# Patient Record
Sex: FEMALE | Race: WHITE | Employment: UNEMPLOYED | URBAN - METROPOLITAN AREA
[De-identification: names, ages, dates, MRNs, and addresses within clinical notes are randomized per-mention and may not be internally consistent; named-entity substitution may affect disease eponyms.]

---

## 2017-01-04 RX ORDER — IMIPRAMINE HCL 50 MG
200 TABLET ORAL NIGHTLY
Qty: 360 TABLET | Refills: 3 | Status: SHIPPED | OUTPATIENT
Start: 2017-01-04 | End: 2017-12-04

## 2017-01-04 RX ORDER — PRAVASTATIN SODIUM 40 MG
40 TABLET ORAL NIGHTLY
Qty: 90 TABLET | Refills: 3 | OUTPATIENT
Start: 2017-01-04

## 2017-01-04 RX ORDER — FAMOTIDINE 40 MG/1
40 TABLET, FILM COATED ORAL 2 TIMES DAILY PRN
Qty: 180 TABLET | Refills: 3 | Status: SHIPPED | OUTPATIENT
Start: 2017-01-04 | End: 2017-12-23

## 2017-01-04 NOTE — TELEPHONE ENCOUNTER
Pt. Called to check status of refills she is out of meds as of yesterday. Teresa's ph. # [phone]   Nichole ph.  # [phone]   Routed to Rx

## 2017-03-02 RX ORDER — LOSARTAN POTASSIUM 25 MG/1
TABLET ORAL
Qty: 45 TABLET | Refills: 3 | OUTPATIENT
Start: 2017-03-02

## 2017-03-02 RX ORDER — FUROSEMIDE 20 MG/1
TABLET ORAL
Qty: 180 TABLET | Refills: 3 | Status: SHIPPED | OUTPATIENT
Start: 2017-03-02 | End: 2018-03-26

## 2017-03-02 RX ORDER — LOSARTAN POTASSIUM 25 MG/1
TABLET ORAL
Qty: 45 TABLET | Refills: 3 | Status: SHIPPED | OUTPATIENT
Start: 2017-03-02 | End: 2018-01-25

## 2017-03-08 ENCOUNTER — TELEPHONE (OUTPATIENT)
Dept: INTERNAL MEDICINE CLINIC | Facility: CLINIC | Age: 62
End: 2017-03-08

## 2017-03-08 ENCOUNTER — OFFICE VISIT (OUTPATIENT)
Dept: INTERNAL MEDICINE CLINIC | Facility: CLINIC | Age: 62
End: 2017-03-08

## 2017-03-08 VITALS
DIASTOLIC BLOOD PRESSURE: 74 MMHG | RESPIRATION RATE: 18 BRPM | HEIGHT: 63.5 IN | SYSTOLIC BLOOD PRESSURE: 112 MMHG | OXYGEN SATURATION: 94 % | WEIGHT: 217.63 LBS | HEART RATE: 118 BPM | BODY MASS INDEX: 38.08 KG/M2 | TEMPERATURE: 99 F

## 2017-03-08 DIAGNOSIS — E53.8 VITAMIN B12 DEFICIENCY: ICD-10-CM

## 2017-03-08 DIAGNOSIS — Z86.010 HISTORY OF COLONIC POLYPS: ICD-10-CM

## 2017-03-08 DIAGNOSIS — Z79.4 TYPE 2 DIABETES MELLITUS WITHOUT COMPLICATION, WITH LONG-TERM CURRENT USE OF INSULIN (HCC): Primary | ICD-10-CM

## 2017-03-08 DIAGNOSIS — I87.2 VENOUS INSUFFICIENCY: ICD-10-CM

## 2017-03-08 DIAGNOSIS — Z86.73 HX OF TRANSIENT ISCHEMIC ATTACK (TIA): ICD-10-CM

## 2017-03-08 DIAGNOSIS — K76.0 NAFLD (NONALCOHOLIC FATTY LIVER DISEASE): ICD-10-CM

## 2017-03-08 DIAGNOSIS — E66.01 MORBID OBESITY, UNSPECIFIED OBESITY TYPE (HCC): ICD-10-CM

## 2017-03-08 DIAGNOSIS — Z00.00 ROUTINE HEALTH MAINTENANCE: ICD-10-CM

## 2017-03-08 DIAGNOSIS — E78.00 HYPERCHOLESTEROLEMIA: ICD-10-CM

## 2017-03-08 DIAGNOSIS — D69.6 THROMBOCYTOPENIA (HCC): ICD-10-CM

## 2017-03-08 DIAGNOSIS — E11.9 TYPE 2 DIABETES MELLITUS WITHOUT COMPLICATION, WITH LONG-TERM CURRENT USE OF INSULIN (HCC): Primary | ICD-10-CM

## 2017-03-08 DIAGNOSIS — E03.8 OTHER SPECIFIED HYPOTHYROIDISM: ICD-10-CM

## 2017-03-08 PROCEDURE — 99214 OFFICE O/P EST MOD 30 MIN: CPT | Performed by: INTERNAL MEDICINE

## 2017-03-08 PROCEDURE — 99212 OFFICE O/P EST SF 10 MIN: CPT | Performed by: INTERNAL MEDICINE

## 2017-03-08 NOTE — PROGRESS NOTES
Shannon Cerrato is a 63year old female. Patient presents with:  Diabetes: 6 month check up  Hyperlipidemia  Thyroid Problem    HPI:     Here for check up. Feels well. Admits to gorging on chocolate and sweets while in [facility] last month.    Daughter Be total) by mouth daily. Disp: 1 tablet Rfl: 0   Glucose Blood In Vitro Strip 1 each by Other route 6 (six) times daily.    Disp:  Rfl:    ondansetron (ZOFRAN ODT) 8 MG Oral Tablet Dispersible Take 1 tablet (8 mg total) by mouth every 6 (six) hours as needed 94%  GENERAL: well developed, well nourished, in no apparent distress  HEENT: normal oropharynx, normal TM's  NECK: supple, no adenopathy, no bruits  LUNGS: clear to auscultation  CARDIO: RRR, normal S1S2, without murmur or gallop  GI: soft, NT, ND, NABS, hepatology. 11. Thrombocytopenia  Plts remain slightly low (143) but stable. If starts to trend downward, will refer to heme for eval    12.  Left knee OA  Had arthroscopic surgery with Dr. Royal Nice in the past.  Had cortisone injection by Dr. Darcy Gibbs in the p

## 2017-03-09 NOTE — TELEPHONE ENCOUNTER
Called patient and relayed DR. METZ message and she verbalized understanding .  Transferred to [facility] to schedule F/U laura with  in 2-3 weeks

## 2017-03-22 ENCOUNTER — OFFICE VISIT (OUTPATIENT)
Dept: INTERNAL MEDICINE CLINIC | Facility: CLINIC | Age: 62
End: 2017-03-22

## 2017-03-22 VITALS
OXYGEN SATURATION: 98 % | DIASTOLIC BLOOD PRESSURE: 78 MMHG | HEART RATE: 100 BPM | BODY MASS INDEX: 38.15 KG/M2 | HEIGHT: 63.5 IN | SYSTOLIC BLOOD PRESSURE: 140 MMHG | TEMPERATURE: 98 F | WEIGHT: 218 LBS

## 2017-03-22 DIAGNOSIS — L98.8 LESION OF SKIN OF BREAST: Primary | ICD-10-CM

## 2017-03-22 DIAGNOSIS — Z12.31 ENCOUNTER FOR SCREENING MAMMOGRAM FOR BREAST CANCER: ICD-10-CM

## 2017-03-22 PROCEDURE — 99212 OFFICE O/P EST SF 10 MIN: CPT | Performed by: INTERNAL MEDICINE

## 2017-03-22 PROCEDURE — 99213 OFFICE O/P EST LOW 20 MIN: CPT | Performed by: INTERNAL MEDICINE

## 2017-03-22 NOTE — PROGRESS NOTES
Vashti Trejo is a [address] old female. Patient presents with: Follow - Up: Right breast lesion. Patient reports no change since last visit. HPI:     Here for f/u of breast lesion. Pt states unchanged; has been scratching at scab.   No more oozing; no Blood In Vitro Strip 1 each by Other route 6 (six) times daily. Disp:  Rfl:    ondansetron (ZOFRAN ODT) 8 MG Oral Tablet Dispersible Take 1 tablet (8 mg total) by mouth every 6 (six) hours as needed for Nausea.  Place on top of the tongue to dissolve, the fluctuance    ASSESSMENT AND PLAN:     1. Lesion of skin of R breast  Lesion started out as a \"blood blister. \"  Pt squeezed it and expressed blood -- now scabbed over with some induration.  Still with small scab and thickened, indurated skin -- likely ju

## 2017-04-21 RX ORDER — ERGOCALCIFEROL 1.25 MG/1
CAPSULE ORAL
Qty: 12 CAPSULE | Refills: 3 | Status: SHIPPED | OUTPATIENT
Start: 2017-04-21 | End: 2018-05-25

## 2017-04-27 RX ORDER — POTASSIUM CHLORIDE 750 MG/1
TABLET, FILM COATED, EXTENDED RELEASE ORAL
Qty: 180 TABLET | Refills: 11 | Status: SHIPPED | OUTPATIENT
Start: 2017-04-27 | End: 2017-09-26

## 2017-05-02 ENCOUNTER — HOSPITAL ENCOUNTER (OUTPATIENT)
Dept: MAMMOGRAPHY | Facility: HOSPITAL | Age: 62
Discharge: HOME | End: 2017-05-02
Attending: INTERNAL MEDICINE
Payer: COMMERCIAL

## 2017-05-02 ENCOUNTER — HOSPITAL ENCOUNTER (OUTPATIENT)
Dept: ULTRASOUND IMAGING | Facility: HOSPITAL | Age: 62
Discharge: HOME | End: 2017-05-02
Attending: INTERNAL MEDICINE
Payer: COMMERCIAL

## 2017-05-02 DIAGNOSIS — Z12.31 ENCOUNTER FOR SCREENING MAMMOGRAM FOR BREAST CANCER: ICD-10-CM

## 2017-05-02 DIAGNOSIS — L98.8 LESION OF SKIN OF BREAST: ICD-10-CM

## 2017-05-02 PROCEDURE — 77066 DX MAMMO INCL CAD BI: CPT

## 2017-05-02 PROCEDURE — 76642 ULTRASOUND BREAST LIMITED: CPT

## 2017-05-08 ENCOUNTER — OFFICE VISIT (OUTPATIENT)
Dept: SURGERY | Facility: CLINIC | Age: 62
End: 2017-05-08

## 2017-05-08 VITALS
DIASTOLIC BLOOD PRESSURE: 73 MMHG | WEIGHT: 217.38 LBS | TEMPERATURE: 97 F | HEIGHT: 63.5 IN | HEART RATE: 109 BPM | SYSTOLIC BLOOD PRESSURE: 122 MMHG | BODY MASS INDEX: 38.04 KG/M2

## 2017-05-08 DIAGNOSIS — R79.89 ABNORMAL LIVER FUNCTION TESTS: Primary | ICD-10-CM

## 2017-05-08 NOTE — PROGRESS NOTES
[facility]  [address], [address]  [address]  [phone] ([phone]) CR, TAKE 3 TABLETS(30 MEQ) BY MOUTH TWICE DAILY, Disp: 180 tablet, Rfl: 11  •  ERGOCALCIFEROL 57130 units Oral Cap, TAKE 1 CAPSULE BY MOUTH EVERY WEEK, Disp: 12 capsule, Rfl: 3  •  Insulin Pen Needle (CARETOUCH PEN NEEDLES) 32G X 4 MM Does not apply Misc, 5  •  Meclizine HCl (ANTIVERT) 25 MG Oral Tab, Take 1 tablet (25 mg total) by mouth 3 (three) times daily as needed. , Disp: 30 tablet, Rfl: 5  •  Butalbital-APAP-Caffeine (FIORICET) -40 MG Oral Tab, Take 1-2 tablets by mouth every 4 (four) hours as n (fax)  [phone] (mobile)  [email or web address]

## 2017-05-10 ENCOUNTER — LAB ENCOUNTER (OUTPATIENT)
Dept: LAB | Facility: HOSPITAL | Age: 62
End: 2017-05-10
Attending: INTERNAL MEDICINE
Payer: COMMERCIAL

## 2017-05-10 ENCOUNTER — PRIOR ORIGINAL RECORDS (OUTPATIENT)
Dept: OTHER | Age: 62
End: 2017-05-10

## 2017-05-10 ENCOUNTER — TELEPHONE (OUTPATIENT)
Dept: SURGERY | Facility: CLINIC | Age: 62
End: 2017-05-10

## 2017-05-10 DIAGNOSIS — E03.9 MYXEDEMA HEART DISEASE: ICD-10-CM

## 2017-05-10 DIAGNOSIS — R79.89 HYPOURICEMIA: ICD-10-CM

## 2017-05-10 DIAGNOSIS — E78.2 MIXED HYPERLIPIDEMIA: ICD-10-CM

## 2017-05-10 DIAGNOSIS — E10.9 DIABETES MELLITUS TYPE I (HCC): Primary | ICD-10-CM

## 2017-05-10 DIAGNOSIS — I51.9 MYXEDEMA HEART DISEASE: ICD-10-CM

## 2017-05-10 PROCEDURE — 85049 AUTOMATED PLATELET COUNT: CPT

## 2017-05-10 PROCEDURE — 84460 ALANINE AMINO (ALT) (SGPT): CPT

## 2017-05-10 PROCEDURE — 84450 TRANSFERASE (AST) (SGOT): CPT

## 2017-05-10 PROCEDURE — 84443 ASSAY THYROID STIM HORMONE: CPT

## 2017-05-10 PROCEDURE — 82570 ASSAY OF URINE CREATININE: CPT

## 2017-05-10 PROCEDURE — 83883 ASSAY NEPHELOMETRY NOT SPEC: CPT

## 2017-05-10 PROCEDURE — 82977 ASSAY OF GGT: CPT

## 2017-05-10 PROCEDURE — 83036 HEMOGLOBIN GLYCOSYLATED A1C: CPT

## 2017-05-10 PROCEDURE — 82043 UR ALBUMIN QUANTITATIVE: CPT

## 2017-05-10 PROCEDURE — 84520 ASSAY OF UREA NITROGEN: CPT

## 2017-05-10 PROCEDURE — 80061 LIPID PANEL: CPT

## 2017-05-10 PROCEDURE — 84439 ASSAY OF FREE THYROXINE: CPT

## 2017-05-10 PROCEDURE — 36415 COLL VENOUS BLD VENIPUNCTURE: CPT

## 2017-05-10 PROCEDURE — 80053 COMPREHEN METABOLIC PANEL: CPT

## 2017-05-10 NOTE — TELEPHONE ENCOUNTER
Spoke with MD at [facility] and [facility] approved    [id number]; expiration 6/24/17    He noted ordering MD is incorrect on what they have and not sure where error was.

## 2017-05-12 ENCOUNTER — HOSPITAL ENCOUNTER (OUTPATIENT)
Dept: CT IMAGING | Facility: HOSPITAL | Age: 62
Discharge: HOME | End: 2017-05-12
Attending: INTERNAL MEDICINE
Payer: COMMERCIAL

## 2017-05-12 DIAGNOSIS — R79.89 ABNORMAL LIVER FUNCTION TESTS: ICD-10-CM

## 2017-05-12 PROCEDURE — 82565 ASSAY OF CREATININE: CPT

## 2017-05-12 PROCEDURE — 74160 CT ABDOMEN W/CONTRAST: CPT | Performed by: INTERNAL MEDICINE

## 2017-05-31 ENCOUNTER — TELEPHONE (OUTPATIENT)
Dept: INTERNAL MEDICINE CLINIC | Facility: CLINIC | Age: 62
End: 2017-05-31

## 2017-05-31 NOTE — TELEPHONE ENCOUNTER
Pt states breast lesion has healed and doing fine Had US and xray of breast and a cyst was noted to recheck in 1 yr  FYI for DR METZ to note--    Saw DR Mercedes Turner - cat scan of liver and spleen done   Enlarged liver and spleen noted  rec bx of liver   But needs t

## 2017-06-01 NOTE — TELEPHONE ENCOUNTER
Noted.  I do not see any recent CXR. Pt did have a recent CT chest and aorta was normal; there was mild calcification of aortic valve.

## 2017-06-13 ENCOUNTER — OFFICE VISIT (OUTPATIENT)
Dept: INTERNAL MEDICINE CLINIC | Facility: CLINIC | Age: 62
End: 2017-06-13

## 2017-06-13 VITALS
SYSTOLIC BLOOD PRESSURE: 120 MMHG | WEIGHT: 217 LBS | HEART RATE: 109 BPM | OXYGEN SATURATION: 97 % | TEMPERATURE: 99 F | DIASTOLIC BLOOD PRESSURE: 82 MMHG | HEIGHT: 63.5 IN | BODY MASS INDEX: 37.97 KG/M2

## 2017-06-13 DIAGNOSIS — Z86.73 HX OF TRANSIENT ISCHEMIC ATTACK (TIA): ICD-10-CM

## 2017-06-13 DIAGNOSIS — K76.0 NAFLD (NONALCOHOLIC FATTY LIVER DISEASE): Primary | ICD-10-CM

## 2017-06-13 PROCEDURE — 99213 OFFICE O/P EST LOW 20 MIN: CPT | Performed by: INTERNAL MEDICINE

## 2017-06-13 PROCEDURE — 99212 OFFICE O/P EST SF 10 MIN: CPT | Performed by: INTERNAL MEDICINE

## 2017-06-13 NOTE — PROGRESS NOTES
Joseluis Daley is a 63year old female. Patient presents with:   Follow - Up    HPI:     Seeing Dr. Robbie Dubois for her NAFLD -- recommended liver biopsy but given that pt is on Plavix, he recommended a CT abdomen and blood test -- which were inconclusive, so he U-500 KWIKPEN 500 UNIT/ML Subcutaneous Solution Pen-injector Inject 80 Units into the skin 3 (three) times daily. Disp:  Rfl: 11   Vitamin B-12 500 MCG Oral Tab Take 1 tablet (500 mcg total) by mouth daily.  Disp: 1 tablet Rfl: 0   Glucose Blood In Vitro 217 lb (98.431 kg)  BMI 37.83 kg/m2  SpO2 97%  GENERAL: well developed, well nourished, in no apparent distress  NECK: supple, no adenopathy  LUNGS: clear to auscultation  CARDIO: RRR, normal S1S2, without murmur or gallop      ASSESSMENT AND PLAN:     1.

## 2017-06-29 LAB
ALBUMIN: 3.4 G/DL
ALKALINE PHOSPHATATE(ALK PHOS): 85 IU/L
ALT (SGPT): 76 U/L
AST (SGOT): 75 U/L
BILIRUBIN TOTAL: 0.6 MG/DL
BUN: 11 MG/DL
CALCIUM: 9.7 MG/DL
CHLORIDE: 99 MEQ/L
CHOLESTEROL, TOTAL: 164 MG/DL
CREATININE, SERUM: 0.87 MG/DL
GLUCOSE: 157 MG/DL
GLUCOSE: 157 MG/DL
HDL CHOLESTEROL: 51 MG/DL
HEMOGLOBIN A1C: 11.6 %
LDL CHOLESTEROL: 90 MG/DL
NON-HDL CHOLESTEROL: 113 MG/DL
POTASSIUM, SERUM: 4.1 MEQ/L
PROTEIN, TOTAL: 7.3 G/DL
SGOT (AST): 75 IU/L
SGPT (ALT): 76 IU/L
SODIUM: 139 MEQ/L
THYROID STIMULATING HORMONE: 2.27 MLU/L
TRIGLYCERIDES: 114 MG/DL

## 2017-06-30 ENCOUNTER — PRIOR ORIGINAL RECORDS (OUTPATIENT)
Dept: OTHER | Age: 62
End: 2017-06-30

## 2017-07-04 RX ORDER — CLOPIDOGREL BISULFATE 75 MG/1
TABLET ORAL
Qty: 90 TABLET | Refills: 3 | Status: SHIPPED | OUTPATIENT
Start: 2017-07-04 | End: 2018-06-26

## 2017-07-10 ENCOUNTER — PRIOR ORIGINAL RECORDS (OUTPATIENT)
Dept: OTHER | Age: 62
End: 2017-07-10

## 2017-07-14 ENCOUNTER — PRIOR ORIGINAL RECORDS (OUTPATIENT)
Dept: OTHER | Age: 62
End: 2017-07-14

## 2017-09-05 ENCOUNTER — LAB ENCOUNTER (OUTPATIENT)
Dept: LAB | Facility: HOSPITAL | Age: 62
End: 2017-09-05
Attending: INTERNAL MEDICINE
Payer: COMMERCIAL

## 2017-09-05 ENCOUNTER — PRIOR ORIGINAL RECORDS (OUTPATIENT)
Dept: OTHER | Age: 62
End: 2017-09-05

## 2017-09-05 DIAGNOSIS — R79.89 ABNORMAL LFTS: Primary | ICD-10-CM

## 2017-09-05 LAB
ALBUMIN SERPL BCP-MCNC: 3.4 G/DL (ref 3.5–4.8)
ALP SERPL-CCNC: 82 U/L (ref 32–100)
ALT SERPL-CCNC: 55 U/L (ref 14–54)
AST SERPL-CCNC: 46 U/L (ref 15–41)
BILIRUB DIRECT SERPL-MCNC: 0.2 MG/DL (ref 0–0.2)
BILIRUB SERPL-MCNC: 0.6 MG/DL (ref 0.3–1.2)
PROT SERPL-MCNC: 7 G/DL (ref 5.9–8.4)

## 2017-09-05 PROCEDURE — 36415 COLL VENOUS BLD VENIPUNCTURE: CPT

## 2017-09-05 PROCEDURE — 80076 HEPATIC FUNCTION PANEL: CPT

## 2017-09-06 LAB
ALBUMIN: 3.4 G/DL
ALKALINE PHOSPHATATE(ALK PHOS): 82 IU/L
BILIRUBIN TOTAL: 0.6 MG/DL
PROTEIN, TOTAL: 7 G/DL
SGOT (AST): 46 IU/L
SGPT (ALT): 55 IU/L

## 2017-09-12 ENCOUNTER — MYAURORA ACCOUNT LINK (OUTPATIENT)
Dept: OTHER | Age: 62
End: 2017-09-12

## 2017-09-12 ENCOUNTER — PRIOR ORIGINAL RECORDS (OUTPATIENT)
Dept: OTHER | Age: 62
End: 2017-09-12

## 2017-09-13 ENCOUNTER — LAB ENCOUNTER (OUTPATIENT)
Dept: LAB | Facility: HOSPITAL | Age: 62
End: 2017-09-13
Attending: INTERNAL MEDICINE
Payer: COMMERCIAL

## 2017-09-13 ENCOUNTER — PRIOR ORIGINAL RECORDS (OUTPATIENT)
Dept: OTHER | Age: 62
End: 2017-09-13

## 2017-09-13 DIAGNOSIS — E11.9 DM2 (DIABETES MELLITUS, TYPE 2) (HCC): Primary | ICD-10-CM

## 2017-09-13 DIAGNOSIS — R74.8 ELEVATED LIVER ENZYMES: ICD-10-CM

## 2017-09-13 DIAGNOSIS — D69.6 THROMBOCYTOPENIA (HCC): ICD-10-CM

## 2017-09-13 DIAGNOSIS — E78.00 HYPERCHOLESTEREMIA: ICD-10-CM

## 2017-09-13 LAB
ALBUMIN SERPL BCP-MCNC: 3.5 G/DL (ref 3.5–4.8)
ALBUMIN/GLOB SERPL: 1 {RATIO} (ref 1–2)
ALP SERPL-CCNC: 79 U/L (ref 32–100)
ALT SERPL-CCNC: 63 U/L (ref 14–54)
ANION GAP SERPL CALC-SCNC: 10 MMOL/L (ref 0–18)
AST SERPL-CCNC: 50 U/L (ref 15–41)
BASOPHILS # BLD: 0 K/UL (ref 0–0.2)
BASOPHILS NFR BLD: 1 %
BILIRUB SERPL-MCNC: 0.7 MG/DL (ref 0.3–1.2)
BUN SERPL-MCNC: 8 MG/DL (ref 8–20)
BUN/CREAT SERPL: 9.4 (ref 10–20)
CALCIUM SERPL-MCNC: 9.1 MG/DL (ref 8.5–10.5)
CHLORIDE SERPL-SCNC: 99 MMOL/L (ref 95–110)
CHOLEST SERPL-MCNC: 208 MG/DL (ref 110–200)
CO2 SERPL-SCNC: 30 MMOL/L (ref 22–32)
CREAT SERPL-MCNC: 0.85 MG/DL (ref 0.5–1.5)
CREAT UR-MCNC: 186.1 MG/DL
EOSINOPHIL # BLD: 0.2 K/UL (ref 0–0.7)
EOSINOPHIL NFR BLD: 4 %
ERYTHROCYTE [DISTWIDTH] IN BLOOD BY AUTOMATED COUNT: 13.6 % (ref 11–15)
GLOBULIN PLAS-MCNC: 3.5 G/DL (ref 2.5–3.7)
GLUCOSE SERPL-MCNC: 133 MG/DL (ref 70–99)
HBA1C MFR BLD: 11.3 % (ref 4–6)
HCT VFR BLD AUTO: 44.1 % (ref 35–48)
HDLC SERPL-MCNC: 49 MG/DL
HGB BLD-MCNC: 14.8 G/DL (ref 12–16)
LDLC SERPL CALC-MCNC: 127 MG/DL (ref 0–99)
LYMPHOCYTES # BLD: 2.9 K/UL (ref 1–4)
LYMPHOCYTES NFR BLD: 54 %
MCH RBC QN AUTO: 30.4 PG (ref 27–32)
MCHC RBC AUTO-ENTMCNC: 33.6 G/DL (ref 32–37)
MCV RBC AUTO: 90.7 FL (ref 80–100)
MICROALBUMIN UR-MCNC: 58.3 MG/DL (ref 0–1.8)
MICROALBUMIN/CREAT UR: 313.3 MG/G{CREAT} (ref 0–20)
MONOCYTES # BLD: 0.6 K/UL (ref 0–1)
MONOCYTES NFR BLD: 11 %
NEUTROPHILS # BLD AUTO: 1.7 K/UL (ref 1.8–7.7)
NEUTROPHILS NFR BLD: 32 %
NONHDLC SERPL-MCNC: 159 MG/DL
OSMOLALITY UR CALC.SUM OF ELEC: 288 MOSM/KG (ref 275–295)
PLATELET # BLD AUTO: 134 K/UL (ref 140–400)
PMV BLD AUTO: 7.7 FL (ref 7.4–10.3)
POTASSIUM SERPL-SCNC: 3.3 MMOL/L (ref 3.3–5.1)
PROT SERPL-MCNC: 7 G/DL (ref 5.9–8.4)
RBC # BLD AUTO: 4.86 M/UL (ref 3.7–5.4)
SODIUM SERPL-SCNC: 139 MMOL/L (ref 136–144)
TRIGL SERPL-MCNC: 162 MG/DL (ref 1–149)
WBC # BLD AUTO: 5.5 K/UL (ref 4–11)

## 2017-09-13 PROCEDURE — 82043 UR ALBUMIN QUANTITATIVE: CPT

## 2017-09-13 PROCEDURE — 80061 LIPID PANEL: CPT

## 2017-09-13 PROCEDURE — 82570 ASSAY OF URINE CREATININE: CPT

## 2017-09-13 PROCEDURE — 80053 COMPREHEN METABOLIC PANEL: CPT

## 2017-09-13 PROCEDURE — 36415 COLL VENOUS BLD VENIPUNCTURE: CPT

## 2017-09-13 PROCEDURE — 83036 HEMOGLOBIN GLYCOSYLATED A1C: CPT

## 2017-09-13 PROCEDURE — 85025 COMPLETE CBC W/AUTO DIFF WBC: CPT

## 2017-09-14 LAB
ALBUMIN: 3.5 G/DL
ALKALINE PHOSPHATATE(ALK PHOS): 79 IU/L
ALT (SGPT): 63 U/L
AST (SGOT): 50 U/L
BILIRUBIN TOTAL: 0.7 MG/DL
BUN: 8 MG/DL
CALCIUM: 9.1 MG/DL
CHLORIDE: 99 MEQ/L
CHOLESTEROL, TOTAL: 208 MG/DL
CREATININE, SERUM: 0.85 MG/DL
GLOBULIN: 3.5 G/DL
GLUCOSE: 133 MG/DL
GLUCOSE: 133 MG/DL
HDL CHOLESTEROL: 49 MG/DL
LDL CHOLESTEROL: 127 MG/DL
POTASSIUM, SERUM: 3.3 MEQ/L
PROTEIN, TOTAL: 7 G/DL
SGOT (AST): 50 IU/L
SGPT (ALT): 63 IU/L
SODIUM: 139 MEQ/L
TRIGLYCERIDES: 162 MG/DL

## 2017-09-19 ENCOUNTER — OFFICE VISIT (OUTPATIENT)
Dept: INTERNAL MEDICINE CLINIC | Facility: CLINIC | Age: 62
End: 2017-09-19

## 2017-09-19 VITALS
DIASTOLIC BLOOD PRESSURE: 78 MMHG | HEIGHT: 63 IN | HEART RATE: 107 BPM | WEIGHT: 217 LBS | OXYGEN SATURATION: 98 % | TEMPERATURE: 100 F | BODY MASS INDEX: 38.45 KG/M2 | RESPIRATION RATE: 16 BRPM | SYSTOLIC BLOOD PRESSURE: 132 MMHG

## 2017-09-19 DIAGNOSIS — E53.8 VITAMIN B12 DEFICIENCY: ICD-10-CM

## 2017-09-19 DIAGNOSIS — Z00.00 ROUTINE HEALTH MAINTENANCE: ICD-10-CM

## 2017-09-19 DIAGNOSIS — K76.0 NAFLD (NONALCOHOLIC FATTY LIVER DISEASE): ICD-10-CM

## 2017-09-19 DIAGNOSIS — E03.8 OTHER SPECIFIED HYPOTHYROIDISM: ICD-10-CM

## 2017-09-19 DIAGNOSIS — D69.6 THROMBOCYTOPENIA (HCC): ICD-10-CM

## 2017-09-19 DIAGNOSIS — E11.9 TYPE 2 DIABETES MELLITUS WITHOUT COMPLICATION, WITH LONG-TERM CURRENT USE OF INSULIN (HCC): Primary | ICD-10-CM

## 2017-09-19 DIAGNOSIS — Z86.73 HX OF TRANSIENT ISCHEMIC ATTACK (TIA): ICD-10-CM

## 2017-09-19 DIAGNOSIS — E78.00 HYPERCHOLESTEROLEMIA: ICD-10-CM

## 2017-09-19 DIAGNOSIS — Z79.4 TYPE 2 DIABETES MELLITUS WITHOUT COMPLICATION, WITH LONG-TERM CURRENT USE OF INSULIN (HCC): Primary | ICD-10-CM

## 2017-09-19 PROCEDURE — 99215 OFFICE O/P EST HI 40 MIN: CPT | Performed by: INTERNAL MEDICINE

## 2017-09-19 PROCEDURE — 99212 OFFICE O/P EST SF 10 MIN: CPT | Performed by: INTERNAL MEDICINE

## 2017-09-19 NOTE — PROGRESS NOTES
Ellie Stephens is a 63year old female. Patient presents with: Follow - Up    HPI:     Here for 6 month check up. Saw Dr. Scott Saldana in 5/2017 for eval of her elevated liver enzymes. He did a scan and then recommmended a liver biopsy.   She saw Dr. Wicho Hall 10   HUMULIN R U-500 KWIKPEN 500 UNIT/ML Subcutaneous Solution Pen-injector Inject 80 Units into the skin 3 (three) times daily. Disp:  Rfl: 11   Vitamin B-12 500 MCG Oral Tab Take 1 tablet (500 mcg total) by mouth daily.  Disp: 1 tablet Rfl: 0   Glucose index is 38.44 kg/m².       EXAM:   /78 (BP Location: Left arm, Patient Position: Sitting, Cuff Size: adult)   Pulse 107   Temp 99.5 °F (37.5 °C) (Oral)   Resp 16   Ht 5' 3\" (1.6 m)   Wt 217 lb (98.4 kg)   SpO2 98%   BMI 38.44 kg/m²   GENERAL: well d A/B/C panel, ceruloplasmin, DONTAE, AMA normal.  Again discussed weight loss. Seeing Dr. Anjana Posey who recommended liver biopsy.   Saw Dr. Don Poon for pre-op eval -- had Echo which was overall normal. He stopped her pravachol in 7/2017, but liver transaminases onl

## 2017-09-19 NOTE — PROGRESS NOTES
Pt reported that she normally has a foot exam done at the \"diabetic expo\" every year. She was not able to go this year, but plans to go next year. This RN encouraged and offered a foot exam today.  Pt refused, stating that she will go to the expo next yea

## 2017-09-25 ENCOUNTER — OFFICE VISIT (OUTPATIENT)
Dept: SURGERY | Facility: CLINIC | Age: 62
End: 2017-09-25

## 2017-09-25 VITALS
RESPIRATION RATE: 18 BRPM | TEMPERATURE: 99 F | BODY MASS INDEX: 37.16 KG/M2 | SYSTOLIC BLOOD PRESSURE: 122 MMHG | WEIGHT: 217.63 LBS | OXYGEN SATURATION: 98 % | HEART RATE: 105 BPM | DIASTOLIC BLOOD PRESSURE: 78 MMHG | HEIGHT: 64 IN

## 2017-09-25 DIAGNOSIS — K75.81 LIVER CIRRHOSIS SECONDARY TO NASH (HCC): Primary | ICD-10-CM

## 2017-09-25 DIAGNOSIS — K74.60 LIVER CIRRHOSIS SECONDARY TO NASH (HCC): Primary | ICD-10-CM

## 2017-09-25 RX ORDER — PRAVASTATIN SODIUM 40 MG
TABLET ORAL
Qty: 90 TABLET | Refills: 0 | OUTPATIENT
Start: 2017-09-25

## 2017-09-25 NOTE — PROGRESS NOTES
[facility]  [address], [address]  [address]  [phone] ([phone]) Use: Yes                Comment: occassionaly            Medications:  •  KLOR-CON 10 10 MEQ Oral Tab CR, TAKE 3 TABLETS(30 MEQ) BY MOUTH TWICE DAILY, Disp: 180 tablet, Rfl: 11  •  ERGOCALCIFEROL 58470 units Oral Cap, TAKE 1 CAPSULE BY MOUTH EVERY WEEK, Martha Prince hours as needed for Nausea. Place on top of the tongue to dissolve, then swallow, Disp: 30 tablet, Rfl: 5  •  Meclizine HCl (ANTIVERT) 25 MG Oral Tab, Take 1 tablet (25 mg total) by mouth 3 (three) times daily as needed. , Disp: 30 tablet, Rfl: 5  •  Butalb

## 2017-09-25 NOTE — PROGRESS NOTES
[facility]  [address], [address]  [address]  [phone] ([phone]) Rfl: 3  •  LOSARTAN POTASSIUM 25 MG Oral Tab, TAKE 1/2 TABLET(12.5 MG) BY MOUTH DAILY, Disp: 45 tablet, Rfl: 3  •  famotidine 40 MG Oral Tab, Take 1 tablet (40 mg total) by mouth 2 (two) times daily as needed for Heartburn., Disp: 180 tablet, Rfl: 3  •  Im non-distended, non-tender, no hepatosplenomegaly  Derm: no rash  Neuro: A&Ox3, no asterixis  Psych: normal affect/mood     Assessment and Plan:  63year old with NAFLD, likely BLANCAS cirrhosis given her thrombocytopenia, mild splenomegaly and slightly low al

## 2017-09-25 NOTE — TELEPHONE ENCOUNTER
Pravastatin refill request denied, per last office visit note (9/19/17) pravachol stopped by Rosanne Ontiveros.

## 2017-09-26 ENCOUNTER — PATIENT MESSAGE (OUTPATIENT)
Dept: INTERNAL MEDICINE CLINIC | Facility: CLINIC | Age: 62
End: 2017-09-26

## 2017-09-26 ENCOUNTER — PRIOR ORIGINAL RECORDS (OUTPATIENT)
Dept: OTHER | Age: 62
End: 2017-09-26

## 2017-09-26 DIAGNOSIS — E87.6 HYPOKALEMIA: Primary | ICD-10-CM

## 2017-09-26 RX ORDER — FLUCONAZOLE 150 MG/1
150 TABLET ORAL ONCE
Qty: 1 TABLET | Refills: 1 | Status: SHIPPED | OUTPATIENT
Start: 2017-09-26 | End: 2017-09-26

## 2017-09-26 RX ORDER — POTASSIUM CHLORIDE 750 MG/1
40 TABLET, FILM COATED, EXTENDED RELEASE ORAL 2 TIMES DAILY
Qty: 240 TABLET | Refills: 11 | Status: SHIPPED | OUTPATIENT
Start: 2017-09-26 | End: 2018-09-11

## 2017-09-26 RX ORDER — PRAVASTATIN SODIUM 40 MG
40 TABLET ORAL NIGHTLY
Qty: 90 TABLET | Refills: 3 | Status: SHIPPED | OUTPATIENT
Start: 2017-09-26 | End: 2018-09-11

## 2017-09-26 NOTE — TELEPHONE ENCOUNTER
From: Pj Ruelas  To: Monster Murrieta MD  Sent: 9/26/2017 4:27 PM CDT  Subject: Other    Dr Dianne Kelly    . Dr Peyman Swartz did see me yesterday and confirms that i do have Cirrhosis.  He will be doing Ultrasounds/Cat Scans every 6 mos to see if there is any

## 2017-10-12 ENCOUNTER — TELEPHONE (OUTPATIENT)
Dept: INTERNAL MEDICINE CLINIC | Facility: CLINIC | Age: 62
End: 2017-10-12

## 2017-10-12 ENCOUNTER — APPOINTMENT (OUTPATIENT)
Dept: LAB | Facility: HOSPITAL | Age: 62
End: 2017-10-12
Attending: INTERNAL MEDICINE
Payer: COMMERCIAL

## 2017-10-12 DIAGNOSIS — E87.6 HYPOKALEMIA: ICD-10-CM

## 2017-10-12 DIAGNOSIS — E87.6 HYPOKALEMIA: Primary | ICD-10-CM

## 2017-10-12 PROCEDURE — 80048 BASIC METABOLIC PNL TOTAL CA: CPT

## 2017-10-12 PROCEDURE — 36415 COLL VENOUS BLD VENIPUNCTURE: CPT

## 2017-10-12 NOTE — TELEPHONE ENCOUNTER
Pt went for lab today at [facility]  Pt hoping to hear back today about her potassium if possible (not an emergency) as she is concerned it might be high. Pt can be reached at [phone].    Tasked to nursing

## 2017-10-12 NOTE — TELEPHONE ENCOUNTER
To Dr Gera Holliday advise on lab results from today. Patient hoping to hear back today.  She is concerned about her potassium level (3.8)

## 2017-10-13 NOTE — TELEPHONE ENCOUNTER
Spoke with patient and relayed Dr. Rebecca Acosta' message. Patient verbalized an understanding. Results released to [address]   Order for BMP placed per Dr. Rebecca Acosta' verbal request.

## 2017-10-18 ENCOUNTER — LAB ENCOUNTER (OUTPATIENT)
Dept: LAB | Facility: HOSPITAL | Age: 62
End: 2017-10-18
Attending: INTERNAL MEDICINE
Payer: COMMERCIAL

## 2017-10-18 DIAGNOSIS — E11.9 DIABETES MELLITUS (HCC): Primary | ICD-10-CM

## 2017-10-18 PROCEDURE — 36415 COLL VENOUS BLD VENIPUNCTURE: CPT

## 2017-10-18 PROCEDURE — 83036 HEMOGLOBIN GLYCOSYLATED A1C: CPT

## 2017-10-18 PROCEDURE — 80053 COMPREHEN METABOLIC PANEL: CPT

## 2017-10-21 ENCOUNTER — HOSPITAL ENCOUNTER (OUTPATIENT)
Dept: ULTRASOUND IMAGING | Facility: HOSPITAL | Age: 62
Discharge: HOME | End: 2017-10-21
Attending: INTERNAL MEDICINE
Payer: COMMERCIAL

## 2017-10-21 DIAGNOSIS — K75.81 LIVER CIRRHOSIS SECONDARY TO NASH (HCC): ICD-10-CM

## 2017-10-21 DIAGNOSIS — K74.60 LIVER CIRRHOSIS SECONDARY TO NASH (HCC): ICD-10-CM

## 2017-10-21 PROCEDURE — 76705 ECHO EXAM OF ABDOMEN: CPT | Performed by: INTERNAL MEDICINE

## 2017-10-25 ENCOUNTER — PATIENT MESSAGE (OUTPATIENT)
Dept: INTERNAL MEDICINE CLINIC | Facility: CLINIC | Age: 62
End: 2017-10-25

## 2017-10-25 DIAGNOSIS — E87.6 HYPOKALEMIA: Primary | ICD-10-CM

## 2017-10-26 NOTE — TELEPHONE ENCOUNTER
From: Shannon Cerrato  To:  Leticia Astorga MD  Sent: 10/25/2017 11:41 PM CDT  Subject: Test Results Question    Hello  Just a quick question  Potassium  9/13 3.3 taking 6 pills  10/12 3.8 after taking 8 pills    i had blood work done for Dr Haydee Brennan and th

## 2017-10-28 ENCOUNTER — APPOINTMENT (OUTPATIENT)
Dept: ENDOCRINOLOGY | Facility: HOSPITAL | Age: 62
End: 2017-10-28
Attending: INTERNAL MEDICINE
Payer: COMMERCIAL

## 2017-11-08 RX ORDER — IMIPRAMINE HCL 50 MG
TABLET ORAL
Qty: 360 TABLET | Refills: 3 | OUTPATIENT
Start: 2017-11-08

## 2017-11-08 NOTE — TELEPHONE ENCOUNTER
Refill request received for imipramine. Rx last authorized for refill 1/4/17 90 day supply with 3 refills:  Disp Refills Start End     Imipramine HCl 50 MG Oral Tab 360 tablet 3 1/4/2017     Sig - Route: Take 4 tablets (200 mg total) by mouth nightly.  - Or

## 2017-11-16 ENCOUNTER — APPOINTMENT (OUTPATIENT)
Dept: LAB | Facility: HOSPITAL | Age: 62
End: 2017-11-16
Attending: INTERNAL MEDICINE
Payer: COMMERCIAL

## 2017-11-16 DIAGNOSIS — E87.6 HYPOKALEMIA: ICD-10-CM

## 2017-11-16 PROCEDURE — 36415 COLL VENOUS BLD VENIPUNCTURE: CPT

## 2017-11-16 PROCEDURE — 83735 ASSAY OF MAGNESIUM: CPT

## 2017-11-16 PROCEDURE — 80048 BASIC METABOLIC PNL TOTAL CA: CPT

## 2017-11-17 ENCOUNTER — APPOINTMENT (OUTPATIENT)
Dept: ENDOCRINOLOGY | Facility: HOSPITAL | Age: 62
End: 2017-11-17
Attending: INTERNAL MEDICINE
Payer: COMMERCIAL

## 2017-12-04 RX ORDER — IMIPRAMINE HCL 50 MG
TABLET ORAL
Qty: 360 TABLET | Refills: 3 | OUTPATIENT
Start: 2017-12-04

## 2017-12-04 RX ORDER — ERGOCALCIFEROL 1.25 MG/1
CAPSULE ORAL
Qty: 12 CAPSULE | Refills: 0 | OUTPATIENT
Start: 2017-12-04

## 2017-12-04 RX ORDER — ERGOCALCIFEROL 1.25 MG/1
CAPSULE ORAL
Qty: 12 CAPSULE | Refills: 3
Start: 2017-12-04

## 2017-12-05 RX ORDER — IMIPRAMINE HCL 50 MG
200 TABLET ORAL NIGHTLY
Qty: 360 TABLET | Refills: 3 | Status: SHIPPED
Start: 2017-12-05 | End: 2018-09-11

## 2017-12-05 RX ORDER — ERGOCALCIFEROL 1.25 MG/1
CAPSULE ORAL
Qty: 12 CAPSULE | Refills: 0 | OUTPATIENT
Start: 2017-12-05

## 2017-12-05 NOTE — TELEPHONE ENCOUNTER
From: Lu Michael  Sent: 12/4/2017 10:16 PM CST  Subject: Medication Renewal Request    Lu Michael would like a refill of the following medications:     Imipramine HCl 50 MG Oral Tab Jenaro Llanes MD]   [facility] Comment[facility]

## 2017-12-08 RX ORDER — ERGOCALCIFEROL 1.25 MG/1
CAPSULE ORAL
Qty: 12 CAPSULE | Refills: 0 | OUTPATIENT
Start: 2017-12-08

## 2017-12-09 ENCOUNTER — TELEPHONE (OUTPATIENT)
Dept: INTERNAL MEDICINE CLINIC | Facility: CLINIC | Age: 62
End: 2017-12-09

## 2017-12-09 DIAGNOSIS — E83.42 HYPOMAGNESEMIA: ICD-10-CM

## 2017-12-09 DIAGNOSIS — E87.6 HYPOKALEMIA: Primary | ICD-10-CM

## 2017-12-09 RX ORDER — MELATONIN
400 2 TIMES DAILY
Qty: 30 TABLET | Refills: 0 | COMMUNITY
Start: 2017-12-09 | End: 2018-09-11

## 2017-12-11 RX ORDER — MAGNESIUM OXIDE 400 MG (241.3 MG MAGNESIUM) TABLET
400 TABLET 2 TIMES DAILY
Qty: 1 TABLET | Refills: 0 | COMMUNITY
Start: 2017-12-11 | End: 2018-09-11

## 2017-12-11 NOTE — TELEPHONE ENCOUNTER
Patient called back - relayed DR. METZ message and she verbalized understanding.  Med module updated

## 2017-12-26 RX ORDER — FAMOTIDINE 40 MG/1
TABLET, FILM COATED ORAL
Qty: 180 TABLET | Refills: 3 | Status: SHIPPED | OUTPATIENT
Start: 2017-12-26 | End: 2018-09-11

## 2017-12-29 RX ORDER — IMIPRAMINE HCL 50 MG
TABLET ORAL
Qty: 360 TABLET | Refills: 0 | OUTPATIENT
Start: 2017-12-29

## 2018-01-03 ENCOUNTER — APPOINTMENT (OUTPATIENT)
Dept: LAB | Facility: HOSPITAL | Age: 63
End: 2018-01-03
Attending: INTERNAL MEDICINE
Payer: COMMERCIAL

## 2018-01-03 DIAGNOSIS — E83.42 HYPOMAGNESEMIA: ICD-10-CM

## 2018-01-03 DIAGNOSIS — E87.6 HYPOKALEMIA: ICD-10-CM

## 2018-01-03 LAB
ANION GAP SERPL CALC-SCNC: 10 MMOL/L (ref 0–18)
BUN SERPL-MCNC: 10 MG/DL (ref 8–20)
BUN/CREAT SERPL: 12 (ref 10–20)
CALCIUM SERPL-MCNC: 9.4 MG/DL (ref 8.5–10.5)
CHLORIDE SERPL-SCNC: 99 MMOL/L (ref 95–110)
CO2 SERPL-SCNC: 31 MMOL/L (ref 22–32)
CREAT SERPL-MCNC: 0.83 MG/DL (ref 0.5–1.5)
GLUCOSE SERPL-MCNC: 120 MG/DL (ref 70–99)
MAGNESIUM SERPL-MCNC: 1.9 MG/DL (ref 1.8–2.5)
OSMOLALITY UR CALC.SUM OF ELEC: 290 MOSM/KG (ref 275–295)
POTASSIUM SERPL-SCNC: 3.7 MMOL/L (ref 3.3–5.1)
SODIUM SERPL-SCNC: 140 MMOL/L (ref 136–144)

## 2018-01-03 PROCEDURE — 80048 BASIC METABOLIC PNL TOTAL CA: CPT

## 2018-01-03 PROCEDURE — 83735 ASSAY OF MAGNESIUM: CPT

## 2018-01-03 PROCEDURE — 36415 COLL VENOUS BLD VENIPUNCTURE: CPT

## 2018-01-25 RX ORDER — LOSARTAN POTASSIUM 25 MG/1
TABLET ORAL
Qty: 45 TABLET | Refills: 3 | Status: SHIPPED | OUTPATIENT
Start: 2018-01-25 | End: 2018-09-11

## 2018-02-19 ENCOUNTER — LAB ENCOUNTER (OUTPATIENT)
Dept: LAB | Facility: HOSPITAL | Age: 63
End: 2018-02-19
Attending: INTERNAL MEDICINE
Payer: COMMERCIAL

## 2018-02-19 DIAGNOSIS — E78.2 MIXED HYPERLIPIDEMIA: Primary | ICD-10-CM

## 2018-02-19 DIAGNOSIS — E11.9 DIABETES MELLITUS, TYPE II (HCC): ICD-10-CM

## 2018-02-19 LAB
ALBUMIN SERPL BCP-MCNC: 3.5 G/DL (ref 3.5–4.8)
ALBUMIN/GLOB SERPL: 1 {RATIO} (ref 1–2)
ALP SERPL-CCNC: 78 U/L (ref 32–100)
ALT SERPL-CCNC: 61 U/L (ref 14–54)
ANION GAP SERPL CALC-SCNC: 8 MMOL/L (ref 0–18)
AST SERPL-CCNC: 52 U/L (ref 15–41)
BILIRUB SERPL-MCNC: 0.7 MG/DL (ref 0.3–1.2)
BUN SERPL-MCNC: 10 MG/DL (ref 8–20)
BUN/CREAT SERPL: 11.4 (ref 10–20)
CALCIUM SERPL-MCNC: 9.2 MG/DL (ref 8.5–10.5)
CHLORIDE SERPL-SCNC: 101 MMOL/L (ref 95–110)
CHOLEST SERPL-MCNC: 149 MG/DL (ref 110–200)
CO2 SERPL-SCNC: 31 MMOL/L (ref 22–32)
CREAT SERPL-MCNC: 0.88 MG/DL (ref 0.5–1.5)
CREAT UR-MCNC: 244 MG/DL
GLOBULIN PLAS-MCNC: 3.5 G/DL (ref 2.5–3.7)
GLUCOSE SERPL-MCNC: 108 MG/DL (ref 70–99)
HBA1C MFR BLD: 11 % (ref 4–6)
HDLC SERPL-MCNC: 46 MG/DL
LDLC SERPL CALC-MCNC: 82 MG/DL (ref 0–99)
MICROALBUMIN UR-MCNC: 1.8 MG/DL (ref 0–1.8)
MICROALBUMIN/CREAT UR: 7.4 MG/G{CREAT} (ref 0–20)
NONHDLC SERPL-MCNC: 103 MG/DL
OSMOLALITY UR CALC.SUM OF ELEC: 290 MOSM/KG (ref 275–295)
PATIENT FASTING: YES
POTASSIUM SERPL-SCNC: 3.6 MMOL/L (ref 3.3–5.1)
PROT SERPL-MCNC: 7 G/DL (ref 5.9–8.4)
SODIUM SERPL-SCNC: 140 MMOL/L (ref 136–144)
TRIGL SERPL-MCNC: 107 MG/DL (ref 1–149)

## 2018-02-19 PROCEDURE — 82043 UR ALBUMIN QUANTITATIVE: CPT

## 2018-02-19 PROCEDURE — 36415 COLL VENOUS BLD VENIPUNCTURE: CPT

## 2018-02-19 PROCEDURE — 80053 COMPREHEN METABOLIC PANEL: CPT

## 2018-02-19 PROCEDURE — 82570 ASSAY OF URINE CREATININE: CPT

## 2018-02-19 PROCEDURE — 83036 HEMOGLOBIN GLYCOSYLATED A1C: CPT

## 2018-02-19 PROCEDURE — 80061 LIPID PANEL: CPT

## 2018-03-08 DIAGNOSIS — K75.81 LIVER CIRRHOSIS SECONDARY TO NASH (HCC): ICD-10-CM

## 2018-03-08 DIAGNOSIS — K74.60 LIVER CIRRHOSIS SECONDARY TO NASH (HCC): ICD-10-CM

## 2018-03-08 DIAGNOSIS — K75.81 NASH (NONALCOHOLIC STEATOHEPATITIS): Primary | ICD-10-CM

## 2018-03-19 ENCOUNTER — LAB ENCOUNTER (OUTPATIENT)
Dept: LAB | Facility: HOSPITAL | Age: 63
End: 2018-03-19
Attending: INTERNAL MEDICINE
Payer: COMMERCIAL

## 2018-03-19 DIAGNOSIS — E78.00 HYPERCHOLESTEROLEMIA: ICD-10-CM

## 2018-03-19 DIAGNOSIS — E53.8 VITAMIN B12 DEFICIENCY: ICD-10-CM

## 2018-03-19 DIAGNOSIS — Z79.4 TYPE 2 DIABETES MELLITUS WITHOUT COMPLICATION, WITH LONG-TERM CURRENT USE OF INSULIN (HCC): ICD-10-CM

## 2018-03-19 DIAGNOSIS — E03.8 OTHER SPECIFIED HYPOTHYROIDISM: ICD-10-CM

## 2018-03-19 DIAGNOSIS — E11.9 TYPE 2 DIABETES MELLITUS WITHOUT COMPLICATION, WITH LONG-TERM CURRENT USE OF INSULIN (HCC): ICD-10-CM

## 2018-03-19 DIAGNOSIS — Z00.00 ROUTINE HEALTH MAINTENANCE: ICD-10-CM

## 2018-03-19 LAB
25(OH)D3 SERPL-MCNC: 42.6 NG/ML
ALBUMIN SERPL BCP-MCNC: 3.4 G/DL (ref 3.5–4.8)
ALBUMIN/GLOB SERPL: 0.9 {RATIO} (ref 1–2)
ALP SERPL-CCNC: 87 U/L (ref 32–100)
ALT SERPL-CCNC: 44 U/L (ref 14–54)
ANION GAP SERPL CALC-SCNC: 7 MMOL/L (ref 0–18)
AST SERPL-CCNC: 34 U/L (ref 15–41)
BASOPHILS # BLD: 0 K/UL (ref 0–0.2)
BASOPHILS NFR BLD: 1 %
BILIRUB SERPL-MCNC: 0.6 MG/DL (ref 0.3–1.2)
BUN SERPL-MCNC: 10 MG/DL (ref 8–20)
BUN/CREAT SERPL: 13 (ref 10–20)
CALCIUM SERPL-MCNC: 9.4 MG/DL (ref 8.5–10.5)
CHLORIDE SERPL-SCNC: 100 MMOL/L (ref 95–110)
CHOLEST SERPL-MCNC: 156 MG/DL (ref 110–200)
CO2 SERPL-SCNC: 31 MMOL/L (ref 22–32)
CREAT SERPL-MCNC: 0.77 MG/DL (ref 0.5–1.5)
CREAT UR-MCNC: 186.9 MG/DL
EOSINOPHIL # BLD: 0.2 K/UL (ref 0–0.7)
EOSINOPHIL NFR BLD: 4 %
ERYTHROCYTE [DISTWIDTH] IN BLOOD BY AUTOMATED COUNT: 13.2 % (ref 11–15)
GLOBULIN PLAS-MCNC: 3.9 G/DL (ref 2.5–3.7)
GLUCOSE SERPL-MCNC: 141 MG/DL (ref 70–99)
HBA1C MFR BLD: 10.9 % (ref 4–6)
HCT VFR BLD AUTO: 44.3 % (ref 35–48)
HDLC SERPL-MCNC: 51 MG/DL
HGB BLD-MCNC: 15 G/DL (ref 12–16)
LDLC SERPL CALC-MCNC: 85 MG/DL (ref 0–99)
LYMPHOCYTES # BLD: 2.6 K/UL (ref 1–4)
LYMPHOCYTES NFR BLD: 47 %
MCH RBC QN AUTO: 30.4 PG (ref 27–32)
MCHC RBC AUTO-ENTMCNC: 33.7 G/DL (ref 32–37)
MCV RBC AUTO: 90.2 FL (ref 80–100)
MICROALBUMIN UR-MCNC: 2.3 MG/DL (ref 0–1.8)
MICROALBUMIN/CREAT UR: 12.3 MG/G{CREAT} (ref 0–20)
MONOCYTES # BLD: 0.6 K/UL (ref 0–1)
MONOCYTES NFR BLD: 11 %
NEUTROPHILS # BLD AUTO: 2 K/UL (ref 1.8–7.7)
NEUTROPHILS NFR BLD: 37 %
NONHDLC SERPL-MCNC: 105 MG/DL
OSMOLALITY UR CALC.SUM OF ELEC: 287 MOSM/KG (ref 275–295)
PATIENT FASTING: YES
PLATELET # BLD AUTO: 168 K/UL (ref 140–400)
PMV BLD AUTO: 7.9 FL (ref 7.4–10.3)
POTASSIUM SERPL-SCNC: 3.7 MMOL/L (ref 3.3–5.1)
PROT SERPL-MCNC: 7.3 G/DL (ref 5.9–8.4)
RBC # BLD AUTO: 4.91 M/UL (ref 3.7–5.4)
SODIUM SERPL-SCNC: 138 MMOL/L (ref 136–144)
TRIGL SERPL-MCNC: 98 MG/DL (ref 1–149)
TSH SERPL-ACNC: 3.03 UIU/ML (ref 0.45–5.33)
VIT B12 SERPL-MCNC: 768 PG/ML (ref 181–914)
WBC # BLD AUTO: 5.5 K/UL (ref 4–11)

## 2018-03-19 PROCEDURE — 82570 ASSAY OF URINE CREATININE: CPT

## 2018-03-19 PROCEDURE — 36415 COLL VENOUS BLD VENIPUNCTURE: CPT

## 2018-03-19 PROCEDURE — 85025 COMPLETE CBC W/AUTO DIFF WBC: CPT

## 2018-03-19 PROCEDURE — 80053 COMPREHEN METABOLIC PANEL: CPT

## 2018-03-19 PROCEDURE — 80050 GENERAL HEALTH PANEL: CPT

## 2018-03-19 PROCEDURE — 82306 VITAMIN D 25 HYDROXY: CPT

## 2018-03-19 PROCEDURE — 82043 UR ALBUMIN QUANTITATIVE: CPT

## 2018-03-19 PROCEDURE — 83036 HEMOGLOBIN GLYCOSYLATED A1C: CPT

## 2018-03-19 PROCEDURE — 80061 LIPID PANEL: CPT

## 2018-03-19 PROCEDURE — 82607 VITAMIN B-12: CPT

## 2018-03-20 ENCOUNTER — OFFICE VISIT (OUTPATIENT)
Dept: INTERNAL MEDICINE CLINIC | Facility: CLINIC | Age: 63
End: 2018-03-20

## 2018-03-20 VITALS
TEMPERATURE: 98 F | BODY MASS INDEX: 36.88 KG/M2 | WEIGHT: 216 LBS | HEIGHT: 64 IN | HEART RATE: 104 BPM | DIASTOLIC BLOOD PRESSURE: 82 MMHG | SYSTOLIC BLOOD PRESSURE: 120 MMHG

## 2018-03-20 DIAGNOSIS — K76.0 NAFLD (NONALCOHOLIC FATTY LIVER DISEASE): ICD-10-CM

## 2018-03-20 DIAGNOSIS — E53.8 VITAMIN B12 DEFICIENCY: ICD-10-CM

## 2018-03-20 DIAGNOSIS — G47.33 OBSTRUCTIVE SLEEP APNEA: ICD-10-CM

## 2018-03-20 DIAGNOSIS — K21.9 GASTROESOPHAGEAL REFLUX DISEASE, ESOPHAGITIS PRESENCE NOT SPECIFIED: ICD-10-CM

## 2018-03-20 DIAGNOSIS — E03.8 OTHER SPECIFIED HYPOTHYROIDISM: ICD-10-CM

## 2018-03-20 DIAGNOSIS — Z79.4 TYPE 2 DIABETES MELLITUS WITHOUT COMPLICATION, WITH LONG-TERM CURRENT USE OF INSULIN (HCC): Primary | ICD-10-CM

## 2018-03-20 DIAGNOSIS — E83.42 HYPOMAGNESEMIA: ICD-10-CM

## 2018-03-20 DIAGNOSIS — E11.9 TYPE 2 DIABETES MELLITUS WITHOUT COMPLICATION, WITH LONG-TERM CURRENT USE OF INSULIN (HCC): Primary | ICD-10-CM

## 2018-03-20 DIAGNOSIS — Z86.010 HISTORY OF COLONIC POLYPS: ICD-10-CM

## 2018-03-20 DIAGNOSIS — E78.00 HYPERCHOLESTEROLEMIA: ICD-10-CM

## 2018-03-20 DIAGNOSIS — Z12.31 ENCOUNTER FOR SCREENING MAMMOGRAM FOR BREAST CANCER: ICD-10-CM

## 2018-03-20 DIAGNOSIS — E66.01 MORBID OBESITY (HCC): ICD-10-CM

## 2018-03-20 DIAGNOSIS — K75.81 NASH (NONALCOHOLIC STEATOHEPATITIS): ICD-10-CM

## 2018-03-20 PROCEDURE — 99213 OFFICE O/P EST LOW 20 MIN: CPT | Performed by: INTERNAL MEDICINE

## 2018-03-20 PROCEDURE — 99396 PREV VISIT EST AGE 40-64: CPT | Performed by: INTERNAL MEDICINE

## 2018-03-20 NOTE — PROGRESS NOTES
Anabel Hummel is a 63year old female. Patient presents with:  Physical: Patient is here today for a PE. She states that she has been feeling good lately. Patient notes that she is following Dr. Bakari Dean for liver cirrhosis.  She is scheduled to have a liver PEN NEEDLES) 32G X 4 MM Does not apply Misc Use to inject QID Disp: 400 each Rfl: 3   FUROSEMIDE 20 MG Oral Tab TAKE 1 TABLET BY MOUTH TWICE DAILY Disp: 180 tablet Rfl: 3   Levothyroxine Sodium 88 MCG Oral Tab Take 1 tablet by mouth every morning before br Past Surgical History:  No date: APPENDECTOMY  No date: CHOLECYSTECTOMY  No date: COLONOSCOPY  No date: COLONOSCOPY & POLYPECTOMY  No date: CORTISONE INJECTION Left      Comment: Left knee, Dr. Yves Chaney  3-7-2013: ELECTROCARDIOGRAM, COMPLETE      Comment: Lantus and Victoza and Humalog R U-500 (500 units/mL). Unable to afford or tolerate Farxiga.  HgbA1c still elevated 10.9 (was 11.2 a month ago). On Humulin R U-500 80 units TID.   Intolerant to lisinopril (nausea).  Continue losartan 12.5mg/day.  Sees opht  continue to follow.      12. Left knee OA  Had arthroscopic surgery with Dr. Alanna Blair in the past.  Had cortisone injection by Dr. lAba Peoples in the past.  Was told she will need eventual TKR.      13.  Overweight  Again discussed pt's weight and related health prob

## 2018-03-22 ENCOUNTER — LAB ENCOUNTER (OUTPATIENT)
Dept: LAB | Facility: HOSPITAL | Age: 63
End: 2018-03-22
Attending: INTERNAL MEDICINE
Payer: COMMERCIAL

## 2018-03-22 DIAGNOSIS — E83.42 HYPOMAGNESEMIA: Primary | ICD-10-CM

## 2018-03-22 LAB — MAGNESIUM SERPL-MCNC: 1.8 MG/DL (ref 1.8–2.5)

## 2018-03-22 PROCEDURE — 83735 ASSAY OF MAGNESIUM: CPT

## 2018-03-22 PROCEDURE — 36415 COLL VENOUS BLD VENIPUNCTURE: CPT

## 2018-03-27 RX ORDER — FUROSEMIDE 20 MG/1
TABLET ORAL
Qty: 180 TABLET | Refills: 3 | Status: SHIPPED | OUTPATIENT
Start: 2018-03-27 | End: 2018-09-11

## 2018-03-31 ENCOUNTER — HOSPITAL ENCOUNTER (OUTPATIENT)
Dept: ULTRASOUND IMAGING | Facility: HOSPITAL | Age: 63
Discharge: HOME | End: 2018-03-31
Attending: INTERNAL MEDICINE
Payer: COMMERCIAL

## 2018-03-31 DIAGNOSIS — K75.81 LIVER CIRRHOSIS SECONDARY TO NASH (HCC): ICD-10-CM

## 2018-03-31 DIAGNOSIS — K74.60 LIVER CIRRHOSIS SECONDARY TO NASH (HCC): ICD-10-CM

## 2018-03-31 PROCEDURE — 76705 ECHO EXAM OF ABDOMEN: CPT | Performed by: INTERNAL MEDICINE

## 2018-04-05 ENCOUNTER — LAB ENCOUNTER (OUTPATIENT)
Dept: LAB | Facility: HOSPITAL | Age: 63
End: 2018-04-05
Attending: INTERNAL MEDICINE
Payer: COMMERCIAL

## 2018-04-05 DIAGNOSIS — K74.60 LIVER CIRRHOSIS SECONDARY TO NASH (HCC): ICD-10-CM

## 2018-04-05 DIAGNOSIS — K75.81 LIVER CIRRHOSIS SECONDARY TO NASH (HCC): ICD-10-CM

## 2018-04-05 PROCEDURE — 36415 COLL VENOUS BLD VENIPUNCTURE: CPT

## 2018-04-05 PROCEDURE — 86706 HEP B SURFACE ANTIBODY: CPT

## 2018-04-05 PROCEDURE — 86708 HEPATITIS A ANTIBODY: CPT

## 2018-04-05 PROCEDURE — 86709 HEPATITIS A IGM ANTIBODY: CPT

## 2018-04-09 ENCOUNTER — OFFICE VISIT (OUTPATIENT)
Dept: SURGERY | Facility: CLINIC | Age: 63
End: 2018-04-09

## 2018-04-09 VITALS
HEIGHT: 64 IN | HEART RATE: 96 BPM | TEMPERATURE: 97 F | BODY MASS INDEX: 37.59 KG/M2 | DIASTOLIC BLOOD PRESSURE: 84 MMHG | RESPIRATION RATE: 18 BRPM | OXYGEN SATURATION: 98 % | WEIGHT: 220.19 LBS | SYSTOLIC BLOOD PRESSURE: 138 MMHG

## 2018-04-09 DIAGNOSIS — K74.60 CIRRHOSIS OF LIVER WITHOUT ASCITES, UNSPECIFIED HEPATIC CIRRHOSIS TYPE (HCC): Primary | ICD-10-CM

## 2018-04-09 NOTE — PROGRESS NOTES
[facility]  [address], [address]  [address]  [phone] ([phone]) polydipsia(-)  Neuro: loss of strength/weakness (-)    Medications:  FUROSEMIDE 20 MG Oral Tab TAKE 1 TABLET BY MOUTH TWICE DAILY Disp: 180 tablet Rfl: 3   LOSARTAN POTASSIUM 25 MG Oral Tab TAKE 1/2 TABLET(12.5 MG) BY MOUTH DAILY Disp: 45 tablet Rfl: 3   F ODT) 8 MG Oral Tablet Dispersible Take 1 tablet (8 mg total) by mouth every 6 (six) hours as needed for Nausea.  Place on top of the tongue to dissolve, then swallow Disp: 30 tablet Rfl: 5   Meclizine HCl (ANTIVERT) 25 MG Oral Tab Take 1 tablet (25 mg total follow up. 1. Cirrhosis likely to BLANCAS  Her weight loss (0257-5237) certainly helps with lessening any liver damage. Patient encouraged to continue to diet and exercise and gradually lose 5-10% percent of weight. Low MELD.  Will repeat labs in 6 months-o

## 2018-04-12 RX ORDER — PEN NEEDLE, DIABETIC 32GX 5/32"
NEEDLE, DISPOSABLE MISCELLANEOUS
Qty: 400 EACH | Refills: 3 | Status: SHIPPED | OUTPATIENT
Start: 2018-04-12 | End: 2018-09-11

## 2018-04-25 ENCOUNTER — TELEPHONE (OUTPATIENT)
Dept: INTERNAL MEDICINE CLINIC | Facility: CLINIC | Age: 63
End: 2018-04-25

## 2018-04-25 NOTE — TELEPHONE ENCOUNTER
Called patient and relayed  S message - verbalized understanding. Patient states that DR. Roxana Herrmann has her off Plavix for 5 days. DR Naheed Fleming told patient she should stay on insulin , just not the morning of procedure

## 2018-04-25 NOTE — TELEPHONE ENCOUNTER
Patient would like Dr. Raleigh Ontiveros to know that Dr. Natalya Bernard is removing 6 teeth and putting in implants,  Patient needs to be sedated. Diabetes physician Dr. Yoshi Valladares is okay with her having this done.  A1C 10.9 Dr. Yoshi Valladares said go ahead have teeth pulled and

## 2018-04-25 NOTE — TELEPHONE ENCOUNTER
The only question I can answer is about the liver enzymes -- they are normal.  Pt's cardiologist will have to answer about the Plavix.   Pt is on high dose insulin -- Dr. Haydee Brennan can answer the question about perioperative insulin

## 2018-04-25 NOTE — TELEPHONE ENCOUNTER
Dr. Arben Rios (oral & maxillofacial surgeon) called to clarify and confirm what patient reported below. He wants to ensure the following before proceeding with procedure (which has not been scheduled yet). Procedure will be done under IV anesthesia.     1. Yobani Ray

## 2018-04-30 NOTE — TELEPHONE ENCOUNTER
Patient called back again. She could not remember what the nurse told her last Wednesday. She states that Dr Laurence Driscoll has not received a phone call back from Dr Raleigh Ontiveros.   I again relayed Dr Katie Rios message to her and told her that Dr Laurence Driscoll would have

## 2018-05-24 ENCOUNTER — PATIENT MESSAGE (OUTPATIENT)
Dept: INTERNAL MEDICINE CLINIC | Facility: CLINIC | Age: 63
End: 2018-05-24

## 2018-05-24 DIAGNOSIS — R53.83 FATIGUE, UNSPECIFIED TYPE: Primary | ICD-10-CM

## 2018-05-24 DIAGNOSIS — R53.83 OTHER FATIGUE: Primary | ICD-10-CM

## 2018-05-24 NOTE — TELEPHONE ENCOUNTER
From: Jeimy Marie  To: Abdirizak Massey MD  Sent: 5/24/2018 5:23 PM CDT  Subject: Other    Dr Ren Wong    I have been extra tired and exhausted. Could my potassium or anything else be out of range.  i was wondering if i should have blood test to find

## 2018-05-25 RX ORDER — ERGOCALCIFEROL 1.25 MG/1
CAPSULE ORAL
Qty: 12 CAPSULE | Refills: 3 | Status: SHIPPED | OUTPATIENT
Start: 2018-05-25 | End: 2018-09-11

## 2018-05-25 NOTE — TELEPHONE ENCOUNTER
From: Piter Reason  To: Frederick Pendleton MD  Sent: 5/24/2018 10:49 PM CDT  Subject: Other    Maybe i should have a blood test to check if everything ok , AST ALT be in the blood test .. ..also can u do magnesium  LMK when u have sent [place] for the b

## 2018-05-25 NOTE — TELEPHONE ENCOUNTER
Per other MyChart encounter, Dr. Amanda Bolaños states, [facility],     Low potassium does not generally cause fatigue, but we can check a CMP and a CBC.  Your recent thyroid test 2 mos ago was normal, so I wouldn't repeat that.      Es.\"    Patient also requesting ma

## 2018-05-29 ENCOUNTER — LAB ENCOUNTER (OUTPATIENT)
Dept: LAB | Facility: HOSPITAL | Age: 63
End: 2018-05-29
Attending: INTERNAL MEDICINE
Payer: COMMERCIAL

## 2018-05-29 DIAGNOSIS — R53.83 OTHER FATIGUE: ICD-10-CM

## 2018-05-29 DIAGNOSIS — R53.83 FATIGUE, UNSPECIFIED TYPE: ICD-10-CM

## 2018-05-29 PROCEDURE — 85025 COMPLETE CBC W/AUTO DIFF WBC: CPT

## 2018-05-29 PROCEDURE — 36415 COLL VENOUS BLD VENIPUNCTURE: CPT

## 2018-05-29 PROCEDURE — 80053 COMPREHEN METABOLIC PANEL: CPT

## 2018-05-29 PROCEDURE — 83735 ASSAY OF MAGNESIUM: CPT

## 2018-05-30 ENCOUNTER — TELEPHONE (OUTPATIENT)
Dept: INTERNAL MEDICINE CLINIC | Facility: CLINIC | Age: 63
End: 2018-05-30

## 2018-05-30 NOTE — TELEPHONE ENCOUNTER
Had labs done yesterday - would like results released on My Chart  Can be reached at [phone] if Dr Mark Stone needs to speak with her

## 2018-06-02 ENCOUNTER — HOSPITAL ENCOUNTER (OUTPATIENT)
Dept: MAMMOGRAPHY | Age: 63
Discharge: HOME | End: 2018-06-02
Attending: INTERNAL MEDICINE
Payer: COMMERCIAL

## 2018-06-02 DIAGNOSIS — Z12.31 ENCOUNTER FOR SCREENING MAMMOGRAM FOR BREAST CANCER: ICD-10-CM

## 2018-06-02 PROCEDURE — 77067 SCR MAMMO BI INCL CAD: CPT | Performed by: INTERNAL MEDICINE

## 2018-06-26 RX ORDER — CLOPIDOGREL BISULFATE 75 MG/1
TABLET ORAL
Qty: 90 TABLET | Refills: 3 | Status: SHIPPED | OUTPATIENT
Start: 2018-06-26 | End: 2018-09-11

## 2018-07-21 ENCOUNTER — LAB ENCOUNTER (OUTPATIENT)
Dept: LAB | Age: 63
End: 2018-07-21
Attending: INTERNAL MEDICINE
Payer: COMMERCIAL

## 2018-07-21 DIAGNOSIS — E11.9 DIABETES MELLITUS (HCC): Primary | ICD-10-CM

## 2018-07-21 DIAGNOSIS — E78.2 MIXED HYPERLIPIDEMIA: ICD-10-CM

## 2018-07-21 LAB
ALBUMIN SERPL BCP-MCNC: 3.4 G/DL (ref 3.5–4.8)
ALBUMIN/GLOB SERPL: 1 {RATIO} (ref 1–2)
ALP SERPL-CCNC: 82 U/L (ref 32–100)
ALT SERPL-CCNC: 54 U/L (ref 14–54)
ANION GAP SERPL CALC-SCNC: 6 MMOL/L (ref 0–18)
AST SERPL-CCNC: 44 U/L (ref 15–41)
BILIRUB SERPL-MCNC: 0.5 MG/DL (ref 0.3–1.2)
BUN SERPL-MCNC: 8 MG/DL (ref 8–20)
BUN/CREAT SERPL: 9.3 (ref 10–20)
CALCIUM SERPL-MCNC: 9.2 MG/DL (ref 8.5–10.5)
CHLORIDE SERPL-SCNC: 104 MMOL/L (ref 95–110)
CO2 SERPL-SCNC: 31 MMOL/L (ref 22–32)
CREAT SERPL-MCNC: 0.86 MG/DL (ref 0.5–1.5)
CREAT UR-MCNC: 188.7 MG/DL
GLOBULIN PLAS-MCNC: 3.5 G/DL (ref 2.5–3.7)
GLUCOSE SERPL-MCNC: 92 MG/DL (ref 70–99)
MICROALBUMIN UR-MCNC: 1.1 MG/DL (ref 0–1.8)
MICROALBUMIN/CREAT UR: 5.8 MG/G{CREAT} (ref 0–20)
OSMOLALITY UR CALC.SUM OF ELEC: 290 MOSM/KG (ref 275–295)
PATIENT FASTING: YES
POTASSIUM SERPL-SCNC: 3.6 MMOL/L (ref 3.3–5.1)
PROT SERPL-MCNC: 6.9 G/DL (ref 5.9–8.4)
SODIUM SERPL-SCNC: 141 MMOL/L (ref 136–144)

## 2018-07-21 PROCEDURE — 82570 ASSAY OF URINE CREATININE: CPT

## 2018-07-21 PROCEDURE — 83036 HEMOGLOBIN GLYCOSYLATED A1C: CPT

## 2018-07-21 PROCEDURE — 82043 UR ALBUMIN QUANTITATIVE: CPT

## 2018-07-21 PROCEDURE — 36415 COLL VENOUS BLD VENIPUNCTURE: CPT

## 2018-07-21 PROCEDURE — 80053 COMPREHEN METABOLIC PANEL: CPT

## 2018-07-22 LAB — HBA1C MFR BLD: 10.9 % (ref 4–6)

## 2018-09-07 ENCOUNTER — LAB ENCOUNTER (OUTPATIENT)
Dept: LAB | Facility: HOSPITAL | Age: 63
End: 2018-09-07
Attending: INTERNAL MEDICINE
Payer: COMMERCIAL

## 2018-09-07 DIAGNOSIS — E87.6 HYPOKALEMIA: ICD-10-CM

## 2018-09-07 DIAGNOSIS — E53.8 VITAMIN B12 DEFICIENCY: ICD-10-CM

## 2018-09-07 DIAGNOSIS — E11.9 TYPE 2 DIABETES MELLITUS WITHOUT COMPLICATION, WITH LONG-TERM CURRENT USE OF INSULIN (HCC): ICD-10-CM

## 2018-09-07 DIAGNOSIS — E83.42 HYPOMAGNESEMIA: ICD-10-CM

## 2018-09-07 DIAGNOSIS — Z79.4 TYPE 2 DIABETES MELLITUS WITHOUT COMPLICATION, WITH LONG-TERM CURRENT USE OF INSULIN (HCC): ICD-10-CM

## 2018-09-07 DIAGNOSIS — E78.00 HYPERCHOLESTEROLEMIA: ICD-10-CM

## 2018-09-07 LAB
ALBUMIN SERPL BCP-MCNC: 3.4 G/DL (ref 3.5–4.8)
ALBUMIN/GLOB SERPL: 0.9 {RATIO} (ref 1–2)
ALP SERPL-CCNC: 91 U/L (ref 32–100)
ALT SERPL-CCNC: 56 U/L (ref 14–54)
ANION GAP SERPL CALC-SCNC: 8 MMOL/L (ref 0–18)
AST SERPL-CCNC: 47 U/L (ref 15–41)
BASOPHILS # BLD: 0 K/UL (ref 0–0.2)
BASOPHILS NFR BLD: 1 %
BILIRUB SERPL-MCNC: 0.4 MG/DL (ref 0.3–1.2)
BUN SERPL-MCNC: 9 MG/DL (ref 8–20)
BUN/CREAT SERPL: 10.6 (ref 10–20)
CALCIUM SERPL-MCNC: 9.2 MG/DL (ref 8.5–10.5)
CHLORIDE SERPL-SCNC: 100 MMOL/L (ref 95–110)
CHOLEST SERPL-MCNC: 168 MG/DL (ref 110–200)
CO2 SERPL-SCNC: 31 MMOL/L (ref 22–32)
CREAT SERPL-MCNC: 0.85 MG/DL (ref 0.5–1.5)
CREAT UR-MCNC: 215.5 MG/DL
EOSINOPHIL # BLD: 0.3 K/UL (ref 0–0.7)
EOSINOPHIL NFR BLD: 5 %
ERYTHROCYTE [DISTWIDTH] IN BLOOD BY AUTOMATED COUNT: 13.4 % (ref 11–15)
GLOBULIN PLAS-MCNC: 3.7 G/DL (ref 2.5–3.7)
GLUCOSE SERPL-MCNC: 239 MG/DL (ref 70–99)
HBA1C MFR BLD: 10.7 % (ref 4–6)
HCT VFR BLD AUTO: 44.3 % (ref 35–48)
HDLC SERPL-MCNC: 52 MG/DL
HGB BLD-MCNC: 14.6 G/DL (ref 12–16)
LDLC SERPL CALC-MCNC: 83 MG/DL (ref 0–99)
LYMPHOCYTES # BLD: 2.6 K/UL (ref 1–4)
LYMPHOCYTES NFR BLD: 44 %
MAGNESIUM SERPL-MCNC: 1.9 MG/DL (ref 1.8–2.5)
MCH RBC QN AUTO: 30.2 PG (ref 27–32)
MCHC RBC AUTO-ENTMCNC: 32.9 G/DL (ref 32–37)
MCV RBC AUTO: 91.9 FL (ref 80–100)
MICROALBUMIN UR-MCNC: 1.9 MG/DL (ref 0–1.8)
MICROALBUMIN/CREAT UR: 8.8 MG/G{CREAT} (ref 0–20)
MONOCYTES # BLD: 0.6 K/UL (ref 0–1)
MONOCYTES NFR BLD: 10 %
NEUTROPHILS # BLD AUTO: 2.4 K/UL (ref 1.8–7.7)
NEUTROPHILS NFR BLD: 40 %
NONHDLC SERPL-MCNC: 116 MG/DL
OSMOLALITY UR CALC.SUM OF ELEC: 294 MOSM/KG (ref 275–295)
PATIENT FASTING: YES
PLATELET # BLD AUTO: 148 K/UL (ref 140–400)
PMV BLD AUTO: 8.1 FL (ref 7.4–10.3)
POTASSIUM SERPL-SCNC: 3.7 MMOL/L (ref 3.3–5.1)
PROT SERPL-MCNC: 7.1 G/DL (ref 5.9–8.4)
RBC # BLD AUTO: 4.82 M/UL (ref 3.7–5.4)
SODIUM SERPL-SCNC: 139 MMOL/L (ref 136–144)
TRIGL SERPL-MCNC: 166 MG/DL (ref 1–149)
WBC # BLD AUTO: 5.9 K/UL (ref 4–11)

## 2018-09-07 PROCEDURE — 83036 HEMOGLOBIN GLYCOSYLATED A1C: CPT

## 2018-09-07 PROCEDURE — 80053 COMPREHEN METABOLIC PANEL: CPT

## 2018-09-07 PROCEDURE — 83735 ASSAY OF MAGNESIUM: CPT

## 2018-09-07 PROCEDURE — 85025 COMPLETE CBC W/AUTO DIFF WBC: CPT

## 2018-09-07 PROCEDURE — 80061 LIPID PANEL: CPT

## 2018-09-07 PROCEDURE — 82043 UR ALBUMIN QUANTITATIVE: CPT

## 2018-09-07 PROCEDURE — 82570 ASSAY OF URINE CREATININE: CPT

## 2018-09-07 PROCEDURE — 36415 COLL VENOUS BLD VENIPUNCTURE: CPT

## 2018-09-11 ENCOUNTER — OFFICE VISIT (OUTPATIENT)
Dept: INTERNAL MEDICINE CLINIC | Facility: CLINIC | Age: 63
End: 2018-09-11
Payer: COMMERCIAL

## 2018-09-11 VITALS
HEART RATE: 100 BPM | WEIGHT: 220.63 LBS | HEIGHT: 64 IN | SYSTOLIC BLOOD PRESSURE: 136 MMHG | DIASTOLIC BLOOD PRESSURE: 86 MMHG | TEMPERATURE: 98 F | OXYGEN SATURATION: 96 % | BODY MASS INDEX: 37.67 KG/M2

## 2018-09-11 DIAGNOSIS — E11.40 TYPE 2 DIABETES MELLITUS WITH DIABETIC NEUROPATHY, WITH LONG-TERM CURRENT USE OF INSULIN (HCC): Primary | ICD-10-CM

## 2018-09-11 DIAGNOSIS — E78.00 HYPERCHOLESTEROLEMIA: ICD-10-CM

## 2018-09-11 DIAGNOSIS — R22.2 CHEST WALL MASS: ICD-10-CM

## 2018-09-11 DIAGNOSIS — E03.8 OTHER SPECIFIED HYPOTHYROIDISM: ICD-10-CM

## 2018-09-11 DIAGNOSIS — K76.0 NAFLD (NONALCOHOLIC FATTY LIVER DISEASE): ICD-10-CM

## 2018-09-11 DIAGNOSIS — K75.81 NASH (NONALCOHOLIC STEATOHEPATITIS): ICD-10-CM

## 2018-09-11 DIAGNOSIS — Z79.4 TYPE 2 DIABETES MELLITUS WITH DIABETIC NEUROPATHY, WITH LONG-TERM CURRENT USE OF INSULIN (HCC): Primary | ICD-10-CM

## 2018-09-11 DIAGNOSIS — E66.01 MORBID OBESITY (HCC): ICD-10-CM

## 2018-09-11 DIAGNOSIS — D69.6 THROMBOCYTOPENIA (HCC): ICD-10-CM

## 2018-09-11 DIAGNOSIS — E83.42 HYPOMAGNESEMIA: ICD-10-CM

## 2018-09-11 DIAGNOSIS — E87.6 HYPOKALEMIA: ICD-10-CM

## 2018-09-11 DIAGNOSIS — Z00.00 ROUTINE HEALTH MAINTENANCE: ICD-10-CM

## 2018-09-11 DIAGNOSIS — Z86.010 HISTORY OF COLONIC POLYPS: ICD-10-CM

## 2018-09-11 DIAGNOSIS — Z86.73 HX OF TRANSIENT ISCHEMIC ATTACK (TIA): ICD-10-CM

## 2018-09-11 DIAGNOSIS — E53.8 VITAMIN B12 DEFICIENCY: ICD-10-CM

## 2018-09-11 DIAGNOSIS — G47.33 OBSTRUCTIVE SLEEP APNEA: ICD-10-CM

## 2018-09-11 PROCEDURE — 99212 OFFICE O/P EST SF 10 MIN: CPT | Performed by: INTERNAL MEDICINE

## 2018-09-11 PROCEDURE — 99214 OFFICE O/P EST MOD 30 MIN: CPT | Performed by: INTERNAL MEDICINE

## 2018-09-11 RX ORDER — LEVOTHYROXINE SODIUM 88 UG/1
88 TABLET ORAL
Qty: 30 TABLET | Refills: 11 | COMMUNITY
Start: 2018-09-11

## 2018-09-11 RX ORDER — IMIPRAMINE HCL 50 MG
200 TABLET ORAL NIGHTLY
Qty: 90 TABLET | Refills: 11 | COMMUNITY
Start: 2018-09-11 | End: 2018-12-24

## 2018-09-11 RX ORDER — POTASSIUM CHLORIDE 750 MG/1
TABLET, FILM COATED, EXTENDED RELEASE ORAL
Qty: 240 TABLET | Refills: 3 | Status: ON HOLD | COMMUNITY
Start: 2018-09-11 | End: 2019-01-21

## 2018-09-11 RX ORDER — INSULIN HUMAN 500 [IU]/ML
240 INJECTION, SOLUTION SUBCUTANEOUS 2 TIMES DAILY
Qty: 1 PEN | Refills: 11 | COMMUNITY
Start: 2018-09-11

## 2018-09-11 RX ORDER — METOLAZONE 2.5 MG/1
2.5 TABLET ORAL AS NEEDED
Qty: 30 TABLET | Refills: 0 | COMMUNITY
Start: 2018-09-11

## 2018-09-11 RX ORDER — PRAVASTATIN SODIUM 40 MG
40 TABLET ORAL NIGHTLY
Qty: 90 TABLET | Refills: 3 | COMMUNITY
Start: 2018-09-11 | End: 2018-10-31

## 2018-09-11 RX ORDER — MELATONIN
400 2 TIMES DAILY
Qty: 30 TABLET | Refills: 0 | COMMUNITY
Start: 2018-09-11

## 2018-09-11 RX ORDER — CLOPIDOGREL BISULFATE 75 MG/1
75 TABLET ORAL DAILY
COMMUNITY
End: 2019-06-21

## 2018-09-11 RX ORDER — TRAMADOL HYDROCHLORIDE 50 MG/1
50 TABLET ORAL EVERY 6 HOURS PRN
Qty: 30 TABLET | Refills: 0 | COMMUNITY
Start: 2018-09-11

## 2018-09-11 RX ORDER — FUROSEMIDE 20 MG/1
20 TABLET ORAL 2 TIMES DAILY
Qty: 180 TABLET | Refills: 3 | COMMUNITY
Start: 2018-09-11 | End: 2019-10-06

## 2018-09-11 RX ORDER — ERGOCALCIFEROL 1.25 MG/1
CAPSULE ORAL
Qty: 12 CAPSULE | Refills: 3 | COMMUNITY
Start: 2018-09-11 | End: 2019-07-11

## 2018-09-11 RX ORDER — BLOOD-GLUCOSE METER
EACH MISCELLANEOUS
COMMUNITY

## 2018-09-11 RX ORDER — BUTALBITAL, ACETAMINOPHEN AND CAFFEINE 50; 325; 40 MG/1; MG/1; MG/1
1 TABLET ORAL EVERY 4 HOURS PRN
COMMUNITY
End: 2019-03-12

## 2018-09-11 RX ORDER — FAMOTIDINE 40 MG/1
40 TABLET, FILM COATED ORAL 2 TIMES DAILY PRN
Qty: 180 TABLET | Refills: 3 | Status: SHIPPED | OUTPATIENT
Start: 2018-09-11 | End: 2019-09-18

## 2018-09-11 RX ORDER — MECLIZINE HYDROCHLORIDE 25 MG/1
25 TABLET ORAL 3 TIMES DAILY PRN
Qty: 30 TABLET | Refills: 5 | COMMUNITY
Start: 2018-09-11 | End: 2020-03-16

## 2018-09-11 RX ORDER — LOSARTAN POTASSIUM 25 MG/1
TABLET ORAL
Qty: 45 TABLET | Refills: 3 | COMMUNITY
Start: 2018-09-11 | End: 2019-02-08

## 2018-09-11 RX ORDER — ALBUTEROL SULFATE 90 UG/1
2 AEROSOL, METERED RESPIRATORY (INHALATION) EVERY 6 HOURS PRN
COMMUNITY
End: 2018-10-30

## 2018-09-11 RX ORDER — ONDANSETRON 8 MG/1
8 TABLET, ORALLY DISINTEGRATING ORAL EVERY 6 HOURS PRN
Qty: 30 TABLET | Refills: 0 | COMMUNITY
Start: 2018-09-11 | End: 2019-03-12

## 2018-09-11 NOTE — PROGRESS NOTES
Pj Ruelas is a 66year old female. Patient presents with:  Checkup: 6 MONTH - lab results, LLE stinging x 6 weeks, 9/2 - pt felt a snap near the left ankle while putting her shoe on, tender to touch.       HPI:   Pj Ruelas is a 66year old fema (two) times daily. Disp: 1 tablet Rfl: 0   Imipramine HCl 50 MG Oral Tab Take 4 tablets (200 mg total) by mouth nightly.  Disp: 360 tablet Rfl: 3   Potassium Chloride ER (KLOR-CON 10) 10 MEQ Oral Tab CR Take 4 tablets (40 mEq total) by mouth 2 (two) times d unspecified type diabetes mellitus without mention of complication, not stated as uncontrolled    • Unspecified sleep apnea    • Vertigo       Past Surgical History:  No date: APPENDECTOMY  No date: CHOLECYSTECTOMY  No date: COLONOSCOPY  No date: Sam Metcalf murmurs  GI: soft, NT, ND, NABS, no HSM  EXTREMITIES: no cyanosis, clubbing or edema, +2 radial pulses, +mild swelling and tenderness over mid-Achilles tendon. ASSESSMENT AND PLAN:     1.  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 Liver transaminases again slightly elevated (AST 47, ALT 56). Sees Dr. Venu Del Angel next month 10/2018. Has u/s every 6 months.      11.  Thrombocytopenia  Plts again normal (1

## 2018-09-15 RX ORDER — POTASSIUM CHLORIDE 750 MG/1
TABLET, FILM COATED, EXTENDED RELEASE ORAL
Qty: 720 TABLET | Refills: 1 | Status: SHIPPED | OUTPATIENT
Start: 2018-09-15 | End: 2019-04-10

## 2018-10-09 RX ORDER — PRAVASTATIN SODIUM 40 MG
TABLET ORAL
Qty: 90 TABLET | Refills: 3 | Status: SHIPPED | OUTPATIENT
Start: 2018-10-09 | End: 2019-12-29

## 2018-10-13 ENCOUNTER — LAB ENCOUNTER (OUTPATIENT)
Dept: LAB | Age: 63
End: 2018-10-13
Attending: NURSE PRACTITIONER
Payer: COMMERCIAL

## 2018-10-13 ENCOUNTER — HOSPITAL ENCOUNTER (OUTPATIENT)
Dept: ULTRASOUND IMAGING | Age: 63
Discharge: HOME | End: 2018-10-13
Attending: NURSE PRACTITIONER
Payer: COMMERCIAL

## 2018-10-13 DIAGNOSIS — K74.60 CIRRHOSIS OF LIVER WITHOUT ASCITES, UNSPECIFIED HEPATIC CIRRHOSIS TYPE (HCC): ICD-10-CM

## 2018-10-13 PROCEDURE — 85025 COMPLETE CBC W/AUTO DIFF WBC: CPT

## 2018-10-13 PROCEDURE — 36415 COLL VENOUS BLD VENIPUNCTURE: CPT

## 2018-10-13 PROCEDURE — 82105 ALPHA-FETOPROTEIN SERUM: CPT

## 2018-10-13 PROCEDURE — 80053 COMPREHEN METABOLIC PANEL: CPT

## 2018-10-13 PROCEDURE — 76705 ECHO EXAM OF ABDOMEN: CPT | Performed by: NURSE PRACTITIONER

## 2018-10-13 PROCEDURE — 85610 PROTHROMBIN TIME: CPT

## 2018-10-22 ENCOUNTER — OFFICE VISIT (OUTPATIENT)
Dept: SURGERY | Facility: CLINIC | Age: 63
End: 2018-10-22
Payer: COMMERCIAL

## 2018-10-22 VITALS
DIASTOLIC BLOOD PRESSURE: 81 MMHG | OXYGEN SATURATION: 98 % | RESPIRATION RATE: 16 BRPM | SYSTOLIC BLOOD PRESSURE: 145 MMHG | WEIGHT: 223 LBS | BODY MASS INDEX: 38 KG/M2 | HEART RATE: 100 BPM

## 2018-10-22 DIAGNOSIS — K75.81 LIVER CIRRHOSIS SECONDARY TO NASH (HCC): Primary | ICD-10-CM

## 2018-10-22 DIAGNOSIS — K74.60 LIVER CIRRHOSIS SECONDARY TO NASH (HCC): Primary | ICD-10-CM

## 2018-10-22 NOTE — PROGRESS NOTES
[facility]  [address], [address]  [address]  [phone] ([phone]) Jeffrey   • ELECTROCARDIOGRAM, COMPLETE  3-7-2013    scanned to media   • EXTRACTION ERUPTED TOOTH/EXR  06/01/2018   • HERNIA SURGERY     • HYSTERECTOMY     • TOOTH IMPLANTATION  07/93/7519   • UMBILICAL HERNIA REPAIR        Family History   Problem Relatio Blood In Vitro Strip, Use 1 strip by percutaneous route 4-6 times every day, Disp: 200 each, Rfl: 0  •  Imipramine HCl 50 MG Oral Tab, Take 3 tablets (150 mg total) by mouth nightly., Disp: 90 tablet, Rfl: 11  •  Insulin Pen Needle (BD PEN NEEDLE SHORT U/F affect/mood    Assessment and Plan:   66year old female with history of cirrhosis likely to BLANCAS who presents to the hepatology clinic for follow up. - Low platelets, splenomegaly; likely with cirrhosis. On plavix with stroke history.  Don't feel biopsy

## 2018-10-30 ENCOUNTER — PATIENT MESSAGE (OUTPATIENT)
Dept: INTERNAL MEDICINE CLINIC | Facility: CLINIC | Age: 63
End: 2018-10-30

## 2018-10-30 ENCOUNTER — TELEPHONE (OUTPATIENT)
Dept: INTERNAL MEDICINE CLINIC | Facility: CLINIC | Age: 63
End: 2018-10-30

## 2018-10-30 RX ORDER — ALBUTEROL SULFATE 90 UG/1
2 AEROSOL, METERED RESPIRATORY (INHALATION) EVERY 6 HOURS PRN
Qty: 1 INHALER | Refills: 0 | Status: SHIPPED | OUTPATIENT
Start: 2018-10-30 | End: 2019-11-16

## 2018-10-30 NOTE — TELEPHONE ENCOUNTER
To nursing, refill for albuterol inhaler sent to [facility] in [place]. Please let patient know. Thanks.

## 2018-10-30 NOTE — TELEPHONE ENCOUNTER
See Blued messages sent today for med clarification. Discussed with patient at length. Med module updated (dose of Imipramine was incorrect). The main thing patient was concerned about was the qty listed with certain medications on [employer].  Explained to

## 2018-10-30 NOTE — TELEPHONE ENCOUNTER
From: Shannon Cerrato  To:  Leticia Astorga MD  Sent: 10/30/2018 11:03 AM CDT  Subject: Prescription Question    Dr Bert Smiley    I have been looking at my medicine list and it looks like someone has changed that i longer use some of the drugs and changed

## 2018-10-30 NOTE — TELEPHONE ENCOUNTER
Called patient to ensure she is safe. She states she had butter on a hot pan and forgot about it when we spoke on the phone earlier. Reports there is soot everywhere, but she is okay. Air is now clear.      Patient also reports Dr. Rosalba Shi usually orders her Vent

## 2018-12-24 RX ORDER — IMIPRAMINE HCL 50 MG
TABLET ORAL
Qty: 360 TABLET | Refills: 3 | Status: SHIPPED | OUTPATIENT
Start: 2018-12-24 | End: 2019-12-27

## 2018-12-26 RX ORDER — SODIUM CHLORIDE 9 MG/ML
INJECTION, SOLUTION INTRAVENOUS CONTINUOUS
Status: CANCELLED | OUTPATIENT
Start: 2018-12-26

## 2018-12-27 ENCOUNTER — LAB ENCOUNTER (OUTPATIENT)
Dept: LAB | Age: 63
End: 2018-12-27
Attending: INTERNAL MEDICINE
Payer: COMMERCIAL

## 2018-12-27 DIAGNOSIS — E11.9 DIABETES MELLITUS (HCC): Primary | ICD-10-CM

## 2018-12-27 DIAGNOSIS — E78.5 HYPERLIPIDEMIA, UNSPECIFIED HYPERLIPIDEMIA TYPE: ICD-10-CM

## 2018-12-27 PROCEDURE — 80061 LIPID PANEL: CPT

## 2018-12-27 PROCEDURE — 36415 COLL VENOUS BLD VENIPUNCTURE: CPT

## 2018-12-27 PROCEDURE — 83036 HEMOGLOBIN GLYCOSYLATED A1C: CPT

## 2018-12-27 PROCEDURE — 80053 COMPREHEN METABOLIC PANEL: CPT

## 2019-01-21 ENCOUNTER — ANESTHESIA (OUTPATIENT)
Dept: ENDOSCOPY | Facility: HOSPITAL | Age: 64
End: 2019-01-21
Payer: COMMERCIAL

## 2019-01-21 ENCOUNTER — HOSPITAL ENCOUNTER (OUTPATIENT)
Facility: HOSPITAL | Age: 64
Setting detail: HOSPITAL OUTPATIENT SURGERY
Discharge: HOME | End: 2019-01-21
Attending: INTERNAL MEDICINE | Admitting: INTERNAL MEDICINE
Payer: COMMERCIAL

## 2019-01-21 ENCOUNTER — ANESTHESIA EVENT (OUTPATIENT)
Dept: ENDOSCOPY | Facility: HOSPITAL | Age: 64
End: 2019-01-21
Payer: COMMERCIAL

## 2019-01-21 VITALS
SYSTOLIC BLOOD PRESSURE: 118 MMHG | RESPIRATION RATE: 17 BRPM | OXYGEN SATURATION: 95 % | WEIGHT: 220 LBS | HEIGHT: 63 IN | HEART RATE: 89 BPM | DIASTOLIC BLOOD PRESSURE: 75 MMHG | BODY MASS INDEX: 38.98 KG/M2

## 2019-01-21 DIAGNOSIS — K75.81 LIVER CIRRHOSIS SECONDARY TO NASH (HCC): ICD-10-CM

## 2019-01-21 DIAGNOSIS — K74.60 LIVER CIRRHOSIS SECONDARY TO NASH (HCC): ICD-10-CM

## 2019-01-21 DIAGNOSIS — Z86.010 HISTORY OF ADENOMATOUS POLYP OF COLON: ICD-10-CM

## 2019-01-21 DIAGNOSIS — D69.6 THROMBOCYTOPENIA (HCC): ICD-10-CM

## 2019-01-21 LAB
COLONOSCOPY STUDY: NORMAL
GLUCOSE BLDC GLUCOMTR-MCNC: 243 MG/DL (ref 70–99)

## 2019-01-21 PROCEDURE — 88305 TISSUE EXAM BY PATHOLOGIST: CPT | Performed by: INTERNAL MEDICINE

## 2019-01-21 PROCEDURE — 88312 SPECIAL STAINS GROUP 1: CPT | Performed by: INTERNAL MEDICINE

## 2019-01-21 PROCEDURE — 0DB68ZX EXCISION OF STOMACH, VIA NATURAL OR ARTIFICIAL OPENING ENDOSCOPIC, DIAGNOSTIC: ICD-10-PCS | Performed by: INTERNAL MEDICINE

## 2019-01-21 PROCEDURE — 82962 GLUCOSE BLOOD TEST: CPT

## 2019-01-21 PROCEDURE — 0DBL8ZX EXCISION OF TRANSVERSE COLON, VIA NATURAL OR ARTIFICIAL OPENING ENDOSCOPIC, DIAGNOSTIC: ICD-10-PCS | Performed by: INTERNAL MEDICINE

## 2019-01-21 PROCEDURE — 0DBM8ZX EXCISION OF DESCENDING COLON, VIA NATURAL OR ARTIFICIAL OPENING ENDOSCOPIC, DIAGNOSTIC: ICD-10-PCS | Performed by: INTERNAL MEDICINE

## 2019-01-21 RX ORDER — SODIUM CHLORIDE, SODIUM LACTATE, POTASSIUM CHLORIDE, CALCIUM CHLORIDE 600; 310; 30; 20 MG/100ML; MG/100ML; MG/100ML; MG/100ML
INJECTION, SOLUTION INTRAVENOUS CONTINUOUS
Status: DISCONTINUED | OUTPATIENT
Start: 2019-01-21 | End: 2019-01-21

## 2019-01-21 RX ORDER — NALOXONE HYDROCHLORIDE 0.4 MG/ML
80 INJECTION, SOLUTION INTRAMUSCULAR; INTRAVENOUS; SUBCUTANEOUS AS NEEDED
Status: DISCONTINUED | OUTPATIENT
Start: 2019-01-21 | End: 2019-01-21

## 2019-01-21 RX ORDER — MIDAZOLAM HYDROCHLORIDE 1 MG/ML
1 INJECTION INTRAMUSCULAR; INTRAVENOUS EVERY 5 MIN PRN
Status: DISCONTINUED | OUTPATIENT
Start: 2019-01-21 | End: 2019-01-21

## 2019-01-21 RX ORDER — SODIUM CHLORIDE 0.9 % (FLUSH) 0.9 %
10 SYRINGE (ML) INJECTION AS NEEDED
Status: DISCONTINUED | OUTPATIENT
Start: 2019-01-21 | End: 2019-01-21

## 2019-01-21 RX ORDER — SODIUM CHLORIDE, SODIUM LACTATE, POTASSIUM CHLORIDE, CALCIUM CHLORIDE 600; 310; 30; 20 MG/100ML; MG/100ML; MG/100ML; MG/100ML
INJECTION, SOLUTION INTRAVENOUS CONTINUOUS PRN
Status: DISCONTINUED | OUTPATIENT
Start: 2019-01-21 | End: 2019-01-21 | Stop reason: SURG

## 2019-01-21 RX ORDER — DEXTROSE MONOHYDRATE 25 G/50ML
50 INJECTION, SOLUTION INTRAVENOUS
Status: DISCONTINUED | OUTPATIENT
Start: 2019-01-21 | End: 2019-01-21

## 2019-01-21 RX ORDER — ONDANSETRON 2 MG/ML
INJECTION INTRAMUSCULAR; INTRAVENOUS AS NEEDED
Status: DISCONTINUED | OUTPATIENT
Start: 2019-01-21 | End: 2019-01-21 | Stop reason: SURG

## 2019-01-21 RX ADMIN — SODIUM CHLORIDE, SODIUM LACTATE, POTASSIUM CHLORIDE, CALCIUM CHLORIDE: 600; 310; 30; 20 INJECTION, SOLUTION INTRAVENOUS at 07:32:00

## 2019-01-21 RX ADMIN — SODIUM CHLORIDE, SODIUM LACTATE, POTASSIUM CHLORIDE, CALCIUM CHLORIDE: 600; 310; 30; 20 INJECTION, SOLUTION INTRAVENOUS at 08:00:00

## 2019-01-21 RX ADMIN — ONDANSETRON 4 MG: 2 INJECTION INTRAMUSCULAR; INTRAVENOUS at 07:45:00

## 2019-01-21 NOTE — ANESTHESIA POSTPROCEDURE EVALUATION
Patient: Fredy Guillen    Procedure Summary     Date:  01/21/19 Room / Location:  [facility] ENDOSCOPY 01 / [facility] ENDOSCOPY    Anesthesia Start:  0732 Anesthesia Stop:      Procedures:       COLONOSCOPY (N/A )      ESOPHAGOGASTRODUODENOSCOPY (EGD) (N/A ) Diagnos

## 2019-01-21 NOTE — PROGRESS NOTES
1/21/2019  [address] [address]    Dear Franklyn Sinha,     Here are the results from your recent testing :    Here are the biopsy/pathology findings from your recent EGD (upper endoscopy) and colonoscopy:     The biopsy/patholog

## 2019-01-21 NOTE — H&P
PRE-PROCEDURE UPDATE    HPI: Jen Guevara is a 66year old female. 8/8/1955. Patient presents for a colonoscopy and gastroscopy.      ALLERGIES:   Adhesive Tape           HIVES  Acetaminophen           UNKNOWN  Amiloride                   Comment:Oth 31/59/7555   • UMBILICAL HERNIA REPAIR         PHYSICAL EXAM:  BP (!) 149/94 (BP Location: Left arm)   Pulse 106   Resp 20   Ht 5' 3\" (1.6 m)   Wt 220 lb (99.8 kg)   SpO2 98%   BMI 38.97 kg/m²   LUNGS:   Clear to auscultation.   CARDIAC:   RRR, normal S1,

## 2019-01-21 NOTE — PROGRESS NOTES
1/21/2019    Re:  Felipe Collins   8/8/1955   [id number]       Dear Kenneth Welsh,    I had the pleasure of seeing Felipe Collins for colon cancer screening due to a history of polyps and cirrhosis: screening for varices.      She underwent colonoscopy and upp

## 2019-01-21 NOTE — OPERATIVE REPORT
COLONOSCOPY AND ESOPHAGOGASTRODUODENOSCOPY REPORT    Patient Name:  Mario De Jesus Record #: [id number]  YOB: 1955  Date of Procedure: 1/21/2019    Referring physician: Natalie Peoples MD    Surgeon:  Leonardo Adhikari MD    Pre-o or hiatal hernia. There were small (grade 1) esophageal varices seen only the distal one third of the esophagus. There were no stigmata of risk of bleeding seen. The stomach demonstrated normal distensibility.   There were no retained gastric contents a

## 2019-01-21 NOTE — ANESTHESIA PREPROCEDURE EVALUATION
Anesthesia PreOp Note    HPI:     Jeimy Marie is a 66year old female who presents for preoperative consultation requested by: Ward Kumar MD    Date of Surgery: 1/21/2019    Procedure(s):  COLONOSCOPY  ESOPHAGOGASTRODUODENOSCOPY (EGD)  Indicati disorder    • Stroke [facility])    • Type II or unspecified type diabetes mellitus without mention of complication, not stated as uncontrolled    • Unspecified sleep apnea    • Vertigo        Past Surgical History:   Procedure Laterality Date   • APPENDECTOMY breakfast. Disp: 30 tablet Rfl: 11 Taking   Losartan Potassium 25 MG Oral Tab TAKE 1/2 TABLET(12.5 MG) BY MOUTH DAILY Disp: 45 tablet Rfl: 3 Taking   Magnesium Oxide 400 (240 Mg) MG Oral Tab Take 1 tablet (400 mg total) by mouth 2 (two) times daily.  Disp: infusion  Intravenous Continuous PRN Rafael LEROY CRNA     propofol (DIPRIVAN) injection  Intravenous PRN Beverley León CRNA 20 mg at 01/21/19 0736    propofol (DIPRIVAN) infusion  Intravenous Continuous PRN Beverley León CRNA Last Rate: 59.9 Never Used    Substance and Sexual Activity      Alcohol use: Yes        Comment: occassionaly      Drug use: No      Sexual activity: Not on file    Other Topics      Concerns:         Service: Not Asked        Blood Transfusions: Not Asked benefits, risks including possible dental damage if relevant, major complications, and any alternative forms of anesthetic management. All of the patient's questions were answered to the best of my ability.  The patient desires the anesthetic management a

## 2019-01-26 RX ORDER — LOSARTAN POTASSIUM 25 MG/1
TABLET ORAL
Qty: 45 TABLET | Refills: 0 | OUTPATIENT
Start: 2019-01-26

## 2019-02-08 RX ORDER — LOSARTAN POTASSIUM 25 MG/1
TABLET ORAL
Qty: 45 TABLET | Refills: 3 | Status: SHIPPED | OUTPATIENT
Start: 2019-02-08 | End: 2020-01-15

## 2019-02-08 RX ORDER — LOSARTAN POTASSIUM 25 MG/1
TABLET ORAL
Qty: 45 TABLET | Refills: 0 | OUTPATIENT
Start: 2019-02-08

## 2019-02-08 NOTE — TELEPHONE ENCOUNTER
Current refill request refused due to refill is either a duplicate request or has active refills at the pharmacy. Check previous templates.     Requested Prescriptions     Refused Prescriptions Disp Refills   • LOSARTAN POTASSIUM 25 MG Oral Tab [Pharmacy M

## 2019-02-28 VITALS
BODY MASS INDEX: 38.45 KG/M2 | WEIGHT: 217 LBS | HEART RATE: 112 BPM | SYSTOLIC BLOOD PRESSURE: 120 MMHG | OXYGEN SATURATION: 97 % | DIASTOLIC BLOOD PRESSURE: 62 MMHG | HEIGHT: 63 IN

## 2019-02-28 VITALS
BODY MASS INDEX: 38.27 KG/M2 | SYSTOLIC BLOOD PRESSURE: 138 MMHG | WEIGHT: 216 LBS | HEART RATE: 114 BPM | DIASTOLIC BLOOD PRESSURE: 80 MMHG | HEIGHT: 63 IN | OXYGEN SATURATION: 98 %

## 2019-03-05 ENCOUNTER — LAB ENCOUNTER (OUTPATIENT)
Dept: LAB | Age: 64
End: 2019-03-05
Attending: INTERNAL MEDICINE
Payer: COMMERCIAL

## 2019-03-05 DIAGNOSIS — Z00.00 ROUTINE HEALTH MAINTENANCE: ICD-10-CM

## 2019-03-05 DIAGNOSIS — E53.8 VITAMIN B12 DEFICIENCY: ICD-10-CM

## 2019-03-05 DIAGNOSIS — Z79.4 TYPE 2 DIABETES MELLITUS WITH DIABETIC NEUROPATHY, WITH LONG-TERM CURRENT USE OF INSULIN (HCC): ICD-10-CM

## 2019-03-05 DIAGNOSIS — E11.40 TYPE 2 DIABETES MELLITUS WITH DIABETIC NEUROPATHY, WITH LONG-TERM CURRENT USE OF INSULIN (HCC): ICD-10-CM

## 2019-03-05 DIAGNOSIS — E78.00 HYPERCHOLESTEROLEMIA: ICD-10-CM

## 2019-03-05 DIAGNOSIS — E83.42 HYPOMAGNESEMIA: ICD-10-CM

## 2019-03-05 LAB
ALBUMIN SERPL-MCNC: 3.1 G/DL (ref 3.4–5)
ALBUMIN/GLOB SERPL: 0.7 {RATIO} (ref 1–2)
ALP LIVER SERPL-CCNC: 107 U/L (ref 50–130)
ALT SERPL-CCNC: 60 U/L (ref 13–56)
ANION GAP SERPL CALC-SCNC: 4 MMOL/L (ref 0–18)
AST SERPL-CCNC: 43 U/L (ref 15–37)
BASOPHILS # BLD AUTO: 0.04 X10(3) UL (ref 0–0.2)
BASOPHILS NFR BLD AUTO: 0.8 %
BILIRUB SERPL-MCNC: 0.4 MG/DL (ref 0.1–2)
BUN BLD-MCNC: 10 MG/DL (ref 7–18)
BUN/CREAT SERPL: 12.3 (ref 10–20)
CALCIUM BLD-MCNC: 9 MG/DL (ref 8.5–10.1)
CHLORIDE SERPL-SCNC: 106 MMOL/L (ref 98–107)
CHOLEST SMN-MCNC: 175 MG/DL (ref ?–200)
CO2 SERPL-SCNC: 32 MMOL/L (ref 21–32)
CREAT BLD-MCNC: 0.81 MG/DL (ref 0.55–1.02)
CREAT UR-SCNC: 135 MG/DL
DEPRECATED RDW RBC AUTO: 44.3 FL (ref 35.1–46.3)
EOSINOPHIL # BLD AUTO: 0.27 X10(3) UL (ref 0–0.7)
EOSINOPHIL NFR BLD AUTO: 5.5 %
ERYTHROCYTE [DISTWIDTH] IN BLOOD BY AUTOMATED COUNT: 13.1 % (ref 11–15)
EST. AVERAGE GLUCOSE BLD GHB EST-MCNC: 266 MG/DL (ref 68–126)
GLOBULIN PLAS-MCNC: 4.3 G/DL (ref 2.8–4.4)
GLUCOSE BLD-MCNC: 150 MG/DL (ref 70–99)
HAV IGM SER QL: 2.1 MG/DL (ref 1.6–2.6)
HBA1C MFR BLD HPLC: 10.9 % (ref ?–5.7)
HCT VFR BLD AUTO: 42.9 % (ref 35–48)
HDLC SERPL-MCNC: 55 MG/DL (ref 40–59)
HGB BLD-MCNC: 14 G/DL (ref 12–16)
IMM GRANULOCYTES # BLD AUTO: 0.02 X10(3) UL (ref 0–1)
IMM GRANULOCYTES NFR BLD: 0.4 %
LDLC SERPL CALC-MCNC: 93 MG/DL (ref ?–100)
LYMPHOCYTES # BLD AUTO: 2 X10(3) UL (ref 1–4)
LYMPHOCYTES NFR BLD AUTO: 40.5 %
M PROTEIN MFR SERPL ELPH: 7.4 G/DL (ref 6.4–8.2)
MCH RBC QN AUTO: 30.4 PG (ref 26–34)
MCHC RBC AUTO-ENTMCNC: 32.6 G/DL (ref 31–37)
MCV RBC AUTO: 93.3 FL (ref 80–100)
MICROALBUMIN UR-MCNC: 1.67 MG/DL
MICROALBUMIN/CREAT 24H UR-RTO: 12.4 UG/MG (ref ?–30)
MONOCYTES # BLD AUTO: 0.54 X10(3) UL (ref 0.1–1)
MONOCYTES NFR BLD AUTO: 10.9 %
NEUTROPHILS # BLD AUTO: 2.07 X10 (3) UL (ref 1.5–7.7)
NEUTROPHILS # BLD AUTO: 2.07 X10(3) UL (ref 1.5–7.7)
NEUTROPHILS NFR BLD AUTO: 41.9 %
NONHDLC SERPL-MCNC: 120 MG/DL (ref ?–130)
OSMOLALITY SERPL CALC.SUM OF ELEC: 296 MOSM/KG (ref 275–295)
PLATELET # BLD AUTO: 165 10(3)UL (ref 150–450)
POTASSIUM SERPL-SCNC: 4.1 MMOL/L (ref 3.5–5.1)
RBC # BLD AUTO: 4.6 X10(6)UL (ref 3.8–5.3)
SODIUM SERPL-SCNC: 142 MMOL/L (ref 136–145)
TRIGL SERPL-MCNC: 135 MG/DL (ref 30–149)
TSI SER-ACNC: 2.78 MIU/ML (ref 0.36–3.74)
VIT B12 SERPL-MCNC: 755 PG/ML (ref 193–986)
VLDLC SERPL CALC-MCNC: 27 MG/DL (ref 0–30)
WBC # BLD AUTO: 4.9 X10(3) UL (ref 4–11)

## 2019-03-05 PROCEDURE — 80053 COMPREHEN METABOLIC PANEL: CPT

## 2019-03-05 PROCEDURE — 84443 ASSAY THYROID STIM HORMONE: CPT

## 2019-03-05 PROCEDURE — 82043 UR ALBUMIN QUANTITATIVE: CPT

## 2019-03-05 PROCEDURE — 83735 ASSAY OF MAGNESIUM: CPT

## 2019-03-05 PROCEDURE — 82306 VITAMIN D 25 HYDROXY: CPT

## 2019-03-05 PROCEDURE — 80061 LIPID PANEL: CPT

## 2019-03-05 PROCEDURE — 85025 COMPLETE CBC W/AUTO DIFF WBC: CPT

## 2019-03-05 PROCEDURE — 83036 HEMOGLOBIN GLYCOSYLATED A1C: CPT

## 2019-03-05 PROCEDURE — 82570 ASSAY OF URINE CREATININE: CPT

## 2019-03-05 PROCEDURE — 82607 VITAMIN B-12: CPT

## 2019-03-05 PROCEDURE — 36415 COLL VENOUS BLD VENIPUNCTURE: CPT

## 2019-03-06 ENCOUNTER — PATIENT MESSAGE (OUTPATIENT)
Dept: INTERNAL MEDICINE CLINIC | Facility: CLINIC | Age: 64
End: 2019-03-06

## 2019-03-06 LAB — 25(OH)D3 SERPL-MCNC: 39 NG/ML (ref 30–100)

## 2019-03-07 NOTE — TELEPHONE ENCOUNTER
From: Lu Michael  To: Ester Moeller MD  Sent: 3/6/2019 5:27 PM CST  Subject: Test Results Question    Hello  i just wondering in my blood test did you Order CMP for my potassium and a A1C  I do have appointment next tues.     RUPERTO  thank you  Josefa

## 2019-03-12 ENCOUNTER — OFFICE VISIT (OUTPATIENT)
Dept: INTERNAL MEDICINE CLINIC | Facility: CLINIC | Age: 64
End: 2019-03-12
Payer: COMMERCIAL

## 2019-03-12 VITALS
BODY MASS INDEX: 39.87 KG/M2 | HEART RATE: 108 BPM | HEIGHT: 63 IN | SYSTOLIC BLOOD PRESSURE: 142 MMHG | OXYGEN SATURATION: 98 % | DIASTOLIC BLOOD PRESSURE: 86 MMHG | TEMPERATURE: 99 F | WEIGHT: 225 LBS

## 2019-03-12 DIAGNOSIS — E83.42 HYPOMAGNESEMIA: ICD-10-CM

## 2019-03-12 DIAGNOSIS — Z78.0 POSTMENOPAUSE: ICD-10-CM

## 2019-03-12 DIAGNOSIS — Z79.4 TYPE 2 DIABETES MELLITUS WITH DIABETIC NEUROPATHY, WITH LONG-TERM CURRENT USE OF INSULIN (HCC): ICD-10-CM

## 2019-03-12 DIAGNOSIS — Z86.010 HISTORY OF ADENOMATOUS POLYP OF COLON: ICD-10-CM

## 2019-03-12 DIAGNOSIS — K74.60 LIVER CIRRHOSIS SECONDARY TO NASH (HCC): ICD-10-CM

## 2019-03-12 DIAGNOSIS — E11.40 TYPE 2 DIABETES MELLITUS WITH DIABETIC NEUROPATHY, WITH LONG-TERM CURRENT USE OF INSULIN (HCC): ICD-10-CM

## 2019-03-12 DIAGNOSIS — E03.8 OTHER SPECIFIED HYPOTHYROIDISM: ICD-10-CM

## 2019-03-12 DIAGNOSIS — E78.00 HYPERCHOLESTEROLEMIA: ICD-10-CM

## 2019-03-12 DIAGNOSIS — K75.81 LIVER CIRRHOSIS SECONDARY TO NASH (HCC): ICD-10-CM

## 2019-03-12 DIAGNOSIS — Z12.31 ENCOUNTER FOR SCREENING MAMMOGRAM FOR BREAST CANCER: Primary | ICD-10-CM

## 2019-03-12 DIAGNOSIS — D69.6 THROMBOCYTOPENIA (HCC): ICD-10-CM

## 2019-03-12 PROCEDURE — 99396 PREV VISIT EST AGE 40-64: CPT | Performed by: INTERNAL MEDICINE

## 2019-03-12 RX ORDER — ONDANSETRON 8 MG/1
8 TABLET, ORALLY DISINTEGRATING ORAL EVERY 6 HOURS PRN
Qty: 30 TABLET | Refills: 2 | Status: SHIPPED
Start: 2019-03-12 | End: 2020-03-16

## 2019-03-12 RX ORDER — ONDANSETRON 8 MG/1
8 TABLET, ORALLY DISINTEGRATING ORAL EVERY 6 HOURS PRN
Qty: 30 TABLET | Refills: 2 | Status: SHIPPED | OUTPATIENT
Start: 2019-03-12 | End: 2019-03-12

## 2019-03-12 RX ORDER — BUTALBITAL, ACETAMINOPHEN AND CAFFEINE 50; 325; 40 MG/1; MG/1; MG/1
1 TABLET ORAL EVERY 4 HOURS PRN
Qty: 30 TABLET | Refills: 3 | Status: SHIPPED
Start: 2019-03-12 | End: 2019-11-15

## 2019-03-12 NOTE — PROGRESS NOTES
Anatoliy Cardona is a 66year old female. Patient presents with:  Checkup: Type 2 DM pt here today for 6 month checkup; BS Reading this a.m. was about 162. Pt reports she completed EGD/COLON Jan '19 w/Dr. Charu Jackson.  She also followed up with Dr. Brooke Coffey for LT K Does not apply route. Disp:  Rfl:    Butalbital-APAP-Caffeine -40 MG Oral Tab Take 1 tablet by mouth every 4 (four) hours as needed. Not to exceed 6 tablets per 24 hours Disp:  Rfl:    Clopidogrel Bisulfate 75 MG Oral Tab Take 75 mg by mouth daily.  D Units into the skin 2 (two) times daily.    Disp: 1 pen Rfl: 11      Past Medical History:   Diagnosis Date   • Adenomatous colon polyp 09/16/2013   • Cellulitis and abscess of leg    • CVA (cerebral infarction)    • Extrinsic asthma, unspecified    • High palpitations  GI: denies abdominal pain, nausea, vomiting, diarrhea, constipation, hematochezia, or melena  NEURO: denies headaches or dizziness    EXAM:   /86   Pulse 108   Temp 98.7 °F (37.1 °C) (Oral)   Ht 5' 3\" (1.6 m)   Wt 225 lb (102.1 kg)   S normal 3/2019.     LE edema  K+ normal on KDur 40meq BID.  Cont JILL hose. Cont Lasix 20mg/day. No edema on exam today.      NAFLD, prob BLANCAS cirrhosis  Fatty liver seen on CT in 9/2013.  Iron sats, Hep A/B/C panel, ceruloplasmin, DONTAE, AMA normal.  Again di

## 2019-03-21 RX ORDER — FUROSEMIDE 20 MG/1
TABLET ORAL
Qty: 180 TABLET | Refills: 3 | Status: SHIPPED | OUTPATIENT
Start: 2019-03-21 | End: 2020-03-13

## 2019-04-10 RX ORDER — POTASSIUM CHLORIDE 750 MG/1
TABLET, FILM COATED, EXTENDED RELEASE ORAL
Qty: 720 TABLET | Refills: 1 | Status: CANCELLED | OUTPATIENT
Start: 2019-04-10

## 2019-04-11 ENCOUNTER — PATIENT MESSAGE (OUTPATIENT)
Dept: INTERNAL MEDICINE CLINIC | Facility: CLINIC | Age: 64
End: 2019-04-11

## 2019-04-11 RX ORDER — POTASSIUM CHLORIDE 750 MG/1
TABLET, FILM COATED, EXTENDED RELEASE ORAL
Qty: 720 TABLET | Refills: 0 | OUTPATIENT
Start: 2019-04-11

## 2019-04-11 RX ORDER — POTASSIUM CHLORIDE 750 MG/1
40 TABLET, FILM COATED, EXTENDED RELEASE ORAL 2 TIMES DAILY
Qty: 720 TABLET | Refills: 3 | Status: SHIPPED | OUTPATIENT
Start: 2019-04-11 | End: 2020-03-16

## 2019-04-11 NOTE — TELEPHONE ENCOUNTER
Pt called for status of refill of Klor-Con 10 MEQ (must be this brand)  Pt has been out of medication for 3 days  Please call to advise  Tasked/printed for [place]

## 2019-04-12 ENCOUNTER — LAB ENCOUNTER (OUTPATIENT)
Dept: LAB | Age: 64
End: 2019-04-12
Attending: INTERNAL MEDICINE
Payer: COMMERCIAL

## 2019-04-12 ENCOUNTER — HOSPITAL ENCOUNTER (OUTPATIENT)
Dept: ULTRASOUND IMAGING | Age: 64
Discharge: HOME | End: 2019-04-12
Attending: INTERNAL MEDICINE
Payer: COMMERCIAL

## 2019-04-12 DIAGNOSIS — K74.60 LIVER CIRRHOSIS SECONDARY TO NASH (HCC): ICD-10-CM

## 2019-04-12 DIAGNOSIS — K75.81 LIVER CIRRHOSIS SECONDARY TO NASH (HCC): ICD-10-CM

## 2019-04-12 PROCEDURE — 80053 COMPREHEN METABOLIC PANEL: CPT

## 2019-04-12 PROCEDURE — 76705 ECHO EXAM OF ABDOMEN: CPT | Performed by: INTERNAL MEDICINE

## 2019-04-12 PROCEDURE — 36415 COLL VENOUS BLD VENIPUNCTURE: CPT

## 2019-04-12 PROCEDURE — 82105 ALPHA-FETOPROTEIN SERUM: CPT

## 2019-04-12 PROCEDURE — 85025 COMPLETE CBC W/AUTO DIFF WBC: CPT

## 2019-04-22 ENCOUNTER — OFFICE VISIT (OUTPATIENT)
Dept: SURGERY | Facility: CLINIC | Age: 64
End: 2019-04-22
Payer: COMMERCIAL

## 2019-04-22 VITALS
OXYGEN SATURATION: 97 % | HEART RATE: 116 BPM | WEIGHT: 225 LBS | BODY MASS INDEX: 40 KG/M2 | RESPIRATION RATE: 16 BRPM | DIASTOLIC BLOOD PRESSURE: 82 MMHG | SYSTOLIC BLOOD PRESSURE: 128 MMHG

## 2019-04-22 DIAGNOSIS — K75.81 LIVER CIRRHOSIS SECONDARY TO NASH (HCC): Primary | ICD-10-CM

## 2019-04-22 DIAGNOSIS — K74.60 LIVER CIRRHOSIS SECONDARY TO NASH (HCC): Primary | ICD-10-CM

## 2019-04-22 NOTE — PROGRESS NOTES
[facility] at [facility]  [address] [facility] [address], [address]  [address]  [phone] ([phone]) mellitus without mention of complication, not stated as uncontrolled    • Unspecified sleep apnea    • Vertigo       Past Surgical History:   Procedure Laterality Date   • APPENDECTOMY     • CHOLECYSTECTOMY     • COLONOSCOPY  01/2019   • COLONOSCOPY N/A 1/ TABLETS(200 MG) BY MOUTH EVERY NIGHT, Disp: 360 tablet, Rfl: 3  •  Albuterol Sulfate HFA (VENTOLIN HFA) 108 (90 Base) MCG/ACT Inhalation Aero Soln, Inhale 2 puffs into the lungs every 6 (six) hours as needed for Wheezing., Disp: 1 Inhaler, Rfl: 0  •  PRAVA hours as needed. , Disp: 30 tablet, Rfl: 0  •  HUMULIN R U-500 KWIKPEN 500 UNIT/ML Subcutaneous Solution Pen-injector, Inject 240 Units into the skin 2 (two) times daily.   , Disp: 1 pen, Rfl: 11      Exam:  Vitals per chart  Gen: NAD  HEENT: Anicteric  Lymp

## 2019-05-01 RX ORDER — ERGOCALCIFEROL 1.25 MG/1
CAPSULE ORAL
Qty: 13 CAPSULE | Refills: 3 | OUTPATIENT
Start: 2019-05-01

## 2019-05-01 NOTE — TELEPHONE ENCOUNTER
Current refill request refused due to refill is either a duplicate request or has active refills at the pharmacy. Check previous templates.     Requested Prescriptions     Refused Prescriptions Disp Refills   • ERGOCALCIFEROL 91052 units Oral Cap [facility]

## 2019-05-03 ENCOUNTER — LAB ENCOUNTER (OUTPATIENT)
Dept: LAB | Age: 64
End: 2019-05-03
Attending: INTERNAL MEDICINE
Payer: COMMERCIAL

## 2019-05-03 DIAGNOSIS — E11.9 DIABETES MELLITUS (HCC): Primary | ICD-10-CM

## 2019-05-03 DIAGNOSIS — E78.2 MIXED HYPERLIPIDEMIA: ICD-10-CM

## 2019-05-03 PROCEDURE — 82570 ASSAY OF URINE CREATININE: CPT

## 2019-05-03 PROCEDURE — 83036 HEMOGLOBIN GLYCOSYLATED A1C: CPT

## 2019-05-03 PROCEDURE — 82043 UR ALBUMIN QUANTITATIVE: CPT

## 2019-05-03 PROCEDURE — 36415 COLL VENOUS BLD VENIPUNCTURE: CPT

## 2019-05-03 PROCEDURE — 80053 COMPREHEN METABOLIC PANEL: CPT

## 2019-05-03 PROCEDURE — 80061 LIPID PANEL: CPT

## 2019-06-04 ENCOUNTER — PATIENT MESSAGE (OUTPATIENT)
Dept: INTERNAL MEDICINE CLINIC | Facility: CLINIC | Age: 64
End: 2019-06-04

## 2019-06-04 ENCOUNTER — HOSPITAL ENCOUNTER (OUTPATIENT)
Dept: BONE DENSITY | Age: 64
Discharge: HOME | End: 2019-06-04
Attending: INTERNAL MEDICINE
Payer: COMMERCIAL

## 2019-06-04 ENCOUNTER — HOSPITAL ENCOUNTER (OUTPATIENT)
Dept: MAMMOGRAPHY | Age: 64
Discharge: HOME | End: 2019-06-04
Attending: INTERNAL MEDICINE
Payer: COMMERCIAL

## 2019-06-04 DIAGNOSIS — Z78.0 POSTMENOPAUSE: ICD-10-CM

## 2019-06-04 DIAGNOSIS — Z12.31 ENCOUNTER FOR SCREENING MAMMOGRAM FOR BREAST CANCER: ICD-10-CM

## 2019-06-04 PROCEDURE — 77067 SCR MAMMO BI INCL CAD: CPT | Performed by: INTERNAL MEDICINE

## 2019-06-04 PROCEDURE — 77080 DXA BONE DENSITY AXIAL: CPT | Performed by: INTERNAL MEDICINE

## 2019-06-04 PROCEDURE — 77063 BREAST TOMOSYNTHESIS BI: CPT | Performed by: INTERNAL MEDICINE

## 2019-06-04 NOTE — TELEPHONE ENCOUNTER
From: Royden Nageotte  To: Melissa Stafford MD  Sent: 6/4/2019 2:57 PM CDT  Subject: Test Results Question    Nancy Griffiths [phone] cell      Hello  I went to [place] today and had my mammogram done at [address].   i did get results by though

## 2019-06-06 ENCOUNTER — TELEPHONE (OUTPATIENT)
Dept: INTERNAL MEDICINE CLINIC | Facility: CLINIC | Age: 64
End: 2019-06-06

## 2019-06-06 NOTE — TELEPHONE ENCOUNTER
I spoke with patient and relayed Dr. Cooper Rob message. She verbalized understanding and says she is scheduled with [facility] breast clinic. She asked that her DEXA be released to [facility] and this was also done.

## 2019-06-06 NOTE — TELEPHONE ENCOUNTER
Patient calling, would like to talk to Dr. Carin Barbour regarding Mammogram results. Hospital stating she has mass, would like patient to have additional views. She would like to talk to Dr. Carin Barbour regarding this.     Also asking for Bone Density done 6/4 r

## 2019-06-06 NOTE — TELEPHONE ENCOUNTER
Question of a mass in the left breast so they need additional mammo views and an ultrasound to further evaluate. Could just be dense breast tissue but need to evaluate further.   DEXA was normal

## 2019-06-06 NOTE — TELEPHONE ENCOUNTER
Regarding: Test Results Question  Contact: [phone]  ----- Message from Slime Figueroa sent at 6/6/2019 10:27 AM CDT -----       ----- Message from Fortunato Rondon to Kelby Nelson MD sent at 6/4/2019  2:57 PM -----   Moni Rosales [phone] cell

## 2019-06-19 ENCOUNTER — HOSPITAL ENCOUNTER (OUTPATIENT)
Dept: ULTRASOUND IMAGING | Facility: HOSPITAL | Age: 64
Discharge: HOME | End: 2019-06-19
Attending: INTERNAL MEDICINE
Payer: COMMERCIAL

## 2019-06-19 ENCOUNTER — HOSPITAL ENCOUNTER (OUTPATIENT)
Dept: MAMMOGRAPHY | Facility: HOSPITAL | Age: 64
Discharge: HOME | End: 2019-06-19
Attending: INTERNAL MEDICINE
Payer: COMMERCIAL

## 2019-06-19 DIAGNOSIS — R92.8 ABNORMAL MAMMOGRAM: ICD-10-CM

## 2019-06-19 PROCEDURE — 77061 BREAST TOMOSYNTHESIS UNI: CPT | Performed by: INTERNAL MEDICINE

## 2019-06-19 PROCEDURE — 76642 ULTRASOUND BREAST LIMITED: CPT | Performed by: INTERNAL MEDICINE

## 2019-06-19 PROCEDURE — 77065 DX MAMMO INCL CAD UNI: CPT | Performed by: INTERNAL MEDICINE

## 2019-06-21 RX ORDER — CLOPIDOGREL BISULFATE 75 MG/1
TABLET ORAL
Qty: 90 TABLET | Refills: 3 | Status: SHIPPED | OUTPATIENT
Start: 2019-06-21 | End: 2020-05-11

## 2019-06-25 ENCOUNTER — TELEPHONE (OUTPATIENT)
Dept: INTERNAL MEDICINE CLINIC | Facility: CLINIC | Age: 64
End: 2019-06-25

## 2019-07-12 RX ORDER — ERGOCALCIFEROL 1.25 MG/1
CAPSULE ORAL
Qty: 12 CAPSULE | Refills: 3 | Status: SHIPPED | OUTPATIENT
Start: 2019-07-12 | End: 2020-06-12

## 2019-08-28 ENCOUNTER — LAB ENCOUNTER (OUTPATIENT)
Dept: LAB | Facility: HOSPITAL | Age: 64
End: 2019-08-28
Attending: INTERNAL MEDICINE
Payer: COMMERCIAL

## 2019-08-28 DIAGNOSIS — E11.9 DIABETES MELLITUS (HCC): Primary | ICD-10-CM

## 2019-08-28 LAB
ALBUMIN SERPL-MCNC: 3.2 G/DL (ref 3.4–5)
ALBUMIN/GLOB SERPL: 0.8 {RATIO} (ref 1–2)
ALP LIVER SERPL-CCNC: 86 U/L (ref 50–130)
ALT SERPL-CCNC: 46 U/L (ref 13–56)
ANION GAP SERPL CALC-SCNC: 6 MMOL/L (ref 0–18)
AST SERPL-CCNC: 34 U/L (ref 15–37)
BILIRUB SERPL-MCNC: 0.5 MG/DL (ref 0.1–2)
BUN BLD-MCNC: 14 MG/DL (ref 7–18)
BUN/CREAT SERPL: 14.9 (ref 10–20)
CALCIUM BLD-MCNC: 9 MG/DL (ref 8.5–10.1)
CHLORIDE SERPL-SCNC: 105 MMOL/L (ref 98–112)
CO2 SERPL-SCNC: 31 MMOL/L (ref 21–32)
CREAT BLD-MCNC: 0.94 MG/DL (ref 0.55–1.02)
EST. AVERAGE GLUCOSE BLD GHB EST-MCNC: 249 MG/DL (ref 68–126)
GLOBULIN PLAS-MCNC: 4 G/DL (ref 2.8–4.4)
GLUCOSE BLD-MCNC: 167 MG/DL (ref 70–99)
HBA1C MFR BLD HPLC: 10.3 % (ref ?–5.7)
M PROTEIN MFR SERPL ELPH: 7.2 G/DL (ref 6.4–8.2)
OSMOLALITY SERPL CALC.SUM OF ELEC: 298 MOSM/KG (ref 275–295)
PATIENT FASTING: YES
POTASSIUM SERPL-SCNC: 3.9 MMOL/L (ref 3.5–5.1)
SODIUM SERPL-SCNC: 142 MMOL/L (ref 136–145)

## 2019-08-28 PROCEDURE — 83036 HEMOGLOBIN GLYCOSYLATED A1C: CPT

## 2019-08-28 PROCEDURE — 36415 COLL VENOUS BLD VENIPUNCTURE: CPT

## 2019-08-28 PROCEDURE — 80053 COMPREHEN METABOLIC PANEL: CPT

## 2019-09-04 ENCOUNTER — TELEPHONE (OUTPATIENT)
Dept: INTERNAL MEDICINE CLINIC | Facility: CLINIC | Age: 64
End: 2019-09-04

## 2019-09-04 DIAGNOSIS — E03.8 OTHER SPECIFIED HYPOTHYROIDISM: Primary | ICD-10-CM

## 2019-09-04 NOTE — TELEPHONE ENCOUNTER
Please advise - orders pending . Called patient who wants to go to [address] [place] [address] ( [facility]) - thanks - to DR. Jyotsna Ruiz

## 2019-09-04 NOTE — TELEPHONE ENCOUNTER
Pt has appt scheduled with Dr Aisha Estrada on 9/17/19  Pt will go for labs prior  Dr Virgil Burnett asked if T3 and T4 could be added to Dr Aisha Estrada existing lab order  Please call pt to confirm this has been done  Tasked to nursing

## 2019-09-04 NOTE — TELEPHONE ENCOUNTER
FT3 and T4 added to the labs ordered in 3/2019 (please ask her to tell phlebotomist to do all of them)

## 2019-09-12 ENCOUNTER — LAB ENCOUNTER (OUTPATIENT)
Dept: LAB | Age: 64
End: 2019-09-12
Attending: INTERNAL MEDICINE
Payer: COMMERCIAL

## 2019-09-12 DIAGNOSIS — D69.6 THROMBOCYTOPENIA (HCC): ICD-10-CM

## 2019-09-12 DIAGNOSIS — E11.40 TYPE 2 DIABETES MELLITUS WITH DIABETIC NEUROPATHY, WITH LONG-TERM CURRENT USE OF INSULIN (HCC): ICD-10-CM

## 2019-09-12 DIAGNOSIS — E78.00 HYPERCHOLESTEROLEMIA: ICD-10-CM

## 2019-09-12 DIAGNOSIS — Z79.4 TYPE 2 DIABETES MELLITUS WITH DIABETIC NEUROPATHY, WITH LONG-TERM CURRENT USE OF INSULIN (HCC): ICD-10-CM

## 2019-09-12 LAB
ALBUMIN SERPL-MCNC: 3.3 G/DL (ref 3.4–5)
ALBUMIN/GLOB SERPL: 0.8 {RATIO} (ref 1–2)
ALP LIVER SERPL-CCNC: 101 U/L (ref 50–130)
ALT SERPL-CCNC: 49 U/L (ref 13–56)
ANION GAP SERPL CALC-SCNC: 5 MMOL/L (ref 0–18)
AST SERPL-CCNC: 32 U/L (ref 15–37)
BASOPHILS # BLD AUTO: 0.06 X10(3) UL (ref 0–0.2)
BASOPHILS NFR BLD AUTO: 1 %
BILIRUB SERPL-MCNC: 0.5 MG/DL (ref 0.1–2)
BUN BLD-MCNC: 15 MG/DL (ref 7–18)
BUN/CREAT SERPL: 16 (ref 10–20)
CALCIUM BLD-MCNC: 9.7 MG/DL (ref 8.5–10.1)
CHLORIDE SERPL-SCNC: 104 MMOL/L (ref 98–112)
CHOLEST SMN-MCNC: 166 MG/DL (ref ?–200)
CO2 SERPL-SCNC: 33 MMOL/L (ref 21–32)
CREAT BLD-MCNC: 0.94 MG/DL (ref 0.55–1.02)
CREAT UR-SCNC: 143 MG/DL
DEPRECATED RDW RBC AUTO: 43.3 FL (ref 35.1–46.3)
EOSINOPHIL # BLD AUTO: 0.23 X10(3) UL (ref 0–0.7)
EOSINOPHIL NFR BLD AUTO: 3.9 %
ERYTHROCYTE [DISTWIDTH] IN BLOOD BY AUTOMATED COUNT: 12.5 % (ref 11–15)
EST. AVERAGE GLUCOSE BLD GHB EST-MCNC: 255 MG/DL (ref 68–126)
GLOBULIN PLAS-MCNC: 4.1 G/DL (ref 2.8–4.4)
GLUCOSE BLD-MCNC: 119 MG/DL (ref 70–99)
HBA1C MFR BLD HPLC: 10.5 % (ref ?–5.7)
HCT VFR BLD AUTO: 43.3 % (ref 35–48)
HDLC SERPL-MCNC: 59 MG/DL (ref 40–59)
HGB BLD-MCNC: 14.3 G/DL (ref 12–16)
IMM GRANULOCYTES # BLD AUTO: 0.02 X10(3) UL (ref 0–1)
IMM GRANULOCYTES NFR BLD: 0.3 %
LDLC SERPL CALC-MCNC: 83 MG/DL (ref ?–100)
LYMPHOCYTES # BLD AUTO: 2.53 X10(3) UL (ref 1–4)
LYMPHOCYTES NFR BLD AUTO: 42.8 %
M PROTEIN MFR SERPL ELPH: 7.4 G/DL (ref 6.4–8.2)
MCH RBC QN AUTO: 31 PG (ref 26–34)
MCHC RBC AUTO-ENTMCNC: 33 G/DL (ref 31–37)
MCV RBC AUTO: 93.9 FL (ref 80–100)
MICROALBUMIN UR-MCNC: 2.34 MG/DL
MICROALBUMIN/CREAT 24H UR-RTO: 16.4 UG/MG (ref ?–30)
MONOCYTES # BLD AUTO: 0.62 X10(3) UL (ref 0.1–1)
MONOCYTES NFR BLD AUTO: 10.5 %
NEUTROPHILS # BLD AUTO: 2.45 X10 (3) UL (ref 1.5–7.7)
NEUTROPHILS # BLD AUTO: 2.45 X10(3) UL (ref 1.5–7.7)
NEUTROPHILS NFR BLD AUTO: 41.5 %
NONHDLC SERPL-MCNC: 107 MG/DL (ref ?–130)
OSMOLALITY SERPL CALC.SUM OF ELEC: 296 MOSM/KG (ref 275–295)
PATIENT FASTING: YES
PATIENT FASTING: YES
PLATELET # BLD AUTO: 147 10(3)UL (ref 150–450)
POTASSIUM SERPL-SCNC: 3.8 MMOL/L (ref 3.5–5.1)
RBC # BLD AUTO: 4.61 X10(6)UL (ref 3.8–5.3)
SODIUM SERPL-SCNC: 142 MMOL/L (ref 136–145)
T3FREE SERPL-MCNC: 2.28 PG/ML (ref 2.4–4.2)
T4 FREE SERPL-MCNC: 0.9 NG/DL (ref 0.8–1.7)
TRIGL SERPL-MCNC: 119 MG/DL (ref 30–149)
VLDLC SERPL CALC-MCNC: 24 MG/DL (ref 0–30)
WBC # BLD AUTO: 5.9 X10(3) UL (ref 4–11)

## 2019-09-12 PROCEDURE — 80061 LIPID PANEL: CPT

## 2019-09-12 PROCEDURE — 82570 ASSAY OF URINE CREATININE: CPT

## 2019-09-12 PROCEDURE — 80053 COMPREHEN METABOLIC PANEL: CPT

## 2019-09-12 PROCEDURE — 82043 UR ALBUMIN QUANTITATIVE: CPT

## 2019-09-12 PROCEDURE — 84439 ASSAY OF FREE THYROXINE: CPT | Performed by: INTERNAL MEDICINE

## 2019-09-12 PROCEDURE — 83036 HEMOGLOBIN GLYCOSYLATED A1C: CPT

## 2019-09-12 PROCEDURE — 85025 COMPLETE CBC W/AUTO DIFF WBC: CPT

## 2019-09-12 PROCEDURE — 36415 COLL VENOUS BLD VENIPUNCTURE: CPT

## 2019-09-12 PROCEDURE — 84481 FREE ASSAY (FT-3): CPT | Performed by: INTERNAL MEDICINE

## 2019-09-17 ENCOUNTER — OFFICE VISIT (OUTPATIENT)
Dept: INTERNAL MEDICINE CLINIC | Facility: CLINIC | Age: 64
End: 2019-09-17
Payer: COMMERCIAL

## 2019-09-17 ENCOUNTER — APPOINTMENT (OUTPATIENT)
Dept: LAB | Age: 64
End: 2019-09-17
Attending: INTERNAL MEDICINE
Payer: COMMERCIAL

## 2019-09-17 VITALS
TEMPERATURE: 99 F | WEIGHT: 221 LBS | BODY MASS INDEX: 38.2 KG/M2 | HEIGHT: 63.8 IN | SYSTOLIC BLOOD PRESSURE: 128 MMHG | HEART RATE: 101 BPM | OXYGEN SATURATION: 97 % | DIASTOLIC BLOOD PRESSURE: 86 MMHG

## 2019-09-17 DIAGNOSIS — E83.42 HYPOMAGNESEMIA: ICD-10-CM

## 2019-09-17 DIAGNOSIS — E53.8 VITAMIN B12 DEFICIENCY: ICD-10-CM

## 2019-09-17 DIAGNOSIS — E55.9 VITAMIN D DEFICIENCY: ICD-10-CM

## 2019-09-17 DIAGNOSIS — K21.9 GASTROESOPHAGEAL REFLUX DISEASE, ESOPHAGITIS PRESENCE NOT SPECIFIED: ICD-10-CM

## 2019-09-17 DIAGNOSIS — Z86.73 HX OF TRANSIENT ISCHEMIC ATTACK (TIA): ICD-10-CM

## 2019-09-17 DIAGNOSIS — Z86.010 HISTORY OF ADENOMATOUS POLYP OF COLON: ICD-10-CM

## 2019-09-17 DIAGNOSIS — G47.33 OBSTRUCTIVE SLEEP APNEA: ICD-10-CM

## 2019-09-17 DIAGNOSIS — Z79.4 TYPE 2 DIABETES MELLITUS WITH DIABETIC NEUROPATHY, WITH LONG-TERM CURRENT USE OF INSULIN (HCC): ICD-10-CM

## 2019-09-17 DIAGNOSIS — E11.40 TYPE 2 DIABETES MELLITUS WITH DIABETIC NEUROPATHY, WITH LONG-TERM CURRENT USE OF INSULIN (HCC): ICD-10-CM

## 2019-09-17 DIAGNOSIS — E03.8 OTHER SPECIFIED HYPOTHYROIDISM: Primary | ICD-10-CM

## 2019-09-17 DIAGNOSIS — E03.8 OTHER SPECIFIED HYPOTHYROIDISM: ICD-10-CM

## 2019-09-17 DIAGNOSIS — K76.0 NAFLD (NONALCOHOLIC FATTY LIVER DISEASE): ICD-10-CM

## 2019-09-17 DIAGNOSIS — E78.00 HYPERCHOLESTEROLEMIA: ICD-10-CM

## 2019-09-17 DIAGNOSIS — E66.01 MORBID OBESITY (HCC): ICD-10-CM

## 2019-09-17 DIAGNOSIS — F41.1 ANXIETY STATE: ICD-10-CM

## 2019-09-17 DIAGNOSIS — D69.6 THROMBOCYTOPENIA (HCC): ICD-10-CM

## 2019-09-17 DIAGNOSIS — I87.2 VENOUS INSUFFICIENCY: ICD-10-CM

## 2019-09-17 DIAGNOSIS — Z00.00 ROUTINE HEALTH MAINTENANCE: ICD-10-CM

## 2019-09-17 LAB
HAV IGM SER QL: 2.1 MG/DL (ref 1.6–2.6)
TSI SER-ACNC: 1.71 MIU/ML (ref 0.36–3.74)

## 2019-09-17 PROCEDURE — 36415 COLL VENOUS BLD VENIPUNCTURE: CPT

## 2019-09-17 PROCEDURE — 84443 ASSAY THYROID STIM HORMONE: CPT

## 2019-09-17 PROCEDURE — 83735 ASSAY OF MAGNESIUM: CPT

## 2019-09-17 PROCEDURE — 99214 OFFICE O/P EST MOD 30 MIN: CPT | Performed by: INTERNAL MEDICINE

## 2019-09-17 PROCEDURE — 99212 OFFICE O/P EST SF 10 MIN: CPT | Performed by: INTERNAL MEDICINE

## 2019-09-17 NOTE — PROGRESS NOTES
Liana Gibson is a 60year old female. Patient presents with:  Checkup: 6 month. Mammo, L breast US, DEXA done in June 2019. Colonoscopy/endoscopy Jan 2019. She has been seeing Dr. Anjana Posey and Dr. Rena Greer.    Hair/Scalp Problem: Patient reports she has lost a every 4 (four) hours as needed. Not to exceed 6 tablets per 24 hours Disp: 30 tablet Rfl: 3   ondansetron (ZOFRAN ODT) 8 MG Oral Tablet Dispersible Take 1 tablet (8 mg total) by mouth every 6 (six) hours as needed for Nausea.  Place on top of the tongue whe Subcutaneous Solution Pen-injector Inject 240 Units into the skin 2 (two) times daily. Disp: 1 pen Rfl: 11   furosemide 20 MG Oral Tab Take 1 tablet (20 mg total) by mouth 2 (two) times daily.  Disp: 180 tablet Rfl: 3   TraMADol HCl 50 MG Oral Tab Take 1 GENERAL: feels well otherwise  SKIN: denies any unusual skin lesions  EYES:denies blurred vision or double vision  HEENT: denies nasal congestion, sinus pain or ST  LUNGS: denies shortness of breath with exertion or cough  CARDIOVASCULAR: denies chest pa attack (TIA) (expressive aphasia)  Continue Plavix. Pt notes occasional difficulty with word finding.      Routine health maintenance  Pt is s/p HERMELINDA/BSO.  Mammo and u/s neg (cyst) 6/2019. DEXA normal 6/2019.   Tdap given 9/7/16.   Rec Shingrix shingles vac 12.5mg/day -- pt states BP is normal at home -- usually 951'Z systolic. Depression  Pt decreased her imipramine from 4 to 3 tabs/day and is doing fine. Hair loss  FT3 slightly low, FT4 normal.  TSH not done -- will check today.   If normal, pt to se

## 2019-09-18 ENCOUNTER — TELEPHONE (OUTPATIENT)
Dept: INTERNAL MEDICINE CLINIC | Facility: CLINIC | Age: 64
End: 2019-09-18

## 2019-09-18 RX ORDER — FAMOTIDINE 40 MG/1
TABLET, FILM COATED ORAL
Qty: 180 TABLET | Refills: 3 | Status: SHIPPED | OUTPATIENT
Start: 2019-09-18 | End: 2020-02-05

## 2019-09-18 RX ORDER — FLUCONAZOLE 150 MG/1
150 TABLET ORAL ONCE
Qty: 1 TABLET | Refills: 0 | Status: SHIPPED | OUTPATIENT
Start: 2019-09-18 | End: 2019-09-18

## 2019-09-18 NOTE — TELEPHONE ENCOUNTER
Pt is calling to get the results from the blood test she had completed yesterday.      Best call back: [phone]

## 2019-09-18 NOTE — TELEPHONE ENCOUNTER
LMTCB:     Please obtain more information regarding itch. Also, what is patients preferred pharmacy?

## 2019-09-18 NOTE — TELEPHONE ENCOUNTER
Pt is calling with a vaginal itch that has been going on and off for about a month. Pt forgot to tell Dr Enriqueta Estrella about it at her last visit and would like to get the medication sent over to the pharmacy.      Best call back: [phone]

## 2019-10-04 NOTE — TELEPHONE ENCOUNTER
Nichole, [address] [facility] sent Drug Change Request for:  Klor-Con 10MEQ tablets  \"drug not covered by patient plan.   Preferred alternative is Klor-Con 10MEQ ER tablets\"  Form placed in purple folder  Tasked to [employer]

## 2019-10-06 RX ORDER — POTASSIUM CHLORIDE 750 MG/1
10 CAPSULE, EXTENDED RELEASE ORAL 2 TIMES DAILY
Refills: 0 | OUTPATIENT
Start: 2019-10-06

## 2019-10-07 NOTE — TELEPHONE ENCOUNTER
Current refill request refused due to refill is either a duplicate request or has active refills at the pharmacy. Check previous templates.     Requested Prescriptions     Refused Prescriptions Disp Refills   • Potassium Chloride ER 10 MEQ Oral Cap CR  0

## 2019-10-21 ENCOUNTER — APPOINTMENT (OUTPATIENT)
Dept: LAB | Age: 64
End: 2019-10-21
Attending: INTERNAL MEDICINE
Payer: COMMERCIAL

## 2019-10-21 ENCOUNTER — HOSPITAL ENCOUNTER (OUTPATIENT)
Dept: ULTRASOUND IMAGING | Age: 64
Discharge: HOME | End: 2019-10-21
Attending: INTERNAL MEDICINE
Payer: COMMERCIAL

## 2019-10-21 DIAGNOSIS — K75.81 LIVER CIRRHOSIS SECONDARY TO NASH (HCC): ICD-10-CM

## 2019-10-21 DIAGNOSIS — K74.60 LIVER CIRRHOSIS SECONDARY TO NASH (HCC): ICD-10-CM

## 2019-10-21 PROCEDURE — 85610 PROTHROMBIN TIME: CPT

## 2019-10-21 PROCEDURE — 36415 COLL VENOUS BLD VENIPUNCTURE: CPT

## 2019-10-21 PROCEDURE — 76705 ECHO EXAM OF ABDOMEN: CPT | Performed by: INTERNAL MEDICINE

## 2019-10-21 PROCEDURE — 80053 COMPREHEN METABOLIC PANEL: CPT

## 2019-10-21 PROCEDURE — 82105 ALPHA-FETOPROTEIN SERUM: CPT

## 2019-10-28 ENCOUNTER — OFFICE VISIT (OUTPATIENT)
Dept: SURGERY | Facility: CLINIC | Age: 64
End: 2019-10-28
Payer: COMMERCIAL

## 2019-10-28 VITALS
HEART RATE: 100 BPM | RESPIRATION RATE: 16 BRPM | DIASTOLIC BLOOD PRESSURE: 81 MMHG | SYSTOLIC BLOOD PRESSURE: 130 MMHG | BODY MASS INDEX: 39 KG/M2 | OXYGEN SATURATION: 99 % | WEIGHT: 224 LBS

## 2019-10-28 DIAGNOSIS — K75.81 LIVER CIRRHOSIS SECONDARY TO NASH (HCC): Primary | ICD-10-CM

## 2019-10-28 DIAGNOSIS — K74.60 LIVER CIRRHOSIS SECONDARY TO NASH (HCC): Primary | ICD-10-CM

## 2019-10-28 NOTE — PROGRESS NOTES
[facility] at [facility]  [address] [facility], [address]  [address]  [phone] ([phone]) diabetes mellitus without mention of complication, not stated as uncontrolled    • Unspecified sleep apnea    • Vertigo      Past Surgical History:   Procedure Laterality Date   • APPENDECTOMY     • CHOLECYSTECTOMY     • COLONOSCOPY  01/2019   • COLONOSCOP needed. Not to exceed 6 tablets per 24 hours, Disp: 30 tablet, Rfl: 3  ondansetron (ZOFRAN ODT) 8 MG Oral Tablet Dispersible, Take 1 tablet (8 mg total) by mouth every 6 (six) hours as needed for Nausea.  Place on top of the tongue where it will dissolve, t Subcutaneous Solution Pen-injector, Inject 240 Units into the skin 2 (two) times daily.   , Disp: 1 pen, Rfl: 11    Allergies:   Adhesive Tape           HIVES  Acetaminophen           UNKNOWN  Amiloride                   Comment:Other reaction(s): Sores in WNLs, continue to monitor; repeat in 6 months.  - Diabetes remains poorly controlled. Discussed the impact this has on progression of her liver disease.   - Jan '23 EGD with small varices.  Repeat in 2-3 years depending on disease progression.  [facility] screen

## 2019-11-01 ENCOUNTER — OFFICE VISIT (OUTPATIENT)
Dept: DERMATOLOGY CLINIC | Facility: CLINIC | Age: 64
End: 2019-11-01
Payer: COMMERCIAL

## 2019-11-01 DIAGNOSIS — L30.9 DERMATITIS: ICD-10-CM

## 2019-11-01 DIAGNOSIS — L65.9 ALOPECIA: ICD-10-CM

## 2019-11-01 DIAGNOSIS — Q80.9 ICHTHYOSIS: ICD-10-CM

## 2019-11-01 DIAGNOSIS — D23.9 BENIGN NEOPLASM OF SKIN, UNSPECIFIED LOCATION: ICD-10-CM

## 2019-11-01 DIAGNOSIS — L82.1 SEBORRHEIC KERATOSES: Primary | ICD-10-CM

## 2019-11-01 DIAGNOSIS — I78.1 TELANGIECTASIA: ICD-10-CM

## 2019-11-01 PROCEDURE — 99203 OFFICE O/P NEW LOW 30 MIN: CPT | Performed by: DERMATOLOGY

## 2019-11-01 PROCEDURE — 99212 OFFICE O/P EST SF 10 MIN: CPT | Performed by: DERMATOLOGY

## 2019-11-01 RX ORDER — BETAMETHASONE DIPROPIONATE 0.5 MG/G
LOTION TOPICAL
Qty: 60 ML | Refills: 5 | Status: SHIPPED | OUTPATIENT
Start: 2019-11-01

## 2019-11-01 RX ORDER — AMMONIUM LACTATE 12 G/100G
1 LOTION TOPICAL 2 TIMES DAILY
Qty: 500 ML | Refills: 11 | Status: SHIPPED | OUTPATIENT
Start: 2019-11-01 | End: 2019-12-01

## 2019-11-01 RX ORDER — FLUCONAZOLE 150 MG/1
TABLET ORAL
Refills: 0 | COMMUNITY
Start: 2019-09-18 | End: 2020-03-13

## 2019-11-06 ENCOUNTER — PATIENT MESSAGE (OUTPATIENT)
Dept: DERMATOLOGY CLINIC | Facility: CLINIC | Age: 64
End: 2019-11-06

## 2019-11-07 NOTE — TELEPHONE ENCOUNTER
Pt asking for clarification instructions for her medications. I checked in the AVS and office visit but could not find specifics. Thank you!

## 2019-11-09 NOTE — TELEPHONE ENCOUNTER
Betamethasone sosln to scalp bid    Lactic acid lotion to arms legs, triamcinolone along with the to  Itchy areas on arms , legs ,back etc

## 2019-11-11 NOTE — PROGRESS NOTES
Cristina Ghosh is a 60year old female. HPI:     CC:  Patient presents with:  Derm Problem: NEW PATIENT - Pt has white spots and patches all over arms, legs, and back. Pt states the spots itch.   Pt states they appeared a few years ago and seem to be ge IMPLANTATION  35/73/4880   • UMBILICAL HERNIA REPAIR        Family History   Problem Relation Age of Onset   • Diabetes Father    • Hypertension Mother    • Diabetes Sister    • Cancer Other    • Diabetes Other    • Hypertension Other       Social History hours as needed for Nausea.  Place on top of the tongue where it will dissolve, then swallow 30 tablet 2   • Losartan Potassium 25 MG Oral Tab TAKE 1/2 TABLET(12.5 MG) BY MOUTH DAILY 45 tablet 3   • IMIPRAMINE HCL 50 MG Oral Tab TAKE 4 TABLETS(200 MG) BY MO VOMITING  Farxiga [Dapagliflo*    ITCHING    Comment:Vaginal  Ibuprofen               OTHER (SEE COMMENTS)    Comment:Mouth ulcers  Lisinopril                  Comment:Other reaction(s): nausea, dizzy  Metformin Hcl               Comment:Other reaction(s): on file      Years of education: Not on file      Highest education level: Not on file    Occupational History      Not on file    Social Needs      Financial resource strain: Not on file      Food insecurity:        Worry: Not on file        Inability: No file    Family History   Problem Relation Age of Onset   • Diabetes Father    • Hypertension Mother    • Diabetes Sister    • Cancer Other    • Diabetes Other    • Hypertension Other        There were no vitals filed for this visit.     HPI:    Patient pres pigmented lesions examined with dermoscopy benign-appearing patterns. Waxy tannish keratotic papules scattered, cherry-red vascular papules scattered. See map today's date for lesions noted . Otherwise remarkable for lesions as noted on map.   Sally ramón twice daily as tolerated if patient desires. The fact this may take 2-4 months to see any significant change discussed. The fact regimen better and maintaining hair rather than regrowth reviewed. Further plans pending labs.   Recheck 3-4 months as needed

## 2019-11-15 ENCOUNTER — TELEPHONE (OUTPATIENT)
Dept: INTERNAL MEDICINE CLINIC | Facility: CLINIC | Age: 64
End: 2019-11-15

## 2019-11-17 RX ORDER — ALBUTEROL SULFATE 90 UG/1
AEROSOL, METERED RESPIRATORY (INHALATION)
Qty: 18 G | Refills: 3 | Status: SHIPPED | OUTPATIENT
Start: 2019-11-17 | End: 2021-10-05

## 2019-11-17 RX ORDER — FLUCONAZOLE 150 MG/1
TABLET ORAL
Qty: 1 TABLET | Refills: 0 | OUTPATIENT
Start: 2019-11-17

## 2019-11-18 RX ORDER — BUTALBITAL, ACETAMINOPHEN AND CAFFEINE 50; 325; 40 MG/1; MG/1; MG/1
1 TABLET ORAL EVERY 4 HOURS PRN
Qty: 30 TABLET | Refills: 3 | Status: CANCELLED | OUTPATIENT
Start: 2019-11-18

## 2019-11-19 RX ORDER — BUTALBITAL, ACETAMINOPHEN AND CAFFEINE 50; 325; 40 MG/1; MG/1; MG/1
TABLET ORAL
Qty: 30 TABLET | Refills: 3 | Status: SHIPPED | OUTPATIENT
Start: 2019-11-19

## 2019-12-17 RX ORDER — GABAPENTIN 300 MG/1
CAPSULE ORAL
Qty: 90 CAPSULE | Refills: 3 | Status: SHIPPED | OUTPATIENT
Start: 2019-12-17 | End: 2021-10-05

## 2019-12-17 NOTE — TELEPHONE ENCOUNTER
Rx sent -- she takes for possible neuropathy in her hands.   Please clarify with her whether it is helping -- if so, okay to continue gabapentin

## 2019-12-27 RX ORDER — IMIPRAMINE HCL 50 MG
150 TABLET ORAL NIGHTLY
Qty: 270 TABLET | Refills: 3 | Status: SHIPPED | OUTPATIENT
Start: 2019-12-27 | End: 2020-09-29

## 2019-12-29 RX ORDER — PRAVASTATIN SODIUM 40 MG
TABLET ORAL
Qty: 90 TABLET | Refills: 3 | Status: SHIPPED | OUTPATIENT
Start: 2019-12-29 | End: 2020-12-07

## 2020-01-09 NOTE — TELEPHONE ENCOUNTER
2nd attempted made to pt to inform message from Dr. Varun Dotson. No answer, left detailed message to call back. Called patient and relayed DR. METZ message - verbalized understanding

## 2020-01-15 RX ORDER — LOSARTAN POTASSIUM 25 MG/1
TABLET ORAL
Qty: 45 TABLET | Refills: 3 | Status: SHIPPED | OUTPATIENT
Start: 2020-01-15 | End: 2020-12-06

## 2020-01-28 ENCOUNTER — LAB ENCOUNTER (OUTPATIENT)
Dept: LAB | Facility: HOSPITAL | Age: 65
End: 2020-01-28
Attending: INTERNAL MEDICINE
Payer: COMMERCIAL

## 2020-01-28 DIAGNOSIS — E55.9 AVITAMINOSIS D: ICD-10-CM

## 2020-01-28 DIAGNOSIS — E11.9 DIABETES MELLITUS, TYPE II (HCC): Primary | ICD-10-CM

## 2020-01-28 DIAGNOSIS — E78.2 MIXED HYPERLIPIDEMIA: ICD-10-CM

## 2020-01-28 LAB
ALBUMIN SERPL-MCNC: 3.3 G/DL (ref 3.4–5)
ALBUMIN/GLOB SERPL: 0.8 {RATIO} (ref 1–2)
ALP LIVER SERPL-CCNC: 92 U/L (ref 50–130)
ALT SERPL-CCNC: 52 U/L (ref 13–56)
ANION GAP SERPL CALC-SCNC: 3 MMOL/L (ref 0–18)
AST SERPL-CCNC: 39 U/L (ref 15–37)
BILIRUB SERPL-MCNC: 0.5 MG/DL (ref 0.1–2)
BUN BLD-MCNC: 13 MG/DL (ref 7–18)
BUN/CREAT SERPL: 13.8 (ref 10–20)
CALCIUM BLD-MCNC: 9 MG/DL (ref 8.5–10.1)
CHLORIDE SERPL-SCNC: 104 MMOL/L (ref 98–112)
CHOLEST SMN-MCNC: 165 MG/DL (ref ?–200)
CO2 SERPL-SCNC: 33 MMOL/L (ref 21–32)
CREAT BLD-MCNC: 0.94 MG/DL (ref 0.55–1.02)
CREAT UR-SCNC: 289 MG/DL
EST. AVERAGE GLUCOSE BLD GHB EST-MCNC: 269 MG/DL (ref 68–126)
GLOBULIN PLAS-MCNC: 4.1 G/DL (ref 2.8–4.4)
GLUCOSE BLD-MCNC: 148 MG/DL (ref 70–99)
HBA1C MFR BLD HPLC: 11 % (ref ?–5.7)
HDLC SERPL-MCNC: 62 MG/DL (ref 40–59)
LDLC SERPL CALC-MCNC: 80 MG/DL (ref ?–100)
M PROTEIN MFR SERPL ELPH: 7.4 G/DL (ref 6.4–8.2)
MICROALBUMIN UR-MCNC: 4.69 MG/DL
MICROALBUMIN/CREAT 24H UR-RTO: 16.2 UG/MG (ref ?–30)
NONHDLC SERPL-MCNC: 103 MG/DL (ref ?–130)
OSMOLALITY SERPL CALC.SUM OF ELEC: 293 MOSM/KG (ref 275–295)
PATIENT FASTING Y/N/NP: YES
PATIENT FASTING Y/N/NP: YES
POTASSIUM SERPL-SCNC: 3.7 MMOL/L (ref 3.5–5.1)
SODIUM SERPL-SCNC: 140 MMOL/L (ref 136–145)
TRIGL SERPL-MCNC: 117 MG/DL (ref 30–149)
VLDLC SERPL CALC-MCNC: 23 MG/DL (ref 0–30)

## 2020-01-28 PROCEDURE — 82043 UR ALBUMIN QUANTITATIVE: CPT

## 2020-01-28 PROCEDURE — 80053 COMPREHEN METABOLIC PANEL: CPT

## 2020-01-28 PROCEDURE — 82570 ASSAY OF URINE CREATININE: CPT

## 2020-01-28 PROCEDURE — 36415 COLL VENOUS BLD VENIPUNCTURE: CPT

## 2020-01-28 PROCEDURE — 82306 VITAMIN D 25 HYDROXY: CPT

## 2020-01-28 PROCEDURE — 83036 HEMOGLOBIN GLYCOSYLATED A1C: CPT

## 2020-01-28 PROCEDURE — 80061 LIPID PANEL: CPT

## 2020-01-29 LAB — 25(OH)D3 SERPL-MCNC: 37.7 NG/ML (ref 30–100)

## 2020-02-05 ENCOUNTER — PATIENT MESSAGE (OUTPATIENT)
Dept: INTERNAL MEDICINE CLINIC | Facility: CLINIC | Age: 65
End: 2020-02-05

## 2020-02-05 RX ORDER — FAMOTIDINE 40 MG/1
TABLET, FILM COATED ORAL
Qty: 180 TABLET | Refills: 3 | OUTPATIENT
Start: 2020-02-05

## 2020-02-05 RX ORDER — FAMOTIDINE 40 MG/1
TABLET, FILM COATED ORAL
Qty: 180 TABLET | Refills: 3 | Status: SHIPPED | OUTPATIENT
Start: 2020-02-05 | End: 2021-01-30

## 2020-02-05 NOTE — TELEPHONE ENCOUNTER
Current refill request refused due to refill is either a duplicate request or has active refills at the pharmacy. Check previous templates.     Requested Prescriptions     Refused Prescriptions Disp Refills   • FAMOTIDINE 40 MG Oral Tab [Pharmacy Med Name:

## 2020-02-05 NOTE — TELEPHONE ENCOUNTER
From: Freddy Sports  To: Zay Kearney MD  Sent: 2/5/2020 9:22 AM CST  Subject: Prescription Question    Hello  this med came back denied from the office  FAMOTIDINE 40 MG Oral Tab [Pharmacy Med Name: FAMOTIDINE 40MG TABLETS]     i do need a new pres

## 2020-03-10 ENCOUNTER — APPOINTMENT (OUTPATIENT)
Dept: LAB | Facility: HOSPITAL | Age: 65
End: 2020-03-10
Attending: INTERNAL MEDICINE
Payer: COMMERCIAL

## 2020-03-10 DIAGNOSIS — Z79.4 TYPE 2 DIABETES MELLITUS WITH DIABETIC NEUROPATHY, WITH LONG-TERM CURRENT USE OF INSULIN (HCC): ICD-10-CM

## 2020-03-10 DIAGNOSIS — E03.8 OTHER SPECIFIED HYPOTHYROIDISM: ICD-10-CM

## 2020-03-10 DIAGNOSIS — E11.40 TYPE 2 DIABETES MELLITUS WITH DIABETIC NEUROPATHY, WITH LONG-TERM CURRENT USE OF INSULIN (HCC): ICD-10-CM

## 2020-03-10 DIAGNOSIS — E55.9 VITAMIN D DEFICIENCY: ICD-10-CM

## 2020-03-10 DIAGNOSIS — E53.8 VITAMIN B12 DEFICIENCY: ICD-10-CM

## 2020-03-10 LAB
ALBUMIN SERPL-MCNC: 3.3 G/DL (ref 3.4–5)
ALBUMIN/GLOB SERPL: 0.8 {RATIO} (ref 1–2)
ALP LIVER SERPL-CCNC: 107 U/L (ref 50–130)
ALT SERPL-CCNC: 49 U/L (ref 13–56)
ANION GAP SERPL CALC-SCNC: 8 MMOL/L (ref 0–18)
AST SERPL-CCNC: 32 U/L (ref 15–37)
BILIRUB SERPL-MCNC: 0.6 MG/DL (ref 0.1–2)
BUN BLD-MCNC: 13 MG/DL (ref 7–18)
BUN/CREAT SERPL: 13.5 (ref 10–20)
CALCIUM BLD-MCNC: 9.3 MG/DL (ref 8.5–10.1)
CHLORIDE SERPL-SCNC: 102 MMOL/L (ref 98–112)
CHOLEST SMN-MCNC: 177 MG/DL (ref ?–200)
CO2 SERPL-SCNC: 30 MMOL/L (ref 21–32)
CREAT BLD-MCNC: 0.96 MG/DL (ref 0.55–1.02)
CREAT UR-SCNC: 246 MG/DL
EST. AVERAGE GLUCOSE BLD GHB EST-MCNC: 272 MG/DL (ref 68–126)
GLOBULIN PLAS-MCNC: 4.3 G/DL (ref 2.8–4.4)
GLUCOSE BLD-MCNC: 263 MG/DL (ref 70–99)
HBA1C MFR BLD HPLC: 11.1 % (ref ?–5.7)
HDLC SERPL-MCNC: 68 MG/DL (ref 40–59)
LDLC SERPL CALC-MCNC: 83 MG/DL (ref ?–100)
M PROTEIN MFR SERPL ELPH: 7.6 G/DL (ref 6.4–8.2)
MICROALBUMIN UR-MCNC: 4.3 MG/DL
MICROALBUMIN/CREAT 24H UR-RTO: 17.5 UG/MG (ref ?–30)
NONHDLC SERPL-MCNC: 109 MG/DL (ref ?–130)
OSMOLALITY SERPL CALC.SUM OF ELEC: 299 MOSM/KG (ref 275–295)
PATIENT FASTING Y/N/NP: YES
PATIENT FASTING Y/N/NP: YES
POTASSIUM SERPL-SCNC: 4.1 MMOL/L (ref 3.5–5.1)
SODIUM SERPL-SCNC: 140 MMOL/L (ref 136–145)
TRIGL SERPL-MCNC: 131 MG/DL (ref 30–149)
TSI SER-ACNC: 3.37 MIU/ML (ref 0.36–3.74)
VIT B12 SERPL-MCNC: 905 PG/ML (ref 193–986)
VLDLC SERPL CALC-MCNC: 26 MG/DL (ref 0–30)

## 2020-03-10 PROCEDURE — 82570 ASSAY OF URINE CREATININE: CPT

## 2020-03-10 PROCEDURE — 82043 UR ALBUMIN QUANTITATIVE: CPT

## 2020-03-10 PROCEDURE — 80053 COMPREHEN METABOLIC PANEL: CPT

## 2020-03-10 PROCEDURE — 80061 LIPID PANEL: CPT

## 2020-03-10 PROCEDURE — 36415 COLL VENOUS BLD VENIPUNCTURE: CPT

## 2020-03-10 PROCEDURE — 82607 VITAMIN B-12: CPT

## 2020-03-10 PROCEDURE — 84443 ASSAY THYROID STIM HORMONE: CPT

## 2020-03-10 PROCEDURE — 83036 HEMOGLOBIN GLYCOSYLATED A1C: CPT

## 2020-03-10 PROCEDURE — 82306 VITAMIN D 25 HYDROXY: CPT

## 2020-03-11 LAB — 25(OH)D3 SERPL-MCNC: 36 NG/ML (ref 30–100)

## 2020-03-13 RX ORDER — FUROSEMIDE 20 MG/1
TABLET ORAL
Qty: 180 TABLET | Refills: 3 | Status: SHIPPED | OUTPATIENT
Start: 2020-03-13 | End: 2021-03-01

## 2020-03-14 ENCOUNTER — PATIENT MESSAGE (OUTPATIENT)
Dept: INTERNAL MEDICINE CLINIC | Facility: CLINIC | Age: 65
End: 2020-03-14

## 2020-03-16 ENCOUNTER — TELEPHONE (OUTPATIENT)
Dept: INTERNAL MEDICINE CLINIC | Facility: CLINIC | Age: 65
End: 2020-03-16

## 2020-03-16 RX ORDER — POTASSIUM CHLORIDE 750 MG/1
40 TABLET, FILM COATED, EXTENDED RELEASE ORAL 2 TIMES DAILY
Qty: 720 TABLET | Refills: 3 | Status: SHIPPED | OUTPATIENT
Start: 2020-03-16 | End: 2021-02-15

## 2020-03-16 RX ORDER — ONDANSETRON 8 MG/1
TABLET, ORALLY DISINTEGRATING ORAL
Qty: 30 TABLET | Refills: 2 | OUTPATIENT
Start: 2020-03-16

## 2020-03-16 RX ORDER — MECLIZINE HYDROCHLORIDE 25 MG/1
25 TABLET ORAL 3 TIMES DAILY PRN
Qty: 30 TABLET | Refills: 5 | Status: SHIPPED | OUTPATIENT
Start: 2020-03-16

## 2020-03-16 RX ORDER — IMIPRAMINE HCL 50 MG
150 TABLET ORAL NIGHTLY
Qty: 270 TABLET | Refills: 3 | Status: CANCELLED | OUTPATIENT
Start: 2020-03-16

## 2020-03-16 RX ORDER — POTASSIUM CHLORIDE 750 MG/1
TABLET, FILM COATED, EXTENDED RELEASE ORAL
Qty: 720 TABLET | Refills: 3 | OUTPATIENT
Start: 2020-03-16

## 2020-03-16 RX ORDER — ONDANSETRON 8 MG/1
8 TABLET, ORALLY DISINTEGRATING ORAL EVERY 6 HOURS PRN
Qty: 30 TABLET | Refills: 2 | Status: SHIPPED | OUTPATIENT
Start: 2020-03-16

## 2020-03-16 RX ORDER — CLOPIDOGREL BISULFATE 75 MG/1
TABLET ORAL
Qty: 90 TABLET | Refills: 3 | Status: CANCELLED | OUTPATIENT
Start: 2020-03-16

## 2020-03-16 NOTE — TELEPHONE ENCOUNTER
From: Juliane Alcantara  To: Aaron Humphrey MD  Sent: 3/14/2020 10:49 AM CDT  Subject: Non-Urgent Medical Question    Hello. Will u still c patients on tues.  Can I get refills for 90 day on my medication   [address] [facility]

## 2020-03-16 NOTE — TELEPHONE ENCOUNTER
kwadwot to pt  Refills to   - meclizine Rx older;   Not sure if ondasetron is maintenance for pt      Requested Prescriptions     Pending Prescriptions Disp Refills   • Meclizine HCl 25 MG Oral Tab 30 tablet 5     Sig: Take 1 tablet (25 mg total) by cristian

## 2020-03-16 NOTE — TELEPHONE ENCOUNTER
Pt requesting these refills  Please call with any questions  Pt asked that Dr Rodnye Bermudez call with any questions  Tasked to [employer]

## 2020-03-17 ENCOUNTER — PATIENT MESSAGE (OUTPATIENT)
Dept: INTERNAL MEDICINE CLINIC | Facility: CLINIC | Age: 65
End: 2020-03-17

## 2020-03-17 NOTE — TELEPHONE ENCOUNTER
From: Anabel Hummel  To: Terra Parisi MD  Sent: 3/17/2020 1:52 PM CDT  Subject: Test Results Question    Dr Torie Muniz  Did you look at my test results?   I was suppose to be there for appointment for these test results i did not come because with the

## 2020-03-31 ENCOUNTER — PATIENT MESSAGE (OUTPATIENT)
Dept: INTERNAL MEDICINE CLINIC | Facility: CLINIC | Age: 65
End: 2020-03-31

## 2020-04-01 RX ORDER — VALACYCLOVIR HYDROCHLORIDE 1 G/1
1 TABLET, FILM COATED ORAL
Qty: 21 TABLET | Refills: 0 | Status: SHIPPED | OUTPATIENT
Start: 2020-04-01 | End: 2020-04-08

## 2020-04-01 NOTE — TELEPHONE ENCOUNTER
Ask her to take a picture and upload it to [address] Sounds like shingles. Recommend starting Valtrex 1000mg TID x 7 days.   Needs to be seen if not improving or if getting worse

## 2020-04-01 NOTE — TELEPHONE ENCOUNTER
From: Joseluis Daley  To: Te Matthew MD  Sent: 3/31/2020 11:23 PM CDT  Subject: Non-Urgent Medical Question    Hello  On Saturday these red spots showed up on my side of underarm next to breast and then on my arm just below underarm.  i'm not talkin

## 2020-04-10 ENCOUNTER — TELEPHONE (OUTPATIENT)
Dept: SURGERY | Facility: CLINIC | Age: 65
End: 2020-04-10

## 2020-04-10 NOTE — TELEPHONE ENCOUNTER
Returned patient's call regarding upcomming ultrasound and appointment given recent Shingles diagnosis. Communicated to patient that it is OK to reschedule ultrasound until she is deemed no longer contagious.   Also communicated that we will be reschedulin

## 2020-05-11 RX ORDER — CLOPIDOGREL BISULFATE 75 MG/1
TABLET ORAL
Qty: 90 TABLET | Refills: 0 | Status: SHIPPED | OUTPATIENT
Start: 2020-05-11 | End: 2020-08-05

## 2020-05-11 NOTE — TELEPHONE ENCOUNTER
Checkup 6/9/20 with Dr. Lisandro Santana request is for a maintenance medication and has met the criteria specified in the Ambulatory Medication Refill Standing Order for eligibility, visits, laboratory, alerts and was sent to the requested pharmacy.     Request

## 2020-05-18 ENCOUNTER — HOSPITAL ENCOUNTER (OUTPATIENT)
Dept: ULTRASOUND IMAGING | Age: 65
Discharge: HOME | End: 2020-05-18
Attending: INTERNAL MEDICINE
Payer: COMMERCIAL

## 2020-05-18 ENCOUNTER — LABORATORY ENCOUNTER (OUTPATIENT)
Dept: LAB | Age: 65
End: 2020-05-18
Attending: INTERNAL MEDICINE
Payer: COMMERCIAL

## 2020-05-18 DIAGNOSIS — K75.81 LIVER CIRRHOSIS SECONDARY TO NASH (HCC): ICD-10-CM

## 2020-05-18 DIAGNOSIS — K74.60 LIVER CIRRHOSIS SECONDARY TO NASH (HCC): ICD-10-CM

## 2020-05-18 PROCEDURE — 82105 ALPHA-FETOPROTEIN SERUM: CPT

## 2020-05-18 PROCEDURE — 85610 PROTHROMBIN TIME: CPT

## 2020-05-18 PROCEDURE — 80053 COMPREHEN METABOLIC PANEL: CPT

## 2020-05-18 PROCEDURE — 76705 ECHO EXAM OF ABDOMEN: CPT | Performed by: INTERNAL MEDICINE

## 2020-05-18 PROCEDURE — 36415 COLL VENOUS BLD VENIPUNCTURE: CPT

## 2020-05-18 PROCEDURE — 85025 COMPLETE CBC W/AUTO DIFF WBC: CPT

## 2020-06-12 RX ORDER — ERGOCALCIFEROL 1.25 MG/1
CAPSULE ORAL
Qty: 12 CAPSULE | Refills: 3 | Status: SHIPPED | OUTPATIENT
Start: 2020-06-12 | End: 2021-05-11

## 2020-07-22 NOTE — PROGRESS NOTES
Suze Beltre is a 60year old female. Patient presents with:  Checkup: Type 2 DM here today for checkup. Pt reports stress has been high, diet poor; Discussed with Dr. Alex Espana as well. Shingles: March Dx with Shingles; Finished Abx.  Still has spot under • Imipramine HCl 50 MG Oral Tab Take 3 tablets (150 mg total) by mouth nightly. Patient states she takes 3 tablets nightly for a total dose of 150mg.  270 tablet 3   • GABAPENTIN 300 MG Oral Cap TAKE ONE CAPSULE ON DAY 1; ONE CAPSULE TWICE DAILY ON DAY 2; Hypothyroidism    • Liver cirrhosis ([facility] [address]    • Migraines    • Other and unspecified hyperlipidemia    • PONV (postoperative nausea and vomiting)    • Renal disorder    • Shingles     March 2020   • Stroke [facility])    • Type II or unspecified type diabetes rosalia 38.86 kg/m²     GENERAL: well developed, well nourished, in no apparent distress  HEENT: normal oropharynx, normal TM's  EYES: PERRLA, EOMI, conjunctivae are pink  NECK: supple, no cervical or supraclavicular LAD, no carotid bruits  LUNGS: clear to auscult 3/2019.     LE edema  K+ normal on KDur 40meq BID.  Cont JILL hose. Cont Lasix 20mg/day. Trace pedal edema on exam today.     NAFLD, prob BLANCAS cirrhosis  Fatty liver seen on CT in 9/2013.  Iron sats, Hep A/B/C panel, ceruloplasmin, DONTAE, AMA normal.  Again d

## 2020-08-05 RX ORDER — CLOPIDOGREL BISULFATE 75 MG/1
TABLET ORAL
Qty: 90 TABLET | Refills: 3 | Status: SHIPPED | OUTPATIENT
Start: 2020-08-05 | End: 2021-10-15

## 2020-08-18 RX ORDER — ONDANSETRON 8 MG/1
TABLET, ORALLY DISINTEGRATING ORAL
Qty: 30 TABLET | Refills: 2 | OUTPATIENT
Start: 2020-08-18

## 2020-08-20 ENCOUNTER — HOSPITAL ENCOUNTER (OUTPATIENT)
Dept: CT IMAGING | Age: 65
Discharge: HOME | End: 2020-08-20
Attending: INTERNAL MEDICINE

## 2020-08-20 ENCOUNTER — HOSPITAL ENCOUNTER (OUTPATIENT)
Dept: MAMMOGRAPHY | Age: 65
Discharge: HOME | End: 2020-08-20
Attending: INTERNAL MEDICINE
Payer: COMMERCIAL

## 2020-08-20 DIAGNOSIS — Z12.31 ENCOUNTER FOR SCREENING MAMMOGRAM FOR BREAST CANCER: ICD-10-CM

## 2020-08-20 DIAGNOSIS — Z13.6 ENCOUNTER FOR SCREENING FOR CORONARY ARTERY DISEASE: ICD-10-CM

## 2020-08-20 PROCEDURE — 77063 BREAST TOMOSYNTHESIS BI: CPT | Performed by: INTERNAL MEDICINE

## 2020-08-20 PROCEDURE — 77067 SCR MAMMO BI INCL CAD: CPT | Performed by: INTERNAL MEDICINE

## 2020-08-31 ENCOUNTER — APPOINTMENT (OUTPATIENT)
Dept: LAB | Age: 65
End: 2020-08-31
Attending: INTERNAL MEDICINE
Payer: COMMERCIAL

## 2020-08-31 ENCOUNTER — PATIENT MESSAGE (OUTPATIENT)
Dept: INTERNAL MEDICINE CLINIC | Facility: CLINIC | Age: 65
End: 2020-08-31

## 2020-08-31 ENCOUNTER — LAB ENCOUNTER (OUTPATIENT)
Dept: LAB | Age: 65
End: 2020-08-31
Attending: INTERNAL MEDICINE
Payer: COMMERCIAL

## 2020-08-31 DIAGNOSIS — E83.42 HYPOMAGNESEMIA: ICD-10-CM

## 2020-08-31 DIAGNOSIS — E78.2 MIXED HYPERLIPIDEMIA: ICD-10-CM

## 2020-08-31 DIAGNOSIS — E11.9 DIABETES MELLITUS (HCC): Primary | ICD-10-CM

## 2020-08-31 LAB
ALBUMIN SERPL-MCNC: 3.3 G/DL (ref 3.4–5)
ALBUMIN/GLOB SERPL: 0.8 {RATIO} (ref 1–2)
ALP LIVER SERPL-CCNC: 100 U/L (ref 50–130)
ALT SERPL-CCNC: 53 U/L (ref 13–56)
ANION GAP SERPL CALC-SCNC: 10 MMOL/L (ref 0–18)
AST SERPL-CCNC: 32 U/L (ref 15–37)
BILIRUB SERPL-MCNC: 0.6 MG/DL (ref 0.1–2)
BUN BLD-MCNC: 15 MG/DL (ref 7–18)
BUN/CREAT SERPL: 17.4 (ref 10–20)
CALCIUM BLD-MCNC: 9.2 MG/DL (ref 8.5–10.1)
CHLORIDE SERPL-SCNC: 104 MMOL/L (ref 98–112)
CHOLEST SMN-MCNC: 165 MG/DL (ref ?–200)
CO2 SERPL-SCNC: 27 MMOL/L (ref 21–32)
CREAT BLD-MCNC: 0.86 MG/DL (ref 0.55–1.02)
CREAT UR-SCNC: 179 MG/DL
EST. AVERAGE GLUCOSE BLD GHB EST-MCNC: 272 MG/DL (ref 68–126)
GLOBULIN PLAS-MCNC: 4.2 G/DL (ref 2.8–4.4)
GLUCOSE BLD-MCNC: 169 MG/DL (ref 70–99)
HAV IGM SER QL: 2.1 MG/DL (ref 1.6–2.6)
HBA1C MFR BLD HPLC: 11.1 % (ref ?–5.7)
HDLC SERPL-MCNC: 59 MG/DL (ref 40–59)
LDLC SERPL CALC-MCNC: 80 MG/DL (ref ?–100)
M PROTEIN MFR SERPL ELPH: 7.5 G/DL (ref 6.4–8.2)
MICROALBUMIN UR-MCNC: 3.36 MG/DL
MICROALBUMIN/CREAT 24H UR-RTO: 18.8 UG/MG (ref ?–30)
NONHDLC SERPL-MCNC: 106 MG/DL (ref ?–130)
OSMOLALITY SERPL CALC.SUM OF ELEC: 297 MOSM/KG (ref 275–295)
PATIENT FASTING Y/N/NP: YES
PATIENT FASTING Y/N/NP: YES
POTASSIUM SERPL-SCNC: 3.8 MMOL/L (ref 3.5–5.1)
SODIUM SERPL-SCNC: 141 MMOL/L (ref 136–145)
TRIGL SERPL-MCNC: 130 MG/DL (ref 30–149)
VLDLC SERPL CALC-MCNC: 26 MG/DL (ref 0–30)

## 2020-08-31 PROCEDURE — 36415 COLL VENOUS BLD VENIPUNCTURE: CPT

## 2020-08-31 PROCEDURE — 80061 LIPID PANEL: CPT

## 2020-08-31 PROCEDURE — 83735 ASSAY OF MAGNESIUM: CPT

## 2020-08-31 PROCEDURE — 82043 UR ALBUMIN QUANTITATIVE: CPT

## 2020-08-31 PROCEDURE — 80053 COMPREHEN METABOLIC PANEL: CPT

## 2020-08-31 PROCEDURE — 83036 HEMOGLOBIN GLYCOSYLATED A1C: CPT

## 2020-08-31 PROCEDURE — 82570 ASSAY OF URINE CREATININE: CPT

## 2020-08-31 NOTE — TELEPHONE ENCOUNTER
From: Merlin Porter  To: Bruna Kunz MD  Sent: 8/31/2020 12:12 AM CDT  Subject: Non-Urgent Medical Question    Hello  My Mother has shingles with some blisters already she has a bad case of them.  the question is i Hany Artemio take care her 24/7 a

## 2020-09-03 NOTE — TELEPHONE ENCOUNTER
LMTCB to call back with patient. Is her mother also a patient of Dr. Melany Damon Did she hit her head during the fall? Was she evaluated after the fall? Any injuries?

## 2020-09-03 NOTE — TELEPHONE ENCOUNTER
Spoke with patient-- her mother is not a patient of [facility], sees a doctor at [facility], mom is refusing paramedics but her doctors are aware. She apologizes for the confusion in her message.      FYI to Dr. Zavala Grandchild-- patient requests an fyi be sent to you that she recent

## 2020-09-24 ENCOUNTER — PATIENT MESSAGE (OUTPATIENT)
Dept: INTERNAL MEDICINE CLINIC | Facility: CLINIC | Age: 65
End: 2020-09-24

## 2020-09-24 ENCOUNTER — HOSPITAL ENCOUNTER (OUTPATIENT)
Dept: ULTRASOUND IMAGING | Facility: HOSPITAL | Age: 65
Discharge: HOME | End: 2020-09-24
Attending: INTERNAL MEDICINE

## 2020-09-24 DIAGNOSIS — Z13.9 ENCOUNTER FOR SCREENING: ICD-10-CM

## 2020-09-24 RX ORDER — IMIPRAMINE HCL 50 MG
150 TABLET ORAL NIGHTLY
Qty: 270 TABLET | Refills: 3 | OUTPATIENT
Start: 2020-09-24

## 2020-09-24 NOTE — PROGRESS NOTES
Spoke with patient and discussed Final results of her PV screening. Carotid Arteries: Right and Left - Normal  Abdominal Aorta: Normal    Patient verbalized understanding of results.

## 2020-09-25 RX ORDER — IMIPRAMINE HCL 50 MG
150 TABLET ORAL NIGHTLY
Qty: 270 TABLET | Refills: 3 | OUTPATIENT
Start: 2020-09-25

## 2020-09-25 NOTE — TELEPHONE ENCOUNTER
From: Shannon Cerrato  To: Mimi Villanueva MD  Sent: 9/24/2020 11:14 PM CDT  Subject: Prescription Question    Dr Constanza Lala  Just recd this message that medicine was denied. ..i only have a few days left . ...can it please be refilled Imipramine HC

## 2020-09-25 NOTE — TELEPHONE ENCOUNTER
ashley sent  Ziggy Moore,     Imipramine HCL 50mg three tablets daily was refilled 12/27/19 for a year supply to [facility] in [place]. Please call the pharmacy to fill refills on file.  Please reach out if you need anything further.     Thanks,  ROSANNE Staff

## 2020-09-25 NOTE — TELEPHONE ENCOUNTER
Current refill request refused due to refill is either a duplicate request or has active refills at the pharmacy. Check previous templates.     Requested Prescriptions     Refused Prescriptions Disp Refills   • Imipramine HCl 50 MG Oral Tab 270 tablet 3

## 2020-09-25 NOTE — TELEPHONE ENCOUNTER
See mychart encounter from yesterday. Duplicate request    Current refill request refused due to refill is either a duplicate request or has active refills at the pharmacy. Check previous templates.     Requested Prescriptions     Refused Prescriptions Dis

## 2020-09-28 NOTE — TELEPHONE ENCOUNTER
Fax received from [facility]    Requesting refill of Imipramine 50 mg tablets    Qty 270  Last refill 9/24/20

## 2020-09-29 RX ORDER — IMIPRAMINE HCL 50 MG
150 TABLET ORAL NIGHTLY
Qty: 270 TABLET | Refills: 3 | Status: SHIPPED | OUTPATIENT
Start: 2020-09-29 | End: 2021-08-19

## 2020-09-29 NOTE — TELEPHONE ENCOUNTER
Reviewed and Rx done  Requested Prescriptions     Signed Prescriptions Disp Refills   • Imipramine HCl 50 MG Oral Tab 270 tablet 3     Sig: Take 3 tablets (150 mg total) by mouth nightly.  Patient states she takes 3 tablets nightly for a total dose of 150mg

## 2020-10-19 ENCOUNTER — HOSPITAL ENCOUNTER (OUTPATIENT)
Dept: ULTRASOUND IMAGING | Age: 65
Discharge: HOME | End: 2020-10-19
Attending: INTERNAL MEDICINE
Payer: COMMERCIAL

## 2020-10-19 ENCOUNTER — APPOINTMENT (OUTPATIENT)
Dept: LAB | Age: 65
End: 2020-10-19
Attending: INTERNAL MEDICINE
Payer: COMMERCIAL

## 2020-10-19 DIAGNOSIS — K75.81 LIVER CIRRHOSIS SECONDARY TO NASH (HCC): ICD-10-CM

## 2020-10-19 DIAGNOSIS — K74.60 LIVER CIRRHOSIS SECONDARY TO NASH (HCC): ICD-10-CM

## 2020-10-19 PROCEDURE — 80053 COMPREHEN METABOLIC PANEL: CPT | Performed by: INTERNAL MEDICINE

## 2020-10-19 PROCEDURE — 85610 PROTHROMBIN TIME: CPT | Performed by: INTERNAL MEDICINE

## 2020-10-19 PROCEDURE — 36415 COLL VENOUS BLD VENIPUNCTURE: CPT | Performed by: INTERNAL MEDICINE

## 2020-10-19 PROCEDURE — 82105 ALPHA-FETOPROTEIN SERUM: CPT | Performed by: INTERNAL MEDICINE

## 2020-10-19 PROCEDURE — 85025 COMPLETE CBC W/AUTO DIFF WBC: CPT | Performed by: INTERNAL MEDICINE

## 2020-10-19 PROCEDURE — 76705 ECHO EXAM OF ABDOMEN: CPT | Performed by: INTERNAL MEDICINE

## 2020-10-26 ENCOUNTER — OFFICE VISIT (OUTPATIENT)
Dept: SURGERY | Facility: CLINIC | Age: 65
End: 2020-10-26
Payer: COMMERCIAL

## 2020-10-26 VITALS
BODY MASS INDEX: 39 KG/M2 | SYSTOLIC BLOOD PRESSURE: 146 MMHG | WEIGHT: 225.63 LBS | DIASTOLIC BLOOD PRESSURE: 87 MMHG | HEIGHT: 63.8 IN | OXYGEN SATURATION: 99 % | RESPIRATION RATE: 16 BRPM | HEART RATE: 99 BPM

## 2020-10-26 DIAGNOSIS — K75.81 LIVER CIRRHOSIS SECONDARY TO NASH (HCC): Primary | ICD-10-CM

## 2020-10-26 DIAGNOSIS — K74.60 LIVER CIRRHOSIS SECONDARY TO NASH (HCC): Primary | ICD-10-CM

## 2020-10-26 RX ORDER — SUB-Q INSULIN DEVICE, 40 UNIT
EACH MISCELLANEOUS
COMMUNITY
Start: 2020-10-08 | End: 2021-10-05 | Stop reason: ALTCHOICE

## 2020-10-26 NOTE — PROGRESS NOTES
[facility] at [facility]  [address] [facility], [address]  [address]  [phone] ([phone]) (postoperative nausea and vomiting)    • Renal disorder    • Shingles     March 2020   • Stroke [facility])    • Type II or unspecified type diabetes mellitus without mention of complication, not stated as uncontrolled    • Unspecified sleep apnea    • Vertigo mouth every 6 (six) hours as needed for Nausea. Place on top of the tongue where it will dissolve, then swallow 30 tablet 2   • Potassium Chloride ER (KLOR-CON 10) 10 MEQ Oral Tab CR Take 4 tablets (40 mEq total) by mouth 2 (two) times daily.  720 tablet 3 TraMADol HCl 50 MG Oral Tab Take 1 tablet (50 mg total) by mouth every 6 (six) hours as needed. 30 tablet 0   • HUMULIN R U-500 KWIKPEN 500 UNIT/ML Subcutaneous Solution Pen-injector Inject 240 Units into the skin 2 (two) times daily.    1 pen 11   Allergie asterixis  Psych: normal affect/mood    Assessment and Plan:   63year old female with history of cirrhosis likely to BLANCAS     - Labs stable, repeat in ~ 6 months  - Since no swelling and has chronic hypokalemia, discussed perhaps skipping 1-2 days per wee

## 2020-12-06 RX ORDER — LOSARTAN POTASSIUM 25 MG/1
TABLET ORAL
Qty: 45 TABLET | Refills: 3 | Status: SHIPPED | OUTPATIENT
Start: 2020-12-06 | End: 2022-01-14

## 2020-12-07 RX ORDER — PRAVASTATIN SODIUM 40 MG
40 TABLET ORAL NIGHTLY
Qty: 90 TABLET | Refills: 3 | Status: SHIPPED | OUTPATIENT
Start: 2020-12-07 | End: 2022-01-11

## 2020-12-09 NOTE — TELEPHONE ENCOUNTER
Please call pt with labs. Her K+ was still on the lower side (3.5), but magnesium was also slightly low (1.7), which can cause potassium to remain low. I'd like her to start taking magnesium oxide (OTC) 400mg BID.   Let's get repeat labs in a couple of we Cephalexin Counseling: I counseled the patient regarding use of cephalexin as an antibiotic for prophylactic and/or therapeutic purposes. Cephalexin (commonly prescribed under brand name Keflex) is a cephalosporin antibiotic which is active against numerous classes of bacteria, including most skin bacteria. Side effects may include nausea, diarrhea, gastrointestinal upset, rash, hives, yeast infections, and in rare cases, hepatitis, kidney disease, seizures, fever, confusion, neurologic symptoms, and others. Patients with severe allergies to penicillin medications are cautioned that there is about a 10% incidence of cross-reactivity with cephalosporins. When possible, patients with penicillin allergies should use alternatives to cephalosporins for antibiotic therapy.

## 2021-01-15 ENCOUNTER — LAB ENCOUNTER (OUTPATIENT)
Dept: LAB | Age: 66
End: 2021-01-15
Attending: INTERNAL MEDICINE
Payer: COMMERCIAL

## 2021-01-15 ENCOUNTER — TELEPHONE (OUTPATIENT)
Dept: INTERNAL MEDICINE CLINIC | Facility: CLINIC | Age: 66
End: 2021-01-15

## 2021-01-15 DIAGNOSIS — Z20.822 CLOSE EXPOSURE TO COVID-19 VIRUS: Primary | ICD-10-CM

## 2021-01-15 DIAGNOSIS — Z20.822 CLOSE EXPOSURE TO COVID-19 VIRUS: ICD-10-CM

## 2021-01-15 NOTE — TELEPHONE ENCOUNTER
Patient should have Covid testing. However she would not get rapid since she is not having symptoms. She would not get antibody infusion unless she were Covid positive. All members of family who were in contact with  should get tested.   I have or

## 2021-01-15 NOTE — TELEPHONE ENCOUNTER
Spoke to patient who reports her  went to ER last night and received a rapid COVID test and an antibody infusion. He is now at home and isolating in his room. Patient was around her  with close contact prior to positive COVID result.      Kate

## 2021-01-15 NOTE — TELEPHONE ENCOUNTER
Called patient and relayed DR. CISNEROS message - verbalized understanding. Number for scheduling given .  Explained if she develops SOB or high fever to go to ER - verbalized understanding

## 2021-01-15 NOTE — TELEPHONE ENCOUNTER
Please call pt on either number  Her  was at the ER last night and was tested positive for COVID, he also had the infusion  Pt mother who is 80 lives with her  Please call pt  How should she proceed based on her health issues, diabetes, cirrhosis

## 2021-01-18 ENCOUNTER — PATIENT MESSAGE (OUTPATIENT)
Dept: INTERNAL MEDICINE CLINIC | Facility: CLINIC | Age: 66
End: 2021-01-18

## 2021-01-18 LAB — SARS-COV-2 BY PCR: NOT DETECTED

## 2021-01-18 NOTE — TELEPHONE ENCOUNTER
From: Joseluis Daley  To: Te Matthew MD  Sent: 1/18/2021 12:45 AM CST  Subject: Test Results Question    Hello i had a covid 19 test taken friday never got result.  Venuszaire Iraheta went hosp last week 15 min test showed positive they gave him infusion in isol

## 2021-01-18 NOTE — TELEPHONE ENCOUNTER
To Dr. Rober Galeano-- you ordered testing for this patient on Friday. Can you please review messages and advise if there is anything else we can advise for patient other than testing at alternate facility?

## 2021-01-19 RX ORDER — PEN NEEDLE, DIABETIC 32GX 5/32"
NEEDLE, DISPOSABLE MISCELLANEOUS
Qty: 200 EACH | Refills: 3 | Status: SHIPPED | OUTPATIENT
Start: 2021-01-19

## 2021-01-30 RX ORDER — FAMOTIDINE 40 MG/1
TABLET, FILM COATED ORAL
Qty: 180 TABLET | Refills: 3 | Status: SHIPPED | OUTPATIENT
Start: 2021-01-30 | End: 2022-01-27

## 2021-02-15 RX ORDER — POTASSIUM CHLORIDE 750 MG/1
TABLET, FILM COATED, EXTENDED RELEASE ORAL
Qty: 720 TABLET | Refills: 3 | Status: SHIPPED | OUTPATIENT
Start: 2021-02-15 | End: 2022-01-13

## 2021-02-15 NOTE — TELEPHONE ENCOUNTER
Spoke to pt --  She has lab appt 2/22 and OV 3/3;  Her dose confirmed and refill sent    Refill request is for a maintenance medication and has met the criteria specified in the Ambulatory Medication Refill Standing Order for eligibility, visits, laborator

## 2021-02-23 ENCOUNTER — LAB ENCOUNTER (OUTPATIENT)
Dept: LAB | Facility: REFERENCE LAB | Age: 66
End: 2021-02-23
Attending: INTERNAL MEDICINE
Payer: COMMERCIAL

## 2021-02-23 DIAGNOSIS — E78.00 HYPERCHOLESTEROLEMIA: ICD-10-CM

## 2021-02-23 DIAGNOSIS — E11.40 TYPE 2 DIABETES MELLITUS WITH DIABETIC NEUROPATHY, WITH LONG-TERM CURRENT USE OF INSULIN (HCC): ICD-10-CM

## 2021-02-23 DIAGNOSIS — Z79.4 TYPE 2 DIABETES MELLITUS WITH DIABETIC NEUROPATHY, WITH LONG-TERM CURRENT USE OF INSULIN (HCC): ICD-10-CM

## 2021-02-23 LAB
ALBUMIN SERPL-MCNC: 3.2 G/DL (ref 3.4–5)
ALBUMIN/GLOB SERPL: 0.8 {RATIO} (ref 1–2)
ALP LIVER SERPL-CCNC: 127 U/L
ALT SERPL-CCNC: 53 U/L
ANION GAP SERPL CALC-SCNC: 6 MMOL/L (ref 0–18)
AST SERPL-CCNC: 35 U/L (ref 15–37)
BILIRUB SERPL-MCNC: 0.5 MG/DL (ref 0.1–2)
BUN BLD-MCNC: 12 MG/DL (ref 7–18)
BUN/CREAT SERPL: 12.4 (ref 10–20)
CALCIUM BLD-MCNC: 9.7 MG/DL (ref 8.5–10.1)
CHLORIDE SERPL-SCNC: 103 MMOL/L (ref 98–112)
CHOLEST SMN-MCNC: 174 MG/DL (ref ?–200)
CO2 SERPL-SCNC: 30 MMOL/L (ref 21–32)
CREAT BLD-MCNC: 0.97 MG/DL
CREAT UR-SCNC: 123 MG/DL
GLOBULIN PLAS-MCNC: 4 G/DL (ref 2.8–4.4)
GLUCOSE BLD-MCNC: 304 MG/DL (ref 70–99)
HDLC SERPL-MCNC: 66 MG/DL (ref 40–59)
LDLC SERPL CALC-MCNC: 85 MG/DL (ref ?–100)
M PROTEIN MFR SERPL ELPH: 7.2 G/DL (ref 6.4–8.2)
MICROALBUMIN UR-MCNC: 2.61 MG/DL
MICROALBUMIN/CREAT 24H UR-RTO: 21.2 UG/MG (ref ?–30)
NONHDLC SERPL-MCNC: 108 MG/DL (ref ?–130)
OSMOLALITY SERPL CALC.SUM OF ELEC: 299 MOSM/KG (ref 275–295)
PATIENT FASTING Y/N/NP: YES
PATIENT FASTING Y/N/NP: YES
POTASSIUM SERPL-SCNC: 4.1 MMOL/L (ref 3.5–5.1)
SODIUM SERPL-SCNC: 139 MMOL/L (ref 136–145)
TRIGL SERPL-MCNC: 114 MG/DL (ref 30–149)
VLDLC SERPL CALC-MCNC: 23 MG/DL (ref 0–30)

## 2021-02-23 PROCEDURE — 36415 COLL VENOUS BLD VENIPUNCTURE: CPT | Performed by: INTERNAL MEDICINE

## 2021-02-23 PROCEDURE — 83036 HEMOGLOBIN GLYCOSYLATED A1C: CPT | Performed by: INTERNAL MEDICINE

## 2021-02-23 PROCEDURE — 82043 UR ALBUMIN QUANTITATIVE: CPT

## 2021-02-23 PROCEDURE — 80061 LIPID PANEL: CPT

## 2021-02-23 PROCEDURE — 80053 COMPREHEN METABOLIC PANEL: CPT

## 2021-02-23 PROCEDURE — 82570 ASSAY OF URINE CREATININE: CPT

## 2021-03-02 RX ORDER — FUROSEMIDE 20 MG/1
20 TABLET ORAL 2 TIMES DAILY
Qty: 180 TABLET | Refills: 3 | Status: SHIPPED | OUTPATIENT
Start: 2021-03-02

## 2021-03-03 ENCOUNTER — LAB ENCOUNTER (OUTPATIENT)
Dept: LAB | Age: 66
End: 2021-03-03
Attending: INTERNAL MEDICINE
Payer: COMMERCIAL

## 2021-03-03 DIAGNOSIS — E03.8 OTHER SPECIFIED HYPOTHYROIDISM: ICD-10-CM

## 2021-03-03 DIAGNOSIS — E53.8 VITAMIN B12 DEFICIENCY: ICD-10-CM

## 2021-03-03 DIAGNOSIS — E83.42 HYPOMAGNESEMIA: ICD-10-CM

## 2021-03-03 DIAGNOSIS — D69.6 THROMBOCYTOPENIA (HCC): ICD-10-CM

## 2021-03-03 LAB
BASOPHILS # BLD AUTO: 0.05 X10(3) UL (ref 0–0.2)
BASOPHILS NFR BLD AUTO: 1.2 %
DEPRECATED RDW RBC AUTO: 42.5 FL (ref 35.1–46.3)
EOSINOPHIL # BLD AUTO: 0.1 X10(3) UL (ref 0–0.7)
EOSINOPHIL NFR BLD AUTO: 2.4 %
ERYTHROCYTE [DISTWIDTH] IN BLOOD BY AUTOMATED COUNT: 12.9 % (ref 11–15)
HAV IGM SER QL: 2.3 MG/DL (ref 1.6–2.6)
HCT VFR BLD AUTO: 42.6 %
HGB BLD-MCNC: 13.9 G/DL
IMM GRANULOCYTES # BLD AUTO: 0.01 X10(3) UL (ref 0–1)
IMM GRANULOCYTES NFR BLD: 0.2 %
LYMPHOCYTES # BLD AUTO: 1.35 X10(3) UL (ref 1–4)
LYMPHOCYTES NFR BLD AUTO: 32.2 %
MCH RBC QN AUTO: 29.5 PG (ref 26–34)
MCHC RBC AUTO-ENTMCNC: 32.6 G/DL (ref 31–37)
MCV RBC AUTO: 90.4 FL
MONOCYTES # BLD AUTO: 0.38 X10(3) UL (ref 0.1–1)
MONOCYTES NFR BLD AUTO: 9.1 %
NEUTROPHILS # BLD AUTO: 2.3 X10 (3) UL (ref 1.5–7.7)
NEUTROPHILS # BLD AUTO: 2.3 X10(3) UL (ref 1.5–7.7)
NEUTROPHILS NFR BLD AUTO: 54.9 %
PLATELET # BLD AUTO: 138 10(3)UL (ref 150–450)
RBC # BLD AUTO: 4.71 X10(6)UL
TSI SER-ACNC: 1.65 MIU/ML (ref 0.36–3.74)
VIT B12 SERPL-MCNC: 1063 PG/ML (ref 193–986)
WBC # BLD AUTO: 4.2 X10(3) UL (ref 4–11)

## 2021-03-03 PROCEDURE — 85025 COMPLETE CBC W/AUTO DIFF WBC: CPT

## 2021-03-03 PROCEDURE — 84443 ASSAY THYROID STIM HORMONE: CPT

## 2021-03-03 PROCEDURE — 83735 ASSAY OF MAGNESIUM: CPT

## 2021-03-03 PROCEDURE — 36415 COLL VENOUS BLD VENIPUNCTURE: CPT

## 2021-03-03 PROCEDURE — 82607 VITAMIN B-12: CPT

## 2021-03-06 ENCOUNTER — LAB ENCOUNTER (OUTPATIENT)
Dept: LAB | Age: 66
End: 2021-03-06
Attending: INTERNAL MEDICINE
Payer: COMMERCIAL

## 2021-03-06 DIAGNOSIS — E78.5 HYPERLIPIDEMIA: ICD-10-CM

## 2021-03-06 DIAGNOSIS — E11.9 DIABETES MELLITUS (HCC): Primary | ICD-10-CM

## 2021-03-06 LAB
ALBUMIN SERPL-MCNC: 3.2 G/DL (ref 3.4–5)
ALBUMIN/GLOB SERPL: 0.8 {RATIO} (ref 1–2)
ALP LIVER SERPL-CCNC: 126 U/L
ALT SERPL-CCNC: 54 U/L
ANION GAP SERPL CALC-SCNC: 3 MMOL/L (ref 0–18)
AST SERPL-CCNC: 35 U/L (ref 15–37)
BILIRUB SERPL-MCNC: 0.5 MG/DL (ref 0.1–2)
BUN BLD-MCNC: 14 MG/DL (ref 7–18)
BUN/CREAT SERPL: 15.2 (ref 10–20)
CALCIUM BLD-MCNC: 9.1 MG/DL (ref 8.5–10.1)
CHLORIDE SERPL-SCNC: 103 MMOL/L (ref 98–112)
CHOLEST SMN-MCNC: 168 MG/DL (ref ?–200)
CO2 SERPL-SCNC: 35 MMOL/L (ref 21–32)
CREAT BLD-MCNC: 0.92 MG/DL
EST. AVERAGE GLUCOSE BLD GHB EST-MCNC: 269 MG/DL (ref 68–126)
GLOBULIN PLAS-MCNC: 4.1 G/DL (ref 2.8–4.4)
GLUCOSE BLD-MCNC: 185 MG/DL (ref 70–99)
HBA1C MFR BLD HPLC: 11 % (ref ?–5.7)
HDLC SERPL-MCNC: 63 MG/DL (ref 40–59)
LDLC SERPL CALC-MCNC: 81 MG/DL (ref ?–100)
M PROTEIN MFR SERPL ELPH: 7.3 G/DL (ref 6.4–8.2)
NONHDLC SERPL-MCNC: 105 MG/DL (ref ?–130)
OSMOLALITY SERPL CALC.SUM OF ELEC: 297 MOSM/KG (ref 275–295)
PATIENT FASTING Y/N/NP: YES
PATIENT FASTING Y/N/NP: YES
POTASSIUM SERPL-SCNC: 3.6 MMOL/L (ref 3.5–5.1)
SODIUM SERPL-SCNC: 141 MMOL/L (ref 136–145)
TRIGL SERPL-MCNC: 118 MG/DL (ref 30–149)
VLDLC SERPL CALC-MCNC: 24 MG/DL (ref 0–30)

## 2021-03-06 PROCEDURE — 83036 HEMOGLOBIN GLYCOSYLATED A1C: CPT

## 2021-03-06 PROCEDURE — 80061 LIPID PANEL: CPT

## 2021-03-06 PROCEDURE — 36415 COLL VENOUS BLD VENIPUNCTURE: CPT

## 2021-03-06 PROCEDURE — 80053 COMPREHEN METABOLIC PANEL: CPT

## 2021-03-21 ENCOUNTER — IMMUNIZATION (OUTPATIENT)
Dept: LAB | Facility: HOSPITAL | Age: 66
End: 2021-03-21
Attending: HOSPITALIST
Payer: COMMERCIAL

## 2021-03-21 DIAGNOSIS — Z23 NEED FOR VACCINATION: Primary | ICD-10-CM

## 2021-03-21 PROCEDURE — 0011A SARSCOV2 VAC 100MCG/0.5ML IM: CPT

## 2021-04-16 ENCOUNTER — LAB ENCOUNTER (OUTPATIENT)
Dept: LAB | Age: 66
End: 2021-04-16
Attending: INTERNAL MEDICINE
Payer: COMMERCIAL

## 2021-04-16 ENCOUNTER — HOSPITAL ENCOUNTER (OUTPATIENT)
Dept: ULTRASOUND IMAGING | Age: 66
Discharge: HOME | End: 2021-04-16
Attending: INTERNAL MEDICINE
Payer: COMMERCIAL

## 2021-04-16 DIAGNOSIS — K74.60 LIVER CIRRHOSIS SECONDARY TO NASH (HCC): ICD-10-CM

## 2021-04-16 DIAGNOSIS — K75.81 LIVER CIRRHOSIS SECONDARY TO NASH (HCC): ICD-10-CM

## 2021-04-16 PROCEDURE — 85610 PROTHROMBIN TIME: CPT

## 2021-04-16 PROCEDURE — 36415 COLL VENOUS BLD VENIPUNCTURE: CPT

## 2021-04-16 PROCEDURE — 80053 COMPREHEN METABOLIC PANEL: CPT

## 2021-04-16 PROCEDURE — 76705 ECHO EXAM OF ABDOMEN: CPT | Performed by: INTERNAL MEDICINE

## 2021-04-16 PROCEDURE — 82105 ALPHA-FETOPROTEIN SERUM: CPT

## 2021-04-18 ENCOUNTER — IMMUNIZATION (OUTPATIENT)
Dept: LAB | Facility: HOSPITAL | Age: 66
End: 2021-04-18
Attending: EMERGENCY MEDICINE
Payer: COMMERCIAL

## 2021-04-18 DIAGNOSIS — Z23 NEED FOR VACCINATION: Primary | ICD-10-CM

## 2021-04-18 PROCEDURE — 0012A SARSCOV2 VAC 100MCG/0.5ML IM: CPT

## 2021-04-25 NOTE — PROGRESS NOTES
[facility] at [facility]  [address] [address], [address]  [address]  [phone] ([phone]) Migraines    • Other and unspecified hyperlipidemia    • PONV (postoperative nausea and vomiting)    • Renal disorder    • Shingles     March 2020   • Stroke [facility])    • Type II or unspecified type diabetes mellitus without mention of complication, not stat USE ONE V-GO FOR 24 H SUBCUTNEOUSLY UTD     • Imipramine HCl 50 MG Oral Tab Take 3 tablets (150 mg total) by mouth nightly. Patient states she takes 3 tablets nightly for a total dose of 150mg.  270 tablet 3   • CLOPIDOGREL BISULFATE 75 MG Oral Tab TAKE 1 T metolazone 2.5 MG Oral Tab Take 1 tablet (2.5 mg total) by mouth as needed. 30 tablet 0   • TraMADol HCl 50 MG Oral Tab Take 1 tablet (50 mg total) by mouth every 6 (six) hours as needed.  30 tablet 0   • HUMULIN R U-500 KWIKPEN 500 UNIT/ML Subcutaneous Sol affect/mood    Assessment and Plan:   63year old female with history of cirrhosis likely to BLANCAS     - Labs 4/'20 with slightly more elevated alk phos; continue to monitor repeat in ~ 6 months (Oct '21)  - Since no swelling and has chronic hypokalemia, di

## 2021-04-26 ENCOUNTER — OFFICE VISIT (OUTPATIENT)
Dept: SURGERY | Facility: CLINIC | Age: 66
End: 2021-04-26
Payer: COMMERCIAL

## 2021-04-26 VITALS
HEIGHT: 63.8 IN | DIASTOLIC BLOOD PRESSURE: 74 MMHG | HEART RATE: 102 BPM | RESPIRATION RATE: 24 BRPM | OXYGEN SATURATION: 99 % | WEIGHT: 225 LBS | SYSTOLIC BLOOD PRESSURE: 111 MMHG | BODY MASS INDEX: 38.89 KG/M2

## 2021-04-26 DIAGNOSIS — K75.81 LIVER CIRRHOSIS SECONDARY TO NASH (HCC): Primary | ICD-10-CM

## 2021-04-26 DIAGNOSIS — K74.60 LIVER CIRRHOSIS SECONDARY TO NASH (HCC): Primary | ICD-10-CM

## 2021-04-26 NOTE — PROGRESS NOTES
[facility] at [facility]  [address], [address] [facility] [address]  [address]  [phone] ([phone]) Type II or unspecified type diabetes mellitus without mention of complication, not stated as uncontrolled    • Unspecified sleep apnea    • Vertigo      Past Surgical History:   Procedure Laterality Date   • APPENDECTOMY     • CHOLECYSTECTOMY     • COLONOS a total dose of 150mg.  270 tablet 3   • CLOPIDOGREL BISULFATE 75 MG Oral Tab TAKE 1 TABLET(75 MG) BY MOUTH EVERY DAY 90 tablet 3   • ERGOCALCIFEROL 1.25 MG (88753 UT) Oral Cap TAKE 1 CAPSULE BY MOUTH EVERY WEEK 12 capsule 3   • Meclizine HCl 25 MG Oral Tab hours as needed. 30 tablet 0   • HUMULIN R U-500 KWIKPEN 500 UNIT/ML Subcutaneous Solution Pen-injector Inject 240 Units into the skin 2 (two) times daily.    1 pen 11   Allergies:   Adhesive Tape           HIVES  Acetaminophen           UNKNOWN  Amiloride Plan:   63year old female with history of cirrhosis likely to BLANCAS     - Labs stable, repeat in ~ 6 months  - Since no swelling and has chronic hypokalemia, discussed perhaps skipping 1-2 days per week of lasix but also discussed taking every day if any s

## 2021-05-11 RX ORDER — ERGOCALCIFEROL 1.25 MG/1
CAPSULE ORAL
Qty: 12 CAPSULE | Refills: 3 | Status: SHIPPED | OUTPATIENT
Start: 2021-05-11

## 2021-05-23 RX ORDER — POTASSIUM CHLORIDE 750 MG/1
TABLET, FILM COATED, EXTENDED RELEASE ORAL
Qty: 720 TABLET | Refills: 3 | OUTPATIENT
Start: 2021-05-23

## 2021-05-23 NOTE — TELEPHONE ENCOUNTER
*Physical Exam





- Vital Signs


 Last Vital Signs











Temp Pulse Resp BP Pulse Ox


 


 97.9 F   65   16   137/82   97 


 


 09/13/18 06:58  09/13/18 06:58  09/13/18 06:58  09/13/18 06:58  09/13/18 06:58














ED Treatment Course





- LABORATORY


CBC & Chemistry Diagram: 


 09/13/18 03:28





 09/13/18 03:28





- ADDITIONAL ORDERS


Additional order review: 


 Laboratory  Results











  09/13/18 09/13/18 09/13/18





  03:28 03:28 03:28


 


PT with INR   


 


INR   


 


PTT (Actin FS)  32.3  


 


Sodium    136


 


Potassium    4.0


 


Chloride    101


 


Carbon Dioxide    28


 


Anion Gap    7 L


 


BUN    23 H


 


Creatinine    1.7 H


 


Creat Clearance w eGFR    41.04


 


Random Glucose    143 H D


 


Lactic Acid   0.8 


 


Calcium    9.2


 


Total Bilirubin    0.3


 


AST    28


 


ALT    37


 


Alkaline Phosphatase    171 H


 


Creatine Kinase    191


 


Creatine Kinase Index    1.9


 


CK-MB (CK-2)    3.81 H


 


Troponin I    < 0.02


 


Total Protein    8.9 H


 


Albumin    4.5














  09/13/18





  03:28


 


PT with INR  12.40


 


INR  1.10 H


 


PTT (Actin FS) 


 


Sodium 


 


Potassium 


 


Chloride 


 


Carbon Dioxide 


 


Anion Gap 


 


BUN 


 


Creatinine 


 


Creat Clearance w eGFR 


 


Random Glucose 


 


Lactic Acid 


 


Calcium 


 


Total Bilirubin 


 


AST 


 


ALT 


 


Alkaline Phosphatase 


 


Creatine Kinase 


 


Creatine Kinase Index 


 


CK-MB (CK-2) 


 


Troponin I 


 


Total Protein 


 


Albumin 








 











  09/13/18





  03:28


 


RBC  4.44


 


MCV  89.6


 


MCHC  34.4


 


RDW  13.0


 


MPV  8.2














Medical Decision Making





- Medical Decision Making





09/13/18 07:19


Care taken over from Dr. Stevens for further evaluation.





Patient is a 65 yo male w/ pmh of Sheridan's BIBA AOx1 for unknown cause. 

Patient currently AOx3 and would like to go home. Patient pending Head CT. 

Discussed with patient family who are on board with patient return given normal 

CT.





09/13/18 08:45


Head CT negative. Discharging patient to home.





*DC/Admit/Observation/Transfer


Diagnosis at time of Disposition: 


Altered mental status


Qualifiers:


 Altered mental status type: unspecified Qualified Code(s): R41.82 - Altered 

mental status, unspecified








- Discharge Dispostion


Disposition: HOME





- Referrals


Referrals: 


Milena Vang MD [Primary Care Provider] - 





- Patient Instructions


Printed Discharge Instructions:  DI for Altered Mental Status


Additional Instructions: 


You were evaluated today in the ER for your altered mental status. Head CT and 

laboratory evaluation was negative for any cause and your symptoms resolved 

while in the ER. Please follow-up with primary care provider for further 

evaluation in 1-2 days. Return to ER if any pain, fever, chills, return of 

altered mental status, or other concerning symptoms.





- Post Discharge Activity Current refill request refused due to refill is either a duplicate request or has active refills at the pharmacy. Check previous templates.     Requested Prescriptions     Refused Prescriptions Disp Refills   • KLOR-CON 10 10 MEQ Oral Tab CR [Pharmacy Med

## 2021-08-19 RX ORDER — IMIPRAMINE HCL 50 MG
TABLET ORAL
Qty: 270 TABLET | Refills: 1 | Status: SHIPPED | OUTPATIENT
Start: 2021-08-19

## 2021-09-03 ENCOUNTER — PATIENT MESSAGE (OUTPATIENT)
Dept: INTERNAL MEDICINE CLINIC | Facility: CLINIC | Age: 66
End: 2021-09-03

## 2021-09-07 RX ORDER — PRAVASTATIN SODIUM 40 MG
TABLET ORAL
Qty: 90 TABLET | Refills: 3 | OUTPATIENT
Start: 2021-09-07

## 2021-09-07 RX ORDER — LOSARTAN POTASSIUM 25 MG/1
TABLET ORAL
Qty: 45 TABLET | Refills: 3 | OUTPATIENT
Start: 2021-09-07

## 2021-09-07 NOTE — TELEPHONE ENCOUNTER
From: Royden Nageotte  To: Niya Roland MD  Sent: 9/3/2021 10:13 PM CDT  Subject: Non-Urgent Medical Question    does Spencer have the info for the labs i have to do before i c u?     [facility] [address]  [phone]

## 2021-09-11 ENCOUNTER — LAB ENCOUNTER (OUTPATIENT)
Dept: LAB | Facility: HOSPITAL | Age: 66
End: 2021-09-11
Attending: INTERNAL MEDICINE
Payer: COMMERCIAL

## 2021-09-11 DIAGNOSIS — E78.2 MIXED HYPERLIPIDEMIA: ICD-10-CM

## 2021-09-11 DIAGNOSIS — E11.9 DIABETES MELLITUS (HCC): Primary | ICD-10-CM

## 2021-09-11 LAB
ALBUMIN SERPL-MCNC: 3.3 G/DL (ref 3.4–5)
ALBUMIN/GLOB SERPL: 0.8 {RATIO} (ref 1–2)
ALP LIVER SERPL-CCNC: 92 U/L
ALT SERPL-CCNC: 43 U/L
ANION GAP SERPL CALC-SCNC: 5 MMOL/L (ref 0–18)
AST SERPL-CCNC: 31 U/L (ref 15–37)
BILIRUB SERPL-MCNC: 0.7 MG/DL (ref 0.1–2)
BUN BLD-MCNC: 13 MG/DL (ref 7–18)
BUN/CREAT SERPL: 15.7 (ref 10–20)
CALCIUM BLD-MCNC: 9.3 MG/DL (ref 8.5–10.1)
CHLORIDE SERPL-SCNC: 106 MMOL/L (ref 98–112)
CHOLEST SMN-MCNC: 163 MG/DL (ref ?–200)
CO2 SERPL-SCNC: 32 MMOL/L (ref 21–32)
CREAT BLD-MCNC: 0.83 MG/DL
EST. AVERAGE GLUCOSE BLD GHB EST-MCNC: 260 MG/DL (ref 68–126)
GLOBULIN PLAS-MCNC: 3.9 G/DL (ref 2.8–4.4)
GLUCOSE BLD-MCNC: 102 MG/DL (ref 70–99)
HBA1C MFR BLD HPLC: 10.7 % (ref ?–5.7)
HDLC SERPL-MCNC: 68 MG/DL (ref 40–59)
LDLC SERPL CALC-MCNC: 76 MG/DL (ref ?–100)
M PROTEIN MFR SERPL ELPH: 7.2 G/DL (ref 6.4–8.2)
NONHDLC SERPL-MCNC: 95 MG/DL (ref ?–130)
OSMOLALITY SERPL CALC.SUM OF ELEC: 296 MOSM/KG (ref 275–295)
PATIENT FASTING Y/N/NP: YES
PATIENT FASTING Y/N/NP: YES
POTASSIUM SERPL-SCNC: 3.5 MMOL/L (ref 3.5–5.1)
SODIUM SERPL-SCNC: 143 MMOL/L (ref 136–145)
TRIGL SERPL-MCNC: 107 MG/DL (ref 30–149)
VLDLC SERPL CALC-MCNC: 17 MG/DL (ref 0–30)

## 2021-09-11 PROCEDURE — 80061 LIPID PANEL: CPT

## 2021-09-11 PROCEDURE — 36415 COLL VENOUS BLD VENIPUNCTURE: CPT

## 2021-09-11 PROCEDURE — 80053 COMPREHEN METABOLIC PANEL: CPT

## 2021-09-11 PROCEDURE — 83036 HEMOGLOBIN GLYCOSYLATED A1C: CPT

## 2021-09-17 ENCOUNTER — PATIENT MESSAGE (OUTPATIENT)
Dept: INTERNAL MEDICINE CLINIC | Facility: CLINIC | Age: 66
End: 2021-09-17

## 2021-09-17 ENCOUNTER — HOSPITAL ENCOUNTER (OUTPATIENT)
Dept: MAMMOGRAPHY | Age: 66
Discharge: HOME | End: 2021-09-17
Attending: INTERNAL MEDICINE
Payer: COMMERCIAL

## 2021-09-17 DIAGNOSIS — Z12.31 ENCOUNTER FOR SCREENING MAMMOGRAM FOR MALIGNANT NEOPLASM OF BREAST: ICD-10-CM

## 2021-09-17 DIAGNOSIS — N64.52 NIPPLE DISCHARGE IN FEMALE: Primary | ICD-10-CM

## 2021-09-17 DIAGNOSIS — N64.52 BLOODY DISCHARGE FROM RIGHT NIPPLE: ICD-10-CM

## 2021-09-17 NOTE — TELEPHONE ENCOUNTER
To nursing, patient should see Dr. Judith Mukherjee to examine her breasts in view of nipple drainage. Can give her the diagnostic mammogram order under Dr. Yusuf Alejo name. Thanks. Routed to nursing and to Dr Judith Mukherjee.

## 2021-09-17 NOTE — TELEPHONE ENCOUNTER
Patient said she will wait for her upcoming laura on 9/29  She is aware the Mammogram order is in the system

## 2021-09-17 NOTE — TELEPHONE ENCOUNTER
From: Anabel Hummel  To: Terra Parisi MD  Sent: 9/17/2021 1:52 PM CDT  Subject: Mammogram     Hello  I went to get mammogram this morning they would not do it.  they say i need to have Diagnostic Mammogram With Ultrasound at the hospital.  Nipple dis

## 2021-09-17 NOTE — TELEPHONE ENCOUNTER
Please call patient and have her schedule laura with DR. MTEZ for nipple discharge, also order for diagnostic mammogram is in system

## 2021-09-24 ENCOUNTER — HOSPITAL ENCOUNTER (OUTPATIENT)
Dept: MAMMOGRAPHY | Facility: HOSPITAL | Age: 66
Discharge: HOME | End: 2021-09-24
Attending: INTERNAL MEDICINE
Payer: COMMERCIAL

## 2021-09-24 ENCOUNTER — HOSPITAL ENCOUNTER (OUTPATIENT)
Dept: ULTRASOUND IMAGING | Facility: HOSPITAL | Age: 66
Discharge: HOME | End: 2021-09-24
Attending: INTERNAL MEDICINE
Payer: COMMERCIAL

## 2021-09-24 DIAGNOSIS — N64.52 BLOODY DISCHARGE FROM RIGHT NIPPLE: ICD-10-CM

## 2021-09-24 DIAGNOSIS — N64.52 NIPPLE DISCHARGE IN FEMALE: ICD-10-CM

## 2021-09-24 PROCEDURE — 77066 DX MAMMO INCL CAD BI: CPT | Performed by: INTERNAL MEDICINE

## 2021-09-24 PROCEDURE — 76642 ULTRASOUND BREAST LIMITED: CPT | Performed by: INTERNAL MEDICINE

## 2021-09-24 PROCEDURE — 77062 BREAST TOMOSYNTHESIS BI: CPT | Performed by: INTERNAL MEDICINE

## 2021-09-25 ENCOUNTER — LAB ENCOUNTER (OUTPATIENT)
Dept: LAB | Facility: HOSPITAL | Age: 66
End: 2021-09-25
Attending: INTERNAL MEDICINE
Payer: COMMERCIAL

## 2021-09-25 DIAGNOSIS — E11.40 TYPE 2 DIABETES MELLITUS WITH DIABETIC NEUROPATHY, WITH LONG-TERM CURRENT USE OF INSULIN (HCC): ICD-10-CM

## 2021-09-25 DIAGNOSIS — Z00.00 ROUTINE HEALTH MAINTENANCE: ICD-10-CM

## 2021-09-25 DIAGNOSIS — E83.42 HYPOMAGNESEMIA: ICD-10-CM

## 2021-09-25 DIAGNOSIS — E78.00 HYPERCHOLESTEROLEMIA: ICD-10-CM

## 2021-09-25 DIAGNOSIS — Z79.4 TYPE 2 DIABETES MELLITUS WITH DIABETIC NEUROPATHY, WITH LONG-TERM CURRENT USE OF INSULIN (HCC): ICD-10-CM

## 2021-09-25 LAB
ALBUMIN SERPL-MCNC: 3.3 G/DL (ref 3.4–5)
ALBUMIN/GLOB SERPL: 0.8 {RATIO} (ref 1–2)
ALP LIVER SERPL-CCNC: 90 U/L
ALT SERPL-CCNC: 43 U/L
ANION GAP SERPL CALC-SCNC: 4 MMOL/L (ref 0–18)
AST SERPL-CCNC: 30 U/L (ref 15–37)
BILIRUB SERPL-MCNC: 0.6 MG/DL (ref 0.1–2)
BUN BLD-MCNC: 12 MG/DL (ref 7–18)
BUN/CREAT SERPL: 13.3 (ref 10–20)
CALCIUM BLD-MCNC: 8.6 MG/DL (ref 8.5–10.1)
CHLORIDE SERPL-SCNC: 107 MMOL/L (ref 98–112)
CHOLEST SERPL-MCNC: 163 MG/DL (ref ?–200)
CO2 SERPL-SCNC: 31 MMOL/L (ref 21–32)
CREAT BLD-MCNC: 0.9 MG/DL
CREAT UR-SCNC: 245 MG/DL
EST. AVERAGE GLUCOSE BLD GHB EST-MCNC: 255 MG/DL (ref 68–126)
GLOBULIN PLAS-MCNC: 4.1 G/DL (ref 2.8–4.4)
GLUCOSE BLD-MCNC: 116 MG/DL (ref 70–99)
HBA1C MFR BLD HPLC: 10.5 % (ref ?–5.7)
HDLC SERPL-MCNC: 66 MG/DL (ref 40–59)
LDLC SERPL CALC-MCNC: 79 MG/DL (ref ?–100)
MAGNESIUM SERPL-MCNC: 2.2 MG/DL (ref 1.6–2.6)
MICROALBUMIN UR-MCNC: 5.93 MG/DL
MICROALBUMIN/CREAT 24H UR-RTO: 24.2 UG/MG (ref ?–30)
NONHDLC SERPL-MCNC: 97 MG/DL (ref ?–130)
OSMOLALITY SERPL CALC.SUM OF ELEC: 295 MOSM/KG (ref 275–295)
PATIENT FASTING Y/N/NP: YES
PATIENT FASTING Y/N/NP: YES
POTASSIUM SERPL-SCNC: 3.6 MMOL/L (ref 3.5–5.1)
PROT SERPL-MCNC: 7.4 G/DL (ref 6.4–8.2)
SODIUM SERPL-SCNC: 142 MMOL/L (ref 136–145)
TRIGL SERPL-MCNC: 102 MG/DL (ref 30–149)
VIT D+METAB SERPL-MCNC: 80.1 NG/ML (ref 30–100)
VLDLC SERPL CALC-MCNC: 16 MG/DL (ref 0–30)

## 2021-09-25 PROCEDURE — 82043 UR ALBUMIN QUANTITATIVE: CPT

## 2021-09-25 PROCEDURE — 80061 LIPID PANEL: CPT

## 2021-09-25 PROCEDURE — 36415 COLL VENOUS BLD VENIPUNCTURE: CPT

## 2021-09-25 PROCEDURE — 82570 ASSAY OF URINE CREATININE: CPT

## 2021-09-25 PROCEDURE — 83036 HEMOGLOBIN GLYCOSYLATED A1C: CPT

## 2021-09-25 PROCEDURE — 80053 COMPREHEN METABOLIC PANEL: CPT

## 2021-09-25 PROCEDURE — 82306 VITAMIN D 25 HYDROXY: CPT

## 2021-09-25 PROCEDURE — 83735 ASSAY OF MAGNESIUM: CPT

## 2021-10-05 ENCOUNTER — TELEPHONE (OUTPATIENT)
Dept: INTERNAL MEDICINE CLINIC | Facility: CLINIC | Age: 66
End: 2021-10-05

## 2021-10-05 NOTE — PROGRESS NOTES
Anatoliy Cardona is a 61year old female. No chief complaint on file. HPI:   Anatoliy Cardona is a 61year old female who presents for a complete physical.    Still taking care of her mom with Alzheimer's . Mom's cousin/friend just  at 80. AS DIRECTED 200 each 3   • Pravastatin Sodium 40 MG Oral Tab Take 1 tablet (40 mg total) by mouth nightly.  90 tablet 3   • Losartan Potassium 25 MG Oral Tab TAKE 1/2 TABLET(12.5 MG) BY MOUTH EVERY DAY 45 tablet 3   • Insulin Disposable Pump (V-GO 20) Does tablet 0   • metolazone 2.5 MG Oral Tab Take 1 tablet (2.5 mg total) by mouth as needed. 30 tablet 0   • TraMADol HCl 50 MG Oral Tab Take 1 tablet (50 mg total) by mouth every 6 (six) hours as needed.  30 tablet 0   • HUMULIN R U-500 KWIKPEN 500 UNIT/ML Sub SYSTEMS:   GENERAL: feels well otherwise  SKIN: denies any unusual skin lesions  EYES:denies blurred vision or double vision  HEENT: denies nasal congestion, sinus pain or ST  LUNGS: denies shortness of breath with exertion or cough  CARDIOVASCULAR: denies CHF.     History of adenomatous colonic polyps  Found on colonoscopy 9/11/13 -- repeat done 1/2019 revealed 3 more adenomatous polyps. Repeat 3-5 years per Dr. Karly Bashir.      Hx of transient ischemic attack (TIA) (expressive aphasia)  Continue Plavix.   Pt wi Hypomagnesemia  Takes Mg oxide 400mg BID. Level normal 9/2021 (2.2). Hypertension  On losartan 12.5mg/day. BP stable.     Depression  Stable on imipramine 150mg/day  Multiple life stressors -- caring for 81 y/o mom with Alzheimer's as well as for mo

## 2021-10-05 NOTE — TELEPHONE ENCOUNTER
Prior Authorization needed for Jublia 10% topical nail solution  Plan does not cover med  Call [phone]  Patient ID# [id number]    Fax in purple folder

## 2021-10-14 ENCOUNTER — TELEPHONE (OUTPATIENT)
Dept: INTERNAL MEDICINE CLINIC | Facility: CLINIC | Age: 66
End: 2021-10-14

## 2021-10-14 NOTE — IMAGING NOTE
This nurse called at [phone] am introduced self and role of breast coordinator. Discussed recommended breast biopsy with patient.  Pt was recommended by Dr. Lalita Bashir to have 2 areas on rt bx due to nipple discharge, to have a  Right  Breast x 2  ultrasound guide MONTHS.             Dictated by (CST): Prema Smart MD on 9/24/2021 at 9:41 AM       Finalized by (CST):  Prema Smart MD on 9/24/2021 at 9:47 AM                 Reviewed pertinent patient history, family history of cancer, and patient medications TABLET(75 MG) BY MOUTH EVERY DAY 90 tablet 3   • Meclizine HCl 25 MG Oral Tab Take 1 tablet (25 mg total) by mouth 3 (three) times daily as needed.  30 tablet 5   • ondansetron (ZOFRAN ODT) 8 MG Oral Tablet Dispersible Take 1 tablet (8 mg total) by mouth ev 81 mg to be held for 5 days prior to biopsy. She denies usage     -Aspirin (325mg) or 81 mg  aspirin  being taken related to a cardiac condition should be held at the  direction of your physician.   Radiology's preference is to hold this medication for 7 .  Discussed with patient no swimming, bathing,  hot tubs or submerging underwater  for 5 days post procedure until the incision is closed and healed.    Educated patient on lifting restrictions – nothing heavier than a gallon of milk for 24-48 hours after

## 2021-10-14 NOTE — TELEPHONE ENCOUNTER
Patient calling. Having biopsy ordered by Dr. Ariana Horta  Radiology is asking patient to contact Dr. Carin Barbour for guidelines how to take Plavix for biopsy. Needs answer today. Trying to schedule biopsy for next Thursday.     Best call back number [phone]

## 2021-10-15 ENCOUNTER — TELEPHONE (OUTPATIENT)
Dept: ULTRASOUND IMAGING | Facility: HOSPITAL | Age: 66
End: 2021-10-15

## 2021-10-15 NOTE — TELEPHONE ENCOUNTER
Follow up call DR Deepa Cormier provided her with guidelines to go off 5 days for Plavix . Her last dose will be today .  See was provided  Thursday 10/21 checking in at 630 am

## 2021-10-17 RX ORDER — CLOPIDOGREL BISULFATE 75 MG/1
75 TABLET ORAL DAILY
Qty: 90 TABLET | Refills: 3 | Status: SHIPPED | OUTPATIENT
Start: 2021-10-17

## 2021-10-18 ENCOUNTER — HOSPITAL ENCOUNTER (OUTPATIENT)
Dept: ULTRASOUND IMAGING | Facility: HOSPITAL | Age: 66
Discharge: HOME | End: 2021-10-18
Attending: INTERNAL MEDICINE
Payer: COMMERCIAL

## 2021-10-18 ENCOUNTER — LAB ENCOUNTER (OUTPATIENT)
Dept: LAB | Facility: REFERENCE LAB | Age: 66
End: 2021-10-18
Attending: FAMILY MEDICINE
Payer: COMMERCIAL

## 2021-10-18 DIAGNOSIS — E53.8 VITAMIN B12 DEFICIENCY: ICD-10-CM

## 2021-10-18 DIAGNOSIS — K75.81 LIVER CIRRHOSIS SECONDARY TO NASH (HCC): ICD-10-CM

## 2021-10-18 DIAGNOSIS — K74.60 LIVER CIRRHOSIS SECONDARY TO NASH (HCC): ICD-10-CM

## 2021-10-18 PROCEDURE — 80053 COMPREHEN METABOLIC PANEL: CPT

## 2021-10-18 PROCEDURE — 36415 COLL VENOUS BLD VENIPUNCTURE: CPT

## 2021-10-18 PROCEDURE — 85025 COMPLETE CBC W/AUTO DIFF WBC: CPT

## 2021-10-18 PROCEDURE — 76705 ECHO EXAM OF ABDOMEN: CPT | Performed by: INTERNAL MEDICINE

## 2021-10-18 PROCEDURE — 82607 VITAMIN B-12: CPT

## 2021-10-18 PROCEDURE — 82105 ALPHA-FETOPROTEIN SERUM: CPT

## 2021-10-21 ENCOUNTER — HOSPITAL ENCOUNTER (OUTPATIENT)
Dept: MAMMOGRAPHY | Facility: HOSPITAL | Age: 66
Discharge: HOME | End: 2021-10-21
Attending: SURGERY
Payer: COMMERCIAL

## 2021-10-21 ENCOUNTER — HOSPITAL ENCOUNTER (OUTPATIENT)
Dept: ULTRASOUND IMAGING | Facility: HOSPITAL | Age: 66
Discharge: HOME | End: 2021-10-21
Attending: SURGERY
Payer: COMMERCIAL

## 2021-10-21 DIAGNOSIS — N64.9 BREAST LESION: ICD-10-CM

## 2021-10-21 DIAGNOSIS — N64.52 DISCHARGE FROM RIGHT NIPPLE: ICD-10-CM

## 2021-10-21 PROCEDURE — 19083 BX BREAST 1ST LESION US IMAG: CPT | Performed by: SURGERY

## 2021-10-21 PROCEDURE — 19084 BX BREAST ADD LESION US IMAG: CPT | Performed by: SURGERY

## 2021-10-21 PROCEDURE — 77065 DX MAMMO INCL CAD UNI: CPT | Performed by: SURGERY

## 2021-10-21 PROCEDURE — 88305 TISSUE EXAM BY PATHOLOGIST: CPT | Performed by: SURGERY

## 2021-10-21 NOTE — PROCEDURES
[facility]  Procedure Note    Suze Alexsander Patient Status:  Outpatient    1955 MRN [id number]   Location [address] Attending Lee Kingsley MD   Hosp Day # 0 PCP Janay Liu MD     Proc

## 2021-10-22 ENCOUNTER — TELEPHONE (OUTPATIENT)
Dept: ULTRASOUND IMAGING | Facility: HOSPITAL | Age: 66
End: 2021-10-22

## 2021-10-22 NOTE — TELEPHONE ENCOUNTER
Merlin Porter is s/p biopsy . Phoned and introduced myself as breast coordinator . Reinforced to patient  post biopsy care and instructions . NO c/o post she was instructed to follow up with Dr Harpreet Sheldon for her bilateral nipple discharge.   Analisa Stinson at 7:56 AM       Finalized by (CST): Rodney Leon DO on 10/22/2021 at 8:29 AM     Ivan Bernie acknowledges the above and denies questions.  She was also instructed to perform breast self exams and if she develops any changes to contact

## 2021-10-25 ENCOUNTER — OFFICE VISIT (OUTPATIENT)
Dept: SURGERY | Facility: CLINIC | Age: 66
End: 2021-10-25
Payer: COMMERCIAL

## 2021-10-25 VITALS
SYSTOLIC BLOOD PRESSURE: 125 MMHG | HEART RATE: 95 BPM | WEIGHT: 222 LBS | TEMPERATURE: 97 F | HEIGHT: 63.8 IN | DIASTOLIC BLOOD PRESSURE: 80 MMHG | OXYGEN SATURATION: 100 % | BODY MASS INDEX: 38.37 KG/M2 | RESPIRATION RATE: 18 BRPM

## 2021-10-25 DIAGNOSIS — K74.60 LIVER CIRRHOSIS SECONDARY TO NASH (HCC): Primary | ICD-10-CM

## 2021-10-25 DIAGNOSIS — K75.81 LIVER CIRRHOSIS SECONDARY TO NASH (HCC): Primary | ICD-10-CM

## 2021-10-25 NOTE — PROGRESS NOTES
[facility] at [facility]  [facility] [address]  [address] [phone] ([phone]) (postoperative nausea and vomiting)    • Renal disorder    • Shingles     March 2020   • Stroke [facility])    • Type II or unspecified type diabetes mellitus without mention of complication, not stated as uncontrolled    • Unspecified sleep apnea    • Vertigo EVERY 14 DAYS SUBCUTANEOUSLY     • furosemide 20 MG Oral Tab Take 1 tablet (20 mg total) by mouth 2 (two) times daily.  180 tablet 3   • KLOR-CON 10 10 MEQ Oral Tab CR TAKE 4 TABLETS(40 MEQ) BY MOUTH TWICE DAILY 720 tablet 3   • FAMOTIDINE 40 MG Oral Tab TA by mouth 2 (two) times daily. 30 tablet 0   • metolazone 2.5 MG Oral Tab Take 1 tablet (2.5 mg total) by mouth as needed. 30 tablet 0   • TraMADol HCl 50 MG Oral Tab Take 1 tablet (50 mg total) by mouth every 6 (six) hours as needed.  30 tablet 0   • HUMULI old female with history of cirrhosis likely to BLANCAS     - Labs stable, repeat in ~ 6 months  - Mild swelling, controlled on lasix 20 mg BID  - Diabetes remains poorly controlled. Continues to work on it  - Jan '19 EGD with small varices.  Repeat in 2-3 year

## 2021-12-20 ENCOUNTER — IMMUNIZATION (OUTPATIENT)
Dept: LAB | Facility: HOSPITAL | Age: 66
End: 2021-12-20
Attending: EMERGENCY MEDICINE
Payer: COMMERCIAL

## 2021-12-20 DIAGNOSIS — Z23 NEED FOR VACCINATION: Primary | ICD-10-CM

## 2021-12-20 PROCEDURE — 0064A SARSCOV2 VAC 50MCG/0.25ML IM: CPT

## 2022-01-08 ENCOUNTER — TELEPHONE (OUTPATIENT)
Dept: INTERNAL MEDICINE CLINIC | Facility: CLINIC | Age: 67
End: 2022-01-08

## 2022-01-10 NOTE — TELEPHONE ENCOUNTER
To Franky Paz, okay to refill? Please review allergy noted from 2014. No c/o nausea/vomiting at last visit  Allergy/Contraindication: Klor-Con 10  Reactions: NAUSEA AND VOMITING.

## 2022-01-11 ENCOUNTER — TELEPHONE (OUTPATIENT)
Dept: INTERNAL MEDICINE CLINIC | Facility: CLINIC | Age: 67
End: 2022-01-11

## 2022-01-12 ENCOUNTER — TELEPHONE (OUTPATIENT)
Dept: INTERNAL MEDICINE CLINIC | Facility: CLINIC | Age: 67
End: 2022-01-12

## 2022-01-12 RX ORDER — PRAVASTATIN SODIUM 40 MG
40 TABLET ORAL NIGHTLY
Qty: 90 TABLET | Refills: 3 | Status: SHIPPED | OUTPATIENT
Start: 2022-01-12

## 2022-01-12 RX ORDER — PRAVASTATIN SODIUM 40 MG
TABLET ORAL
Qty: 90 TABLET | Refills: 3 | OUTPATIENT
Start: 2022-01-12

## 2022-01-12 NOTE — TELEPHONE ENCOUNTER
Dr. Yane Mitchell office sent over blood thinner clearance, asking to hold Plavix 2 days prior to procedure, Dr. Kiya Ashley approved pt to hold Plavix 2 days prior to procedure, called and spoke with Dr. Yane Mitchell nurse Lorrie Souza.      Dr. Yane Mitchell office [phone]

## 2022-01-13 ENCOUNTER — TELEPHONE (OUTPATIENT)
Dept: INTERNAL MEDICINE CLINIC | Facility: CLINIC | Age: 67
End: 2022-01-13

## 2022-01-13 RX ORDER — POTASSIUM CHLORIDE 750 MG/1
TABLET, FILM COATED, EXTENDED RELEASE ORAL
Qty: 720 TABLET | Refills: 3 | Status: SHIPPED | OUTPATIENT
Start: 2022-01-13

## 2022-01-14 RX ORDER — LOSARTAN POTASSIUM 25 MG/1
TABLET ORAL
Qty: 45 TABLET | Refills: 3 | Status: SHIPPED | OUTPATIENT
Start: 2022-01-14

## 2022-01-14 NOTE — TELEPHONE ENCOUNTER
Reviewed and Rx done  Requested Prescriptions     Signed Prescriptions Disp Refills   • LOSARTAN 25 MG Oral Tab 45 tablet 3     Sig: TAKE 1/2 TABLET(12.5 MG) BY MOUTH EVERY DAY     Authorizing Provider: Sarah Liu     Ordering User: Juan Ridley

## 2022-01-19 RX ORDER — POTASSIUM CHLORIDE 750 MG/1
40 TABLET, FILM COATED, EXTENDED RELEASE ORAL 2 TIMES DAILY
Qty: 720 TABLET | Refills: 3 | OUTPATIENT
Start: 2022-01-19

## 2022-01-27 ENCOUNTER — TELEPHONE (OUTPATIENT)
Dept: INTERNAL MEDICINE CLINIC | Facility: CLINIC | Age: 67
End: 2022-01-27

## 2022-01-27 RX ORDER — FAMOTIDINE 40 MG/1
TABLET, FILM COATED ORAL
Qty: 180 TABLET | Refills: 3 | Status: SHIPPED | OUTPATIENT
Start: 2022-01-27

## 2022-01-31 NOTE — TELEPHONE ENCOUNTER
Roberto Coppola requesting Prior Authorization for:  Famotidine  Please call [phone], pt ID#:  [id number]  Fax placed in purple folder  Tasked to RX

## 2022-02-04 ENCOUNTER — APPOINTMENT (OUTPATIENT)
Dept: LAB | Facility: HOSPITAL | Age: 67
End: 2022-02-04
Attending: INTERNAL MEDICINE
Payer: COMMERCIAL

## 2022-02-04 DIAGNOSIS — E11.9 DIABETES MELLITUS (HCC): Primary | ICD-10-CM

## 2022-02-04 DIAGNOSIS — E78.2 MIXED HYPERLIPIDEMIA: ICD-10-CM

## 2022-02-04 LAB
ALBUMIN SERPL-MCNC: 3.2 G/DL (ref 3.4–5)
ALBUMIN/GLOB SERPL: 0.8 {RATIO} (ref 1–2)
ALP LIVER SERPL-CCNC: 111 U/L
ALT SERPL-CCNC: 44 U/L
ANION GAP SERPL CALC-SCNC: 7 MMOL/L (ref 0–18)
AST SERPL-CCNC: 28 U/L (ref 15–37)
BILIRUB SERPL-MCNC: 0.6 MG/DL (ref 0.1–2)
BUN BLD-MCNC: 13 MG/DL (ref 7–18)
BUN/CREAT SERPL: 14.3 (ref 10–20)
CALCIUM BLD-MCNC: 9.5 MG/DL (ref 8.5–10.1)
CHLORIDE SERPL-SCNC: 102 MMOL/L (ref 98–112)
CHOLEST SERPL-MCNC: 177 MG/DL (ref ?–200)
CO2 SERPL-SCNC: 33 MMOL/L (ref 21–32)
CREAT BLD-MCNC: 0.91 MG/DL
CREAT UR-SCNC: 269 MG/DL
EST. AVERAGE GLUCOSE BLD GHB EST-MCNC: 263 MG/DL (ref 68–126)
FASTING PATIENT LIPID ANSWER: YES
FASTING STATUS PATIENT QL REPORTED: YES
GLOBULIN PLAS-MCNC: 4 G/DL (ref 2.8–4.4)
GLUCOSE BLD-MCNC: 144 MG/DL (ref 70–99)
HBA1C MFR BLD: 10.8 % (ref ?–5.7)
HDLC SERPL-MCNC: 68 MG/DL (ref 40–59)
LDLC SERPL CALC-MCNC: 90 MG/DL (ref ?–100)
MICROALBUMIN UR-MCNC: 6.03 MG/DL
MICROALBUMIN/CREAT 24H UR-RTO: 22.4 UG/MG (ref ?–30)
NONHDLC SERPL-MCNC: 109 MG/DL (ref ?–130)
OSMOLALITY SERPL CALC.SUM OF ELEC: 297 MOSM/KG (ref 275–295)
POTASSIUM SERPL-SCNC: 3.9 MMOL/L (ref 3.5–5.1)
PROT SERPL-MCNC: 7.2 G/DL (ref 6.4–8.2)
SODIUM SERPL-SCNC: 142 MMOL/L (ref 136–145)
TRIGL SERPL-MCNC: 105 MG/DL (ref 30–149)
VLDLC SERPL CALC-MCNC: 17 MG/DL (ref 0–30)

## 2022-02-04 PROCEDURE — 36415 COLL VENOUS BLD VENIPUNCTURE: CPT

## 2022-02-04 PROCEDURE — 80053 COMPREHEN METABOLIC PANEL: CPT

## 2022-02-04 PROCEDURE — 82570 ASSAY OF URINE CREATININE: CPT

## 2022-02-04 PROCEDURE — 83036 HEMOGLOBIN GLYCOSYLATED A1C: CPT

## 2022-02-04 PROCEDURE — 82043 UR ALBUMIN QUANTITATIVE: CPT

## 2022-02-04 PROCEDURE — 80061 LIPID PANEL: CPT

## 2022-02-22 RX ORDER — LOSARTAN POTASSIUM 25 MG/1
TABLET ORAL
Qty: 45 TABLET | Refills: 3 | OUTPATIENT
Start: 2022-02-22

## 2022-02-28 ENCOUNTER — TELEPHONE (OUTPATIENT)
Dept: INTERNAL MEDICINE CLINIC | Facility: CLINIC | Age: 67
End: 2022-02-28

## 2022-02-28 NOTE — TELEPHONE ENCOUNTER
Dr. Spear Sites office called. On  1/12/22 they had requested a 2 day hold on Plavix which Dr. Josephine Pedro approved. However, the hold was supposed to be for a 5 day hold. They will refax the request for the plavix hold.

## 2022-03-01 NOTE — TELEPHONE ENCOUNTER
Completed/signed form faxed back to Dr. Mccallum [place] office; Lakshmi Mariee RN at [phone]. Copy in weekly hold bin, original to scanning.

## 2022-03-07 RX ORDER — IMIPRAMINE HCL 50 MG
TABLET ORAL
Qty: 270 TABLET | Refills: 3 | Status: SHIPPED | OUTPATIENT
Start: 2022-03-07

## 2022-03-30 ENCOUNTER — HOSPITAL ENCOUNTER (OUTPATIENT)
Dept: ULTRASOUND IMAGING | Facility: HOSPITAL | Age: 67
Discharge: HOME | End: 2022-03-30
Attending: INTERNAL MEDICINE
Payer: COMMERCIAL

## 2022-03-30 DIAGNOSIS — R92.8 ABNORMAL MAMMOGRAM OF BOTH BREASTS: ICD-10-CM

## 2022-03-30 PROCEDURE — 76642 ULTRASOUND BREAST LIMITED: CPT | Performed by: INTERNAL MEDICINE

## 2022-04-01 ENCOUNTER — LAB ENCOUNTER (OUTPATIENT)
Dept: LAB | Facility: HOSPITAL | Age: 67
End: 2022-04-01
Attending: INTERNAL MEDICINE
Payer: COMMERCIAL

## 2022-04-01 DIAGNOSIS — Z01.818 PRE-OP TESTING: ICD-10-CM

## 2022-04-01 LAB — SARS-COV-2 RNA RESP QL NAA+PROBE: NOT DETECTED

## 2022-04-04 ENCOUNTER — ANESTHESIA EVENT (OUTPATIENT)
Dept: ENDOSCOPY | Facility: HOSPITAL | Age: 67
End: 2022-04-04
Payer: COMMERCIAL

## 2022-04-04 ENCOUNTER — ANESTHESIA (OUTPATIENT)
Dept: ENDOSCOPY | Facility: HOSPITAL | Age: 67
End: 2022-04-04
Payer: COMMERCIAL

## 2022-04-04 ENCOUNTER — HOSPITAL ENCOUNTER (OUTPATIENT)
Facility: HOSPITAL | Age: 67
Setting detail: HOSPITAL OUTPATIENT SURGERY
Discharge: HOME | End: 2022-04-04
Attending: INTERNAL MEDICINE | Admitting: INTERNAL MEDICINE
Payer: COMMERCIAL

## 2022-04-04 VITALS
OXYGEN SATURATION: 94 % | BODY MASS INDEX: 39.16 KG/M2 | WEIGHT: 221 LBS | RESPIRATION RATE: 11 BRPM | TEMPERATURE: 97 F | DIASTOLIC BLOOD PRESSURE: 75 MMHG | SYSTOLIC BLOOD PRESSURE: 119 MMHG | HEIGHT: 63 IN | HEART RATE: 75 BPM

## 2022-04-04 DIAGNOSIS — Z01.818 PRE-OP TESTING: Primary | ICD-10-CM

## 2022-04-04 DIAGNOSIS — Z86.010 PERSONAL HISTORY OF COLONIC POLYPS: ICD-10-CM

## 2022-04-04 DIAGNOSIS — K21.9 GASTROESOPHAGEAL REFLUX DISEASE, UNSPECIFIED WHETHER ESOPHAGITIS PRESENT: ICD-10-CM

## 2022-04-04 LAB — GLUCOSE BLDC GLUCOMTR-MCNC: 175 MG/DL (ref 70–99)

## 2022-04-04 PROCEDURE — 0DJD8ZZ INSPECTION OF LOWER INTESTINAL TRACT, VIA NATURAL OR ARTIFICIAL OPENING ENDOSCOPIC: ICD-10-PCS | Performed by: INTERNAL MEDICINE

## 2022-04-04 PROCEDURE — 0DJ08ZZ INSPECTION OF UPPER INTESTINAL TRACT, VIA NATURAL OR ARTIFICIAL OPENING ENDOSCOPIC: ICD-10-PCS | Performed by: INTERNAL MEDICINE

## 2022-04-04 PROCEDURE — 82962 GLUCOSE BLOOD TEST: CPT

## 2022-04-04 RX ORDER — SODIUM CHLORIDE, SODIUM LACTATE, POTASSIUM CHLORIDE, CALCIUM CHLORIDE 600; 310; 30; 20 MG/100ML; MG/100ML; MG/100ML; MG/100ML
INJECTION, SOLUTION INTRAVENOUS CONTINUOUS
Status: DISCONTINUED | OUTPATIENT
Start: 2022-04-04 | End: 2022-04-04

## 2022-04-04 RX ORDER — LIDOCAINE HYDROCHLORIDE 10 MG/ML
INJECTION, SOLUTION EPIDURAL; INFILTRATION; INTRACAUDAL; PERINEURAL AS NEEDED
Status: DISCONTINUED | OUTPATIENT
Start: 2022-04-04 | End: 2022-04-04 | Stop reason: SURG

## 2022-04-04 RX ORDER — MIDAZOLAM HYDROCHLORIDE 1 MG/ML
1 INJECTION INTRAMUSCULAR; INTRAVENOUS EVERY 5 MIN PRN
Status: DISCONTINUED | OUTPATIENT
Start: 2022-04-04 | End: 2022-04-04

## 2022-04-04 RX ORDER — DEXTROSE MONOHYDRATE 25 G/50ML
50 INJECTION, SOLUTION INTRAVENOUS
OUTPATIENT
Start: 2022-04-04

## 2022-04-04 RX ORDER — NALOXONE HYDROCHLORIDE 0.4 MG/ML
80 INJECTION, SOLUTION INTRAMUSCULAR; INTRAVENOUS; SUBCUTANEOUS AS NEEDED
OUTPATIENT
Start: 2022-04-04 | End: 2022-04-04

## 2022-04-04 RX ORDER — NICOTINE POLACRILEX 4 MG
15 LOZENGE BUCCAL
OUTPATIENT
Start: 2022-04-04

## 2022-04-04 RX ORDER — SODIUM CHLORIDE 9 MG/ML
INJECTION, SOLUTION INTRAVENOUS CONTINUOUS
Status: DISCONTINUED | OUTPATIENT
Start: 2022-04-04 | End: 2022-04-04

## 2022-04-04 RX ORDER — SODIUM CHLORIDE 0.9 % (FLUSH) 0.9 %
10 SYRINGE (ML) INJECTION AS NEEDED
Status: DISCONTINUED | OUTPATIENT
Start: 2022-04-04 | End: 2022-04-04

## 2022-04-04 RX ORDER — SODIUM CHLORIDE, SODIUM LACTATE, POTASSIUM CHLORIDE, CALCIUM CHLORIDE 600; 310; 30; 20 MG/100ML; MG/100ML; MG/100ML; MG/100ML
INJECTION, SOLUTION INTRAVENOUS CONTINUOUS
OUTPATIENT
Start: 2022-04-04

## 2022-04-04 RX ORDER — NICOTINE POLACRILEX 4 MG
30 LOZENGE BUCCAL
OUTPATIENT
Start: 2022-04-04

## 2022-04-04 RX ADMIN — SODIUM CHLORIDE, SODIUM LACTATE, POTASSIUM CHLORIDE, CALCIUM CHLORIDE: 600; 310; 30; 20 INJECTION, SOLUTION INTRAVENOUS at 07:49:00

## 2022-04-04 RX ADMIN — SODIUM CHLORIDE, SODIUM LACTATE, POTASSIUM CHLORIDE, CALCIUM CHLORIDE: 600; 310; 30; 20 INJECTION, SOLUTION INTRAVENOUS at 08:13:00

## 2022-04-04 RX ADMIN — LIDOCAINE HYDROCHLORIDE 25 MG: 10 INJECTION, SOLUTION EPIDURAL; INFILTRATION; INTRACAUDAL; PERINEURAL at 07:52:00

## 2022-04-04 NOTE — PRE-SEDATION ASSESSMENT
Physician Pre-Sedation Assessment    Pre-Sedation Assessment:    Sedation History: Previous Sedation with No Complications and Airway Assessed    Cardiac: normal S1, S2  Respiratory: breath sounds clear bilaterally   Abdomen: soft, BS (+), non-tender    ASA Classification: 3    Plan: IV Sedation

## 2022-04-04 NOTE — OPERATIVE REPORT
COLONOSCOPY AND ESOPHAGOGASTRODUODENOSCOPY REPORT    Patient Name:  Jarrod Alexander Record #: [id number]  YOB: 1955  Date of Procedure: 4/4/2022    Referring physician: Connor Ovalle MD    Surgeon:  Abhijeet Santacruz. Kathryn Murphy MD    Pre-op diagnosis: Cirrhosis secondary to BLANCAS with history of small esophageal varices in 2019, here for surveillance upper endoscopy. History of 3 adenomatous polyps in 2019. Post-op diagnosis: Poor colon prep (incomplete colonoscopy), small esophageal varices, hyperplastic gastric polyps, no gastric varices    Medications given:  MAC    Procedure #1:    After informed consent, discussion of risks and alternatives the patient was placed in the left lateral decubitus position and the high definition colonoscope was introduced into the rectum and advanced under direct visualization to the transverse colon but the prep did not allow us to go further. Bowel preparation was inadequate. The mucosa was carefully inspected upon withdrawal of the scope. ASA class was 3. Findings:    The visualized colonic mucosa was grossly normal.  Digital rectal exam was normal.    Procedure #2:   With the patient still in the left lateral decubitus position the gastroscope was inserted into the oropharynx and advanced under direct visualization to the second portion of the duodenum. Upon withdrawal of the scope, a retroflexed maneuver was performed to visualize the gastric angulus and cardia. Findings: The GE junction was at 40 cm. The esophagus was normal in appearance without evidence of esophagitis, Lopez's epithelium, stricture or hiatal hernia. There were small esophageal varices without stigmata. The stomach demonstrated normal distensibility. There were no retained gastric contents and no outlet obstruction. There was no evidence of gastritis, ulcers or erosions. There were no varices.  There was scant coffee ground material but no obvious source: no classic portal hypertensive gastropathy. There were hyperplastic appearing polyps in the body. The duodenum was normal with no erosions and with a normal fold pattern by high definition endoscopy. Plan:    Will need to  Repeat colonoscopy with a 2 day bowel prep    Specimens: none  EBL: none    Aronchick bowel prep scale:  5 Inadequate (repeat preparation needed)  4 Poor (semisolid stool could not be suctioned and <90% of mucosa seen)  3 Fair (semisolid stool could not be suctioned, but >90% of mucosa seen)  2 Good (clear liquid covering up to 25% of mucosa, but >90% of mucosa seen)  1 Excellent (>95% of mucosa seen)

## 2022-04-04 NOTE — ANESTHESIA POSTPROCEDURE EVALUATION
Patient: Ledy Matta    Procedure Summary     Date: 04/04/22 Room / Location: [address] [place] [address] ENDOSCOPY 04 / [address] [place] [address] ENDOSCOPY    Anesthesia Start: 0693 Anesthesia Stop: 0126    Procedures:       ESOPHAGOGASTRODUODENOSCOPY (EGD) (N/A )      COLONOSCOPY (N/A ) Diagnosis:       Personal history of colonic polyps      Gastroesophageal reflux disease, unspecified whether esophagitis present      (Poor colon prep., Esophageal Varices, Gastric Polyps)    Surgeons: Aguilar Dobbins MD Anesthesiologist: Lul Anne MD    Anesthesia Type: MAC ASA Status: 3          Anesthesia Type: MAC    Vitals Value Taken Time   /67 04/04/22 0821   Temp 98 04/04/22 0821   Pulse 76 04/04/22 0821   Resp 16 04/04/22 0821   SpO2 98 04/04/22 0821       [facility] AN Post Evaluation:   Patient Evaluated in PACU  Patient Participation: complete - patient participated  Level of Consciousness: awake  Pain Management: adequate  Airway Patency:patent  Dental exam unchanged from preop  Yes    Cardiovascular Status: acceptable  Respiratory Status: acceptable  Postoperative Hydration acceptable      Sb Tello MD  4/4/2022 8:21 AM

## 2022-04-07 RX ORDER — FUROSEMIDE 20 MG/1
TABLET ORAL
Qty: 180 TABLET | Refills: 3 | Status: SHIPPED | OUTPATIENT
Start: 2022-04-07

## 2022-04-08 ENCOUNTER — LAB ENCOUNTER (OUTPATIENT)
Dept: LAB | Facility: REFERENCE LAB | Age: 67
End: 2022-04-08
Attending: INTERNAL MEDICINE
Payer: COMMERCIAL

## 2022-04-08 DIAGNOSIS — E11.40 TYPE 2 DIABETES MELLITUS WITH DIABETIC NEUROPATHY, WITH LONG-TERM CURRENT USE OF INSULIN (HCC): ICD-10-CM

## 2022-04-08 DIAGNOSIS — E78.00 HYPERCHOLESTEROLEMIA: ICD-10-CM

## 2022-04-08 DIAGNOSIS — Z79.4 TYPE 2 DIABETES MELLITUS WITH DIABETIC NEUROPATHY, WITH LONG-TERM CURRENT USE OF INSULIN (HCC): ICD-10-CM

## 2022-04-08 DIAGNOSIS — D69.6 THROMBOCYTOPENIA (HCC): ICD-10-CM

## 2022-04-08 DIAGNOSIS — E03.8 OTHER SPECIFIED HYPOTHYROIDISM: ICD-10-CM

## 2022-04-08 LAB
ALBUMIN SERPL-MCNC: 3.2 G/DL (ref 3.4–5)
ALBUMIN/GLOB SERPL: 0.8 {RATIO} (ref 1–2)
ALP LIVER SERPL-CCNC: 111 U/L
ALT SERPL-CCNC: 41 U/L
ANION GAP SERPL CALC-SCNC: 4 MMOL/L (ref 0–18)
AST SERPL-CCNC: 27 U/L (ref 15–37)
BASOPHILS # BLD AUTO: 0.05 X10(3) UL (ref 0–0.2)
BASOPHILS NFR BLD AUTO: 0.9 %
BILIRUB SERPL-MCNC: 0.5 MG/DL (ref 0.1–2)
BUN BLD-MCNC: 11 MG/DL (ref 7–18)
BUN/CREAT SERPL: 12.9 (ref 10–20)
CALCIUM BLD-MCNC: 9.5 MG/DL (ref 8.5–10.1)
CHLORIDE SERPL-SCNC: 106 MMOL/L (ref 98–112)
CHOLEST SERPL-MCNC: 150 MG/DL (ref ?–200)
CO2 SERPL-SCNC: 33 MMOL/L (ref 21–32)
CREAT BLD-MCNC: 0.85 MG/DL
CREAT UR-SCNC: 139 MG/DL
DEPRECATED RDW RBC AUTO: 41.6 FL (ref 35.1–46.3)
EOSINOPHIL # BLD AUTO: 0.2 X10(3) UL (ref 0–0.7)
EOSINOPHIL NFR BLD AUTO: 3.6 %
ERYTHROCYTE [DISTWIDTH] IN BLOOD BY AUTOMATED COUNT: 12.5 % (ref 11–15)
EST. AVERAGE GLUCOSE BLD GHB EST-MCNC: 255 MG/DL (ref 68–126)
FASTING PATIENT LIPID ANSWER: YES
FASTING STATUS PATIENT QL REPORTED: YES
GLOBULIN PLAS-MCNC: 3.8 G/DL (ref 2.8–4.4)
GLUCOSE BLD-MCNC: 132 MG/DL (ref 70–99)
HBA1C MFR BLD: 10.5 % (ref ?–5.7)
HCT VFR BLD AUTO: 45.6 %
HDLC SERPL-MCNC: 56 MG/DL (ref 40–59)
HGB BLD-MCNC: 15.2 G/DL
IMM GRANULOCYTES # BLD AUTO: 0.04 X10(3) UL (ref 0–1)
IMM GRANULOCYTES NFR BLD: 0.7 %
LDLC SERPL CALC-MCNC: 71 MG/DL (ref ?–100)
LYMPHOCYTES # BLD AUTO: 1.93 X10(3) UL (ref 1–4)
LYMPHOCYTES NFR BLD AUTO: 34.6 %
MCH RBC QN AUTO: 30.3 PG (ref 26–34)
MCHC RBC AUTO-ENTMCNC: 33.3 G/DL (ref 31–37)
MCV RBC AUTO: 90.8 FL
MICROALBUMIN UR-MCNC: 0.83 MG/DL
MICROALBUMIN/CREAT 24H UR-RTO: 6 UG/MG (ref ?–30)
MONOCYTES # BLD AUTO: 0.61 X10(3) UL (ref 0.1–1)
MONOCYTES NFR BLD AUTO: 11 %
NEUTROPHILS # BLD AUTO: 2.74 X10 (3) UL (ref 1.5–7.7)
NEUTROPHILS # BLD AUTO: 2.74 X10(3) UL (ref 1.5–7.7)
NEUTROPHILS NFR BLD AUTO: 49.2 %
NONHDLC SERPL-MCNC: 94 MG/DL (ref ?–130)
OSMOLALITY SERPL CALC.SUM OF ELEC: 297 MOSM/KG (ref 275–295)
PLATELET # BLD AUTO: 156 10(3)UL (ref 150–450)
POTASSIUM SERPL-SCNC: 4.6 MMOL/L (ref 3.5–5.1)
PROT SERPL-MCNC: 7 G/DL (ref 6.4–8.2)
RBC # BLD AUTO: 5.02 X10(6)UL
SODIUM SERPL-SCNC: 143 MMOL/L (ref 136–145)
TRIGL SERPL-MCNC: 133 MG/DL (ref 30–149)
TSI SER-ACNC: 1.91 MIU/ML (ref 0.36–3.74)
VLDLC SERPL CALC-MCNC: 20 MG/DL (ref 0–30)
WBC # BLD AUTO: 5.6 X10(3) UL (ref 4–11)

## 2022-04-08 PROCEDURE — 84443 ASSAY THYROID STIM HORMONE: CPT

## 2022-04-08 PROCEDURE — 3061F NEG MICROALBUMINURIA REV: CPT | Performed by: INTERNAL MEDICINE

## 2022-04-08 PROCEDURE — 80053 COMPREHEN METABOLIC PANEL: CPT

## 2022-04-08 PROCEDURE — 82570 ASSAY OF URINE CREATININE: CPT

## 2022-04-08 PROCEDURE — 85025 COMPLETE CBC W/AUTO DIFF WBC: CPT

## 2022-04-08 PROCEDURE — 3046F HEMOGLOBIN A1C LEVEL >9.0%: CPT | Performed by: INTERNAL MEDICINE

## 2022-04-08 PROCEDURE — 80061 LIPID PANEL: CPT

## 2022-04-08 PROCEDURE — 83036 HEMOGLOBIN GLYCOSYLATED A1C: CPT

## 2022-04-08 PROCEDURE — 82043 UR ALBUMIN QUANTITATIVE: CPT

## 2022-04-08 PROCEDURE — 36415 COLL VENOUS BLD VENIPUNCTURE: CPT

## 2022-04-13 ENCOUNTER — OFFICE VISIT (OUTPATIENT)
Dept: INTERNAL MEDICINE CLINIC | Facility: CLINIC | Age: 67
End: 2022-04-13
Payer: COMMERCIAL

## 2022-04-13 VITALS
TEMPERATURE: 98 F | HEART RATE: 103 BPM | OXYGEN SATURATION: 98 % | DIASTOLIC BLOOD PRESSURE: 68 MMHG | SYSTOLIC BLOOD PRESSURE: 128 MMHG | HEIGHT: 63 IN | BODY MASS INDEX: 38.74 KG/M2 | WEIGHT: 218.63 LBS

## 2022-04-13 DIAGNOSIS — K74.60 LIVER CIRRHOSIS SECONDARY TO NASH (HCC): ICD-10-CM

## 2022-04-13 DIAGNOSIS — E83.42 HYPOMAGNESEMIA: ICD-10-CM

## 2022-04-13 DIAGNOSIS — E78.00 HYPERCHOLESTEROLEMIA: ICD-10-CM

## 2022-04-13 DIAGNOSIS — E55.9 VITAMIN D DEFICIENCY: ICD-10-CM

## 2022-04-13 DIAGNOSIS — G47.33 OBSTRUCTIVE SLEEP APNEA: ICD-10-CM

## 2022-04-13 DIAGNOSIS — K75.81 LIVER CIRRHOSIS SECONDARY TO NASH (HCC): ICD-10-CM

## 2022-04-13 DIAGNOSIS — D69.6 THROMBOCYTOPENIA (HCC): ICD-10-CM

## 2022-04-13 DIAGNOSIS — Z86.73 HX OF TRANSIENT ISCHEMIC ATTACK (TIA): ICD-10-CM

## 2022-04-13 DIAGNOSIS — E11.40 TYPE 2 DIABETES MELLITUS WITH DIABETIC NEUROPATHY, WITH LONG-TERM CURRENT USE OF INSULIN (HCC): ICD-10-CM

## 2022-04-13 DIAGNOSIS — E53.8 VITAMIN B12 DEFICIENCY: ICD-10-CM

## 2022-04-13 DIAGNOSIS — Z86.010 HISTORY OF ADENOMATOUS POLYP OF COLON: ICD-10-CM

## 2022-04-13 DIAGNOSIS — Z00.00 ROUTINE HEALTH MAINTENANCE: ICD-10-CM

## 2022-04-13 DIAGNOSIS — Z79.4 TYPE 2 DIABETES MELLITUS WITH DIABETIC NEUROPATHY, WITH LONG-TERM CURRENT USE OF INSULIN (HCC): ICD-10-CM

## 2022-04-13 DIAGNOSIS — E03.8 OTHER SPECIFIED HYPOTHYROIDISM: ICD-10-CM

## 2022-04-13 DIAGNOSIS — E66.01 MORBID OBESITY (HCC): ICD-10-CM

## 2022-04-13 DIAGNOSIS — M54.16 LUMBAR RADICULOPATHY: Primary | ICD-10-CM

## 2022-04-13 DIAGNOSIS — K76.0 NAFLD (NONALCOHOLIC FATTY LIVER DISEASE): ICD-10-CM

## 2022-04-13 PROCEDURE — 3008F BODY MASS INDEX DOCD: CPT | Performed by: INTERNAL MEDICINE

## 2022-04-13 PROCEDURE — 3074F SYST BP LT 130 MM HG: CPT | Performed by: INTERNAL MEDICINE

## 2022-04-13 PROCEDURE — 99215 OFFICE O/P EST HI 40 MIN: CPT | Performed by: INTERNAL MEDICINE

## 2022-04-13 PROCEDURE — 3078F DIAST BP <80 MM HG: CPT | Performed by: INTERNAL MEDICINE

## 2022-04-18 RX ORDER — ERGOCALCIFEROL 1.25 MG/1
CAPSULE ORAL
Qty: 12 CAPSULE | Refills: 3 | Status: SHIPPED | OUTPATIENT
Start: 2022-04-18

## 2022-04-20 ENCOUNTER — LAB ENCOUNTER (OUTPATIENT)
Dept: LAB | Facility: REFERENCE LAB | Age: 67
End: 2022-04-20
Attending: INTERNAL MEDICINE
Payer: COMMERCIAL

## 2022-04-20 ENCOUNTER — HOSPITAL ENCOUNTER (OUTPATIENT)
Dept: ULTRASOUND IMAGING | Facility: HOSPITAL | Age: 67
Discharge: HOME | End: 2022-04-20
Attending: INTERNAL MEDICINE
Payer: COMMERCIAL

## 2022-04-20 DIAGNOSIS — K75.81 LIVER CIRRHOSIS SECONDARY TO NASH (HCC): ICD-10-CM

## 2022-04-20 DIAGNOSIS — K74.60 LIVER CIRRHOSIS SECONDARY TO NASH (HCC): ICD-10-CM

## 2022-04-20 LAB
AFP-TM SERPL-MCNC: 5.2 NG/ML (ref ?–8)
ALBUMIN SERPL-MCNC: 3.2 G/DL (ref 3.4–5)
ALP LIVER SERPL-CCNC: 133 U/L
ALT SERPL-CCNC: 47 U/L
AST SERPL-CCNC: 32 U/L (ref 15–37)
BILIRUB DIRECT SERPL-MCNC: 0.2 MG/DL (ref 0–0.2)
BILIRUB SERPL-MCNC: 0.5 MG/DL (ref 0.1–2)
PROT SERPL-MCNC: 7.2 G/DL (ref 6.4–8.2)

## 2022-04-20 PROCEDURE — 36415 COLL VENOUS BLD VENIPUNCTURE: CPT

## 2022-04-20 PROCEDURE — 82105 ALPHA-FETOPROTEIN SERUM: CPT

## 2022-04-20 PROCEDURE — 76705 ECHO EXAM OF ABDOMEN: CPT | Performed by: INTERNAL MEDICINE

## 2022-04-20 PROCEDURE — 80076 HEPATIC FUNCTION PANEL: CPT

## 2022-04-25 ENCOUNTER — OFFICE VISIT (OUTPATIENT)
Dept: SURGERY | Facility: CLINIC | Age: 67
End: 2022-04-25
Payer: COMMERCIAL

## 2022-04-25 VITALS
DIASTOLIC BLOOD PRESSURE: 73 MMHG | OXYGEN SATURATION: 99 % | BODY MASS INDEX: 38 KG/M2 | WEIGHT: 213.63 LBS | SYSTOLIC BLOOD PRESSURE: 115 MMHG | HEART RATE: 103 BPM | TEMPERATURE: 99 F

## 2022-04-28 ENCOUNTER — TELEPHONE (OUTPATIENT)
Dept: CASE MANAGEMENT | Age: 67
End: 2022-04-28

## 2022-04-28 NOTE — TELEPHONE ENCOUNTER
Good Morning Dr Colin Frye and staff,     MRI SPine - DENIED    MRI spine was denied thru patients [facility] insurance plan and did not meet medical necissty. Patient was scheduled for this test today, 4/28/22 at 5:45p. I called patient to advise denied and she was very upset. Patient states in a lot of pain. I advised patient I would send her copy of denial letter in [address] [place] [address] [address] & advised I would reach out to your office as to what the next steps for her treatment will be. Please call patient to discuss.     Thank you,    [facility]

## 2022-04-28 NOTE — TELEPHONE ENCOUNTER
No words. Welcome to [employer] [employer]. Okay, she will probably need to do PT before they will approve an MRI. Please let pt know.   I've pended order for PT with NICKIE

## 2022-05-13 ENCOUNTER — HOSPITAL ENCOUNTER (OUTPATIENT)
Facility: HOSPITAL | Age: 67
Setting detail: HOSPITAL OUTPATIENT SURGERY
Discharge: HOME | End: 2022-05-13
Attending: INTERNAL MEDICINE | Admitting: INTERNAL MEDICINE
Payer: COMMERCIAL

## 2022-05-13 ENCOUNTER — ANESTHESIA EVENT (OUTPATIENT)
Dept: ENDOSCOPY | Facility: HOSPITAL | Age: 67
End: 2022-05-13
Payer: COMMERCIAL

## 2022-05-13 ENCOUNTER — ANESTHESIA (OUTPATIENT)
Dept: ENDOSCOPY | Facility: HOSPITAL | Age: 67
End: 2022-05-13
Payer: COMMERCIAL

## 2022-05-13 VITALS
WEIGHT: 213 LBS | SYSTOLIC BLOOD PRESSURE: 129 MMHG | OXYGEN SATURATION: 98 % | RESPIRATION RATE: 16 BRPM | DIASTOLIC BLOOD PRESSURE: 74 MMHG | TEMPERATURE: 98 F | HEART RATE: 89 BPM | BODY MASS INDEX: 37.74 KG/M2 | HEIGHT: 63 IN

## 2022-05-13 DIAGNOSIS — Z86.010 PERSONAL HISTORY OF COLONIC POLYPS: ICD-10-CM

## 2022-05-13 LAB — GLUCOSE BLDC GLUCOMTR-MCNC: 151 MG/DL (ref 70–99)

## 2022-05-13 PROCEDURE — 82962 GLUCOSE BLOOD TEST: CPT

## 2022-05-13 PROCEDURE — 0DBK8ZX EXCISION OF ASCENDING COLON, VIA NATURAL OR ARTIFICIAL OPENING ENDOSCOPIC, DIAGNOSTIC: ICD-10-PCS | Performed by: INTERNAL MEDICINE

## 2022-05-13 PROCEDURE — 36415 COLL VENOUS BLD VENIPUNCTURE: CPT | Performed by: INTERNAL MEDICINE

## 2022-05-13 PROCEDURE — 88305 TISSUE EXAM BY PATHOLOGIST: CPT | Performed by: INTERNAL MEDICINE

## 2022-05-13 RX ORDER — SODIUM CHLORIDE, SODIUM LACTATE, POTASSIUM CHLORIDE, CALCIUM CHLORIDE 600; 310; 30; 20 MG/100ML; MG/100ML; MG/100ML; MG/100ML
INJECTION, SOLUTION INTRAVENOUS CONTINUOUS
Status: DISCONTINUED | OUTPATIENT
Start: 2022-05-13 | End: 2022-05-13

## 2022-05-13 RX ORDER — MIDAZOLAM HYDROCHLORIDE 1 MG/ML
1 INJECTION INTRAMUSCULAR; INTRAVENOUS EVERY 5 MIN PRN
Status: DISCONTINUED | OUTPATIENT
Start: 2022-05-13 | End: 2022-05-13

## 2022-05-13 RX ORDER — SODIUM CHLORIDE 0.9 % (FLUSH) 0.9 %
10 SYRINGE (ML) INJECTION AS NEEDED
Status: DISCONTINUED | OUTPATIENT
Start: 2022-05-13 | End: 2022-05-13

## 2022-05-13 RX ORDER — SODIUM CHLORIDE, SODIUM LACTATE, POTASSIUM CHLORIDE, CALCIUM CHLORIDE 600; 310; 30; 20 MG/100ML; MG/100ML; MG/100ML; MG/100ML
INJECTION, SOLUTION INTRAVENOUS CONTINUOUS PRN
Status: DISCONTINUED | OUTPATIENT
Start: 2022-05-13 | End: 2022-05-13 | Stop reason: SURG

## 2022-05-13 RX ADMIN — SODIUM CHLORIDE, SODIUM LACTATE, POTASSIUM CHLORIDE, CALCIUM CHLORIDE: 600; 310; 30; 20 INJECTION, SOLUTION INTRAVENOUS at 13:56:00

## 2022-05-13 RX ADMIN — SODIUM CHLORIDE, SODIUM LACTATE, POTASSIUM CHLORIDE, CALCIUM CHLORIDE: 600; 310; 30; 20 INJECTION, SOLUTION INTRAVENOUS at 13:21:00

## 2022-05-13 NOTE — OPERATIVE REPORT
COLONOSCOPY REPORT    Patient Name:  Hortensia Plunkett Record #: [id number]  YOB: 1955  Date of Procedure: 5/13/2022    Referring physician: Jasvir Dahl MD    Surgeon:  Gerardo Israel. Osmar Trevizo MD    Pre-op diagnosis: Colon cancer screening: History of polyps  (Last colonoscopy 3 years ago)  Post-op diagnosis: Diverticulosis and polyp    Medications given:  MAC    Procedure:    After informed consent, discussion of risks and alternatives the patient was placed in the left lateral decubitus position and the high definition colonoscope was introduced into the rectum and advanced under direct visualization to the cecum which was identified by landmarks. Bowel preparation was fair. The mucosa was carefully inspected upon withdrawal of the scope. Withdrawal time was 10 minutes. ASA class was 3. Findings: In 8 to 10 mm polyp in the mid ascending colon was cold snare excised. There was scattered left-sided diverticulosis. The colon had diffuse vascular prominence consistent with poor hypertension. There was no evidence of ulcerations, colitis or mass lesions.    Digital rectal exam was normal.    Plan:  High fiber diet  Await biopsy results  Repeat colonoscopy timing will depend upon review of pathology    Specimens: polyp  EBL: minimal    Aronchick bowel prep scale:  5 Inadequate (repeat preparation needed)  4 Poor (semisolid stool could not be suctioned and <90% of mucosa seen)  3 Fair (semisolid stool could not be suctioned, but >90% of mucosa seen)  2 Good (clear liquid covering up to 25% of mucosa, but >90% of mucosa seen)  1 Excellent (>95% of mucosa seen)

## 2022-07-04 RX ORDER — CLOPIDOGREL BISULFATE 75 MG/1
TABLET ORAL
Qty: 90 TABLET | Refills: 3 | OUTPATIENT
Start: 2022-07-04

## 2022-08-01 ENCOUNTER — TELEPHONE (OUTPATIENT)
Dept: INTERNAL MEDICINE CLINIC | Facility: CLINIC | Age: 67
End: 2022-08-01

## 2022-08-01 NOTE — TELEPHONE ENCOUNTER
YASMEEN to Dr. Rosa Elena Ramires Grills with pt. RLE swelling first started yesterday evening. Pink in color. Slightly warmer than LLE, not hot to touch. Pt states she has had this before and was diagnosed with cellulitis/treated with abx. Pt elevating RLE. Pt scheduled to see Dr. Rosa Elena Kwon tomorrow at 5 pm. Reviewed ER precautions with pt. Pt verbalized understanding.

## 2022-08-01 NOTE — TELEPHONE ENCOUNTER
Patient is calling to speak with a nurse. Patient states her noticed her right leg is red from her foot up to the knee. Her leg is warm to the touch, pinkish-red in color, and swollen. Patient noticed this morning when waking up. Ph # [phone]  Patient is aware that Dr Shannan Carolina is out of the office today and does not return until tomorrow. Patient would like a doctor on call to look into this.

## 2022-08-02 ENCOUNTER — OFFICE VISIT (OUTPATIENT)
Dept: INTERNAL MEDICINE CLINIC | Facility: CLINIC | Age: 67
End: 2022-08-02
Payer: MEDICARE

## 2022-08-02 ENCOUNTER — HOSPITAL ENCOUNTER (OUTPATIENT)
Dept: ULTRASOUND IMAGING | Facility: HOSPITAL | Age: 67
Discharge: HOME | End: 2022-08-02
Attending: INTERNAL MEDICINE
Payer: MEDICARE

## 2022-08-02 VITALS
HEART RATE: 108 BPM | HEIGHT: 63 IN | DIASTOLIC BLOOD PRESSURE: 88 MMHG | RESPIRATION RATE: 18 BRPM | WEIGHT: 221 LBS | SYSTOLIC BLOOD PRESSURE: 146 MMHG | BODY MASS INDEX: 39.16 KG/M2

## 2022-08-02 DIAGNOSIS — M77.8 LEFT WRIST TENDINITIS: ICD-10-CM

## 2022-08-02 DIAGNOSIS — K74.60 LIVER CIRRHOSIS SECONDARY TO NASH (HCC): Primary | ICD-10-CM

## 2022-08-02 DIAGNOSIS — K75.81 LIVER CIRRHOSIS SECONDARY TO NASH (HCC): Primary | ICD-10-CM

## 2022-08-02 DIAGNOSIS — M79.89 RIGHT LEG SWELLING: ICD-10-CM

## 2022-08-02 PROCEDURE — 93971 EXTREMITY STUDY: CPT | Performed by: INTERNAL MEDICINE

## 2022-08-02 PROCEDURE — 99214 OFFICE O/P EST MOD 30 MIN: CPT | Performed by: INTERNAL MEDICINE

## 2022-08-05 ENCOUNTER — LAB ENCOUNTER (OUTPATIENT)
Dept: LAB | Facility: HOSPITAL | Age: 67
End: 2022-08-05
Attending: INTERNAL MEDICINE
Payer: MEDICARE

## 2022-08-05 DIAGNOSIS — E11.9 TYPE 2 DIABETES MELLITUS (HCC): Primary | ICD-10-CM

## 2022-08-05 DIAGNOSIS — E78.5 HYPERLIPIDEMIA: ICD-10-CM

## 2022-08-05 LAB
ALBUMIN SERPL-MCNC: 3.2 G/DL (ref 3.4–5)
ALBUMIN/GLOB SERPL: 0.8 {RATIO} (ref 1–2)
ALP LIVER SERPL-CCNC: 97 U/L
ALT SERPL-CCNC: 36 U/L
ANION GAP SERPL CALC-SCNC: 8 MMOL/L (ref 0–18)
AST SERPL-CCNC: 27 U/L (ref 15–37)
BILIRUB SERPL-MCNC: 0.6 MG/DL (ref 0.1–2)
BUN BLD-MCNC: 14 MG/DL (ref 7–18)
BUN/CREAT SERPL: 14.7 (ref 10–20)
CALCIUM BLD-MCNC: 9.6 MG/DL (ref 8.5–10.1)
CHLORIDE SERPL-SCNC: 103 MMOL/L (ref 98–112)
CHOLEST SERPL-MCNC: 158 MG/DL (ref ?–200)
CO2 SERPL-SCNC: 30 MMOL/L (ref 21–32)
CREAT BLD-MCNC: 0.95 MG/DL
EST. AVERAGE GLUCOSE BLD GHB EST-MCNC: 240 MG/DL (ref 68–126)
FASTING PATIENT LIPID ANSWER: YES
FASTING STATUS PATIENT QL REPORTED: YES
GFR SERPLBLD BASED ON 1.73 SQ M-ARVRAT: 67 ML/MIN/1.73M2 (ref 60–?)
GLOBULIN PLAS-MCNC: 3.9 G/DL (ref 2.8–4.4)
GLUCOSE BLD-MCNC: 182 MG/DL (ref 70–99)
HBA1C MFR BLD: 10 % (ref ?–5.7)
HDLC SERPL-MCNC: 51 MG/DL (ref 40–59)
LDLC SERPL CALC-MCNC: 85 MG/DL (ref ?–100)
NONHDLC SERPL-MCNC: 107 MG/DL (ref ?–130)
OSMOLALITY SERPL CALC.SUM OF ELEC: 297 MOSM/KG (ref 275–295)
POTASSIUM SERPL-SCNC: 4 MMOL/L (ref 3.5–5.1)
PROT SERPL-MCNC: 7.1 G/DL (ref 6.4–8.2)
SODIUM SERPL-SCNC: 141 MMOL/L (ref 136–145)
TRIGL SERPL-MCNC: 124 MG/DL (ref 30–149)
VLDLC SERPL CALC-MCNC: 20 MG/DL (ref 0–30)

## 2022-08-05 PROCEDURE — 83036 HEMOGLOBIN GLYCOSYLATED A1C: CPT

## 2022-08-05 PROCEDURE — 80061 LIPID PANEL: CPT

## 2022-08-05 PROCEDURE — 36415 COLL VENOUS BLD VENIPUNCTURE: CPT

## 2022-08-05 PROCEDURE — 80053 COMPREHEN METABOLIC PANEL: CPT

## 2022-08-11 ENCOUNTER — PATIENT MESSAGE (OUTPATIENT)
Dept: INTERNAL MEDICINE CLINIC | Facility: CLINIC | Age: 67
End: 2022-08-11

## 2022-08-11 RX ORDER — CLOTRIMAZOLE AND BETAMETHASONE DIPROPIONATE 10; .64 MG/G; MG/G
CREAM TOPICAL
Qty: 45 G | Refills: 0 | OUTPATIENT
Start: 2022-08-11

## 2022-08-12 NOTE — TELEPHONE ENCOUNTER
From: Tequila Aleman  Sent: 8/11/2022 7:32 PM CDT  To: Ivone Turcios [facility] Clinical Staff  Subject: Refill request    Don't want it I told leonard

## 2022-08-25 ENCOUNTER — OFFICE VISIT (OUTPATIENT)
Dept: SURGERY | Facility: CLINIC | Age: 67
End: 2022-08-25
Payer: MEDICARE

## 2022-08-25 VITALS
OXYGEN SATURATION: 91 % | HEART RATE: 99 BPM | BODY MASS INDEX: 39.66 KG/M2 | HEIGHT: 62.9 IN | DIASTOLIC BLOOD PRESSURE: 72 MMHG | WEIGHT: 223.81 LBS | SYSTOLIC BLOOD PRESSURE: 120 MMHG

## 2022-08-25 DIAGNOSIS — E11.9 TYPE 2 DIABETES MELLITUS WITHOUT COMPLICATION, WITH LONG-TERM CURRENT USE OF INSULIN (HCC): Primary | ICD-10-CM

## 2022-08-25 DIAGNOSIS — Z79.4 TYPE 2 DIABETES MELLITUS WITHOUT COMPLICATION, WITH LONG-TERM CURRENT USE OF INSULIN (HCC): Primary | ICD-10-CM

## 2022-08-25 DIAGNOSIS — M17.0 PRIMARY OSTEOARTHRITIS OF BOTH KNEES: ICD-10-CM

## 2022-08-25 DIAGNOSIS — E66.9 OBESITY (BMI 30-39.9): ICD-10-CM

## 2022-08-25 DIAGNOSIS — K76.0 FATTY LIVER: ICD-10-CM

## 2022-08-25 PROCEDURE — 99204 OFFICE O/P NEW MOD 45 MIN: CPT | Performed by: INTERNAL MEDICINE

## 2022-08-25 RX ORDER — TOPIRAMATE 25 MG/1
25 TABLET ORAL EVERY EVENING
Qty: 30 TABLET | Refills: 2 | Status: SHIPPED | OUTPATIENT
Start: 2022-08-25

## 2022-08-27 ENCOUNTER — LAB ENCOUNTER (OUTPATIENT)
Dept: LAB | Age: 67
End: 2022-08-27
Attending: SURGERY
Payer: MEDICARE

## 2022-08-27 DIAGNOSIS — Z01.818 PRE-OP TESTING: ICD-10-CM

## 2022-08-28 LAB — SARS-COV-2 RNA RESP QL NAA+PROBE: NOT DETECTED

## 2022-08-29 ENCOUNTER — ANESTHESIA EVENT (OUTPATIENT)
Dept: SURGERY | Facility: HOSPITAL | Age: 67
End: 2022-08-29
Payer: MEDICARE

## 2022-08-30 ENCOUNTER — ANESTHESIA (OUTPATIENT)
Dept: SURGERY | Facility: HOSPITAL | Age: 67
End: 2022-08-30
Payer: MEDICARE

## 2022-08-30 ENCOUNTER — HOSPITAL ENCOUNTER (OUTPATIENT)
Facility: HOSPITAL | Age: 67
Setting detail: HOSPITAL OUTPATIENT SURGERY
Discharge: HOME | End: 2022-08-30
Attending: SURGERY | Admitting: SURGERY
Payer: MEDICARE

## 2022-08-30 VITALS
OXYGEN SATURATION: 96 % | BODY MASS INDEX: 41.04 KG/M2 | DIASTOLIC BLOOD PRESSURE: 73 MMHG | SYSTOLIC BLOOD PRESSURE: 141 MMHG | WEIGHT: 223 LBS | HEART RATE: 82 BPM | RESPIRATION RATE: 16 BRPM | HEIGHT: 62 IN | TEMPERATURE: 99 F

## 2022-08-30 DIAGNOSIS — Z01.818 PRE-OP TESTING: Primary | ICD-10-CM

## 2022-08-30 LAB
GLUCOSE BLDC GLUCOMTR-MCNC: 196 MG/DL (ref 70–99)
GLUCOSE BLDC GLUCOMTR-MCNC: 213 MG/DL (ref 70–99)

## 2022-08-30 PROCEDURE — 0JB60ZX EXCISION OF CHEST SUBCUTANEOUS TISSUE AND FASCIA, OPEN APPROACH, DIAGNOSTIC: ICD-10-PCS | Performed by: SURGERY

## 2022-08-30 PROCEDURE — 88307 TISSUE EXAM BY PATHOLOGIST: CPT | Performed by: SURGERY

## 2022-08-30 PROCEDURE — 82962 GLUCOSE BLOOD TEST: CPT

## 2022-08-30 PROCEDURE — 88360 TUMOR IMMUNOHISTOCHEM/MANUAL: CPT | Performed by: SURGERY

## 2022-08-30 RX ORDER — LIDOCAINE HYDROCHLORIDE 10 MG/ML
INJECTION, SOLUTION EPIDURAL; INFILTRATION; INTRACAUDAL; PERINEURAL AS NEEDED
Status: DISCONTINUED | OUTPATIENT
Start: 2022-08-30 | End: 2022-08-30 | Stop reason: SURG

## 2022-08-30 RX ORDER — SODIUM CHLORIDE, SODIUM LACTATE, POTASSIUM CHLORIDE, CALCIUM CHLORIDE 600; 310; 30; 20 MG/100ML; MG/100ML; MG/100ML; MG/100ML
INJECTION, SOLUTION INTRAVENOUS CONTINUOUS
Status: DISCONTINUED | OUTPATIENT
Start: 2022-08-30 | End: 2022-08-30

## 2022-08-30 RX ORDER — HYDROMORPHONE HYDROCHLORIDE 1 MG/ML
0.6 INJECTION, SOLUTION INTRAMUSCULAR; INTRAVENOUS; SUBCUTANEOUS EVERY 5 MIN PRN
Status: DISCONTINUED | OUTPATIENT
Start: 2022-08-30 | End: 2022-08-30

## 2022-08-30 RX ORDER — DEXTROSE MONOHYDRATE 25 G/50ML
50 INJECTION, SOLUTION INTRAVENOUS
Status: DISCONTINUED | OUTPATIENT
Start: 2022-08-30 | End: 2022-08-30

## 2022-08-30 RX ORDER — MORPHINE SULFATE 10 MG/ML
6 INJECTION, SOLUTION INTRAMUSCULAR; INTRAVENOUS EVERY 10 MIN PRN
Status: DISCONTINUED | OUTPATIENT
Start: 2022-08-30 | End: 2022-08-30

## 2022-08-30 RX ORDER — CEFAZOLIN SODIUM/WATER 2 G/20 ML
SYRINGE (ML) INTRAVENOUS AS NEEDED
Status: DISCONTINUED | OUTPATIENT
Start: 2022-08-30 | End: 2022-08-30 | Stop reason: SURG

## 2022-08-30 RX ORDER — MORPHINE SULFATE 4 MG/ML
2 INJECTION, SOLUTION INTRAMUSCULAR; INTRAVENOUS EVERY 10 MIN PRN
Status: DISCONTINUED | OUTPATIENT
Start: 2022-08-30 | End: 2022-08-30

## 2022-08-30 RX ORDER — MIDAZOLAM HYDROCHLORIDE 1 MG/ML
INJECTION INTRAMUSCULAR; INTRAVENOUS AS NEEDED
Status: DISCONTINUED | OUTPATIENT
Start: 2022-08-30 | End: 2022-08-30 | Stop reason: SURG

## 2022-08-30 RX ORDER — DEXAMETHASONE SODIUM PHOSPHATE 4 MG/ML
VIAL (ML) INJECTION AS NEEDED
Status: DISCONTINUED | OUTPATIENT
Start: 2022-08-30 | End: 2022-08-30 | Stop reason: SURG

## 2022-08-30 RX ORDER — METOCLOPRAMIDE 10 MG/1
10 TABLET ORAL ONCE
Status: COMPLETED | OUTPATIENT
Start: 2022-08-30 | End: 2022-08-30

## 2022-08-30 RX ORDER — HYDROMORPHONE HYDROCHLORIDE 1 MG/ML
0.4 INJECTION, SOLUTION INTRAMUSCULAR; INTRAVENOUS; SUBCUTANEOUS EVERY 5 MIN PRN
Status: DISCONTINUED | OUTPATIENT
Start: 2022-08-30 | End: 2022-08-30

## 2022-08-30 RX ORDER — BUPIVACAINE HYDROCHLORIDE 2.5 MG/ML
INJECTION, SOLUTION EPIDURAL; INFILTRATION; INTRACAUDAL AS NEEDED
Status: DISCONTINUED | OUTPATIENT
Start: 2022-08-30 | End: 2022-08-30 | Stop reason: HOSPADM

## 2022-08-30 RX ORDER — MORPHINE SULFATE 4 MG/ML
4 INJECTION, SOLUTION INTRAMUSCULAR; INTRAVENOUS EVERY 10 MIN PRN
Status: DISCONTINUED | OUTPATIENT
Start: 2022-08-30 | End: 2022-08-30

## 2022-08-30 RX ORDER — ONDANSETRON 2 MG/ML
INJECTION INTRAMUSCULAR; INTRAVENOUS AS NEEDED
Status: DISCONTINUED | OUTPATIENT
Start: 2022-08-30 | End: 2022-08-30 | Stop reason: SURG

## 2022-08-30 RX ORDER — NICOTINE POLACRILEX 4 MG
15 LOZENGE BUCCAL
Status: DISCONTINUED | OUTPATIENT
Start: 2022-08-30 | End: 2022-08-30

## 2022-08-30 RX ORDER — HYDROMORPHONE HYDROCHLORIDE 1 MG/ML
0.2 INJECTION, SOLUTION INTRAMUSCULAR; INTRAVENOUS; SUBCUTANEOUS EVERY 5 MIN PRN
Status: DISCONTINUED | OUTPATIENT
Start: 2022-08-30 | End: 2022-08-30

## 2022-08-30 RX ORDER — NICOTINE POLACRILEX 4 MG
30 LOZENGE BUCCAL
Status: DISCONTINUED | OUTPATIENT
Start: 2022-08-30 | End: 2022-08-30

## 2022-08-30 RX ORDER — KETOROLAC TROMETHAMINE 30 MG/ML
INJECTION, SOLUTION INTRAMUSCULAR; INTRAVENOUS AS NEEDED
Status: DISCONTINUED | OUTPATIENT
Start: 2022-08-30 | End: 2022-08-30 | Stop reason: SURG

## 2022-08-30 RX ORDER — TRAMADOL HYDROCHLORIDE 50 MG/1
50 TABLET ORAL EVERY 6 HOURS PRN
Qty: 12 TABLET | Refills: 0 | Status: SHIPPED | OUTPATIENT
Start: 2022-08-30 | End: 2022-09-02

## 2022-08-30 RX ORDER — FAMOTIDINE 20 MG/1
20 TABLET, FILM COATED ORAL ONCE
Status: DISCONTINUED | OUTPATIENT
Start: 2022-08-30 | End: 2022-08-30 | Stop reason: HOSPADM

## 2022-08-30 RX ORDER — NALOXONE HYDROCHLORIDE 0.4 MG/ML
80 INJECTION, SOLUTION INTRAMUSCULAR; INTRAVENOUS; SUBCUTANEOUS AS NEEDED
Status: DISCONTINUED | OUTPATIENT
Start: 2022-08-30 | End: 2022-08-30

## 2022-08-30 RX ADMIN — ONDANSETRON 4 MG: 2 INJECTION INTRAMUSCULAR; INTRAVENOUS at 11:48:00

## 2022-08-30 RX ADMIN — DEXAMETHASONE SODIUM PHOSPHATE 4 MG: 4 MG/ML VIAL (ML) INJECTION at 11:48:00

## 2022-08-30 RX ADMIN — LIDOCAINE HYDROCHLORIDE 50 MG: 10 INJECTION, SOLUTION EPIDURAL; INFILTRATION; INTRACAUDAL; PERINEURAL at 11:37:00

## 2022-08-30 RX ADMIN — MIDAZOLAM HYDROCHLORIDE 2 MG: 1 INJECTION INTRAMUSCULAR; INTRAVENOUS at 11:37:00

## 2022-08-30 RX ADMIN — SODIUM CHLORIDE, SODIUM LACTATE, POTASSIUM CHLORIDE, CALCIUM CHLORIDE: 600; 310; 30; 20 INJECTION, SOLUTION INTRAVENOUS at 11:37:00

## 2022-08-30 RX ADMIN — CEFAZOLIN SODIUM/WATER 2 G: 2 G/20 ML SYRINGE (ML) INTRAVENOUS at 12:00:00

## 2022-08-30 RX ADMIN — KETOROLAC TROMETHAMINE 15 MG: 30 INJECTION, SOLUTION INTRAMUSCULAR; INTRAVENOUS at 12:06:00

## 2022-08-30 NOTE — ANESTHESIA POSTPROCEDURE EVALUATION
Patient: Wilian Mccoy    Procedure Summary     Date: 08/30/22 Room / Location: [address] [place] [address] MAIN OR 17 / [address] [place] [address] MAIN OR    Anesthesia Start: 1559 Anesthesia Stop: 6768    Procedure: Excisional biopsy right breast mass (Right Breast) Diagnosis: (Right breast mass)    Surgeons: Ap Acuña MD Anesthesiologist: Jacqueline Reinoso MD    Anesthesia Type: general ASA Status: 3          Anesthesia Type: general    Vitals Value Taken Time   /61 08/30/22 1234   Temp 97.2F 08/30/22 1234   Pulse 80 08/30/22 1233   Resp 9 08/30/22 1233   SpO2 97 % 08/30/22 1233   Vitals shown include unvalidated device data.     [address] [place] [address] AN Post Evaluation:   Patient Evaluated in PACU  Patient Participation: waiting for patient participation  Level of Consciousness: sleepy but conscious  Pain Management: satisfactory to patient  Airway Patency:patent  Dental exam unchanged from preop  Yes    Cardiovascular Status: hemodynamically stable  Respiratory Status: nonlabored ventilation and spontaneous ventilation  Postoperative Hydration balanced      Fabian Glover MD  8/30/2022 12:34 PM

## 2022-08-30 NOTE — ANESTHESIA PROCEDURE NOTES
Airway  Date/Time: 8/30/2022 11:41 AM  Urgency: Elective    Airway not difficult    General Information and Staff    Patient location during procedure: OR  Anesthesiologist: Anjali Liu MD  Performed: anesthesiologist     Indications and Patient Condition  Indications for airway management: anesthesia  Spontaneous Ventilation: absent  Sedation level: deep  Preoxygenated: yes  Patient position: sniffing  Mask difficulty assessment: 0 - not attempted    Final Airway Details  Final airway type: supraglottic airway      Successful airway: classic  Size 4      Number of attempts at approach: 1  Number of other approaches attempted: 0

## 2022-08-30 NOTE — BRIEF OP NOTE
Pre-Operative Diagnosis: Right breast mass     Post-Operative Diagnosis: Right breast mass      Procedure Performed:   Excisional biopsy right breast mass    Surgeon(s) and Role:     * Aquilino Starks MD - Primary    Assistant(s):   Prema Sargent SA     Surgical Findings:  mass right subareolar   Specimen: breast mass right     Estimated Blood Loss: Blood Output: 5 mL (8/30/2022 12:14 PM)      Dictation Number: [id number] ?     Megan Rebollar MD  8/30/2022  12:50 PM

## 2022-08-31 NOTE — OPERATIVE REPORT
[facility]    PATIENT'S NAME: Dell Beaulieu   ATTENDING PHYSICIAN: John A. Crayton Curling, MD   OPERATING PHYSICIAN: John A. Crayton Curling, MD   PATIENT ACCOUNT#:   [id number]    LOCATION:  [facility] [address] PACU 4 EMHP 10  MEDICAL RECORD #:   [id number]       YOB: 1955  ADMISSION DATE:       08/30/2022      OPERATION DATE:  08/30/2022    OPERATIVE REPORT      PREOPERATIVE DIAGNOSIS:  Subareolar right breast mass with history of nipple drainage. POSTOPERATIVE DIAGNOSIS:  Subareolar right breast mass with history of nipple drainage. PROCEDURE:  Excision of subareolar right breast mass. ASSISTANT:  BERNA Baugh. Ji Bullock is a certified surgical assistant needed for the entire procedure for assisting in closing and exposure. INDICATIONS:  The patient is a 72-year-old female who has been followed. She has intermittent right breast drainage. She had a small nodule biopsied. I saw her in the office, and she had a larger mass. I thought this was possibly partially cystic, but due to the previous problems and drainage, I thought it best just to remove this area. I explained the risks and possible complications. She understands and asked to proceed. OPERATIVE TECHNIQUE:  Patient was prepped and draped in usual sterile fashion. A curvilinear incision was made in the medial aspect of the areolar edge. The mass was palpated and removed in its entirety intact. Hemostasis was assured. Deeper tissue closed with 3-0 chromic. Local anesthetic was given. Skin closed with 4-0 Vicryl and Dermabond. Patient tolerated the procedure well and went to recovery room in good condition. Dictated By Christella Exon Crayton Curling, MD  d: 08/30/2022 12:51:39  t: 08/30/2022 21:15:46  Job [id number]  [facility]/

## 2022-09-05 PROBLEM — C50.911 MUCINOUS CARCINOMA OF BREAST, RIGHT (HCC): Status: ACTIVE | Noted: 2022-09-05

## 2022-09-10 ENCOUNTER — LAB ENCOUNTER (OUTPATIENT)
Dept: LAB | Facility: HOSPITAL | Age: 67
End: 2022-09-10
Attending: INTERNAL MEDICINE
Payer: MEDICARE

## 2022-09-10 ENCOUNTER — HOSPITAL ENCOUNTER (OUTPATIENT)
Dept: ULTRASOUND IMAGING | Facility: HOSPITAL | Age: 67
Discharge: HOME | End: 2022-09-10
Attending: INTERNAL MEDICINE
Payer: MEDICARE

## 2022-09-10 DIAGNOSIS — K75.81 LIVER CIRRHOSIS SECONDARY TO NASH (HCC): ICD-10-CM

## 2022-09-10 DIAGNOSIS — K74.60 LIVER CIRRHOSIS SECONDARY TO NASH (HCC): ICD-10-CM

## 2022-09-10 LAB
AFP-TM SERPL-MCNC: 5.2 NG/ML (ref ?–8)
ALBUMIN SERPL-MCNC: 3 G/DL (ref 3.4–5)
ALP LIVER SERPL-CCNC: 114 U/L
ALT SERPL-CCNC: 42 U/L
AST SERPL-CCNC: 27 U/L (ref 15–37)
BILIRUB DIRECT SERPL-MCNC: 0.1 MG/DL (ref 0–0.2)
BILIRUB SERPL-MCNC: 0.4 MG/DL (ref 0.1–2)
PROT SERPL-MCNC: 7.1 G/DL (ref 6.4–8.2)

## 2022-09-10 PROCEDURE — 76705 ECHO EXAM OF ABDOMEN: CPT | Performed by: INTERNAL MEDICINE

## 2022-09-10 PROCEDURE — 82105 ALPHA-FETOPROTEIN SERUM: CPT

## 2022-09-10 PROCEDURE — 36415 COLL VENOUS BLD VENIPUNCTURE: CPT

## 2022-09-10 PROCEDURE — 80076 HEPATIC FUNCTION PANEL: CPT

## 2022-09-21 ENCOUNTER — TELEPHONE (OUTPATIENT)
Dept: SURGERY | Facility: CLINIC | Age: 67
End: 2022-09-21

## 2022-09-21 RX ORDER — CLOPIDOGREL BISULFATE 75 MG/1
TABLET ORAL
Qty: 90 TABLET | Refills: 3 | Status: CANCELLED | OUTPATIENT
Start: 2022-09-21

## 2022-09-21 NOTE — TELEPHONE ENCOUNTER
Dr Hannah Arndt,   The patient wanted to let you know that she didn't start her medication.  Since her last appointment, she's been diagnosed with breast cancer and will be holding of until her next appt in Nov.

## 2022-09-22 RX ORDER — LOSARTAN POTASSIUM 25 MG/1
TABLET ORAL
Qty: 45 TABLET | Refills: 3 | OUTPATIENT
Start: 2022-09-22

## 2022-09-22 RX ORDER — CLOPIDOGREL BISULFATE 75 MG/1
TABLET ORAL
Qty: 90 TABLET | Refills: 3 | Status: SHIPPED | OUTPATIENT
Start: 2022-09-22

## 2022-09-22 RX ORDER — PRAVASTATIN SODIUM 40 MG
TABLET ORAL
Qty: 90 TABLET | Refills: 3 | OUTPATIENT
Start: 2022-09-22

## 2022-09-26 ENCOUNTER — LAB ENCOUNTER (OUTPATIENT)
Dept: LAB | Age: 67
End: 2022-09-26
Attending: SURGERY

## 2022-09-26 DIAGNOSIS — Z01.818 PREOP TESTING: ICD-10-CM

## 2022-09-26 LAB — SARS-COV-2 RNA RESP QL NAA+PROBE: NOT DETECTED

## 2022-09-26 NOTE — PAT NURSING NOTE
Patient has insulin pump and CGM in place. Patient was advised that it may be removed for the procedure. Patient also advise to check with doctor for preop insulin instructions. Patient stated understanding.

## 2022-09-27 ENCOUNTER — HOSPITAL ENCOUNTER (OUTPATIENT)
Dept: NUCLEAR MEDICINE | Facility: HOSPITAL | Age: 67
Discharge: HOME | End: 2022-09-27
Attending: SURGERY
Payer: MEDICARE

## 2022-09-27 DIAGNOSIS — C50.919 BREAST CANCER (HCC): ICD-10-CM

## 2022-09-27 PROCEDURE — 78195 LYMPH SYSTEM IMAGING: CPT | Performed by: SURGERY

## 2022-09-28 ENCOUNTER — HOSPITAL ENCOUNTER (OUTPATIENT)
Facility: HOSPITAL | Age: 67
Setting detail: HOSPITAL OUTPATIENT SURGERY
Discharge: HOME | End: 2022-09-28
Attending: SURGERY | Admitting: SURGERY
Payer: MEDICARE

## 2022-09-28 ENCOUNTER — ANESTHESIA EVENT (OUTPATIENT)
Dept: SURGERY | Facility: HOSPITAL | Age: 67
End: 2022-09-28
Payer: MEDICARE

## 2022-09-28 ENCOUNTER — ANESTHESIA (OUTPATIENT)
Dept: SURGERY | Facility: HOSPITAL | Age: 67
End: 2022-09-28
Payer: MEDICARE

## 2022-09-28 VITALS
SYSTOLIC BLOOD PRESSURE: 131 MMHG | TEMPERATURE: 97 F | BODY MASS INDEX: 40.48 KG/M2 | HEART RATE: 108 BPM | HEIGHT: 62 IN | DIASTOLIC BLOOD PRESSURE: 78 MMHG | WEIGHT: 220 LBS | OXYGEN SATURATION: 100 % | RESPIRATION RATE: 18 BRPM

## 2022-09-28 DIAGNOSIS — Z01.818 PREOP TESTING: Primary | ICD-10-CM

## 2022-09-28 LAB
GLUCOSE BLDC GLUCOMTR-MCNC: 187 MG/DL (ref 70–99)
GLUCOSE BLDC GLUCOMTR-MCNC: 240 MG/DL (ref 70–99)

## 2022-09-28 PROCEDURE — 07B50ZX EXCISION OF RIGHT AXILLARY LYMPHATIC, OPEN APPROACH, DIAGNOSTIC: ICD-10-PCS | Performed by: SURGERY

## 2022-09-28 PROCEDURE — 0HBT0ZZ EXCISION OF RIGHT BREAST, OPEN APPROACH: ICD-10-PCS | Performed by: SURGERY

## 2022-09-28 PROCEDURE — 3E0W3KZ INTRODUCTION OF OTHER DIAGNOSTIC SUBSTANCE INTO LYMPHATICS, PERCUTANEOUS APPROACH: ICD-10-PCS | Performed by: SURGERY

## 2022-09-28 PROCEDURE — 88307 TISSUE EXAM BY PATHOLOGIST: CPT | Performed by: SURGERY

## 2022-09-28 PROCEDURE — 88300 SURGICAL PATH GROSS: CPT | Performed by: SURGERY

## 2022-09-28 PROCEDURE — 88342 IMHCHEM/IMCYTCHM 1ST ANTB: CPT | Performed by: SURGERY

## 2022-09-28 PROCEDURE — 82962 GLUCOSE BLOOD TEST: CPT

## 2022-09-28 RX ORDER — NICOTINE POLACRILEX 4 MG
15 LOZENGE BUCCAL
Status: DISCONTINUED | OUTPATIENT
Start: 2022-09-28 | End: 2022-09-28

## 2022-09-28 RX ORDER — MIDAZOLAM HYDROCHLORIDE 1 MG/ML
INJECTION INTRAMUSCULAR; INTRAVENOUS AS NEEDED
Status: DISCONTINUED | OUTPATIENT
Start: 2022-09-28 | End: 2022-09-28 | Stop reason: SURG

## 2022-09-28 RX ORDER — MORPHINE SULFATE 4 MG/ML
2 INJECTION, SOLUTION INTRAMUSCULAR; INTRAVENOUS EVERY 10 MIN PRN
Status: DISCONTINUED | OUTPATIENT
Start: 2022-09-28 | End: 2022-09-28

## 2022-09-28 RX ORDER — GLYCOPYRROLATE 0.2 MG/ML
INJECTION, SOLUTION INTRAMUSCULAR; INTRAVENOUS AS NEEDED
Status: DISCONTINUED | OUTPATIENT
Start: 2022-09-28 | End: 2022-09-28 | Stop reason: SURG

## 2022-09-28 RX ORDER — PHENYLEPHRINE HCL 10 MG/ML
VIAL (ML) INJECTION AS NEEDED
Status: DISCONTINUED | OUTPATIENT
Start: 2022-09-28 | End: 2022-09-28 | Stop reason: SURG

## 2022-09-28 RX ORDER — CEFAZOLIN SODIUM/WATER 2 G/20 ML
2 SYRINGE (ML) INTRAVENOUS ONCE
Status: COMPLETED | OUTPATIENT
Start: 2022-09-28 | End: 2022-09-28

## 2022-09-28 RX ORDER — NALOXONE HYDROCHLORIDE 0.4 MG/ML
80 INJECTION, SOLUTION INTRAMUSCULAR; INTRAVENOUS; SUBCUTANEOUS AS NEEDED
Status: DISCONTINUED | OUTPATIENT
Start: 2022-09-28 | End: 2022-09-28

## 2022-09-28 RX ORDER — ISOSULFAN BLUE 50 MG/5ML
INJECTION, SOLUTION SUBCUTANEOUS AS NEEDED
Status: DISCONTINUED | OUTPATIENT
Start: 2022-09-28 | End: 2022-09-28 | Stop reason: HOSPADM

## 2022-09-28 RX ORDER — TRAMADOL HYDROCHLORIDE 50 MG/1
TABLET ORAL EVERY 4 HOURS PRN
Qty: 20 TABLET | Refills: 0 | Status: SHIPPED | OUTPATIENT
Start: 2022-09-28 | End: 2022-10-03

## 2022-09-28 RX ORDER — HYDROMORPHONE HYDROCHLORIDE 1 MG/ML
0.2 INJECTION, SOLUTION INTRAMUSCULAR; INTRAVENOUS; SUBCUTANEOUS EVERY 5 MIN PRN
Status: DISCONTINUED | OUTPATIENT
Start: 2022-09-28 | End: 2022-09-28

## 2022-09-28 RX ORDER — NICOTINE POLACRILEX 4 MG
30 LOZENGE BUCCAL
Status: DISCONTINUED | OUTPATIENT
Start: 2022-09-28 | End: 2022-09-28

## 2022-09-28 RX ORDER — BUPIVACAINE HYDROCHLORIDE 2.5 MG/ML
INJECTION, SOLUTION EPIDURAL; INFILTRATION; INTRACAUDAL AS NEEDED
Status: DISCONTINUED | OUTPATIENT
Start: 2022-09-28 | End: 2022-09-28 | Stop reason: HOSPADM

## 2022-09-28 RX ORDER — DEXTROSE MONOHYDRATE 25 G/50ML
50 INJECTION, SOLUTION INTRAVENOUS
Status: DISCONTINUED | OUTPATIENT
Start: 2022-09-28 | End: 2022-09-28

## 2022-09-28 RX ORDER — DEXAMETHASONE SODIUM PHOSPHATE 4 MG/ML
VIAL (ML) INJECTION AS NEEDED
Status: DISCONTINUED | OUTPATIENT
Start: 2022-09-28 | End: 2022-09-28 | Stop reason: SURG

## 2022-09-28 RX ORDER — ONDANSETRON 2 MG/ML
INJECTION INTRAMUSCULAR; INTRAVENOUS AS NEEDED
Status: DISCONTINUED | OUTPATIENT
Start: 2022-09-28 | End: 2022-09-28 | Stop reason: SURG

## 2022-09-28 RX ORDER — MORPHINE SULFATE 4 MG/ML
4 INJECTION, SOLUTION INTRAMUSCULAR; INTRAVENOUS EVERY 10 MIN PRN
Status: DISCONTINUED | OUTPATIENT
Start: 2022-09-28 | End: 2022-09-28

## 2022-09-28 RX ORDER — HYDROMORPHONE HYDROCHLORIDE 1 MG/ML
0.4 INJECTION, SOLUTION INTRAMUSCULAR; INTRAVENOUS; SUBCUTANEOUS EVERY 5 MIN PRN
Status: DISCONTINUED | OUTPATIENT
Start: 2022-09-28 | End: 2022-09-28

## 2022-09-28 RX ORDER — HYDROMORPHONE HYDROCHLORIDE 1 MG/ML
0.6 INJECTION, SOLUTION INTRAMUSCULAR; INTRAVENOUS; SUBCUTANEOUS EVERY 5 MIN PRN
Status: DISCONTINUED | OUTPATIENT
Start: 2022-09-28 | End: 2022-09-28

## 2022-09-28 RX ORDER — SODIUM CHLORIDE, SODIUM LACTATE, POTASSIUM CHLORIDE, CALCIUM CHLORIDE 600; 310; 30; 20 MG/100ML; MG/100ML; MG/100ML; MG/100ML
INJECTION, SOLUTION INTRAVENOUS CONTINUOUS
Status: DISCONTINUED | OUTPATIENT
Start: 2022-09-28 | End: 2022-09-28

## 2022-09-28 RX ORDER — TRAMADOL HYDROCHLORIDE 50 MG/1
50 TABLET ORAL ONCE
Status: DISCONTINUED | OUTPATIENT
Start: 2022-09-28 | End: 2022-09-28

## 2022-09-28 RX ORDER — MORPHINE SULFATE 10 MG/ML
6 INJECTION, SOLUTION INTRAMUSCULAR; INTRAVENOUS EVERY 10 MIN PRN
Status: DISCONTINUED | OUTPATIENT
Start: 2022-09-28 | End: 2022-09-28

## 2022-09-28 RX ORDER — ROCURONIUM BROMIDE 10 MG/ML
INJECTION, SOLUTION INTRAVENOUS AS NEEDED
Status: DISCONTINUED | OUTPATIENT
Start: 2022-09-28 | End: 2022-09-28 | Stop reason: SURG

## 2022-09-28 RX ORDER — LIDOCAINE HYDROCHLORIDE 10 MG/ML
INJECTION, SOLUTION EPIDURAL; INFILTRATION; INTRACAUDAL; PERINEURAL AS NEEDED
Status: DISCONTINUED | OUTPATIENT
Start: 2022-09-28 | End: 2022-09-28 | Stop reason: SURG

## 2022-09-28 RX ORDER — FAMOTIDINE 20 MG/1
20 TABLET, FILM COATED ORAL ONCE
Status: COMPLETED | OUTPATIENT
Start: 2022-09-28 | End: 2022-09-28

## 2022-09-28 RX ORDER — METOCLOPRAMIDE 10 MG/1
10 TABLET ORAL ONCE
Status: COMPLETED | OUTPATIENT
Start: 2022-09-28 | End: 2022-09-28

## 2022-09-28 RX ADMIN — LIDOCAINE HYDROCHLORIDE 50 MG: 10 INJECTION, SOLUTION EPIDURAL; INFILTRATION; INTRACAUDAL; PERINEURAL at 07:59:00

## 2022-09-28 RX ADMIN — ROCURONIUM BROMIDE 5 MG: 10 INJECTION, SOLUTION INTRAVENOUS at 07:57:00

## 2022-09-28 RX ADMIN — SODIUM CHLORIDE, SODIUM LACTATE, POTASSIUM CHLORIDE, CALCIUM CHLORIDE: 600; 310; 30; 20 INJECTION, SOLUTION INTRAVENOUS at 07:52:00

## 2022-09-28 RX ADMIN — MIDAZOLAM HYDROCHLORIDE 2 MG: 1 INJECTION INTRAMUSCULAR; INTRAVENOUS at 07:52:00

## 2022-09-28 RX ADMIN — PHENYLEPHRINE HCL 100 MCG: 10 MG/ML VIAL (ML) INJECTION at 08:14:00

## 2022-09-28 RX ADMIN — PHENYLEPHRINE HCL 100 MCG: 10 MG/ML VIAL (ML) INJECTION at 08:23:00

## 2022-09-28 RX ADMIN — DEXAMETHASONE SODIUM PHOSPHATE 4 MG: 4 MG/ML VIAL (ML) INJECTION at 08:09:00

## 2022-09-28 RX ADMIN — CEFAZOLIN SODIUM/WATER 2 G: 2 G/20 ML SYRINGE (ML) INTRAVENOUS at 08:09:00

## 2022-09-28 RX ADMIN — SODIUM CHLORIDE, SODIUM LACTATE, POTASSIUM CHLORIDE, CALCIUM CHLORIDE: 600; 310; 30; 20 INJECTION, SOLUTION INTRAVENOUS at 09:42:00

## 2022-09-28 RX ADMIN — ONDANSETRON 4 MG: 2 INJECTION INTRAMUSCULAR; INTRAVENOUS at 08:09:00

## 2022-09-28 RX ADMIN — GLYCOPYRROLATE 0.1 MG: 0.2 INJECTION, SOLUTION INTRAMUSCULAR; INTRAVENOUS at 07:57:00

## 2022-09-28 RX ADMIN — PHENYLEPHRINE HCL 100 MCG: 10 MG/ML VIAL (ML) INJECTION at 08:18:00

## 2022-09-28 NOTE — BRIEF OP NOTE
Pre-Operative Diagnosis: Right breast cancer     Post-Operative Diagnosis: Right breast cancer      Procedure Performed:   Right central breast resection, right axillary sentinel lymph node biopsy, right breast lymphoscintigraphy    Surgeon(s) and Role:     * Greg Nick MD - Primary    Assistant(s):  Surgical Assistant.: Tiffanie Reece          Specimen:central breast, three sentinel lymph nodes right axilla     Estimated Blood Loss: No data recorded    Dictation Number:  [id number]    Jeff Emmanuel MD  9/28/2022  10:28 AM

## 2022-09-28 NOTE — DISCHARGE SUMMARY
[facility] [place] [facility]  Discharge Summary    [address] [facility] Patient Status:  Hospital Outpatient Surgery    1955 MRN [id number]   Location [address] UNIT Attending Taylor Tapia MD   Hosp Day # 0 PCP Alejandro Mccollum MD     Date of Admission: 2022    Date of Discharge: 2022      Admitting Diagnosis: Right breast cancer    Discharge Diagnosis: Patient Active Problem List:     Anxiety state     Type 2 diabetes mellitus without complication, with long-term current use of insulin ([address]     Esophageal reflux     Hx of transient ischemic attack (TIA)     Hypercholesterolemia     Hypothyroidism     Morbid obesity ([facility]     Obstructive sleep apnea     History of adenomatous polyp of colon     Routine health maintenance     Vitamin B12 deficiency     Venous insufficiency     Liver cirrhosis secondary to BLANCAS (HCC)     Thrombocytopenia (HCC)     Fatty liver     Hypomagnesemia     Primary osteoarthritis of both knees     Obesity (BMI 30-39. 9)     Mucinous carcinoma of breast, right [facility])      Hospital Course: tolerated surgery, home same day*    Procedures:  Central breast right excision. Right sentinel lymph node bx    Complications: none    Disposition: Home or Self Care    Discharge Condition: Good    Discharge Medications: Current Discharge Medication List    START taking these medications    !! traMADol 50 MG Oral Tab  Take 1-2 tablets ( mg total) by mouth every 4 (four) hours as needed for Pain. Qty: 20 tablet Refills: 0    !! - Potential duplicate medications found. Please discuss with provider. CONTINUE these medications which have NOT CHANGED    Insulin Regular Human (HUMULIN R IJ)  by Insulin pump route.  omnipod    ERGOCALCIFEROL 1.25 MG (45392 UT) Oral Cap  TAKE 1 CAPSULE BY MOUTH EVERY WEEK  Qty: 12 capsule Refills: 3    FUROSEMIDE 20 MG Oral Tab  TAKE 1 TABLET(20 MG) BY MOUTH TWICE DAILY  Qty: 180 tablet Refills: 3    IMIPRAMINE 50 MG Oral Tab  TAKE 3 TABLETS(150 MG) BY MOUTH EVERY NIGHT  Qty: 270 tablet Refills: 3    FAMOTIDINE 40 MG Oral Tab  TAKE 1 TABLET(40 MG) BY MOUTH TWICE DAILY AS NEEDED FOR HEARTBURN  Qty: 180 tablet Refills: 3    LOSARTAN 25 MG Oral Tab  TAKE 1/2 TABLET(12.5 MG) BY MOUTH EVERY DAY  Qty: 45 tablet Refills: 3    KLOR-CON 10 10 MEQ Oral Tab CR  TAKE 4 TABLETS(40 MEQ) BY MOUTH TWICE DAILY  Qty: 720 tablet Refills: 3    pravastatin 40 MG Oral Tab  Take 1 tablet (40 mg total) by mouth nightly. Qty: 90 tablet Refills: 3    Cyanocobalamin (VITAMIN B 12 OR)  Take 500 mcg by mouth daily. Levothyroxine Sodium 88 MCG Oral Tab  Take 1 tablet (88 mcg total) by mouth every morning before breakfast.  Qty: 30 tablet Refills: 11    Magnesium Oxide 400 (240 Mg) MG Oral Tab  Take 1 tablet (400 mg total) by mouth 2 (two) times daily. Qty: 30 tablet Refills: 0  Associated Diagnoses:Hypokalemia; Hypomagnesemia    CLOPIDOGREL 75 MG Oral Tab  TAKE 1 TABLET(75 MG) BY MOUTH DAILY  Qty: 90 tablet Refills: 3    topiramate 25 MG Oral Tab  Take 1 tablet (25 mg total) by mouth every evening. Qty: 30 tablet Refills: 2    Insulin Disposable Pump (OMNIPOD DASH SYSTEM) Does not apply Kit      albuterol 108 (90 Base) MCG/ACT Inhalation Aero Soln  Inhale 2 puffs into the lungs every 6 (six) hours as needed for Wheezing. Qty: 1 each Refills: 11    clotrimazole-betamethasone 1-0.05 % External Cream  Apply 1 Application topically 2 (two) times daily. Under breast  Qty: 45 g Refills: 0    Continuous Blood Gluc Sensor (FREESTYLE MURALI 2 SENSOR SYSTM) Does not apply Misc  APPLY A NEW SENSOR EVERY 14 DAYS SUBCUTANEOUSLY    BD PEN NEEDLE SKY 2ND GEN 32G X 4 MM Does not apply Misc  USE AS DIRECTED  Qty: 200 each Refills: 3    Meclizine HCl 25 MG Oral Tab  Take 1 tablet (25 mg total) by mouth 3 (three) times daily as needed.   Qty: 30 tablet Refills: 5    ondansetron (ZOFRAN ODT) 8 MG Oral Tablet Dispersible  Take 1 tablet (8 mg total) by mouth every 6 (six) hours as needed for Nausea. Place on top of the tongue where it will dissolve, then swallow  Qty: 30 tablet Refills: 2    BUTALBITAL-APAP-CAFFEINE -40 MG Oral Tab  TAKE 1 TABLET BY MOUTH EVERY 4 HOURS AS NEEDED. DO NOT EXCEED 6 TABLETS IN 24 HOURS  Qty: 30 tablet Refills: 3    Betamethasone Dipropionate 0.05 % External Lotion  Use bid as directed  Qty: 60 mL Refills: 5    triamcinolone acetonide 0.1 % External Cream  Apply topically 2 (two) times daily. Apply bid  Qty: 454 g Refills: 5    mupirocin 2 % External Ointment  Use tid to afected areas as instsructed  Qty: 22 g Refills: 3    Blood Glucose Monitoring Suppl (Carlin Abdul) w/Device Does not apply Kit  by Does not apply route. Glucose Blood In Vitro Strip  Use 1 strip by percutaneous route 4-6 times every day  Qty: 200 each Refills: 0    metolazone 2.5 MG Oral Tab  Take 1 tablet (2.5 mg total) by mouth as needed. Qty: 30 tablet Refills: 0    !! TraMADol HCl 50 MG Oral Tab  Take 50 mg by mouth every 6 (six) hours as needed. Qty: 30 tablet Refills: 0    !! - Potential duplicate medications found. Please discuss with provider. STOP taking these medications    Efinaconazole 10 % External Solution          Follow up Visits:  Follow-up with five days        Ag Sutherland MD  9/28/2022  10:39 AM

## 2022-09-28 NOTE — ANESTHESIA PROCEDURE NOTES
Airway  Date/Time: 9/28/2022 8:02 AM  Urgency: Elective    Airway not difficult    General Information and Staff    Patient location during procedure: OR  Anesthesiologist: Harrison Molina MD  Resident/CRNA: Gerardo Iqbal CRNA  Performed: CRNA     Indications and Patient Condition  Indications for airway management: anesthesia  Sedation level: deep  Preoxygenated: yes  Patient position: sniffing  Mask difficulty assessment: 1 - vent by mask    Final Airway Details  Final airway type: endotracheal airway      Successful airway: ETT  Cuffed: yes   Successful intubation technique: Video laryngoscopy  Facilitating devices/methods: intubating stylet  Endotracheal tube insertion site: oral  Blade: GlideScope  Blade size: #3  ETT size (mm): 7.0    Cormack-Lehane Classification: grade I - full view of glottis  Placement verified by: chest auscultation and capnometry   Measured from: lips  ETT to lips (cm): 21  Number of attempts at approach: 1    Additional Comments  Patient positioned head and neck to comfort prior to induction. Position maintained throughout induction and intubation, and for the procedure.

## 2022-09-29 NOTE — OPERATIVE REPORT
[facility]    PATIENT'S NAME: Juan C Livingston   ATTENDING PHYSICIAN: Abraham Horvath MD   OPERATING PHYSICIAN: Abraham Horvath MD   PATIENT ACCOUNT#:   [id number]    LOCATION:  [address]  MEDICAL RECORD #:   [id number]       YOB: 1955  ADMISSION DATE:       09/28/2022      OPERATION DATE:  09/28/2022    OPERATIVE REPORT      PREOPERATIVE DIAGNOSIS:  Right breast cancer, centrally placed on the nipple. POSTOPERATIVE DIAGNOSIS:  Right breast cancer, centrally placed on the nipple. PROCEDURE:  Central breast excision with reconstruction, right sentinel lymph node biopsy with injection of dye in the periareolar area. ASSISTANT:  Mario Pritchett CSA. INDICATIONS:  The patient is a 60-year-old female who had what appeared to be a benign area in the right breast that changed during the exams. The patient had the area excised. Excisional biopsy which showed medullary carcinoma. The patient is here now for a more definitive treatment. We discussed extensively the options and risks. Would like to have a central breast excision. I explained that there would be expected deformity when we remove the nipple area, the mass itself was just underneath the nipple. I explained the risk of the lymph node biopsy including lymphedema, bleeding, infection, lymphatic fistula. The patient understands this and wants to proceed. OPERATIVE TECHNIQUE:  The patient was prepped and draped in usual sterile fashion. She already had sentinel node injection. We had seen and reviewed those films. We went for the axilla first.  It was already marked where they had found and we found the similar area. Incision was made, taken down, identified a blue lymph node with high counts. As we were slowly removing this, we used a self-sealing Harmonic very long. As we were removing the second lymph node which was not blue but was adjacent, also had higher counts.   They were removed together with separate counts for lymph node 1 and 2. We looked around and there was one more area that was with higher counts, was firm. This was slowly dissected out. The lymph node could be seen within this. There was normal color. This was a higher count. We removed this as well. Hemostasis was carefully assured. The counts were almost completely zero afterward in the axilla. Hemostasis was assured. The deeper fatty tissue closed with interrupted 2-0 chromic. Skin closed with 4-0 Vicryl and Dermabond. We then went to the breast.  We used an elliptical incision including the entire areolas and taking out, cutting this under the skin on each side, and removing the entire central breast with the areolar area. Hemostasis was assured. We then used 2-0 chromic to pull the tissues together to reconstruct more of a conical breast.  We used 3-0 chromic multiple ones underneath the skin and then 4-0 Vicryl for the skin. Local anesthetic was given. Steri-Strips applied. Sterile dressings applied. Patient tolerated the procedure well and went to recovery room in good condition. Dictated By Earline Pittman MD  d: 09/28/2022 10:33:05  t: 09/28/2022 19:58:30  Job [id number]  [facility]/

## 2022-10-06 ENCOUNTER — TELEPHONE (OUTPATIENT)
Dept: HEMATOLOGY/ONCOLOGY | Facility: HOSPITAL | Age: 67
End: 2022-10-06

## 2022-10-06 NOTE — TELEPHONE ENCOUNTER
Phoned patient and introduced myself as Breast RN Navigator. Patient has been referred by Dr. Yany Mandujano to Medical and Radiation Oncology for adjuvant treatment of a new right breast mucinous carcinoma. Appointment scheduled with Dr. Mya Ardon for Tuesday 10/11/22 at [address] [place] [address].  Patient aware she will be contacted by Radiation Oncology regarding appointment scheduling as well. Patient acknowledged the above and denied questions.

## 2022-10-07 ENCOUNTER — LAB ENCOUNTER (OUTPATIENT)
Dept: LAB | Facility: REFERENCE LAB | Age: 67
End: 2022-10-07
Attending: INTERNAL MEDICINE
Payer: MEDICARE

## 2022-10-07 DIAGNOSIS — E55.9 VITAMIN D DEFICIENCY: ICD-10-CM

## 2022-10-07 DIAGNOSIS — E83.42 HYPOMAGNESEMIA: ICD-10-CM

## 2022-10-07 DIAGNOSIS — E78.00 HYPERCHOLESTEROLEMIA: ICD-10-CM

## 2022-10-07 DIAGNOSIS — Z79.4 TYPE 2 DIABETES MELLITUS WITH DIABETIC NEUROPATHY, WITH LONG-TERM CURRENT USE OF INSULIN (HCC): ICD-10-CM

## 2022-10-07 DIAGNOSIS — E11.40 TYPE 2 DIABETES MELLITUS WITH DIABETIC NEUROPATHY, WITH LONG-TERM CURRENT USE OF INSULIN (HCC): ICD-10-CM

## 2022-10-07 DIAGNOSIS — E53.8 VITAMIN B12 DEFICIENCY: ICD-10-CM

## 2022-10-07 LAB
ALBUMIN SERPL-MCNC: 3.1 G/DL (ref 3.4–5)
ALBUMIN/GLOB SERPL: 0.8 {RATIO} (ref 1–2)
ALP LIVER SERPL-CCNC: 105 U/L
ALT SERPL-CCNC: 39 U/L
ANION GAP SERPL CALC-SCNC: 5 MMOL/L (ref 0–18)
AST SERPL-CCNC: 31 U/L (ref 15–37)
BASOPHILS # BLD AUTO: 0.05 X10(3) UL (ref 0–0.2)
BASOPHILS NFR BLD AUTO: 1.1 %
BILIRUB SERPL-MCNC: 0.6 MG/DL (ref 0.1–2)
BUN BLD-MCNC: 9 MG/DL (ref 7–18)
BUN/CREAT SERPL: 10.3 (ref 10–20)
CALCIUM BLD-MCNC: 9.1 MG/DL (ref 8.5–10.1)
CHLORIDE SERPL-SCNC: 104 MMOL/L (ref 98–112)
CHOLEST SERPL-MCNC: 159 MG/DL (ref ?–200)
CO2 SERPL-SCNC: 30 MMOL/L (ref 21–32)
CREAT BLD-MCNC: 0.87 MG/DL
DEPRECATED RDW RBC AUTO: 42.9 FL (ref 35.1–46.3)
EOSINOPHIL # BLD AUTO: 0.19 X10(3) UL (ref 0–0.7)
EOSINOPHIL NFR BLD AUTO: 4 %
ERYTHROCYTE [DISTWIDTH] IN BLOOD BY AUTOMATED COUNT: 12.6 % (ref 11–15)
EST. AVERAGE GLUCOSE BLD GHB EST-MCNC: 252 MG/DL (ref 68–126)
FASTING PATIENT LIPID ANSWER: YES
FASTING STATUS PATIENT QL REPORTED: YES
GFR SERPLBLD BASED ON 1.73 SQ M-ARVRAT: 74 ML/MIN/1.73M2 (ref 60–?)
GLOBULIN PLAS-MCNC: 3.8 G/DL (ref 2.8–4.4)
GLUCOSE BLD-MCNC: 136 MG/DL (ref 70–99)
HBA1C MFR BLD: 10.4 % (ref ?–5.7)
HCT VFR BLD AUTO: 45.1 %
HDLC SERPL-MCNC: 59 MG/DL (ref 40–59)
HGB BLD-MCNC: 14.5 G/DL
IMM GRANULOCYTES # BLD AUTO: 0.01 X10(3) UL (ref 0–1)
IMM GRANULOCYTES NFR BLD: 0.2 %
LDLC SERPL CALC-MCNC: 82 MG/DL (ref ?–100)
LYMPHOCYTES # BLD AUTO: 2.04 X10(3) UL (ref 1–4)
LYMPHOCYTES NFR BLD AUTO: 42.9 %
MAGNESIUM SERPL-MCNC: 2.2 MG/DL (ref 1.6–2.6)
MCH RBC QN AUTO: 29.7 PG (ref 26–34)
MCHC RBC AUTO-ENTMCNC: 32.2 G/DL (ref 31–37)
MCV RBC AUTO: 92.2 FL
MONOCYTES # BLD AUTO: 0.62 X10(3) UL (ref 0.1–1)
MONOCYTES NFR BLD AUTO: 13.1 %
NEUTROPHILS # BLD AUTO: 1.84 X10 (3) UL (ref 1.5–7.7)
NEUTROPHILS # BLD AUTO: 1.84 X10(3) UL (ref 1.5–7.7)
NEUTROPHILS NFR BLD AUTO: 38.7 %
NONHDLC SERPL-MCNC: 100 MG/DL (ref ?–130)
OSMOLALITY SERPL CALC.SUM OF ELEC: 289 MOSM/KG (ref 275–295)
PLATELET # BLD AUTO: 164 10(3)UL (ref 150–450)
POTASSIUM SERPL-SCNC: 4.3 MMOL/L (ref 3.5–5.1)
PROT SERPL-MCNC: 6.9 G/DL (ref 6.4–8.2)
RBC # BLD AUTO: 4.89 X10(6)UL
SODIUM SERPL-SCNC: 139 MMOL/L (ref 136–145)
TRIGL SERPL-MCNC: 97 MG/DL (ref 30–149)
VIT B12 SERPL-MCNC: 1487 PG/ML (ref 193–986)
VIT D+METAB SERPL-MCNC: 80.8 NG/ML (ref 30–100)
VLDLC SERPL CALC-MCNC: 15 MG/DL (ref 0–30)
WBC # BLD AUTO: 4.8 X10(3) UL (ref 4–11)

## 2022-10-07 PROCEDURE — 83036 HEMOGLOBIN GLYCOSYLATED A1C: CPT

## 2022-10-07 PROCEDURE — 85025 COMPLETE CBC W/AUTO DIFF WBC: CPT

## 2022-10-07 PROCEDURE — 80053 COMPREHEN METABOLIC PANEL: CPT

## 2022-10-07 PROCEDURE — 83735 ASSAY OF MAGNESIUM: CPT

## 2022-10-07 PROCEDURE — 82607 VITAMIN B-12: CPT

## 2022-10-07 PROCEDURE — 82306 VITAMIN D 25 HYDROXY: CPT

## 2022-10-07 PROCEDURE — 80061 LIPID PANEL: CPT

## 2022-10-07 PROCEDURE — 36415 COLL VENOUS BLD VENIPUNCTURE: CPT

## 2022-10-14 ENCOUNTER — OFFICE VISIT (OUTPATIENT)
Dept: RADIATION ONCOLOGY | Facility: HOSPITAL | Age: 67
End: 2022-10-14
Attending: RADIOLOGY
Payer: MEDICARE

## 2022-10-14 VITALS
SYSTOLIC BLOOD PRESSURE: 140 MMHG | WEIGHT: 224 LBS | TEMPERATURE: 97 F | RESPIRATION RATE: 18 BRPM | DIASTOLIC BLOOD PRESSURE: 71 MMHG | BODY MASS INDEX: 41 KG/M2 | HEART RATE: 101 BPM | OXYGEN SATURATION: 97 %

## 2022-10-14 PROCEDURE — 99212 OFFICE O/P EST SF 10 MIN: CPT

## 2022-10-18 ENCOUNTER — OFFICE VISIT (OUTPATIENT)
Dept: HEMATOLOGY/ONCOLOGY | Facility: HOSPITAL | Age: 67
End: 2022-10-18
Attending: INTERNAL MEDICINE
Payer: MEDICARE

## 2022-10-18 VITALS
RESPIRATION RATE: 20 BRPM | TEMPERATURE: 98 F | DIASTOLIC BLOOD PRESSURE: 61 MMHG | SYSTOLIC BLOOD PRESSURE: 118 MMHG | WEIGHT: 226 LBS | HEIGHT: 63 IN | HEART RATE: 98 BPM | BODY MASS INDEX: 40.04 KG/M2 | OXYGEN SATURATION: 97 %

## 2022-10-18 DIAGNOSIS — C50.911 MUCINOUS CARCINOMA OF BREAST, RIGHT (HCC): Primary | ICD-10-CM

## 2022-10-18 DIAGNOSIS — Z78.0 POST-MENOPAUSAL: ICD-10-CM

## 2022-10-18 PROCEDURE — 99205 OFFICE O/P NEW HI 60 MIN: CPT | Performed by: INTERNAL MEDICINE

## 2022-10-19 ENCOUNTER — IMMUNIZATION (OUTPATIENT)
Dept: LAB | Age: 67
End: 2022-10-19
Attending: EMERGENCY MEDICINE
Payer: MEDICARE

## 2022-10-19 DIAGNOSIS — Z23 NEED FOR VACCINATION: Primary | ICD-10-CM

## 2022-10-19 PROCEDURE — 0134A SARSCOV2 VAC BVL 50MCG/0.5ML: CPT

## 2022-10-21 ENCOUNTER — HOSPITAL ENCOUNTER (OUTPATIENT)
Dept: BONE DENSITY | Facility: HOSPITAL | Age: 67
Discharge: HOME | End: 2022-10-21
Attending: INTERNAL MEDICINE
Payer: MEDICARE

## 2022-10-21 DIAGNOSIS — Z78.0 POST-MENOPAUSAL: ICD-10-CM

## 2022-10-21 PROCEDURE — 77080 DXA BONE DENSITY AXIAL: CPT | Performed by: INTERNAL MEDICINE

## 2022-10-28 ENCOUNTER — APPOINTMENT (OUTPATIENT)
Dept: RADIATION ONCOLOGY | Facility: HOSPITAL | Age: 67
End: 2022-10-28
Attending: RADIOLOGY
Payer: MEDICARE

## 2022-11-01 ENCOUNTER — APPOINTMENT (OUTPATIENT)
Dept: RADIATION ONCOLOGY | Facility: HOSPITAL | Age: 67
End: 2022-11-01
Attending: RADIOLOGY
Payer: MEDICARE

## 2022-11-01 PROCEDURE — 77295 3-D RADIOTHERAPY PLAN: CPT | Performed by: RADIOLOGY

## 2022-11-01 PROCEDURE — 77300 RADIATION THERAPY DOSE PLAN: CPT | Performed by: RADIOLOGY

## 2022-11-01 PROCEDURE — 77334 RADIATION TREATMENT AID(S): CPT | Performed by: RADIOLOGY

## 2022-11-03 ENCOUNTER — PATIENT MESSAGE (OUTPATIENT)
Dept: INTERNAL MEDICINE CLINIC | Facility: CLINIC | Age: 67
End: 2022-11-03

## 2022-11-04 ENCOUNTER — TELEPHONE (OUTPATIENT)
Dept: INTERNAL MEDICINE CLINIC | Facility: CLINIC | Age: 67
End: 2022-11-04

## 2022-11-04 ENCOUNTER — HOSPITAL ENCOUNTER (OUTPATIENT)
Age: 67
Discharge: HOME | End: 2022-11-04
Payer: MEDICARE

## 2022-11-04 VITALS
DIASTOLIC BLOOD PRESSURE: 78 MMHG | SYSTOLIC BLOOD PRESSURE: 137 MMHG | TEMPERATURE: 98 F | RESPIRATION RATE: 18 BRPM | HEART RATE: 107 BPM | OXYGEN SATURATION: 98 %

## 2022-11-04 DIAGNOSIS — L02.212 ABSCESS OF BACK: Primary | ICD-10-CM

## 2022-11-04 PROCEDURE — 10060 I&D ABSCESS SIMPLE/SINGLE: CPT | Performed by: NURSE PRACTITIONER

## 2022-11-04 PROCEDURE — 99213 OFFICE O/P EST LOW 20 MIN: CPT | Performed by: NURSE PRACTITIONER

## 2022-11-04 RX ORDER — SULFAMETHOXAZOLE AND TRIMETHOPRIM 800; 160 MG/1; MG/1
1 TABLET ORAL 2 TIMES DAILY
Qty: 14 TABLET | Refills: 0 | Status: SHIPPED | OUTPATIENT
Start: 2022-11-04 | End: 2022-11-11

## 2022-11-04 RX ORDER — LIDOCAINE HYDROCHLORIDE 10 MG/ML
10 INJECTION, SOLUTION EPIDURAL; INFILTRATION; INTRACAUDAL; PERINEURAL ONCE
Status: COMPLETED | OUTPATIENT
Start: 2022-11-04 | End: 2022-11-04

## 2022-11-04 NOTE — TELEPHONE ENCOUNTER
From: Jonah Joesph  To: Nisha Carnes MD  Sent: 11/3/2022 7:59 AM CDT  Subject: Red sore on my back    I have a sore with red on my back. It's one of those hard smelly lumps ,tried just once to squeeze it to get it out . It's been like this for 5 days. Hot to touch. I've put nerosporn on it. What should I do. I do start radiation soon.   Attach picture  Evin Evan  [phone]

## 2022-11-04 NOTE — DISCHARGE INSTRUCTIONS
Take the antibiotic twice a day until gone. Wound culture is pending. Change the dressing as needed. Clean the area with soap and water. Warm compresses. Packing removal in 2 days. Follow-up with Dr. Angela Anguiano.   Go to the emergency room if you are feeling more ill -significant drainage, pain, fever

## 2022-11-04 NOTE — TELEPHONE ENCOUNTER
----- Message from Andreia Teague sent at 11/3/2022  7:59 AM CDT -----  Regarding: Red sore on my back  I have a sore with red on my back. It's one of those hard smelly  lumps ,tried just once to  squeeze it to get it out . It's been like this for 5 days. Hot to touch. I've put nerosporn on it. What should I do. I do start radiation soon.   Attach picture  Gerardo Loaiza  [phone]

## 2022-11-04 NOTE — TELEPHONE ENCOUNTER
Called patient who has red  Lump on back - pic in media .  Patient starts radiation soon, RN instructed patient to be seen at [facility] for evaluation - she will go to [facility] 11/7

## 2022-11-06 ENCOUNTER — HOSPITAL ENCOUNTER (OUTPATIENT)
Age: 67
Discharge: HOME | End: 2022-11-06
Payer: MEDICARE

## 2022-11-06 VITALS
RESPIRATION RATE: 20 BRPM | TEMPERATURE: 98 F | SYSTOLIC BLOOD PRESSURE: 158 MMHG | OXYGEN SATURATION: 98 % | HEART RATE: 114 BPM | DIASTOLIC BLOOD PRESSURE: 84 MMHG

## 2022-11-06 DIAGNOSIS — Z48.00 ABSCESS PACKING REMOVAL: Primary | ICD-10-CM

## 2022-11-06 DIAGNOSIS — Z51.89 WOUND CHECK, ABSCESS: ICD-10-CM

## 2022-11-06 NOTE — DISCHARGE INSTRUCTIONS
Continue warm compresses. Continue the Bactrim as prescribed. Your wound culture is still pending. Follow-up with your doctor. Return for any concerns.

## 2022-11-06 NOTE — ED INITIAL ASSESSMENT (HPI)
Pt presents to the IC with daughter for packing removal. Was in the IC on 11/4 for I&D of back cyst.

## 2022-11-08 ENCOUNTER — OFFICE VISIT (OUTPATIENT)
Dept: INTERNAL MEDICINE CLINIC | Facility: CLINIC | Age: 67
End: 2022-11-08
Payer: MEDICARE

## 2022-11-08 VITALS
BODY MASS INDEX: 38.56 KG/M2 | OXYGEN SATURATION: 98 % | DIASTOLIC BLOOD PRESSURE: 62 MMHG | HEART RATE: 106 BPM | HEIGHT: 63 IN | TEMPERATURE: 99 F | WEIGHT: 217.63 LBS | SYSTOLIC BLOOD PRESSURE: 106 MMHG

## 2022-11-08 DIAGNOSIS — C50.911 MUCINOUS CARCINOMA OF BREAST, RIGHT (HCC): ICD-10-CM

## 2022-11-08 DIAGNOSIS — Z86.73 HX OF TRANSIENT ISCHEMIC ATTACK (TIA): ICD-10-CM

## 2022-11-08 DIAGNOSIS — Z79.4 TYPE 2 DIABETES MELLITUS WITHOUT COMPLICATION, WITH LONG-TERM CURRENT USE OF INSULIN (HCC): ICD-10-CM

## 2022-11-08 DIAGNOSIS — L02.212 CUTANEOUS ABSCESS OF BACK EXCLUDING BUTTOCKS: Primary | ICD-10-CM

## 2022-11-08 DIAGNOSIS — K21.9 GASTROESOPHAGEAL REFLUX DISEASE, UNSPECIFIED WHETHER ESOPHAGITIS PRESENT: ICD-10-CM

## 2022-11-08 DIAGNOSIS — E78.00 HYPERCHOLESTEROLEMIA: ICD-10-CM

## 2022-11-08 DIAGNOSIS — E11.9 TYPE 2 DIABETES MELLITUS WITHOUT COMPLICATION, WITH LONG-TERM CURRENT USE OF INSULIN (HCC): ICD-10-CM

## 2022-11-08 PROCEDURE — 99214 OFFICE O/P EST MOD 30 MIN: CPT | Performed by: INTERNAL MEDICINE

## 2022-11-08 NOTE — PATIENT INSTRUCTIONS
You were seen in clinic today for urgent care follow-up. We reviewed the urgent care visit with management of a skin abscess requiring incision and drainage. There was pus concerning for infection. It seems to have been healing and you are responding well to antibiotics. There may still be some drainage given the depth of the incision area. However, healthy appearing blood is draining and should heal over time. We recommended:  -Continue with Bactrim as prescribed for the full course of antibiotics. - Monitor for any sort of fevers or ongoing drainage in that area. - Continue with wound dressing changes 1-3 times a day as needed  - Notify us if still no significant improvement 1 week after antibiotics. Notify us if you are still experiencing fevers, or worsening spread of the area and redness.     Continue following with Dr. Shahla Ruiz and Dr. Matthew Mei    Return to clinic in 1-3 months for follow-up with Dr. Ericka Estrada

## 2022-11-15 ENCOUNTER — APPOINTMENT (OUTPATIENT)
Dept: RADIATION ONCOLOGY | Facility: HOSPITAL | Age: 67
End: 2022-11-15
Attending: RADIOLOGY
Payer: MEDICARE

## 2022-11-15 PROCEDURE — 77417 THER RADIOLOGY PORT IMAGE(S): CPT | Performed by: RADIOLOGY

## 2022-11-15 PROCEDURE — 77280 THER RAD SIMULAJ FIELD SMPL: CPT | Performed by: RADIOLOGY

## 2022-11-15 PROCEDURE — 77412 RADIATION TX DELIVERY LVL 3: CPT | Performed by: RADIOLOGY

## 2022-11-16 PROCEDURE — 77387 GUIDANCE FOR RADJ TX DLVR: CPT | Performed by: RADIOLOGY

## 2022-11-16 PROCEDURE — 77412 RADIATION TX DELIVERY LVL 3: CPT | Performed by: RADIOLOGY

## 2022-11-17 PROCEDURE — 77412 RADIATION TX DELIVERY LVL 3: CPT | Performed by: RADIOLOGY

## 2022-11-17 PROCEDURE — 77387 GUIDANCE FOR RADJ TX DLVR: CPT | Performed by: RADIOLOGY

## 2022-11-18 PROCEDURE — 77387 GUIDANCE FOR RADJ TX DLVR: CPT | Performed by: RADIOLOGY

## 2022-11-18 PROCEDURE — 77412 RADIATION TX DELIVERY LVL 3: CPT | Performed by: RADIOLOGY

## 2022-11-20 ENCOUNTER — OFFICE VISIT (OUTPATIENT)
Dept: RADIATION ONCOLOGY | Facility: HOSPITAL | Age: 67
End: 2022-11-20
Attending: RADIOLOGY
Payer: MEDICARE

## 2022-11-20 VITALS
TEMPERATURE: 97 F | SYSTOLIC BLOOD PRESSURE: 147 MMHG | OXYGEN SATURATION: 97 % | HEART RATE: 101 BPM | DIASTOLIC BLOOD PRESSURE: 76 MMHG | RESPIRATION RATE: 18 BRPM

## 2022-11-20 DIAGNOSIS — C50.911 MUCINOUS CARCINOMA OF BREAST, RIGHT (HCC): Primary | ICD-10-CM

## 2022-11-20 PROCEDURE — 77412 RADIATION TX DELIVERY LVL 3: CPT | Performed by: RADIOLOGY

## 2022-11-20 PROCEDURE — 77387 GUIDANCE FOR RADJ TX DLVR: CPT | Performed by: RADIOLOGY

## 2022-11-20 NOTE — PROGRESS NOTES
[facility] Radiation Treatment Management Note 1-5    Patient:  Marisela Arizmendi  Age:  74year old  Visit Diagnosis:    1. Mucinous carcinoma of breast, right ([address]      Primary Rad/Onc:  Dr. Benedicto Arita    Site Delivered Dose (cGy) Prescribed Dose (cGy) Fraction #   R Breast 1330 4256 5/16   R Breast Bst 0 1000 0/4     First treatment date:  11/15/2022  Concurrent chemotherapy:  None    Oncology Vitals 11/6/2022 11/8/2022 11/20/2022   Height - 5' 3\" -   Height - 160 cm -   Weight - 217 lb 9.6 oz -   Weight - 98.703 kg -   BSA (m2) - 2 m2 -   /84 106/62 147/76   Pulse 114 106 101   Resp 20 - 18   Temp 97.9 98.6 97.4   SpO2 98 98 97   Pain Score - - 0   Some recent data might be hidden        Toxicities:  None, new start    Nursing Note:  Pt seen for OTV. Discussed moisturizer use, pt states her understanding. Saul Sanchez, RN    Physician Note:  Subjective:  Started XRT last week. Doing well, no issues or c/o. No skin irritation as yet. Mild fatigue. Objective:  Unchanged      Treatment setup imaging have been reviewed:   Yes    Assessment/Plan:    Continue radiotherapy per plan    Next visit:  1 week    Dr. Deepak Green

## 2022-11-21 ENCOUNTER — APPOINTMENT (OUTPATIENT)
Dept: RADIATION ONCOLOGY | Facility: HOSPITAL | Age: 67
End: 2022-11-21
Attending: RADIOLOGY
Payer: MEDICARE

## 2022-11-21 PROCEDURE — 77387 GUIDANCE FOR RADJ TX DLVR: CPT | Performed by: RADIOLOGY

## 2022-11-21 PROCEDURE — 77412 RADIATION TX DELIVERY LVL 3: CPT | Performed by: RADIOLOGY

## 2022-11-22 PROCEDURE — 77387 GUIDANCE FOR RADJ TX DLVR: CPT | Performed by: RADIOLOGY

## 2022-11-22 PROCEDURE — 77412 RADIATION TX DELIVERY LVL 3: CPT | Performed by: RADIOLOGY

## 2022-11-22 NOTE — PROGRESS NOTES
[facility] Radiation Treatment Management Note 6-10    Patient:  David Diggs  Age:  74year old  Visit Diagnosis:  No diagnosis found. Primary Rad/Onc:  Dr. Rosanne Keller    Site Delivered Dose (cGy) Prescribed Dose (cGy) Fraction #   R BReast 2394 4256 9/16   R Breast Bst 0 1000 0/4     First treatment date:  11/15/2022  Concurrent chemotherapy:  None    Oncology Vitals 11/6/2022 11/8/2022 11/20/2022   Height - 5' 3\" -   Height - 160 cm -   Weight - 217 lb 9.6 oz -   Weight - 98.703 kg -   BSA (m2) - 2 m2 -   /84 106/62 147/76   Pulse 114 106 101   Resp 20 - 18   Temp 97.9 98.6 97.4   SpO2 98 98 97   Pain Score - - 0   Some recent data might be hidden        Toxicities:  Fatigue {[facility] RAD FATIGUE:70182}  Pruritis {[facility] RAD PRURITIS:24562}  Dry Skin {noted/absent:6916}  Dermatitis associated with radiation {[facility] RAD DERMATITIS ASSOCIATED WITH RADIATION:41645}  Moisturizer used {[facility] RAD  MOISTURIZERS:92529} applied {Number of times:77491:::1} times daily. Hyperpigmentation {noted/absent:6916}      Nursing Note:  Pt seen for OTV with Dr. Marielle Norman, RN    Physician Note:  Subjective:      Objective:      Treatment setup imaging have been reviewed:   Yes    Assessment/Plan:    ***    Continue radiotherapy per plan    Next visit:  1 week    {[facility] RAD ONC MD NAMES:5449}

## 2022-11-23 PROCEDURE — 77387 GUIDANCE FOR RADJ TX DLVR: CPT | Performed by: RADIOLOGY

## 2022-11-23 PROCEDURE — 77412 RADIATION TX DELIVERY LVL 3: CPT | Performed by: RADIOLOGY

## 2022-11-23 PROCEDURE — 77336 RADIATION PHYSICS CONSULT: CPT | Performed by: RADIOLOGY

## 2022-11-27 RX ORDER — POTASSIUM CHLORIDE 750 MG/1
TABLET, FILM COATED, EXTENDED RELEASE ORAL
Qty: 720 TABLET | Refills: 3 | OUTPATIENT
Start: 2022-11-27

## 2022-11-28 ENCOUNTER — OFFICE VISIT (OUTPATIENT)
Dept: RADIATION ONCOLOGY | Facility: HOSPITAL | Age: 67
End: 2022-11-28
Attending: RADIOLOGY
Payer: MEDICARE

## 2022-11-28 VITALS
TEMPERATURE: 97 F | RESPIRATION RATE: 18 BRPM | SYSTOLIC BLOOD PRESSURE: 131 MMHG | DIASTOLIC BLOOD PRESSURE: 71 MMHG | OXYGEN SATURATION: 96 % | HEART RATE: 101 BPM

## 2022-11-28 DIAGNOSIS — C50.911 MUCINOUS CARCINOMA OF BREAST, RIGHT (HCC): Primary | ICD-10-CM

## 2022-11-28 PROCEDURE — 77387 GUIDANCE FOR RADJ TX DLVR: CPT | Performed by: RADIOLOGY

## 2022-11-28 PROCEDURE — 77412 RADIATION TX DELIVERY LVL 3: CPT | Performed by: RADIOLOGY

## 2022-11-28 RX ORDER — POTASSIUM CHLORIDE 750 MG/1
40 TABLET, FILM COATED, EXTENDED RELEASE ORAL 2 TIMES DAILY
Qty: 720 TABLET | Refills: 3 | Status: CANCELLED | OUTPATIENT
Start: 2022-11-28

## 2022-11-28 NOTE — PROGRESS NOTES
[facility] Radiation Treatment Management Note 6-10    Patient:  Wilfredo Bateman  Age:  74year old  Visit Diagnosis:    1. Mucinous carcinoma of breast, right ([address]      Primary Rad/Onc:  Dr. Katie Torres    Site Delivered Dose (cGy) Prescribed Dose (cGy) Fraction #   R BReast 2394 4256 9/16   R Breast Bst 0 1000 0/4     First treatment date:  11/15/2022  Concurrent chemotherapy:  None    Oncology Vitals 11/6/2022 11/8/2022 11/20/2022   Height - 5' 3\" -   Height - 160 cm -   Weight - 217 lb 9.6 oz -   Weight - 98.703 kg -   BSA (m2) - 2 m2 -   /84 106/62 147/76   Pulse 114 106 101   Resp 20 - 18   Temp 97.9 98.6 97.4   SpO2 98 98 97   Pain Score - - 0   Some recent data might be hidden        Toxicities:  Fatigue Grade 0= None  Pruritis Grade 1= Mild or localized; topical intervention indicated, disc HC use PRN  Dry Skin absent  Dermatitis associated with radiation Grade 0= None  Moisturizer used Vanicream applied Number of times: 2 times daily. Hyperpigmentation absent      Nursing Note:  Pt seen for OTV with Dr. Jonn Bowens, RN    Physician Note:  Subjective:    Mild skin changes       Objective:  NAD  g0-1 skin changes      Treatment setup imaging have been reviewed:   Yes    Assessment/Plan:    2 field right breast supine    Skin care  History of DM      Continue radiotherapy per plan    Next visit:  1 week    Dr. Katie Torres

## 2022-11-29 ENCOUNTER — APPOINTMENT (OUTPATIENT)
Dept: RADIATION ONCOLOGY | Facility: HOSPITAL | Age: 67
End: 2022-11-29
Attending: RADIOLOGY
Payer: MEDICARE

## 2022-11-29 PROCEDURE — 77412 RADIATION TX DELIVERY LVL 3: CPT | Performed by: RADIOLOGY

## 2022-11-29 PROCEDURE — 77387 GUIDANCE FOR RADJ TX DLVR: CPT | Performed by: RADIOLOGY

## 2022-11-29 NOTE — TELEPHONE ENCOUNTER
Pt called to check status on refill  Pharmacy advises Dr authorization is required/no available refills  Please send prescription to [place]

## 2022-11-30 PROCEDURE — 77387 GUIDANCE FOR RADJ TX DLVR: CPT | Performed by: RADIOLOGY

## 2022-11-30 PROCEDURE — 77412 RADIATION TX DELIVERY LVL 3: CPT | Performed by: RADIOLOGY

## 2022-11-30 RX ORDER — POTASSIUM CHLORIDE 750 MG/1
TABLET, FILM COATED, EXTENDED RELEASE ORAL
Qty: 720 TABLET | Refills: 3 | Status: SHIPPED | OUTPATIENT
Start: 2022-11-30

## 2022-12-01 ENCOUNTER — APPOINTMENT (OUTPATIENT)
Dept: RADIATION ONCOLOGY | Facility: HOSPITAL | Age: 67
End: 2022-12-01
Attending: RADIOLOGY
Payer: MEDICARE

## 2022-12-01 PROCEDURE — 77412 RADIATION TX DELIVERY LVL 3: CPT | Performed by: RADIOLOGY

## 2022-12-01 PROCEDURE — 77387 GUIDANCE FOR RADJ TX DLVR: CPT | Performed by: RADIOLOGY

## 2022-12-02 PROCEDURE — 77387 GUIDANCE FOR RADJ TX DLVR: CPT | Performed by: RADIOLOGY

## 2022-12-02 PROCEDURE — 77412 RADIATION TX DELIVERY LVL 3: CPT | Performed by: RADIOLOGY

## 2022-12-02 PROCEDURE — 77336 RADIATION PHYSICS CONSULT: CPT | Performed by: RADIOLOGY

## 2022-12-02 NOTE — PROGRESS NOTES
[facility] Radiation Treatment Management Note 11-15    Patient:  Harvey Coto  Age:  74year old  Visit Diagnosis:  No diagnosis found. Primary Rad/Onc:  Dr. Kenneth Bella    Site Delivered Dose (cGy) Prescribed Dose (cGy) Fraction #   R Breast 3724 4256 14/16   R Breast Bst 0 1000 0/4     First treatment date:  11/15/2022  Concurrent chemotherapy:  None    Oncology Vitals 11/8/2022 11/20/2022 11/28/2022   Height 5' 3\" - -   Height 160 cm - -   Weight 217 lb 9.6 oz - -   Weight 98.703 kg - -   BSA (m2) 2 m2 - -   /62 147/76 131/71   Pulse 106 101 101   Resp - 18 18   Temp 98.6 97.4 96.8   SpO2 98 97 96   Pain Score - 0 0   Some recent data might be hidden        Toxicities:  Fatigue Grade 1= Fatigue relieved by rest  Pruritis Grade 1= Mild or localized; topical intervention indicated, HC recommended  Dry Skin absent  Dermatitis associated with radiation Grade 1= Faint erythema or dry desquamation  Moisturizer used Vanicream applied Number of times: 2 times daily. Hyperpigmentation noted    Nursing Note:  Pt seen for OTV with Dr. Cassandra Seip. Pt feels like skin is getting itchy with Vanicream, given samples of Aquaphor to try. Discussed using HC as well. Andry Eduardo, RN    Physician Note:  Subjective:    Diffuse mac-papuluar rash of unclear etiology, related to Vanicream use,    Prev allergy to Eucerin    No breakdown    Previous treated fungal infx,        Objective:  NAD  Diffuse mac-papuluar rash  No breakdown      Treatment setup imaging have been reviewed:   Yes    Assessment/Plan:    2 field right breast supine    Start medium potency steriod  OTC anti-histamine    Monitor for fungal infx  Skin care  History of DM      Continue radiotherapy per plan    Next visit:  1 week    Dr. Kenneth Bella

## 2022-12-05 ENCOUNTER — APPOINTMENT (OUTPATIENT)
Dept: RADIATION ONCOLOGY | Facility: HOSPITAL | Age: 67
End: 2022-12-05
Attending: RADIOLOGY
Payer: MEDICARE

## 2022-12-05 VITALS
OXYGEN SATURATION: 97 % | TEMPERATURE: 97 F | HEART RATE: 106 BPM | SYSTOLIC BLOOD PRESSURE: 140 MMHG | DIASTOLIC BLOOD PRESSURE: 70 MMHG | RESPIRATION RATE: 18 BRPM

## 2022-12-05 DIAGNOSIS — C50.911 MUCINOUS CARCINOMA OF BREAST, RIGHT (HCC): Primary | ICD-10-CM

## 2022-12-05 PROCEDURE — 77412 RADIATION TX DELIVERY LVL 3: CPT | Performed by: RADIOLOGY

## 2022-12-05 PROCEDURE — 77334 RADIATION TREATMENT AID(S): CPT | Performed by: RADIOLOGY

## 2022-12-05 PROCEDURE — 77321 SPECIAL TELETX PORT PLAN: CPT | Performed by: RADIOLOGY

## 2022-12-05 PROCEDURE — 77290 THER RAD SIMULAJ FIELD CPLX: CPT | Performed by: RADIOLOGY

## 2022-12-05 RX ORDER — TRIAMCINOLONE ACETONIDE 1 MG/G
1 CREAM TOPICAL 2 TIMES DAILY
Qty: 80 G | Refills: 1 | Status: SHIPPED | OUTPATIENT
Start: 2022-12-05

## 2022-12-05 RX ORDER — TOPIRAMATE 25 MG/1
TABLET ORAL
Qty: 30 TABLET | Refills: 2 | Status: SHIPPED | OUTPATIENT
Start: 2022-12-05

## 2022-12-06 ENCOUNTER — APPOINTMENT (OUTPATIENT)
Dept: RADIATION ONCOLOGY | Facility: HOSPITAL | Age: 67
End: 2022-12-06
Attending: RADIOLOGY
Payer: MEDICARE

## 2022-12-06 PROCEDURE — 77387 GUIDANCE FOR RADJ TX DLVR: CPT | Performed by: RADIOLOGY

## 2022-12-06 PROCEDURE — 77412 RADIATION TX DELIVERY LVL 3: CPT | Performed by: RADIOLOGY

## 2022-12-07 ENCOUNTER — OFFICE VISIT (OUTPATIENT)
Dept: INTERNAL MEDICINE CLINIC | Facility: CLINIC | Age: 67
End: 2022-12-07
Payer: MEDICARE

## 2022-12-07 ENCOUNTER — LAB ENCOUNTER (OUTPATIENT)
Dept: LAB | Age: 67
End: 2022-12-07
Attending: INTERNAL MEDICINE
Payer: MEDICARE

## 2022-12-07 VITALS
DIASTOLIC BLOOD PRESSURE: 68 MMHG | HEART RATE: 100 BPM | OXYGEN SATURATION: 97 % | SYSTOLIC BLOOD PRESSURE: 118 MMHG | WEIGHT: 219 LBS | BODY MASS INDEX: 38.8 KG/M2 | HEIGHT: 62.9 IN | TEMPERATURE: 98 F

## 2022-12-07 DIAGNOSIS — L72.3 SEBACEOUS CYST: ICD-10-CM

## 2022-12-07 DIAGNOSIS — R31.9 HEMATURIA, UNSPECIFIED TYPE: Primary | ICD-10-CM

## 2022-12-07 DIAGNOSIS — M17.0 PRIMARY OSTEOARTHRITIS OF BOTH KNEES: ICD-10-CM

## 2022-12-07 DIAGNOSIS — K21.9 GASTROESOPHAGEAL REFLUX DISEASE, UNSPECIFIED WHETHER ESOPHAGITIS PRESENT: ICD-10-CM

## 2022-12-07 PROCEDURE — 81001 URINALYSIS AUTO W/SCOPE: CPT | Performed by: INTERNAL MEDICINE

## 2022-12-07 PROCEDURE — 77412 RADIATION TX DELIVERY LVL 3: CPT | Performed by: RADIOLOGY

## 2022-12-07 PROCEDURE — 87086 URINE CULTURE/COLONY COUNT: CPT | Performed by: INTERNAL MEDICINE

## 2022-12-07 PROCEDURE — 81015 MICROSCOPIC EXAM OF URINE: CPT | Performed by: INTERNAL MEDICINE

## 2022-12-07 PROCEDURE — 99214 OFFICE O/P EST MOD 30 MIN: CPT | Performed by: INTERNAL MEDICINE

## 2022-12-07 PROCEDURE — 77387 GUIDANCE FOR RADJ TX DLVR: CPT | Performed by: RADIOLOGY

## 2022-12-07 RX ORDER — INSULIN PUMP CONTROLLER
EACH MISCELLANEOUS
COMMUNITY
Start: 2022-11-27

## 2022-12-08 ENCOUNTER — APPOINTMENT (OUTPATIENT)
Dept: RADIATION ONCOLOGY | Facility: HOSPITAL | Age: 67
End: 2022-12-08
Attending: RADIOLOGY
Payer: MEDICARE

## 2022-12-08 LAB
BILIRUB UR QL: NEGATIVE
CLARITY UR: CLEAR
COLOR UR: YELLOW
GLUCOSE UR-MCNC: 500 MG/DL
KETONES UR-MCNC: NEGATIVE MG/DL
NITRITE UR QL STRIP.AUTO: NEGATIVE
PH UR: 5.5 [PH] (ref 5–8)
PROT UR-MCNC: NEGATIVE MG/DL
SP GR UR STRIP: 1.01 (ref 1–1.03)
UROBILINOGEN UR STRIP-ACNC: 0.2

## 2022-12-08 PROCEDURE — 77280 THER RAD SIMULAJ FIELD SMPL: CPT | Performed by: RADIOLOGY

## 2022-12-08 PROCEDURE — 77412 RADIATION TX DELIVERY LVL 3: CPT | Performed by: RADIOLOGY

## 2022-12-09 ENCOUNTER — TELEPHONE (OUTPATIENT)
Dept: INTERNAL MEDICINE CLINIC | Facility: CLINIC | Age: 67
End: 2022-12-09

## 2022-12-09 PROCEDURE — 77336 RADIATION PHYSICS CONSULT: CPT | Performed by: RADIOLOGY

## 2022-12-09 PROCEDURE — 77412 RADIATION TX DELIVERY LVL 3: CPT | Performed by: RADIOLOGY

## 2022-12-09 NOTE — PROGRESS NOTES
[facility] Radiation Treatment Management Note 16-20    Patient:  Rufino Quiñones  Age:  74year old  Visit Diagnosis:  No diagnosis found. Primary Rad/Onc:  Dr. Zuniga Field    Site Delivered Dose (cGy) Prescribed Dose (cGy) Fraction #   R Breast 4256 4256 16/16   R Breast Bst 750 1000 3/4     First treatment date:  11/15/2022  Concurrent chemotherapy:  None    Oncology Vitals 11/28/2022 12/5/2022 12/7/2022   Height - - 5' 2.9\"   Height - - 160 cm   Weight - - 219 lb   Weight - - 99.338 kg   BSA (m2) - - 2.01 m2   /71 140/70 118/68   Pulse 101 106 100   Resp 18 18 -   Temp 96.8 97.1 97.9   SpO2 96 97 97   Pain Score 0 2 -   Some recent data might be hidden        Toxicities:  Fatigue Grade 1= Fatigue relieved by rest  Pruritis Grade 1= Mild or localized; topical intervention indicated, HC PRN  Dry Skin absent  Dermatitis associated with radiation Grade 1= Faint erythema or dry desquamation  Moisturizer used Aquaphor applied Number of times: 2 times daily. Hyperpigmentation noted      Nursing Note:  Pt seen for OTV with Dr. Bret Salazar. Telfa pads given to patient. AVS given to patient. Pt to follow up with Dr. Bret Salazar in__. Danielle Oreilly, RN      Physician Note:  Subjective:    Diffuse mac-papuluar rash of unclear etiology, related to Vanicream use,    Prev allergy to Eucerin    No breakdown    Previous treated fungal infx,        Objective:  NAD  Diffuse mac-papuluar rash  No breakdown      Treatment setup imaging have been reviewed:   Yes    Assessment/Plan:    2 field right breast supine    Continue medium potency steriod  OTC anti-histamine    Monitor for fungal infx  Skin care  History of DM  telfa pads    To see Dr Tony Coronado Friday    Continue radiotherapy per plan    Next visit:  2-3 week    Dr. Efrain Encarnacion

## 2022-12-11 ENCOUNTER — LAB ENCOUNTER (OUTPATIENT)
Dept: LAB | Facility: HOSPITAL | Age: 67
End: 2022-12-11
Attending: INTERNAL MEDICINE
Payer: MEDICARE

## 2022-12-11 DIAGNOSIS — E78.2 MIXED HYPERLIPIDEMIA: ICD-10-CM

## 2022-12-11 DIAGNOSIS — E11.9 DIABETES MELLITUS (HCC): Primary | ICD-10-CM

## 2022-12-11 LAB
ALBUMIN SERPL-MCNC: 3 G/DL (ref 3.4–5)
ALBUMIN/GLOB SERPL: 0.7 {RATIO} (ref 1–2)
ALP LIVER SERPL-CCNC: 114 U/L
ALT SERPL-CCNC: 42 U/L
ANION GAP SERPL CALC-SCNC: 4 MMOL/L (ref 0–18)
AST SERPL-CCNC: 37 U/L (ref 15–37)
BILIRUB SERPL-MCNC: 0.7 MG/DL (ref 0.1–2)
BUN BLD-MCNC: 10 MG/DL (ref 7–18)
BUN/CREAT SERPL: 11.4 (ref 10–20)
CALCIUM BLD-MCNC: 9.1 MG/DL (ref 8.5–10.1)
CHLORIDE SERPL-SCNC: 104 MMOL/L (ref 98–112)
CHOLEST SERPL-MCNC: 172 MG/DL (ref ?–200)
CO2 SERPL-SCNC: 32 MMOL/L (ref 21–32)
CREAT BLD-MCNC: 0.88 MG/DL
CREAT UR-SCNC: 282 MG/DL
EST. AVERAGE GLUCOSE BLD GHB EST-MCNC: 206 MG/DL (ref 68–126)
FASTING PATIENT LIPID ANSWER: YES
FASTING STATUS PATIENT QL REPORTED: YES
GFR SERPLBLD BASED ON 1.73 SQ M-ARVRAT: 73 ML/MIN/1.73M2 (ref 60–?)
GLOBULIN PLAS-MCNC: 4.4 G/DL (ref 2.8–4.4)
GLUCOSE BLD-MCNC: 225 MG/DL (ref 70–99)
HBA1C MFR BLD: 8.8 % (ref ?–5.7)
HDLC SERPL-MCNC: 69 MG/DL (ref 40–59)
LDLC SERPL CALC-MCNC: 80 MG/DL (ref ?–100)
MICROALBUMIN UR-MCNC: 3.01 MG/DL
MICROALBUMIN/CREAT 24H UR-RTO: 10.7 UG/MG (ref ?–30)
NONHDLC SERPL-MCNC: 103 MG/DL (ref ?–130)
OSMOLALITY SERPL CALC.SUM OF ELEC: 296 MOSM/KG (ref 275–295)
POTASSIUM SERPL-SCNC: 3.9 MMOL/L (ref 3.5–5.1)
PROT SERPL-MCNC: 7.4 G/DL (ref 6.4–8.2)
SODIUM SERPL-SCNC: 140 MMOL/L (ref 136–145)
TRIGL SERPL-MCNC: 131 MG/DL (ref 30–149)
VLDLC SERPL CALC-MCNC: 21 MG/DL (ref 0–30)

## 2022-12-11 PROCEDURE — 83036 HEMOGLOBIN GLYCOSYLATED A1C: CPT

## 2022-12-11 PROCEDURE — 36415 COLL VENOUS BLD VENIPUNCTURE: CPT

## 2022-12-11 PROCEDURE — 82570 ASSAY OF URINE CREATININE: CPT

## 2022-12-11 PROCEDURE — 80061 LIPID PANEL: CPT

## 2022-12-11 PROCEDURE — 80053 COMPREHEN METABOLIC PANEL: CPT

## 2022-12-11 PROCEDURE — 82043 UR ALBUMIN QUANTITATIVE: CPT

## 2022-12-12 ENCOUNTER — TELEPHONE (OUTPATIENT)
Dept: RADIATION ONCOLOGY | Facility: HOSPITAL | Age: 67
End: 2022-12-12

## 2022-12-12 ENCOUNTER — APPOINTMENT (OUTPATIENT)
Dept: RADIATION ONCOLOGY | Facility: HOSPITAL | Age: 67
End: 2022-12-12
Attending: RADIOLOGY
Payer: MEDICARE

## 2022-12-12 VITALS
TEMPERATURE: 97 F | SYSTOLIC BLOOD PRESSURE: 123 MMHG | OXYGEN SATURATION: 97 % | DIASTOLIC BLOOD PRESSURE: 67 MMHG | HEART RATE: 104 BPM | RESPIRATION RATE: 18 BRPM

## 2022-12-12 DIAGNOSIS — C50.911 MUCINOUS CARCINOMA OF BREAST, RIGHT (HCC): Primary | ICD-10-CM

## 2022-12-12 PROCEDURE — 77412 RADIATION TX DELIVERY LVL 3: CPT | Performed by: RADIOLOGY

## 2022-12-12 NOTE — TELEPHONE ENCOUNTER
Refill request is for a maintenance medication and has met the criteria specified in the Ambulatory Medication Refill Standing Order for eligibility, visits, laboratory, alerts and was sent to the requested pharmacy.     Requested Prescriptions     Signed P [JVD] : there was jugular-venous distention [Normal Gait] : abnormal gait [de-identified] : unsteady gait, ambulates with walker

## 2022-12-12 NOTE — PATIENT INSTRUCTIONS
Follow up with Dr. Henry Bedolla in 2-3 weeks. Noah Ferrer will call you to schedule your follow up appointment. If your skin has improved in 2-3 weeks, you can cancel your follow up. We would then see you in 2-3 months. Please call [phone] with any radiation questions. Continue to moisturize for the next 2 weeks with Aquaphor. Follow up with Dr. Donaldo Browne scheduled for 12/16.

## 2022-12-13 ENCOUNTER — APPOINTMENT (OUTPATIENT)
Dept: RADIATION ONCOLOGY | Facility: HOSPITAL | Age: 67
End: 2022-12-13
Attending: RADIOLOGY
Payer: MEDICARE

## 2022-12-13 PROCEDURE — 77412 RADIATION TX DELIVERY LVL 3: CPT | Performed by: RADIOLOGY

## 2022-12-13 PROCEDURE — 77336 RADIATION PHYSICS CONSULT: CPT | Performed by: RADIOLOGY

## 2022-12-16 ENCOUNTER — NURSE ONLY (OUTPATIENT)
Dept: RADIATION ONCOLOGY | Facility: HOSPITAL | Age: 67
End: 2022-12-16

## 2022-12-16 ENCOUNTER — OFFICE VISIT (OUTPATIENT)
Dept: HEMATOLOGY/ONCOLOGY | Facility: HOSPITAL | Age: 67
End: 2022-12-16
Attending: INTERNAL MEDICINE
Payer: MEDICARE

## 2022-12-16 VITALS
OXYGEN SATURATION: 98 % | TEMPERATURE: 98 F | RESPIRATION RATE: 20 BRPM | HEART RATE: 111 BPM | HEIGHT: 63 IN | SYSTOLIC BLOOD PRESSURE: 138 MMHG | WEIGHT: 218 LBS | DIASTOLIC BLOOD PRESSURE: 66 MMHG | BODY MASS INDEX: 38.62 KG/M2

## 2022-12-16 DIAGNOSIS — C50.911 MUCINOUS CARCINOMA OF BREAST, RIGHT (HCC): Primary | ICD-10-CM

## 2022-12-16 DIAGNOSIS — Z17.0 MALIGNANT NEOPLASM OF CENTRAL PORTION OF RIGHT BREAST IN FEMALE, ESTROGEN RECEPTOR POSITIVE (HCC): ICD-10-CM

## 2022-12-16 DIAGNOSIS — C50.111 MALIGNANT NEOPLASM OF CENTRAL PORTION OF RIGHT BREAST IN FEMALE, ESTROGEN RECEPTOR POSITIVE (HCC): ICD-10-CM

## 2022-12-16 PROCEDURE — 99214 OFFICE O/P EST MOD 30 MIN: CPT | Performed by: INTERNAL MEDICINE

## 2022-12-16 RX ORDER — LETROZOLE 2.5 MG/1
2.5 TABLET, FILM COATED ORAL DAILY
Qty: 30 TABLET | Refills: 6 | Status: SHIPPED | OUTPATIENT
Start: 2022-12-16

## 2022-12-16 NOTE — PROGRESS NOTES
Received call from Dr. Donaldo Browne, she is seeing pt for follow-up today and noted wet open skin under R breast. Requested Rad Onc RN to see. Noted to have wet desquamation to R breast fold, some peeling to site. Pt states occ \"twinges\" felt to site, was using moisturizer to area. Instructed pt to use Bacitracin/Neosporin to open area BID (?sulfa allergy), keep site open to air when at home and use abd pads/telfa dressings to prevent skin chafing. Instructed to use moisturizer to rest of treated site. Pt verbalized understanding, abd pads provided. Has appt for follow-up in [address] [place] [address] on 12/22. Dr. Henry Bedolla made aware.

## 2022-12-20 ENCOUNTER — TELEPHONE (OUTPATIENT)
Dept: INTERNAL MEDICINE CLINIC | Facility: CLINIC | Age: 67
End: 2022-12-20

## 2022-12-20 RX ORDER — CEFADROXIL 500 MG/1
500 CAPSULE ORAL 2 TIMES DAILY
Qty: 14 CAPSULE | Refills: 0 | Status: SHIPPED | OUTPATIENT
Start: 2022-12-20

## 2022-12-20 NOTE — TELEPHONE ENCOUNTER
To Dr Jyotsna Quarles -- Please Advise --     Spoke with pt. States about a week ago, a folding chair fell on her right leg and it made a cut on her shin, 1/3 up from ankle. States her cut has been uncovered and is the size of a quarter. Reports it was healing well until 3 days ago. Pt mentions she started a new medication, letrozole 3 days ago as well. Reports the scab is oozing clear fluid and developed redness, 4 inches above the cut and down to her ankle. Area is warm to touch and tender. Denies odor, pain, fever. Pt advised to be evaluated for possible infection/cellulitis. Pt states she will not return to UC. Pt is adamant about waiting for MD response. Pt notes she cannot take bactrim. Pt states is symptoms worsen she would report to the ER.      [facility] DR[facility] STORE [id number] [place] [address] RD  [facility], [phone], [phone]

## 2022-12-20 NOTE — TELEPHONE ENCOUNTER
Patient is calling and states she cut her leg about a week ago. The cut has a scab on it and today it started turning red and leaking clear fluid.       Please advise

## 2022-12-21 ENCOUNTER — VIRTUAL PHONE E/M (OUTPATIENT)
Dept: INTERNAL MEDICINE CLINIC | Facility: CLINIC | Age: 67
End: 2022-12-21
Payer: MEDICARE

## 2022-12-21 DIAGNOSIS — L03.115 CELLULITIS OF RIGHT LOWER EXTREMITY: Primary | ICD-10-CM

## 2022-12-21 PROCEDURE — 99442 PHONE E/M BY PHYS 11-20 MIN: CPT | Performed by: INTERNAL MEDICINE

## 2022-12-21 RX ORDER — PRAVASTATIN SODIUM 40 MG
40 TABLET ORAL NIGHTLY
Qty: 90 TABLET | Refills: 3 | Status: SHIPPED | OUTPATIENT
Start: 2022-12-21

## 2022-12-21 NOTE — TELEPHONE ENCOUNTER
Patient added to Dr Parisa Esquivel' schedule today at 4:30pm for a phone visit. Too early to call patient to confirm.

## 2022-12-21 NOTE — TELEPHONE ENCOUNTER
Rx sent to [facility] for antibiotic cefadroxil 500mg BID x 7 days. Please schedule a video visit with me tomorrow at 4:30pm.  Ask her to outline the area of redness with a pen.

## 2022-12-21 NOTE — TELEPHONE ENCOUNTER
Spoke with patient. Relayed MD message. Pt verbalized understanding. To FD:  Please schedule video visit. Pt aware of 4:30 video visit, but has questions about how to log in/set it up--please call her with instructions if you can. Thank you!

## 2022-12-21 NOTE — PROGRESS NOTES
Virtual Telephone Check-In    Rufino Quiñones verbally consents to a Virtual/Telephone Check-In visit on 12/21/22. Patient has been referred to the [facility] website at [email or web address] to review the yearly Consent to Treat document. Patient understands and accepts financial responsibility for any deductible, co-insurance and/or co-pays associated with this service. Duration of the service: 11 minutes      Summary of topics discussed:     Last Tuesday while on her way to breast radiation therapy she tripped on a chair and cut the back of her R leg (calf). A scab developed. About 3 days ago, she developed redness around the scab and down the leg to the ankle. Pt called yesterday and was started empirically on cefadroxil 500mg BID last night. Since then the redness has receded -- no longer extends to her ankle. No fever or chills. Minimal discomfort.     A/P:    RLE cellulitis  -pt sent a photo today thru MyChart --mild erythema around the scab -- per pt improved from yesterday since starting abx.  -cont cefadroxil 500mg BID x 7 days        Olvin Carlson MD

## 2022-12-29 ENCOUNTER — NURSE ONLY (OUTPATIENT)
Dept: RADIATION ONCOLOGY | Facility: HOSPITAL | Age: 67
End: 2022-12-29
Attending: RADIOLOGY
Payer: MEDICARE

## 2022-12-29 ENCOUNTER — PATIENT MESSAGE (OUTPATIENT)
Dept: INTERNAL MEDICINE CLINIC | Facility: CLINIC | Age: 67
End: 2022-12-29

## 2022-12-29 NOTE — PROGRESS NOTES
Nursing Follow-Up Note    Patient: Rufino Quiñones  YOB: 1955  Age: 74year old  Radiation Oncologist: Dr. Efrain Encarnacion  Referring Physician: Olvin Carlson  Chief Complaint: No chief complaint on file. Date: 12/29/2022    Toxicities: Hyperpigmentation  Fatigue  No dry desquamation noted      Vital Signs: There were no vitals taken for this visit., Wt Readings from Last 6 Encounters:  12/16/22 : 98.9 kg (218 lb)  12/07/22 : 99.3 kg (219 lb)  11/08/22 : 98.7 kg (217 lb 9.6 oz)  10/18/22 : 102.5 kg (226 lb)  10/14/22 : 101.6 kg (224 lb)  10/12/22 : 101.4 kg (223 lb 9.6 oz)      Allergies:    Adhesive Tape           HIVES  Diltiazem               SWELLING  Pioglitazone            SWELLING  Acetaminophen           NAUSEA AND VOMITING    Comment:cirrhosis  Erythromycin            NAUSEA AND VOMITING  Lisinopril              NAUSEA ONLY, DIZZINESS  Metformin Hcl           PAIN    Comment:Other reaction(s): abdominal cramps  Sitagliptin             PAIN    Comment:stomach aches  Sulfamethoxazole W/*    FEVER    Comment:Per patient  Adhesive Tape (Shonda*    UNKNOWN  Amiloride               OTHER (SEE COMMENTS)    Comment:Other reaction(s): Sores in Throat  Ibuprofen               OTHER (SEE COMMENTS)    Comment:Mouth ulcers  Metoprolol              UNKNOWN  Pioglitazone Hcl-Me*    UNKNOWN  Potassium Chloride      NAUSEA AND VOMITING  Propoxyphene            UNKNOWN  Eucerin                 ITCHING, RASH  Farxiga [Dapagliflo*    ITCHING    Comment:Vaginal  Metoprolol Tartrate     FATIGUE    Comment:feeling fuzzy  Pantoprazole Sodium     NAUSEA ONLY  Spironolactone          NAUSEA ONLY    Nursing Note: Pt seen for RN follow up to assess skin healing. Pt healing well. No dry desquamation noted. Pt denies pain/discomfort. Pt has RN number in case of any needs. Pt to schedule f/u with Dr. Bret Salazar in 2-3 months, pt aware she will get a call to schedule.

## 2022-12-29 NOTE — TELEPHONE ENCOUNTER
From: Harvey Coto  To: Catalina Crockett MD  Sent: 12/29/2022 12:15 PM CST  Subject: Right leg red.  Finish antibodies     I took the last pill 2 days ago   Should I have more  Pics attached   LMK

## 2023-01-11 RX ORDER — IMIPRAMINE HCL 50 MG
TABLET ORAL
Qty: 270 TABLET | Refills: 3 | Status: SHIPPED | OUTPATIENT
Start: 2023-01-11

## 2023-01-24 ENCOUNTER — APPOINTMENT (OUTPATIENT)
Dept: HEMATOLOGY/ONCOLOGY | Facility: HOSPITAL | Age: 68
End: 2023-01-24
Attending: NURSE PRACTITIONER
Payer: MEDICARE

## 2023-02-17 RX ORDER — FAMOTIDINE 40 MG/1
TABLET, FILM COATED ORAL
Qty: 180 TABLET | Refills: 3 | Status: SHIPPED | OUTPATIENT
Start: 2023-02-17

## 2023-02-17 RX ORDER — FUROSEMIDE 20 MG/1
TABLET ORAL
Qty: 180 TABLET | Refills: 3 | Status: SHIPPED | OUTPATIENT
Start: 2023-02-17

## 2023-02-17 RX ORDER — ALBUTEROL SULFATE 90 UG/1
AEROSOL, METERED RESPIRATORY (INHALATION)
Qty: 3 EACH | Refills: 3 | Status: SHIPPED | OUTPATIENT
Start: 2023-02-17

## 2023-02-17 RX ORDER — ERGOCALCIFEROL 1.25 MG/1
CAPSULE ORAL
Qty: 12 CAPSULE | Refills: 3 | OUTPATIENT
Start: 2023-02-17

## 2023-02-23 RX ORDER — ERGOCALCIFEROL 1.25 MG/1
CAPSULE ORAL
Qty: 12 CAPSULE | Refills: 3 | OUTPATIENT
Start: 2023-02-23

## 2023-02-24 RX ORDER — ERGOCALCIFEROL 1.25 MG/1
50000 CAPSULE ORAL WEEKLY
Qty: 13 CAPSULE | Refills: 3 | Status: SHIPPED | OUTPATIENT
Start: 2023-02-24

## 2023-03-01 ENCOUNTER — OFFICE VISIT (OUTPATIENT)
Dept: SURGERY | Facility: CLINIC | Age: 68
End: 2023-03-01
Payer: MEDICARE

## 2023-03-01 VITALS
SYSTOLIC BLOOD PRESSURE: 132 MMHG | OXYGEN SATURATION: 96 % | HEIGHT: 62.9 IN | DIASTOLIC BLOOD PRESSURE: 80 MMHG | BODY MASS INDEX: 40.22 KG/M2 | WEIGHT: 227 LBS | HEART RATE: 112 BPM

## 2023-03-01 DIAGNOSIS — Z79.4 TYPE 2 DIABETES MELLITUS WITHOUT COMPLICATION, WITH LONG-TERM CURRENT USE OF INSULIN (HCC): Primary | ICD-10-CM

## 2023-03-01 DIAGNOSIS — E11.9 TYPE 2 DIABETES MELLITUS WITHOUT COMPLICATION, WITH LONG-TERM CURRENT USE OF INSULIN (HCC): Primary | ICD-10-CM

## 2023-03-01 DIAGNOSIS — E66.01 MORBID OBESITY WITH BMI OF 40.0-44.9, ADULT (HCC): ICD-10-CM

## 2023-03-01 DIAGNOSIS — K76.0 FATTY LIVER: ICD-10-CM

## 2023-03-01 DIAGNOSIS — M17.0 PRIMARY OSTEOARTHRITIS OF BOTH KNEES: ICD-10-CM

## 2023-03-01 PROBLEM — E11.40 TYPE 2 DIABETES MELLITUS WITH DIABETIC NEUROPATHY (HCC): Status: ACTIVE | Noted: 2023-03-01

## 2023-03-01 PROCEDURE — 99214 OFFICE O/P EST MOD 30 MIN: CPT | Performed by: INTERNAL MEDICINE

## 2023-03-07 ENCOUNTER — LAB ENCOUNTER (OUTPATIENT)
Dept: LAB | Age: 68
End: 2023-03-07
Attending: INTERNAL MEDICINE
Payer: MEDICARE

## 2023-03-07 DIAGNOSIS — K74.60 LIVER CIRRHOSIS SECONDARY TO NASH (HCC): Primary | ICD-10-CM

## 2023-03-07 DIAGNOSIS — E11.9 DIABETES MELLITUS (HCC): ICD-10-CM

## 2023-03-07 DIAGNOSIS — Z79.4 ENCOUNTER FOR LONG-TERM (CURRENT) USE OF INSULIN (HCC): ICD-10-CM

## 2023-03-07 DIAGNOSIS — K75.81 LIVER CIRRHOSIS SECONDARY TO NASH (HCC): Primary | ICD-10-CM

## 2023-03-07 LAB
AFP-TM SERPL-MCNC: 6.6 NG/ML (ref ?–8)
ALBUMIN SERPL-MCNC: 3.2 G/DL (ref 3.4–5)
ALBUMIN/GLOB SERPL: 0.7 {RATIO} (ref 1–2)
ALP LIVER SERPL-CCNC: 117 U/L
ALT SERPL-CCNC: 45 U/L
ANION GAP SERPL CALC-SCNC: 6 MMOL/L (ref 0–18)
AST SERPL-CCNC: 42 U/L (ref 15–37)
BILIRUB SERPL-MCNC: 0.7 MG/DL (ref 0.1–2)
BUN BLD-MCNC: 13 MG/DL (ref 7–18)
BUN/CREAT SERPL: 14.8 (ref 10–20)
CALCIUM BLD-MCNC: 9.6 MG/DL (ref 8.5–10.1)
CHLORIDE SERPL-SCNC: 107 MMOL/L (ref 98–112)
CHOLEST SERPL-MCNC: 165 MG/DL (ref ?–200)
CO2 SERPL-SCNC: 28 MMOL/L (ref 21–32)
CREAT BLD-MCNC: 0.88 MG/DL
CREAT UR-SCNC: 192 MG/DL
EST. AVERAGE GLUCOSE BLD GHB EST-MCNC: 266 MG/DL (ref 68–126)
FASTING PATIENT LIPID ANSWER: YES
FASTING STATUS PATIENT QL REPORTED: YES
GFR SERPLBLD BASED ON 1.73 SQ M-ARVRAT: 73 ML/MIN/1.73M2 (ref 60–?)
GLOBULIN PLAS-MCNC: 4.4 G/DL (ref 2.8–4.4)
GLUCOSE BLD-MCNC: 175 MG/DL (ref 70–99)
HBA1C MFR BLD: 10.9 % (ref ?–5.7)
HDLC SERPL-MCNC: 79 MG/DL (ref 40–59)
LDLC SERPL CALC-MCNC: 70 MG/DL (ref ?–100)
MICROALBUMIN UR-MCNC: 2.25 MG/DL
MICROALBUMIN/CREAT 24H UR-RTO: 11.7 UG/MG (ref ?–30)
NONHDLC SERPL-MCNC: 86 MG/DL (ref ?–130)
OSMOLALITY SERPL CALC.SUM OF ELEC: 296 MOSM/KG (ref 275–295)
POTASSIUM SERPL-SCNC: 3.9 MMOL/L (ref 3.5–5.1)
PROT SERPL-MCNC: 7.6 G/DL (ref 6.4–8.2)
SODIUM SERPL-SCNC: 141 MMOL/L (ref 136–145)
TRIGL SERPL-MCNC: 86 MG/DL (ref 30–149)
VLDLC SERPL CALC-MCNC: 13 MG/DL (ref 0–30)

## 2023-03-07 PROCEDURE — 82570 ASSAY OF URINE CREATININE: CPT

## 2023-03-07 PROCEDURE — 80061 LIPID PANEL: CPT

## 2023-03-07 PROCEDURE — 80053 COMPREHEN METABOLIC PANEL: CPT

## 2023-03-07 PROCEDURE — 36415 COLL VENOUS BLD VENIPUNCTURE: CPT

## 2023-03-07 PROCEDURE — 82043 UR ALBUMIN QUANTITATIVE: CPT

## 2023-03-07 PROCEDURE — 82105 ALPHA-FETOPROTEIN SERUM: CPT

## 2023-03-07 PROCEDURE — 83036 HEMOGLOBIN GLYCOSYLATED A1C: CPT

## 2023-03-08 ENCOUNTER — HOSPITAL ENCOUNTER (OUTPATIENT)
Dept: ULTRASOUND IMAGING | Age: 68
Discharge: HOME | End: 2023-03-08
Attending: INTERNAL MEDICINE
Payer: MEDICARE

## 2023-03-08 DIAGNOSIS — K74.60 CIRRHOSIS OF LIVER (HCC): ICD-10-CM

## 2023-03-08 DIAGNOSIS — K75.81 NASH (NONALCOHOLIC STEATOHEPATITIS): ICD-10-CM

## 2023-03-08 PROCEDURE — 76705 ECHO EXAM OF ABDOMEN: CPT | Performed by: INTERNAL MEDICINE

## 2023-03-13 RX ORDER — TOPIRAMATE 25 MG/1
TABLET ORAL
Qty: 30 TABLET | Refills: 2 | Status: SHIPPED | OUTPATIENT
Start: 2023-03-13

## 2023-03-21 ENCOUNTER — APPOINTMENT (OUTPATIENT)
Dept: HEMATOLOGY/ONCOLOGY | Facility: HOSPITAL | Age: 68
End: 2023-03-21
Attending: INTERNAL MEDICINE
Payer: MEDICARE

## 2023-03-23 RX ORDER — LOSARTAN POTASSIUM 25 MG/1
TABLET ORAL
Qty: 45 TABLET | Refills: 3 | Status: SHIPPED | OUTPATIENT
Start: 2023-03-23

## 2023-04-03 ENCOUNTER — LAB ENCOUNTER (OUTPATIENT)
Dept: LAB | Facility: REFERENCE LAB | Age: 68
End: 2023-04-03
Attending: INTERNAL MEDICINE
Payer: MEDICARE

## 2023-04-03 DIAGNOSIS — E78.00 HYPERCHOLESTEROLEMIA: ICD-10-CM

## 2023-04-03 DIAGNOSIS — Z79.4 TYPE 2 DIABETES MELLITUS WITHOUT COMPLICATION, WITH LONG-TERM CURRENT USE OF INSULIN (HCC): ICD-10-CM

## 2023-04-03 DIAGNOSIS — E53.8 VITAMIN B12 DEFICIENCY: ICD-10-CM

## 2023-04-03 DIAGNOSIS — D69.6 THROMBOCYTOPENIA (HCC): ICD-10-CM

## 2023-04-03 DIAGNOSIS — E11.9 TYPE 2 DIABETES MELLITUS WITHOUT COMPLICATION, WITH LONG-TERM CURRENT USE OF INSULIN (HCC): ICD-10-CM

## 2023-04-03 DIAGNOSIS — E03.8 OTHER SPECIFIED HYPOTHYROIDISM: ICD-10-CM

## 2023-04-03 LAB
ALBUMIN SERPL-MCNC: 3 G/DL (ref 3.4–5)
ALBUMIN/GLOB SERPL: 0.7 {RATIO} (ref 1–2)
ALP LIVER SERPL-CCNC: 127 U/L
ALT SERPL-CCNC: 46 U/L
ANION GAP SERPL CALC-SCNC: 3 MMOL/L (ref 0–18)
AST SERPL-CCNC: 41 U/L (ref 15–37)
BASOPHILS # BLD AUTO: 0.04 X10(3) UL (ref 0–0.2)
BASOPHILS NFR BLD AUTO: 0.9 %
BILIRUB SERPL-MCNC: 0.5 MG/DL (ref 0.1–2)
BUN BLD-MCNC: 12 MG/DL (ref 7–18)
BUN/CREAT SERPL: 13.2 (ref 10–20)
CALCIUM BLD-MCNC: 9.2 MG/DL (ref 8.5–10.1)
CHLORIDE SERPL-SCNC: 109 MMOL/L (ref 98–112)
CHOLEST SERPL-MCNC: 170 MG/DL (ref ?–200)
CO2 SERPL-SCNC: 32 MMOL/L (ref 21–32)
CREAT BLD-MCNC: 0.91 MG/DL
CREAT UR-SCNC: 125 MG/DL
DEPRECATED RDW RBC AUTO: 43.3 FL (ref 35.1–46.3)
EOSINOPHIL # BLD AUTO: 0.21 X10(3) UL (ref 0–0.7)
EOSINOPHIL NFR BLD AUTO: 4.8 %
ERYTHROCYTE [DISTWIDTH] IN BLOOD BY AUTOMATED COUNT: 13.2 % (ref 11–15)
EST. AVERAGE GLUCOSE BLD GHB EST-MCNC: 263 MG/DL (ref 68–126)
FASTING PATIENT LIPID ANSWER: YES
FASTING STATUS PATIENT QL REPORTED: YES
GFR SERPLBLD BASED ON 1.73 SQ M-ARVRAT: 70 ML/MIN/1.73M2 (ref 60–?)
GLOBULIN PLAS-MCNC: 4.2 G/DL (ref 2.8–4.4)
GLUCOSE BLD-MCNC: 110 MG/DL (ref 70–99)
HBA1C MFR BLD: 10.8 % (ref ?–5.7)
HCT VFR BLD AUTO: 42.5 %
HDLC SERPL-MCNC: 78 MG/DL (ref 40–59)
HGB BLD-MCNC: 13.9 G/DL
IMM GRANULOCYTES # BLD AUTO: 0.01 X10(3) UL (ref 0–1)
IMM GRANULOCYTES NFR BLD: 0.2 %
LDLC SERPL CALC-MCNC: 76 MG/DL (ref ?–100)
LYMPHOCYTES # BLD AUTO: 2 X10(3) UL (ref 1–4)
LYMPHOCYTES NFR BLD AUTO: 46 %
MCH RBC QN AUTO: 29.4 PG (ref 26–34)
MCHC RBC AUTO-ENTMCNC: 32.7 G/DL (ref 31–37)
MCV RBC AUTO: 89.9 FL
MICROALBUMIN UR-MCNC: 3.92 MG/DL
MICROALBUMIN/CREAT 24H UR-RTO: 31.4 UG/MG (ref ?–30)
MONOCYTES # BLD AUTO: 0.47 X10(3) UL (ref 0.1–1)
MONOCYTES NFR BLD AUTO: 10.8 %
NEUTROPHILS # BLD AUTO: 1.62 X10 (3) UL (ref 1.5–7.7)
NEUTROPHILS # BLD AUTO: 1.62 X10(3) UL (ref 1.5–7.7)
NEUTROPHILS NFR BLD AUTO: 37.3 %
NONHDLC SERPL-MCNC: 92 MG/DL (ref ?–130)
OSMOLALITY SERPL CALC.SUM OF ELEC: 298 MOSM/KG (ref 275–295)
PLATELET # BLD AUTO: 136 10(3)UL (ref 150–450)
POTASSIUM SERPL-SCNC: 3.9 MMOL/L (ref 3.5–5.1)
PROT SERPL-MCNC: 7.2 G/DL (ref 6.4–8.2)
RBC # BLD AUTO: 4.73 X10(6)UL
SODIUM SERPL-SCNC: 144 MMOL/L (ref 136–145)
TRIGL SERPL-MCNC: 90 MG/DL (ref 30–149)
TSI SER-ACNC: 1.84 MIU/ML (ref 0.36–3.74)
VLDLC SERPL CALC-MCNC: 14 MG/DL (ref 0–30)
WBC # BLD AUTO: 4.4 X10(3) UL (ref 4–11)

## 2023-04-03 PROCEDURE — 82570 ASSAY OF URINE CREATININE: CPT

## 2023-04-03 PROCEDURE — 36415 COLL VENOUS BLD VENIPUNCTURE: CPT

## 2023-04-03 PROCEDURE — 83036 HEMOGLOBIN GLYCOSYLATED A1C: CPT

## 2023-04-03 PROCEDURE — 80053 COMPREHEN METABOLIC PANEL: CPT

## 2023-04-03 PROCEDURE — 84443 ASSAY THYROID STIM HORMONE: CPT

## 2023-04-03 PROCEDURE — 85025 COMPLETE CBC W/AUTO DIFF WBC: CPT

## 2023-04-03 PROCEDURE — 80061 LIPID PANEL: CPT

## 2023-04-03 PROCEDURE — 82043 UR ALBUMIN QUANTITATIVE: CPT

## 2023-04-06 ENCOUNTER — APPOINTMENT (OUTPATIENT)
Dept: RADIATION ONCOLOGY | Facility: HOSPITAL | Age: 68
End: 2023-04-06
Attending: RADIOLOGY
Payer: MEDICARE

## 2023-04-12 PROBLEM — D69.6 THROMBOCYTOPENIA, UNSPECIFIED: Status: ACTIVE | Noted: 2023-04-12

## 2023-04-12 PROBLEM — E66.9 OBESITY (BMI 30-39.9): Status: RESOLVED | Noted: 2022-08-25 | Resolved: 2023-04-12

## 2023-04-12 PROBLEM — D69.6 THROMBOCYTOPENIA, UNSPECIFIED (HCC): Status: ACTIVE | Noted: 2023-04-12

## 2023-04-20 ENCOUNTER — OFFICE VISIT (OUTPATIENT)
Dept: RADIATION ONCOLOGY | Facility: HOSPITAL | Age: 68
End: 2023-04-20
Attending: RADIOLOGY
Payer: MEDICARE

## 2023-04-20 VITALS
DIASTOLIC BLOOD PRESSURE: 61 MMHG | SYSTOLIC BLOOD PRESSURE: 136 MMHG | TEMPERATURE: 97 F | OXYGEN SATURATION: 98 % | RESPIRATION RATE: 16 BRPM | HEART RATE: 104 BPM

## 2023-04-20 PROCEDURE — 99211 OFF/OP EST MAY X REQ PHY/QHP: CPT

## 2023-04-20 RX ORDER — LIDOCAINE 50 MG/G
OINTMENT TOPICAL
Qty: 50 G | Refills: 3 | Status: SHIPPED | OUTPATIENT
Start: 2023-04-20

## 2023-04-20 NOTE — PATIENT INSTRUCTIONS
Follow up with Dr. Nolvia Ruby in as needed. Please call [phone] with any radiation questions.      Survivorship appointment 5/9  Dr. Robert Juárez appointment 6/19    Call to schedule your mammogram

## 2023-04-20 NOTE — PROGRESS NOTES
Nursing Follow-Up Note    Patient: Rcih Dixon  YOB: 1955  Age: 74year old  Radiation Oncologist: Dr. Hernesto Rodriguez  Referring Physician: Marika Starks  Chief Complaint: Patient presents with:  Breast Cancer    Date: 4/20/2023    Toxicities: Sensitivity at incision   Fatigue, however pt states she experienced this prior to RT  Pt reports scar tissue along incisions      Vital Signs: There were no vitals taken for this visit., Wt Readings from Last 6 Encounters:  04/12/23 : 102.5 kg (226 lb)  03/01/23 : 103 kg (227 lb)  12/16/22 : 98.9 kg (218 lb)  12/07/22 : 99.3 kg (219 lb)  11/08/22 : 98.7 kg (217 lb 9.6 oz)  10/18/22 : 102.5 kg (226 lb)      Allergies:    Adhesive Tape           HIVES  Diltiazem               SWELLING  Pioglitazone            SWELLING  Acetaminophen           NAUSEA AND VOMITING    Comment:cirrhosis  Erythromycin            NAUSEA AND VOMITING  Lisinopril              NAUSEA ONLY, DIZZINESS  Metformin Hcl           PAIN    Comment:Other reaction(s): abdominal cramps  Sitagliptin             PAIN    Comment:stomach aches  Sulfamethoxazole W/*    FEVER    Comment:Per patient  Adhesive Tape (Shonda*    UNKNOWN  Amiloride               OTHER (SEE COMMENTS)    Comment:Other reaction(s): Sores in Throat  Ibuprofen               OTHER (SEE COMMENTS)    Comment:Mouth ulcers  Metoprolol              UNKNOWN  Pioglitazone Hcl-Me*    UNKNOWN  Potassium Chloride      NAUSEA AND VOMITING  Propoxyphene            UNKNOWN  Eucerin                 ITCHING, RASH  Farxiga [Dapagliflo*    ITCHING    Comment:Vaginal  Metoprolol Tartrate     FATIGUE    Comment:feeling fuzzy  Pantoprazole Sodium     NAUSEA ONLY  Spironolactone          NAUSEA ONLY    Nursing Note: Pt seen for follow up with Dr. Melvina Calhoun. Pt completed R Breast RT on 12/13/2022. Pt reports some sensitivity along incision, states she rarely takes OTC medications.  Was out in the sun a few weeks ago and chest got burnt, discussed sun precautions with pt. Pt started on letrozole by Dr. Paulette Hardin, reports joint pain. Advised she discussed with Dr. Paulette Hardin, however pt states she will wait until June f/u with her. Mammogram ordered, given number to call and schedule. Pt scheduled for survivorship 5/9/2023 and Dr. Paulette Hardin 6/19/2023. Pt to follow up with Dr. Adali FARAH.

## 2023-05-09 ENCOUNTER — HOSPITAL ENCOUNTER (OUTPATIENT)
Dept: MAMMOGRAPHY | Facility: HOSPITAL | Age: 68
Discharge: HOME | End: 2023-05-09
Attending: INTERNAL MEDICINE
Payer: MEDICARE

## 2023-05-09 ENCOUNTER — OFFICE VISIT (OUTPATIENT)
Dept: HEMATOLOGY/ONCOLOGY | Facility: HOSPITAL | Age: 68
End: 2023-05-09
Attending: INTERNAL MEDICINE
Payer: MEDICARE

## 2023-05-09 DIAGNOSIS — Z17.0 MALIGNANT NEOPLASM OF CENTRAL PORTION OF RIGHT BREAST IN FEMALE, ESTROGEN RECEPTOR POSITIVE (HCC): ICD-10-CM

## 2023-05-09 DIAGNOSIS — Z85.3 PERSONAL HISTORY OF BREAST CANCER: Primary | ICD-10-CM

## 2023-05-09 DIAGNOSIS — Z71.9 COUNSELING, UNSPECIFIED: ICD-10-CM

## 2023-05-09 DIAGNOSIS — C50.111 MALIGNANT NEOPLASM OF CENTRAL PORTION OF RIGHT BREAST IN FEMALE, ESTROGEN RECEPTOR POSITIVE (HCC): ICD-10-CM

## 2023-05-09 DIAGNOSIS — Z08 ENCOUNTER FOR FOLLOW-UP EXAMINATION AFTER COMPLETED TREATMENT FOR MALIGNANT NEOPLASM: ICD-10-CM

## 2023-05-09 PROCEDURE — 99215 OFFICE O/P EST HI 40 MIN: CPT | Performed by: NURSE PRACTITIONER

## 2023-05-09 PROCEDURE — 77062 BREAST TOMOSYNTHESIS BI: CPT | Performed by: INTERNAL MEDICINE

## 2023-05-09 PROCEDURE — G2212 PROLONG OUTPT/OFFICE VIS: HCPCS | Performed by: NURSE PRACTITIONER

## 2023-05-09 PROCEDURE — 77066 DX MAMMO INCL CAD BI: CPT | Performed by: INTERNAL MEDICINE

## 2023-05-09 RX ORDER — COVID-19 ANTIGEN TEST
220 KIT MISCELLANEOUS DAILY PRN
COMMUNITY

## 2023-05-09 NOTE — PROGRESS NOTES
I met with Bradley Trujillo for a Survivorship Clinic visit to provide a survivorship care plan (SCP) and information related to post-treatment care. She was diagnosed with mucinous carcinoma of the right breast in 8/2022. On 8/20/2022 Dr. Rakesh Barajas performed an excisional biopsy of the right breast with noted positive margin. On 9/28/2022, he performed a right lumpectomy with sentinel node biopsy. Pathology showed a pT1c, pN0, cM0, G1, ER+, SD+, HER2-, Stage IA breast cancer, with negative surgical margins and 3 negative lymph nodes. She was seen by Dr. Donaldo Browne who recommended radiation therapy. She received radiation therapy with Dr. Cleo Mendoza from 11/15/2022-12/13/2022. Dr. Donaldo Browne recommended endocrine therapy with Letrozole. Bradley Trujillo has many medical conditions, including cirrhosis, CVA, DM, sleep apnea, hypothyroidism, high cholesterol, vertigo, arthritis, colon polyps and hypertension. She is managed by Dr. Adrián Wooten and specialists from Endocrinology, Hepatology and Gastroenterology. (See Oncology Treatment Summary SCP for details). Current condition/issues: Bradley Trujillo is recovering well from her treatment. She has been taking Letrozole since 12/2022. She has noted generalized joint discomfort since starting. She rarely takes pain medication but sometimes will take Naproxyn if she is having a bad day. Her baseline bone density in 10/2022 was normal. She takes weekly Vitamin D3 [id number]. She notes intermittent right breast and axilla nerve-type pain. She has limited right shoulder ROM, but reports this pre-dated her treatment and is related to rotator cuff issues. She has no noted swelling in her arm/axilla. She denies current anxiety or depression. She has ongoing personal stressors, but appears to move past these on her own. She gets 6-7 hours of sleep at night and sometimes needs a nap during the day.  She is working with Dr. Kacey Jaquez for weight loss, but is limited on exercise options due to arthritis in both knees. She hopes to have knee surgery in the future, but she reports that her blood glucose needs to be in better control and she needs to lose weight. She admits to keeping busy around her home and walks as much as she can tolerate. She tries to walk once/week outside. She admits she could improve her diet. Current BMI is 41. Survivorship Care Plan and letter: She was provided a hard copy of the SCP and a letter that outlined the purpose of the visit and plan. We reviewed all elements of both documents. She is aware that a letter and SCP will be sent to her PCP, Dr. Austin Linares. Reviewed Cancer Surveillance (office visits, imaging, bone density) and that Dr. Louis Barfield will oversee this care. She will see her next on 6/19 for office visit. Nathan Guevara is having a bilateral mammogram today. She has completed care with Dr. Brodie Wooten.  Next bone density will be due 10/2024. Reviewed Schedule of other clinic visits with Primary Care and specialists: Dr. Kellie Savage will continue to manage all general health care recommended for age and gender including cancer screening tests. Nathan Guevara is having a colonoscopy in 6/2023. She continues twice-yearly liver imaging with Dr. Esteban Urias. She is knowledgeable regarding her medical conditions and the necessary follow-up. Reviewed concerning symptoms that she should report to any Provider. Reviewed possible late and long-term effects related to the treatment that was received. We reviewed care of the surgical arm and management of hormone related side effects. Reviewed common cancer survivor issues and resources available. I reviewed specific exercise options that could be helpful given her scenario from [employer] to the cancer support center options. Nutrition options were also provided. Various survivorship resources were provided to use going forward as needed (see below).      Reviewed Lifestyle/behaviors that can affect ongoing health, including the risk for the cancer coming back or developing another cancer. We reviewed importance of physical activity including aerobic, strength training and weight bearing exercise. We reviewed a plant based diet. We reviewed skin care and sun safety. I encouraged consideration of our Medical Fitness program, use of step tracking on her phone, exercise options at [facility] or [facility], seeing a dietician with Dr. Beatris Red (if insurance covers) or seeing our [facility] dietician for tips given her various medical issues. The patient received a take-home folder that included the following survivorship resources:   -SCP and patient letter  -Breast Survivorship Guide   -[facility] - nutrition and exercise classes, mind/body programs, education, support groups  -[facility] in [place], support groups, special programs, nutrition and exercise classes, movement classes, mind/body programs,  survivorship programs, individual counseling  -[facility]  -[facility] General support group  -[facility]-already involved  -[address]  -Lymphedema Prevention  -[employer]gov handout-nutrition, physical activity  -ACS Cancer Screening Guidelines  -Websites: [facility] for your Life     The patient was given the opportunity to ask questions. She had few questions and verbalized understanding of the information we discussed today. My total time spent caring for the patient on the day of the encounter: 90 minutes (10 minutes reviewing chart, 60 minutes of face to face counseling regarding survivorship education, review of care plan and additional resources and 20 minutes of documentation). She was encouraged to call with any further questions.    YUMIKO Ventura

## 2023-06-12 RX ORDER — TOPIRAMATE 25 MG/1
TABLET ORAL
Qty: 30 TABLET | Refills: 2 | Status: SHIPPED | OUTPATIENT
Start: 2023-06-12

## 2023-06-19 ENCOUNTER — APPOINTMENT (OUTPATIENT)
Dept: HEMATOLOGY/ONCOLOGY | Facility: HOSPITAL | Age: 68
End: 2023-06-19
Attending: INTERNAL MEDICINE
Payer: MEDICARE

## 2023-06-19 ENCOUNTER — TELEPHONE (OUTPATIENT)
Dept: HEMATOLOGY/ONCOLOGY | Facility: HOSPITAL | Age: 68
End: 2023-06-19

## 2023-06-19 NOTE — TELEPHONE ENCOUNTER
Patients  calling on behalf of wife. She is to sick today to  Come in for her appt. at 80 wit Dr Mya Ardon  She is so nauseas she cant leave the house

## 2023-06-19 NOTE — TELEPHONE ENCOUNTER
Called spouse, patient having nausea and is going to reschedule apt later. No sure what nausea is from. No issues with letrozole per spouse.

## 2023-06-22 ENCOUNTER — TELEPHONE (OUTPATIENT)
Dept: INTERNAL MEDICINE CLINIC | Facility: CLINIC | Age: 68
End: 2023-06-22

## 2023-06-22 NOTE — TELEPHONE ENCOUNTER
To Dr. Domingo Berry:    Pt concerned about cellulitis in left lower leg/foot. Has previously had cellulitis in right leg/foot -- treated with cefadroxil. Redness and swelling in left shin area, foot and toes x1 day. Not hot or warm to touch. Somewhat tender/sore to touch. No drainage. Denies fevers/chills/night sweats. Reports swelling improved with elevation. Pt inquiring about antibiotics. See photos below. [facility] DRUG STORE [address] [place] [address] RD  [facility], [phone], [phone]    Advised pt call back with any questions/concerns/worsening symptoms. ER precautions reviewed with patient. Pt verbalized understanding.

## 2023-06-22 NOTE — TELEPHONE ENCOUNTER
Spoke to pt and relayed MD message and instructions. Pt verbalized understanding and agrees with plan. Pt aware no available appointments in office today or tomorrow. Pt is agreeable to be seen in UC today. Nursing to F/U.

## 2023-06-23 ENCOUNTER — HOSPITAL ENCOUNTER (OUTPATIENT)
Age: 68
Discharge: HOME | End: 2023-06-23
Attending: NURSE PRACTITIONER
Payer: MEDICARE

## 2023-06-23 ENCOUNTER — APPOINTMENT (OUTPATIENT)
Dept: ULTRASOUND IMAGING | Age: 68
End: 2023-06-23
Attending: NURSE PRACTITIONER
Payer: MEDICARE

## 2023-06-23 VITALS
OXYGEN SATURATION: 100 % | HEIGHT: 63 IN | WEIGHT: 220 LBS | TEMPERATURE: 98 F | RESPIRATION RATE: 18 BRPM | BODY MASS INDEX: 38.98 KG/M2 | HEART RATE: 99 BPM | DIASTOLIC BLOOD PRESSURE: 77 MMHG | SYSTOLIC BLOOD PRESSURE: 136 MMHG

## 2023-06-23 DIAGNOSIS — L03.116 LEFT LEG CELLULITIS: ICD-10-CM

## 2023-06-23 DIAGNOSIS — M79.89 LEG SWELLING: Primary | ICD-10-CM

## 2023-06-23 PROCEDURE — 93971 EXTREMITY STUDY: CPT | Performed by: NURSE PRACTITIONER

## 2023-06-23 PROCEDURE — 99213 OFFICE O/P EST LOW 20 MIN: CPT | Performed by: NURSE PRACTITIONER

## 2023-06-23 RX ORDER — CEPHALEXIN 500 MG/1
500 CAPSULE ORAL 4 TIMES DAILY
Qty: 40 CAPSULE | Refills: 0 | Status: SHIPPED | OUTPATIENT
Start: 2023-06-23 | End: 2023-07-03

## 2023-06-23 NOTE — TELEPHONE ENCOUNTER
Spoke with patient states she did not go to  yesterday. States she elevated her leg and the swelling got better and redness diminished. Patient states she will have her  take her today. Patient states her  had to go to eye doctor today.

## 2023-06-23 NOTE — DISCHARGE INSTRUCTIONS
Take an over-the-counter probiotic daily while on the antibiotics. Keep the left leg up when possible to decrease the swelling. Please read the attached discharge instructions. Go to your nearest ER for new or worsening symptoms.

## 2023-06-30 ENCOUNTER — ANESTHESIA (OUTPATIENT)
Dept: ENDOSCOPY | Facility: HOSPITAL | Age: 68
End: 2023-06-30
Payer: MEDICARE

## 2023-06-30 ENCOUNTER — ANESTHESIA EVENT (OUTPATIENT)
Dept: ENDOSCOPY | Facility: HOSPITAL | Age: 68
End: 2023-06-30
Payer: MEDICARE

## 2023-06-30 ENCOUNTER — HOSPITAL ENCOUNTER (OUTPATIENT)
Facility: HOSPITAL | Age: 68
Setting detail: HOSPITAL OUTPATIENT SURGERY
Discharge: HOME | End: 2023-06-30
Attending: INTERNAL MEDICINE | Admitting: INTERNAL MEDICINE
Payer: MEDICARE

## 2023-06-30 VITALS
DIASTOLIC BLOOD PRESSURE: 79 MMHG | WEIGHT: 215 LBS | RESPIRATION RATE: 12 BRPM | OXYGEN SATURATION: 98 % | TEMPERATURE: 98 F | HEIGHT: 63 IN | SYSTOLIC BLOOD PRESSURE: 141 MMHG | HEART RATE: 81 BPM | BODY MASS INDEX: 38.09 KG/M2

## 2023-06-30 DIAGNOSIS — K74.60 LIVER CIRRHOSIS SECONDARY TO NASH (HCC): ICD-10-CM

## 2023-06-30 DIAGNOSIS — Z86.010 PERSONAL HISTORY OF COLONIC POLYPS: ICD-10-CM

## 2023-06-30 DIAGNOSIS — K75.81 LIVER CIRRHOSIS SECONDARY TO NASH (HCC): ICD-10-CM

## 2023-06-30 LAB — GLUCOSE BLDC GLUCOMTR-MCNC: 132 MG/DL (ref 70–99)

## 2023-06-30 PROCEDURE — 88312 SPECIAL STAINS GROUP 1: CPT | Performed by: INTERNAL MEDICINE

## 2023-06-30 PROCEDURE — 0DB68ZX EXCISION OF STOMACH, VIA NATURAL OR ARTIFICIAL OPENING ENDOSCOPIC, DIAGNOSTIC: ICD-10-PCS | Performed by: INTERNAL MEDICINE

## 2023-06-30 PROCEDURE — 0DBH8ZX EXCISION OF CECUM, VIA NATURAL OR ARTIFICIAL OPENING ENDOSCOPIC, DIAGNOSTIC: ICD-10-PCS | Performed by: INTERNAL MEDICINE

## 2023-06-30 PROCEDURE — 82962 GLUCOSE BLOOD TEST: CPT

## 2023-06-30 PROCEDURE — 88305 TISSUE EXAM BY PATHOLOGIST: CPT | Performed by: INTERNAL MEDICINE

## 2023-06-30 RX ORDER — LIDOCAINE HYDROCHLORIDE 10 MG/ML
INJECTION, SOLUTION EPIDURAL; INFILTRATION; INTRACAUDAL; PERINEURAL AS NEEDED
Status: DISCONTINUED | OUTPATIENT
Start: 2023-06-30 | End: 2023-06-30 | Stop reason: SURG

## 2023-06-30 RX ORDER — SODIUM CHLORIDE 0.9 % (FLUSH) 0.9 %
10 SYRINGE (ML) INJECTION AS NEEDED
Status: DISCONTINUED | OUTPATIENT
Start: 2023-06-30 | End: 2023-06-30

## 2023-06-30 RX ORDER — SODIUM CHLORIDE 9 MG/ML
INJECTION, SOLUTION INTRAVENOUS CONTINUOUS
Status: DISCONTINUED | OUTPATIENT
Start: 2023-06-30 | End: 2023-06-30

## 2023-06-30 RX ORDER — MIDAZOLAM HYDROCHLORIDE 1 MG/ML
1 INJECTION INTRAMUSCULAR; INTRAVENOUS EVERY 5 MIN PRN
Status: DISCONTINUED | OUTPATIENT
Start: 2023-06-30 | End: 2023-06-30

## 2023-06-30 RX ORDER — SODIUM CHLORIDE, SODIUM LACTATE, POTASSIUM CHLORIDE, CALCIUM CHLORIDE 600; 310; 30; 20 MG/100ML; MG/100ML; MG/100ML; MG/100ML
INJECTION, SOLUTION INTRAVENOUS CONTINUOUS
Status: DISCONTINUED | OUTPATIENT
Start: 2023-06-30 | End: 2023-06-30

## 2023-06-30 RX ORDER — SODIUM CHLORIDE, SODIUM LACTATE, POTASSIUM CHLORIDE, CALCIUM CHLORIDE 600; 310; 30; 20 MG/100ML; MG/100ML; MG/100ML; MG/100ML
INJECTION, SOLUTION INTRAVENOUS CONTINUOUS PRN
Status: DISCONTINUED | OUTPATIENT
Start: 2023-06-30 | End: 2023-06-30 | Stop reason: SURG

## 2023-06-30 RX ADMIN — SODIUM CHLORIDE, SODIUM LACTATE, POTASSIUM CHLORIDE, CALCIUM CHLORIDE: 600; 310; 30; 20 INJECTION, SOLUTION INTRAVENOUS at 07:37:00

## 2023-06-30 RX ADMIN — LIDOCAINE HYDROCHLORIDE 100 MG: 10 INJECTION, SOLUTION EPIDURAL; INFILTRATION; INTRACAUDAL; PERINEURAL at 07:38:00

## 2023-06-30 NOTE — DISCHARGE INSTRUCTIONS
ENDOSCOPY DISCHARGE INSTRUCTIONS    Procedure Performed:   Gastroscopy, Colonoscopy, and Polypectomy    Endoscopist: No name on file  FINDINGS:   Gastritis (inflammation of the stomach lining) and Colon polyp(s) (a growth in the colon)    MEDICATIONS:  You may resume all other medications today    DIET:  High fiber/low fat diet    BIOPSIES:  Biopsy results will be released to you as soon as they are available as is now the law. This will not allow your physician the opportunity to go over these before they are released to you. It is not necessary to contact the office for explanation of these results. Your physician will contact you within a few business days of their release to review the findings with you    X-RAYS/LABS:   No X-rays/Labs were ordered today    ADDITIONAL RECOMMENDATIONS:        Activity for remainder of today:    REST TODAY  DO NOT drive or operate heavy machinery  DO NOT drink any alcoholic beverages  DO NOT sign any legal documents or make any important decisions    After your procedure(s): It is not unusual to feel bloated or gassy . Passing gas and belching is encouraged. Lying on your left side with your knees flexed may relieve the discomfort. A hot pack to the abdomen may also help. After your gastroscopy (upper endoscopy): You may experience a slight sore throat which will subside. Throat lozenges or salt water gargle can be used. FOLLOW-UP:  Contact the office at [phone] for follow-up appointment is needed or if you develop any of the following:    Severe abdominal pain/discomfort     Excessive bleeding                     Black tarry stool    Difficulty breathing/swallowing      Persistent nausea/vomiting  Fever above 100 degrees or chills  Home Care Instructions for Colonoscopy with Sedation    Diet:  - Resume your regular diet as tolerated unless otherwise instructed. - Start with light meals to minimize bloating.  - Do not drink alcohol today.     Medication:  - If you have questions about resuming your normal medications, please contact your Primary Care Physician. Activities:  - Take it easy today. Do not return to work today. - Do not drive today. - Do not operate any machinery today (including kitchen equipment). Colonoscopy:  - You may notice some rectal \"spotting\" (a little blood on the toilet tissue) for a day or two after the exam. This is normal.  - If you experience any rectal bleeding (not spotting), persistent tenderness or sharp severe abdominal pains, oral temperature over 100 degrees Fahrenheit, light-headedness or dizziness, or any other problems, contact your doctor. **If unable to reach your doctor, please go to the [facility]**    - Your referring physician will receive a full report of your examination.  - If you do not hear from your doctor's office within two weeks of your biopsy, please call them for your results. You may be able to see your laboratory results in [facility] between 4 and 7 business days. In some cases, your physician may not have viewed the results before they are released to [employer] If you have questions regarding your results contact the physician who ordered the test/exam by phone or via [employer] by choosing \"Ask a Medical Question. \"Home Care Instructions for Gastroscopy with Sedation    Diet:  - Resume your regular diet as tolerated unless otherwise instructed. - Start with light meals to minimize bloating.  - Do not drink alcohol today. Medication:  - If you have questions about resuming your normal medications, please contact your Primary Care Physician. Activities:  - Take it easy today. Do not return to work today. - Do not drive today. - Do not operate any machinery today (including kitchen equipment).     Gastroscopy:  - You may have a sore throat for 2-3 days following the exam. This is normal. Gargling with warm salt water (1/2 tsp salt to 1 glass warm water) or using throat lozenges will help. - If you experience any sharp pain in your neck, abdomen or chest, vomiting of blood, oral temperature over 100 degrees Fahrenheit, light-headedness or dizziness, or any other problems, contact your doctor. **If unable to reach your doctor, please go to the [facility] Emergency Room**    - Your referring physician will receive a full report of your examination.  - If you do not hear from your doctor's office within two weeks of your biopsy, please call them for your results. You may be able to see your laboratory results in [employer] between 4 and 7 business days. In some cases, your physician may not have viewed the results before they are released to [address] If you have questions regarding your results contact the physician who ordered the test/exam by phone or via [address] by choosing \"Ask a Medical Question. \"

## 2023-06-30 NOTE — OR PREOP
Potassium chloride allergy noted. This RN spoke with pharmacist Nancy Richardson who stated it is OK for patient to have LR infusion for procedure, just to monitor pt for any signs and symptoms of nausea. This information was communicated to [employer].

## 2023-06-30 NOTE — OPERATIVE REPORT
COLONOSCOPY AND ESOPHAGOGASTRODUODENOSCOPY REPORT    Patient Name:  Sixto Hall Record #: [id number]  YOB: 1955  Date of Procedure: 6/30/2023    Referring physician: Sesar Guevara MD    Surgeon:  Tiago Guerrier. Neelima Franklin MD    Pre-op diagnosis: Cirrhosis secondary to BLANCAS with history of small esophageal varices in 2019, here for surveillance upper endoscopy. 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 Post-op diagnosis: Portal hypertensive colopathy, cecal polyp, grade 2 esophageal varices, gastritis/portal hypertensive gastropathy    Medications given:  MAC    Procedure #1:    After informed consent, discussion of risks and alternatives the patient was placed in the left lateral decubitus position and the high definition colonoscope was introduced into the rectum and advanced under direct visualization to the cecum which was identified by landmarks. Bowel preparation was fair. The mucosa was carefully inspected upon withdrawal of the scope. Withdrawal time was 10 minutes. ASA class was 3. Findings:   A 6 mm cecal polyp was cold snare excised. We did not see definitive diverticulosis this time. There was no evidence of ulcerations, colitis, vascular anomalies or mass lesions. Digital rectal exam was normal.    Procedure #2:   With the patient still in the left lateral decubitus position the gastroscope was inserted into the oropharynx and advanced under direct visualization to the second portion of the duodenum. Upon withdrawal of the scope, a retroflexed maneuver was performed to visualize the gastric angulus and cardia. Findings: The GE junction was at 40 cm. The esophagus was normal in appearance except for small to medium sized esophageal varices without stigmata for high risk of bleeding. The stomach demonstrated normal distensibility. There were no retained gastric contents and no outlet obstruction.   There were no gastric varices. There was diffuse erythema with coffee-ground material here and there. Gastritis versus portal evidence of gastropathy. Biopsies were taken. The duodenum was normal with no erosions and with a normal fold pattern by high definition endoscopy.       Plan:   High fiber diet  Await biopsy results    Specimens: Colon polyp and stomach  EBL: minimal    Aronchick bowel prep scale:  5 Inadequate (repeat preparation needed)  4 Poor (semisolid stool could not be suctioned and <90% of mucosa seen)  3 Fair (semisolid stool could not be suctioned, but >90% of mucosa seen)  2 Good (clear liquid covering up to 25% of mucosa, but >90% of mucosa seen)  1 Excellent (>95% of mucosa seen)

## 2023-07-17 ENCOUNTER — LAB ENCOUNTER (OUTPATIENT)
Dept: LAB | Age: 68
End: 2023-07-17
Attending: INTERNAL MEDICINE
Payer: MEDICARE

## 2023-07-17 DIAGNOSIS — Z79.4 ENCOUNTER FOR LONG-TERM (CURRENT) USE OF INSULIN (HCC): ICD-10-CM

## 2023-07-17 DIAGNOSIS — E78.00 PURE HYPERCHOLESTEROLEMIA: Primary | ICD-10-CM

## 2023-07-17 DIAGNOSIS — E11.9 DIABETES MELLITUS (HCC): ICD-10-CM

## 2023-07-17 LAB
ALBUMIN SERPL-MCNC: 3.1 G/DL (ref 3.4–5)
ALBUMIN/GLOB SERPL: 0.8 {RATIO} (ref 1–2)
ALP LIVER SERPL-CCNC: 119 U/L
ALT SERPL-CCNC: 48 U/L
ANION GAP SERPL CALC-SCNC: 3 MMOL/L (ref 0–18)
AST SERPL-CCNC: 42 U/L (ref 15–37)
BILIRUB SERPL-MCNC: 0.8 MG/DL (ref 0.1–2)
BUN BLD-MCNC: 11 MG/DL (ref 7–18)
CALCIUM BLD-MCNC: 9.1 MG/DL (ref 8.5–10.1)
CHLORIDE SERPL-SCNC: 106 MMOL/L (ref 98–112)
CHOLEST SERPL-MCNC: 161 MG/DL (ref ?–200)
CO2 SERPL-SCNC: 31 MMOL/L (ref 21–32)
CREAT BLD-MCNC: 0.89 MG/DL
EST. AVERAGE GLUCOSE BLD GHB EST-MCNC: 272 MG/DL (ref 68–126)
FASTING PATIENT LIPID ANSWER: YES
FASTING STATUS PATIENT QL REPORTED: YES
GFR SERPLBLD BASED ON 1.73 SQ M-ARVRAT: 72 ML/MIN/1.73M2 (ref 60–?)
GLOBULIN PLAS-MCNC: 4.1 G/DL (ref 2.8–4.4)
GLUCOSE BLD-MCNC: 190 MG/DL (ref 70–99)
HBA1C MFR BLD: 11.1 % (ref ?–5.7)
HDLC SERPL-MCNC: 72 MG/DL (ref 40–59)
LDLC SERPL CALC-MCNC: 73 MG/DL (ref ?–100)
NONHDLC SERPL-MCNC: 89 MG/DL (ref ?–130)
OSMOLALITY SERPL CALC.SUM OF ELEC: 294 MOSM/KG (ref 275–295)
POTASSIUM SERPL-SCNC: 3.8 MMOL/L (ref 3.5–5.1)
PROT SERPL-MCNC: 7.2 G/DL (ref 6.4–8.2)
SODIUM SERPL-SCNC: 140 MMOL/L (ref 136–145)
TRIGL SERPL-MCNC: 85 MG/DL (ref 30–149)
VLDLC SERPL CALC-MCNC: 13 MG/DL (ref 0–30)

## 2023-07-17 PROCEDURE — 36415 COLL VENOUS BLD VENIPUNCTURE: CPT

## 2023-07-17 PROCEDURE — 80061 LIPID PANEL: CPT

## 2023-07-17 PROCEDURE — 80053 COMPREHEN METABOLIC PANEL: CPT

## 2023-07-17 PROCEDURE — 83036 HEMOGLOBIN GLYCOSYLATED A1C: CPT

## 2023-07-18 ENCOUNTER — OFFICE VISIT (OUTPATIENT)
Dept: SURGERY | Facility: CLINIC | Age: 68
End: 2023-07-18
Payer: MEDICARE

## 2023-07-18 VITALS
OXYGEN SATURATION: 98 % | BODY MASS INDEX: 38.91 KG/M2 | SYSTOLIC BLOOD PRESSURE: 134 MMHG | HEIGHT: 62.9 IN | DIASTOLIC BLOOD PRESSURE: 78 MMHG | HEART RATE: 96 BPM | WEIGHT: 219.63 LBS

## 2023-07-18 DIAGNOSIS — Z79.4 TYPE 2 DIABETES MELLITUS WITHOUT COMPLICATION, WITH LONG-TERM CURRENT USE OF INSULIN (HCC): Primary | ICD-10-CM

## 2023-07-18 DIAGNOSIS — K76.0 FATTY LIVER: ICD-10-CM

## 2023-07-18 DIAGNOSIS — E11.9 TYPE 2 DIABETES MELLITUS WITHOUT COMPLICATION, WITH LONG-TERM CURRENT USE OF INSULIN (HCC): Primary | ICD-10-CM

## 2023-07-18 DIAGNOSIS — E66.9 OBESITY (BMI 30-39.9): ICD-10-CM

## 2023-07-18 DIAGNOSIS — M17.0 PRIMARY OSTEOARTHRITIS OF BOTH KNEES: ICD-10-CM

## 2023-07-18 PROCEDURE — 99215 OFFICE O/P EST HI 40 MIN: CPT | Performed by: INTERNAL MEDICINE

## 2023-07-26 ENCOUNTER — OFFICE VISIT (OUTPATIENT)
Dept: HEMATOLOGY/ONCOLOGY | Facility: HOSPITAL | Age: 68
End: 2023-07-26
Attending: INTERNAL MEDICINE
Payer: MEDICARE

## 2023-07-26 VITALS
HEIGHT: 63 IN | BODY MASS INDEX: 38.98 KG/M2 | TEMPERATURE: 98 F | RESPIRATION RATE: 18 BRPM | WEIGHT: 220 LBS | HEART RATE: 99 BPM | SYSTOLIC BLOOD PRESSURE: 133 MMHG | OXYGEN SATURATION: 97 % | DIASTOLIC BLOOD PRESSURE: 77 MMHG

## 2023-07-26 DIAGNOSIS — Z79.811 ENCOUNTER FOR MONITORING AROMATASE INHIBITOR THERAPY: ICD-10-CM

## 2023-07-26 DIAGNOSIS — Z85.3 ENCOUNTER FOR FOLLOW-UP SURVEILLANCE OF BREAST CANCER: ICD-10-CM

## 2023-07-26 DIAGNOSIS — Z08 ENCOUNTER FOR FOLLOW-UP SURVEILLANCE OF BREAST CANCER: ICD-10-CM

## 2023-07-26 DIAGNOSIS — Z51.81 ENCOUNTER FOR MONITORING AROMATASE INHIBITOR THERAPY: ICD-10-CM

## 2023-07-26 DIAGNOSIS — C50.911 MUCINOUS CARCINOMA OF BREAST, RIGHT (HCC): Primary | ICD-10-CM

## 2023-07-26 PROCEDURE — 99214 OFFICE O/P EST MOD 30 MIN: CPT | Performed by: INTERNAL MEDICINE

## 2023-07-27 ENCOUNTER — HOSPITAL ENCOUNTER (OUTPATIENT)
Dept: NUCLEAR MEDICINE | Facility: HOSPITAL | Age: 68
Discharge: HOME | End: 2023-07-27
Attending: INTERNAL MEDICINE
Payer: MEDICARE

## 2023-07-27 DIAGNOSIS — R11.0 NAUSEA: ICD-10-CM

## 2023-07-27 PROCEDURE — 78264 GASTRIC EMPTYING IMG STUDY: CPT | Performed by: INTERNAL MEDICINE

## 2023-07-31 ENCOUNTER — HOSPITAL ENCOUNTER (OUTPATIENT)
Dept: ENDOCRINOLOGY | Facility: HOSPITAL | Age: 68
End: 2023-07-31
Attending: INTERNAL MEDICINE
Payer: MEDICARE

## 2023-07-31 VITALS — WEIGHT: 221 LBS | BODY MASS INDEX: 39 KG/M2

## 2023-07-31 DIAGNOSIS — E11.9 TYPE 2 DIABETES MELLITUS WITHOUT COMPLICATION, WITH LONG-TERM CURRENT USE OF INSULIN (HCC): Primary | ICD-10-CM

## 2023-07-31 DIAGNOSIS — Z79.4 TYPE 2 DIABETES MELLITUS WITHOUT COMPLICATION, WITH LONG-TERM CURRENT USE OF INSULIN (HCC): Primary | ICD-10-CM

## 2023-07-31 PROCEDURE — 97802 MEDICAL NUTRITION INDIV IN: CPT

## 2023-07-31 RX ORDER — CLOPIDOGREL BISULFATE 75 MG/1
TABLET ORAL
Qty: 90 TABLET | Refills: 3 | Status: SHIPPED | OUTPATIENT
Start: 2023-07-31

## 2023-07-31 NOTE — TELEPHONE ENCOUNTER
WBCs reviewed  Refill request is for a maintenance medication and has met the criteria specified in the Ambulatory Medication Refill Standing Order for eligibility, visits, laboratory, alerts and was sent to the requested pharmacy.     Requested Prescriptions     Signed Prescriptions Disp Refills    CLOPIDOGREL 75 MG Oral Tab 90 tablet 3     Sig: TAKE 1 TABLET(75 MG) BY MOUTH DAILY     Authorizing Provider: Jennifer Delaney     Ordering User: Amanda Ogden

## 2023-07-31 NOTE — TELEPHONE ENCOUNTER
TO Alma Quijano to please advise on medication. OK to continue with platelet levels in CBC from April?     Component      Latest Ref Rng 4/3/2023   WBC      4.0 - 11.0 x10(3) uL 4.4    RBC      3.80 - 5.30 x10(6)uL 4.73    Hemoglobin      12.0 - 16.0 g/dL 13.9    Hematocrit      35.0 - 48.0 % 42.5    MCV      80.0 - 100.0 fL 89.9    MCH      26.0 - 34.0 pg 29.4    MCHC      31.0 - 37.0 g/dL 32.7    RDW-SD      35.1 - 46.3 fL 43.3    RDW      11.0 - 15.0 % 13.2    Platelet Count      471.8 - 450.0 10(3)uL 136.0 (L)    Prelim Neutrophil Abs      1.50 - 7.70 x10 (3) uL 1.62    Neutrophils Absolute      1.50 - 7.70 x10(3) uL 1.62    Lymphocytes Absolute      1.00 - 4.00 x10(3) uL 2.00    Monocytes Absolute      0.10 - 1.00 x10(3) uL 0.47    Eosinophils Absolute      0.00 - 0.70 x10(3) uL 0.21    Basophils Absolute      0.00 - 0.20 x10(3) uL 0.04    Immature Granulocyte Absolute      0.00 - 1.00 x10(3) uL 0.01    Neutrophils %      % 37.3    Lymphocytes %      % 46.0    Monocytes %      % 10.8    Eosinophils %      % 4.8    Basophils %      % 0.9    Immature Granulocyte %      % 0.2       Legend:  (L) Low

## 2023-07-31 NOTE — PATIENT INSTRUCTIONS
1 Give insulin before meals, 30 - 60 min ahead of time  2 Try for non carb snacks or 15 g of carb or less snacks  3 Log 3-5 days of food and bring to next appt, ok for 45-60 g of carbs per meal as long as bolusing beforehand

## 2023-08-09 ENCOUNTER — APPOINTMENT (OUTPATIENT)
Dept: GENERAL RADIOLOGY | Facility: HOSPITAL | Age: 68
End: 2023-08-09
Attending: EMERGENCY MEDICINE
Payer: MEDICARE

## 2023-08-09 ENCOUNTER — HOSPITAL ENCOUNTER (INPATIENT)
Facility: HOSPITAL | Age: 68
LOS: 3 days | Discharge: HOME HEALTH | End: 2023-08-12
Attending: EMERGENCY MEDICINE | Admitting: INTERNAL MEDICINE
Payer: MEDICARE

## 2023-08-09 ENCOUNTER — HOSPITAL ENCOUNTER (INPATIENT)
Facility: HOSPITAL | Age: 68
LOS: 3 days | Discharge: HOME | End: 2023-08-12
Attending: EMERGENCY MEDICINE | Admitting: INTERNAL MEDICINE
Payer: MEDICARE

## 2023-08-09 DIAGNOSIS — S42.91XA CLOSED FRACTURE OF RIGHT SHOULDER, INITIAL ENCOUNTER: ICD-10-CM

## 2023-08-09 DIAGNOSIS — S42.211A CLOSED DISPLACED FRACTURE OF SURGICAL NECK OF RIGHT HUMERUS, UNSPECIFIED FRACTURE MORPHOLOGY, INITIAL ENCOUNTER: Primary | ICD-10-CM

## 2023-08-09 LAB
ANION GAP SERPL CALC-SCNC: 4 MMOL/L (ref 0–18)
ATRIAL RATE: 85 BPM
BASOPHILS # BLD AUTO: 0.03 X10(3) UL (ref 0–0.2)
BASOPHILS NFR BLD AUTO: 0.7 %
BUN BLD-MCNC: 14 MG/DL (ref 7–18)
BUN/CREAT SERPL: 15.9 (ref 10–20)
CALCIUM BLD-MCNC: 9.1 MG/DL (ref 8.5–10.1)
CHLORIDE SERPL-SCNC: 109 MMOL/L (ref 98–112)
CO2 SERPL-SCNC: 29 MMOL/L (ref 21–32)
CREAT BLD-MCNC: 0.88 MG/DL
DEPRECATED RDW RBC AUTO: 42.4 FL (ref 35.1–46.3)
EGFRCR SERPLBLD CKD-EPI 2021: 72 ML/MIN/1.73M2 (ref 60–?)
EOSINOPHIL # BLD AUTO: 0.16 X10(3) UL (ref 0–0.7)
EOSINOPHIL NFR BLD AUTO: 3.6 %
ERYTHROCYTE [DISTWIDTH] IN BLOOD BY AUTOMATED COUNT: 12.9 % (ref 11–15)
GLUCOSE BLD-MCNC: 211 MG/DL (ref 70–99)
GLUCOSE BLDC GLUCOMTR-MCNC: 150 MG/DL (ref 70–99)
GLUCOSE BLDC GLUCOMTR-MCNC: 208 MG/DL (ref 70–99)
HCT VFR BLD AUTO: 41.2 %
HGB BLD-MCNC: 14.1 G/DL
IMM GRANULOCYTES # BLD AUTO: 0.01 X10(3) UL (ref 0–1)
IMM GRANULOCYTES NFR BLD: 0.2 %
LYMPHOCYTES # BLD AUTO: 1.65 X10(3) UL (ref 1–4)
LYMPHOCYTES NFR BLD AUTO: 37.5 %
MCH RBC QN AUTO: 30.6 PG (ref 26–34)
MCHC RBC AUTO-ENTMCNC: 34.2 G/DL (ref 31–37)
MCV RBC AUTO: 89.4 FL
MONOCYTES # BLD AUTO: 0.47 X10(3) UL (ref 0.1–1)
MONOCYTES NFR BLD AUTO: 10.7 %
NEUTROPHILS # BLD AUTO: 2.08 X10 (3) UL (ref 1.5–7.7)
NEUTROPHILS # BLD AUTO: 2.08 X10(3) UL (ref 1.5–7.7)
NEUTROPHILS NFR BLD AUTO: 47.3 %
OSMOLALITY SERPL CALC.SUM OF ELEC: 301 MOSM/KG (ref 275–295)
P AXIS: -7 DEGREES
P-R INTERVAL: 180 MS
PLATELET # BLD AUTO: 126 10(3)UL (ref 150–450)
POTASSIUM SERPL-SCNC: 3.8 MMOL/L (ref 3.5–5.1)
Q-T INTERVAL: 370 MS
QRS DURATION: 90 MS
QTC CALCULATION (BEZET): 440 MS
R AXIS: -1 DEGREES
RBC # BLD AUTO: 4.61 X10(6)UL
SODIUM SERPL-SCNC: 142 MMOL/L (ref 136–145)
T AXIS: 11 DEGREES
VENTRICULAR RATE: 85 BPM
WBC # BLD AUTO: 4.4 X10(3) UL (ref 4–11)

## 2023-08-09 PROCEDURE — 73030 X-RAY EXAM OF SHOULDER: CPT | Performed by: EMERGENCY MEDICINE

## 2023-08-09 RX ORDER — FAMOTIDINE 20 MG/1
40 TABLET, FILM COATED ORAL 2 TIMES DAILY PRN
Status: DISCONTINUED | OUTPATIENT
Start: 2023-08-09 | End: 2023-08-12

## 2023-08-09 RX ORDER — HYDROMORPHONE HYDROCHLORIDE 1 MG/ML
0.4 INJECTION, SOLUTION INTRAMUSCULAR; INTRAVENOUS; SUBCUTANEOUS EVERY 2 HOUR PRN
Status: DISCONTINUED | OUTPATIENT
Start: 2023-08-09 | End: 2023-08-12

## 2023-08-09 RX ORDER — LEVOTHYROXINE SODIUM 88 UG/1
88 TABLET ORAL
Status: DISCONTINUED | OUTPATIENT
Start: 2023-08-10 | End: 2023-08-12

## 2023-08-09 RX ORDER — HYDROMORPHONE HYDROCHLORIDE 1 MG/ML
0.8 INJECTION, SOLUTION INTRAMUSCULAR; INTRAVENOUS; SUBCUTANEOUS EVERY 2 HOUR PRN
Status: DISCONTINUED | OUTPATIENT
Start: 2023-08-09 | End: 2023-08-12

## 2023-08-09 RX ORDER — LOSARTAN POTASSIUM 25 MG/1
12.5 TABLET ORAL DAILY
Status: DISCONTINUED | OUTPATIENT
Start: 2023-08-09 | End: 2023-08-12

## 2023-08-09 RX ORDER — POTASSIUM CHLORIDE 750 MG/1
40 TABLET, EXTENDED RELEASE ORAL 2 TIMES DAILY
Status: DISCONTINUED | OUTPATIENT
Start: 2023-08-09 | End: 2023-08-12

## 2023-08-09 RX ORDER — MORPHINE SULFATE 4 MG/ML
INJECTION, SOLUTION INTRAMUSCULAR; INTRAVENOUS
Status: COMPLETED
Start: 2023-08-09 | End: 2023-08-09

## 2023-08-09 RX ORDER — ALBUTEROL SULFATE 90 UG/1
2 AEROSOL, METERED RESPIRATORY (INHALATION) EVERY 6 HOURS PRN
Status: DISCONTINUED | OUTPATIENT
Start: 2023-08-09 | End: 2023-08-12

## 2023-08-09 RX ORDER — POLYETHYLENE GLYCOL 3350 17 G/17G
17 POWDER, FOR SOLUTION ORAL DAILY PRN
Status: DISCONTINUED | OUTPATIENT
Start: 2023-08-09 | End: 2023-08-11

## 2023-08-09 RX ORDER — FUROSEMIDE 20 MG/1
20 TABLET ORAL
Status: DISCONTINUED | OUTPATIENT
Start: 2023-08-09 | End: 2023-08-12

## 2023-08-09 RX ORDER — IMIPRAMINE HCL 50 MG
150 TABLET ORAL NIGHTLY
Status: DISCONTINUED | OUTPATIENT
Start: 2023-08-09 | End: 2023-08-09

## 2023-08-09 RX ORDER — TOPIRAMATE 25 MG/1
25 TABLET ORAL EVERY EVENING
Status: DISCONTINUED | OUTPATIENT
Start: 2023-08-09 | End: 2023-08-10

## 2023-08-09 RX ORDER — IMIPRAMINE HCL 50 MG
150 TABLET ORAL NIGHTLY
Status: DISCONTINUED | OUTPATIENT
Start: 2023-08-09 | End: 2023-08-12

## 2023-08-09 RX ORDER — LETROZOLE 2.5 MG/1
2.5 TABLET, FILM COATED ORAL DAILY
Status: DISCONTINUED | OUTPATIENT
Start: 2023-08-09 | End: 2023-08-12

## 2023-08-09 RX ORDER — HEPARIN SODIUM 5000 [USP'U]/ML
5000 INJECTION, SOLUTION INTRAVENOUS; SUBCUTANEOUS EVERY 8 HOURS SCHEDULED
Status: DISCONTINUED | OUTPATIENT
Start: 2023-08-09 | End: 2023-08-12

## 2023-08-09 RX ORDER — LIDOCAINE 50 MG/G
OINTMENT TOPICAL 2 TIMES DAILY PRN
Status: DISCONTINUED | OUTPATIENT
Start: 2023-08-09 | End: 2023-08-12

## 2023-08-09 RX ORDER — ATORVASTATIN CALCIUM 10 MG/1
10 TABLET, FILM COATED ORAL NIGHTLY
Status: DISCONTINUED | OUTPATIENT
Start: 2023-08-09 | End: 2023-08-12

## 2023-08-09 RX ORDER — MAGNESIUM OXIDE 400 MG/1
400 TABLET ORAL 2 TIMES DAILY
Status: DISCONTINUED | OUTPATIENT
Start: 2023-08-09 | End: 2023-08-12

## 2023-08-09 RX ORDER — IMIPRAMINE HCL 50 MG
50 TABLET ORAL 3 TIMES DAILY
Status: DISCONTINUED | OUTPATIENT
Start: 2023-08-10 | End: 2023-08-09

## 2023-08-09 RX ORDER — MORPHINE SULFATE 4 MG/ML
4 INJECTION, SOLUTION INTRAMUSCULAR; INTRAVENOUS ONCE
Status: COMPLETED | OUTPATIENT
Start: 2023-08-09 | End: 2023-08-09

## 2023-08-09 RX ORDER — HYDROMORPHONE HYDROCHLORIDE 1 MG/ML
1.2 INJECTION, SOLUTION INTRAMUSCULAR; INTRAVENOUS; SUBCUTANEOUS EVERY 2 HOUR PRN
Status: DISCONTINUED | OUTPATIENT
Start: 2023-08-09 | End: 2023-08-12

## 2023-08-09 NOTE — ED QUICK NOTES
Orders for admission, patient is aware of plan and ready to go upstairs. Any questions, please call ED RN Jaron Ward at extension [phone].      Patient Covid vaccination status: Fully vaccinated     COVID Test Ordered in ED: None    COVID Suspicion at Admission: N/A    Running Infusions:  None    Mental Status/LOC at time of transport: A&Ox4    Other pertinent information: right arm precautions however she states MD okayed to use arm, GCM on left upper arm, Continent, fall risk, from home with       CIWA score: N/A   NIH score:  N/A

## 2023-08-10 LAB
ANION GAP SERPL CALC-SCNC: 2 MMOL/L (ref 0–18)
BASOPHILS # BLD AUTO: 0.04 X10(3) UL (ref 0–0.2)
BASOPHILS NFR BLD AUTO: 0.6 %
BUN BLD-MCNC: 16 MG/DL (ref 7–18)
BUN/CREAT SERPL: 17.4 (ref 10–20)
CALCIUM BLD-MCNC: 9.2 MG/DL (ref 8.5–10.1)
CHLORIDE SERPL-SCNC: 106 MMOL/L (ref 98–112)
CO2 SERPL-SCNC: 31 MMOL/L (ref 21–32)
CREAT BLD-MCNC: 0.92 MG/DL
DEPRECATED RDW RBC AUTO: 44.6 FL (ref 35.1–46.3)
EGFRCR SERPLBLD CKD-EPI 2021: 69 ML/MIN/1.73M2 (ref 60–?)
EOSINOPHIL # BLD AUTO: 0.03 X10(3) UL (ref 0–0.7)
EOSINOPHIL NFR BLD AUTO: 0.4 %
ERYTHROCYTE [DISTWIDTH] IN BLOOD BY AUTOMATED COUNT: 13.1 % (ref 11–15)
GLUCOSE BLD-MCNC: 252 MG/DL (ref 70–99)
GLUCOSE BLDC GLUCOMTR-MCNC: 214 MG/DL (ref 70–99)
GLUCOSE BLDC GLUCOMTR-MCNC: 301 MG/DL (ref 70–99)
GLUCOSE BLDC GLUCOMTR-MCNC: 315 MG/DL (ref 70–99)
HCT VFR BLD AUTO: 40.5 %
HGB BLD-MCNC: 13.3 G/DL
IMM GRANULOCYTES # BLD AUTO: 0.01 X10(3) UL (ref 0–1)
IMM GRANULOCYTES NFR BLD: 0.1 %
LYMPHOCYTES # BLD AUTO: 1.46 X10(3) UL (ref 1–4)
LYMPHOCYTES NFR BLD AUTO: 21.3 %
MCH RBC QN AUTO: 30.2 PG (ref 26–34)
MCHC RBC AUTO-ENTMCNC: 32.8 G/DL (ref 31–37)
MCV RBC AUTO: 91.8 FL
MONOCYTES # BLD AUTO: 0.73 X10(3) UL (ref 0.1–1)
MONOCYTES NFR BLD AUTO: 10.6 %
NEUTROPHILS # BLD AUTO: 4.59 X10 (3) UL (ref 1.5–7.7)
NEUTROPHILS # BLD AUTO: 4.59 X10(3) UL (ref 1.5–7.7)
NEUTROPHILS NFR BLD AUTO: 67 %
OSMOLALITY SERPL CALC.SUM OF ELEC: 298 MOSM/KG (ref 275–295)
PLATELET # BLD AUTO: 145 10(3)UL (ref 150–450)
POTASSIUM SERPL-SCNC: 4.5 MMOL/L (ref 3.5–5.1)
RBC # BLD AUTO: 4.41 X10(6)UL
SODIUM SERPL-SCNC: 139 MMOL/L (ref 136–145)
WBC # BLD AUTO: 6.9 X10(3) UL (ref 4–11)

## 2023-08-10 PROCEDURE — 99222 1ST HOSP IP/OBS MODERATE 55: CPT | Performed by: INTERNAL MEDICINE

## 2023-08-10 RX ORDER — LIDOCAINE 50 MG/G
OINTMENT TOPICAL 2 TIMES DAILY
Status: DISCONTINUED | OUTPATIENT
Start: 2023-08-10 | End: 2023-08-12

## 2023-08-10 RX ORDER — OXYCODONE HYDROCHLORIDE 5 MG/1
10 TABLET ORAL EVERY 4 HOURS PRN
Status: DISCONTINUED | OUTPATIENT
Start: 2023-08-10 | End: 2023-08-12

## 2023-08-10 RX ORDER — CLOPIDOGREL BISULFATE 75 MG/1
75 TABLET ORAL DAILY
Status: DISCONTINUED | OUTPATIENT
Start: 2023-08-10 | End: 2023-08-12

## 2023-08-10 RX ORDER — ACETAMINOPHEN 325 MG/1
650 TABLET ORAL EVERY 6 HOURS PRN
Status: DISCONTINUED | OUTPATIENT
Start: 2023-08-10 | End: 2023-08-12

## 2023-08-10 RX ORDER — HYDROMORPHONE HYDROCHLORIDE 2 MG/1
2 TABLET ORAL
Status: DISCONTINUED | OUTPATIENT
Start: 2023-08-10 | End: 2023-08-12

## 2023-08-10 NOTE — PLAN OF CARE
Patient A&OX4. Up and walking with standby assist. Voiding freely. SCD's for DVT prophylaxis. Pain management with dilaudid &oxy orals, patient states she is still at 9/10 even with a schedule of every 2 hours. Paged doctor and pain was put on consult. Carb controlled diet, ACHS. Encouraged to cough and deep breathe and patient is compliant. Fall precautions in place including bed in lowest position, call light and personal belongings within reach, non-skid socks. Frequent rounding by nursing staff. Per ortho, plan for possible surgery within the next week to ten days, patient has to follow up with ortho doctor outpatient. Ok to DC home today pending medical clearance. Patient refused to go home today since pain in not under control. Have pain on consult, waiting for doctor.   Problem: Patient Centered Care  Goal: Patient preferences are identified and integrated in the patient's plan of care  Description: Interventions:  - What would you like us to know as we care for you?   - Provide timely, complete, and accurate information to patient/family  - Incorporate patient and family knowledge, values, beliefs, and cultural backgrounds into the planning and delivery of care  - Encourage patient/family to participate in care and decision-making at the level they choose  - Honor patient and family perspectives and choices  Outcome: Progressing

## 2023-08-10 NOTE — PROGRESS NOTES
Orthopedics    Radiographs were reviewed today and they show a comminuted proximal humerus fracture. I discussed the case with my partner Dr. Wilfredo Tillman who is a shoulder specialist.  He suggested to place the patient in a sling, obtain pain control and she can be discharged home tomorrow if she feels good. His office will follow-up with her and plan to surgically manage this shoulder fracture in the near future. A full consul t will follow tomorrow when I see her in the morning.     Mery Lopez MD

## 2023-08-10 NOTE — PROGRESS NOTES
08/10/23 0800   VISIT TYPE   OT Inpatient Visit Type (Documentation Required) Attempted Evaluation     Attempted to initiate OT eval however pt requesting to reschedule d/t poor pain control. Spoke with nurse who is addressing pt needs. Will reschedule session for later this morning.

## 2023-08-10 NOTE — PHYSICAL THERAPY NOTE
PHYSICAL THERAPY EVALUATION - INPATIENT     Room Number: 442/359-D  Evaluation Date: 8/10/2023  Type of Evaluation: Initial   Physician Order: PT Eval and Treat    Presenting Problem:  (R humeral fx)  Reason for Therapy: Mobility Dysfunction and Discharge Planning    PHYSICAL THERAPY ASSESSMENT   Orders received and chart reviewed. RN approved participation in physical therapy. PPE worn by therapist: mask and gloves. Patient was wearing a mask during session. Patient is a 79year old female admitted 8/9/2023 for Presenting Problem:  (R humeral fx). Patient presented in bed with 3/10 pain. Pt has chief c/o lightheadedness from meds. Pt is reluctant to wear sling and resists recommendation to use cane for balance during ambulation. Pt eventually dons sling with therapists assist but refuses to have it on to a snug fit. Education provided on Physical therapy plan of care and physiological benefits of out of bed mobility, gait training with cane. Patient with fair carryover. Bed mobility: mod A  Transfers: Supervision  Gait Assistance: Contact guard assist  Distance (ft):  (40x2)  Assistive Device: Cane  Pattern:  (reciprocal)          Patient was left in bedside chair at end of session with all needs in reach. Patient's current functional deficits include ambulation, transfers and bed mobiltiy. The patient's Approx Degree of Impairment: 54.16% has been calculated based on documentation in the [facility] '6 clicks' Inpatient Basic Mobility Short Form. Research supports that patients with this level of impairment may benefit from CARLEY however pt sleeps in recliner at home which is why bed mobility was so difficult for her. PT recommends home Home with home health PT and use of cane for ambulation. RN aware of patient status post session. Patient will benefit from continued IP PT services to address these deficits in preparation for discharge.     DISCHARGE RECOMMENDATIONS  PT Discharge Recommendations: 24 hour care/supervision;Home with home health PT/OT    PLAN  PT Treatment Plan: Bed mobility  Rehab Potential : Fair  Frequency (Obs): Daily       PHYSICAL THERAPY MEDICAL/SOCIAL HISTORY     History related to current admission:      Problem List  Principal Problem:    Closed displaced fracture of surgical neck of right humerus, unspecified fracture morphology, initial encounter      Past Medical History  Past Medical History:   Diagnosis Date    Acute, but ill-defined, cerebrovascular disease     Adenomatous colon polyp 09/16/2013    Anxiety     Anxiety state     Arthritis     Asthma     Breast CA ([address]     Cellulitis and abscess of leg     CVA (cerebral infarction)     Depression     Disorder of thyroid     Esophageal reflux     Essential hypertension     Extrinsic asthma, unspecified     High cholesterol     Hypothyroidism     Liver cirrhosis (HCC)     cirrhosis    Migraines     Neuropathy     Obesity     Obesity (BMI 30-39. 9)     Other and unspecified hyperlipidemia     PONV (postoperative nausea and vomiting)     Shingles     March 2020    Stroke [facility])     aphasia- 10 years ago    Type II or unspecified type diabetes mellitus without mention of complication, not stated as uncontrolled     Vertigo     Visual impairment     glasses       Past Surgical History  Past Surgical History:   Procedure Laterality Date    APPENDECTOMY      CHOLECYSTECTOMY      COLONOSCOPY  03/2022    COLONOSCOPY N/A 01/21/2019    Procedure: COLONOSCOPY;  Surgeon: Ahmet Burnett MD;  Location: [address] [place] [address] ENDOSCOPY    COLONOSCOPY N/A 04/04/2022    Procedure: COLONOSCOPY;  Surgeon: Ahmet Burnett MD;  Location: [address] [place] [address] ENDOSCOPY    COLONOSCOPY N/A 05/13/2022    Procedure: COLONOSCOPY;  Surgeon: Ahmet Burnett MD;  Location: [address] [place] [address] ENDOSCOPY    COLONOSCOPY  06/2023    COLONOSCOPY N/A 6/30/2023    Procedure: COLONOSCOPY;  Surgeon: Ahmet Burnett MD;  Location: [address] [place] [address] ENDOSCOPY    COLONOSCOPY & POLYPECTOMY  09/16/2013    adenoma    CORTISONE INJECTION Left Left knee, Dr. Radha Russell      4 of them    EGD  2013    EGD  2019    EGD  2022    EGD  2023    ELECTROCARDIOGRAM, COMPLETE  2013    scanned to media    EXTRACTION ERUPTED TOOTH/EXR  2018    HERNIA SURGERY      HYSTERECTOMY            RADIATION RIGHT      TOOTH IMPLANTATION  10/95/0541    UMBILICAL HERNIA REPAIR         HOME SITUATION  Type of Home: House   Home Layout: Able to live on main level              Lives With: Family (spouse states he will remain with pt)             Prior Level of [place]: ind    SUBJECTIVE  \"I don't want to wear the sling\"    PHYSICAL THERAPY EXAMINATION     OBJECTIVE  Precautions:  (RT UE sling)  Fall Risk: High fall risk    WEIGHT BEARING RESTRICTION  Weight Bearing Restriction: R upper extremity  R Upper Extremity: Non-Weight Bearing             PAIN ASSESSMENT  Ratin  Location:  (R shoulder)       COGNITION  Overall Cognitive Status:  WFL - within functional limits    RANGE OF MOTION AND STRENGTH ASSESSMENT  Upper extremity ROM and strength are within functional limits     Lower extremity ROM is within functional limits     Lower extremity strength is within functional limits     BALANCE  Static Sitting: Fair  Dynamic Sitting: Fair  Static Standing: Fair -  Dynamic Standing: Fair -         AM-PAC '6-Clicks' INPATIENT SHORT FORM - BASIC MOBILITY  How much difficulty does the patient currently have. .. Patient Difficulty: Turning over in bed (including adjusting bedclothes, sheets and blankets)?: A Lot   Patient Difficulty: Sitting down on and standing up from a chair with arms (e.g., wheelchair, bedside commode, etc.): A Lot   Patient Difficulty: Moving from lying on back to sitting on the side of the bed?: A Little   How much help from another person does the patient currently need. ..    Help from Another: Moving to and from a bed to a chair (including a wheelchair)?: A Little   Help from Another: Need to walk in hospital room?: A Little   Help from Another: Climbing 3-5 steps with a railing?: A Little     AM-PAC Score:  Raw Score: 16   Approx Degree of Impairment: 54.16%   Standardized Score (AM-PAC Scale): 40.78   CMS Modifier (G-Code): CK      Exercise/Education Provided:  Bed mobility  Gait training  Transfer training    Patient End of Session: Up in chair    CURRENT GOALS    Goals to be met by:   Patient Goal Patient's self-stated goal is: home   Goal #1 Patient is able to demonstrate supine - sit EOB @ level: supervision     Goal #1   Current Status    Goal #2 Patient is able to demonstrate transfers Sit to/from Stand at assistance level: supervision with cane - straight     Goal #2  Current Status    Goal #3 Patient is able to ambulate 300 feet with assist device: cane - straight at assistance level: modified independent   Goal #3   Current Status    Goal #4 Patient will negotiate 3 stairs/one curb w/ assistive device and supervision   Goal #4   Current Status    Goal #5 Patient to demonstrate independence with home activity/exercise instructions provided to patient in preparation for discharge.    Goal #5   Current Status    Goal #6    Goal #6  Current Status      Patient Evaluation Complexity Level:  History Low - no personal factors and/or co-morbidities   Examination of body systems Low - addressing 1-2 elements   Clinical Presentation Low - Stable   Clinical Decision Making Low Complexity       Gait Trainin minutes

## 2023-08-10 NOTE — PLAN OF CARE
Patient alert and oriented X4. Up and walking with standby assist. Refusing to wear sling to R arm. Voiding freely. SCD's for DVT prophylaxis. Pain management with dilaudid prn. Carb controlled diet, ACHS. Encouraged to cough and deep breathe and patient is compliant. Fall precautions in place including bed in lowest position, call light and personal belongings within reach, non-skid socks. Frequent rounding by nursing staff. Per ortho, plan for surgery within the next week to ten days. Ok to DC home today pending medical clearance. Problem: Patient Centered Care  Goal: Patient preferences are identified and integrated in the patient's plan of care  Description: Interventions:  - What would you like us to know as we care for you?  I am [place] with [place]  - Provide timely, complete, and accurate information to patient/family  - Incorporate patient and family knowledge, values, beliefs, and cultural backgrounds into the planning and delivery of care  - Encourage patient/family to participate in care and decision-making at the level they choose  - Honor patient and family perspectives and choices  Outcome: Progressing     Problem: PAIN - ADULT  Goal: Verbalizes/displays adequate comfort level or patient's stated pain goal  Description: INTERVENTIONS:  - Encourage pt to monitor pain and request assistance  - Assess pain using appropriate pain scale  - Administer analgesics based on type and severity of pain and evaluate response  - Implement non-pharmacological measures as appropriate and evaluate response  - Consider cultural and social influences on pain and pain management  - Manage/alleviate anxiety  - Utilize distraction and/or relaxation techniques  - Monitor for opioid side effects  - Notify MD/LIP if interventions unsuccessful or patient reports new pain  - Anticipate increased pain with activity and pre-medicate as appropriate  Outcome: Progressing

## 2023-08-11 LAB
GLUCOSE BLDC GLUCOMTR-MCNC: 155 MG/DL (ref 70–99)
GLUCOSE BLDC GLUCOMTR-MCNC: 204 MG/DL (ref 70–99)
GLUCOSE BLDC GLUCOMTR-MCNC: 204 MG/DL (ref 70–99)
GLUCOSE BLDC GLUCOMTR-MCNC: 297 MG/DL (ref 70–99)

## 2023-08-11 PROCEDURE — 99232 SBSQ HOSP IP/OBS MODERATE 35: CPT | Performed by: INTERNAL MEDICINE

## 2023-08-11 RX ORDER — GABAPENTIN 100 MG/1
100 CAPSULE ORAL 3 TIMES DAILY
Status: DISCONTINUED | OUTPATIENT
Start: 2023-08-11 | End: 2023-08-12

## 2023-08-11 RX ORDER — POLYETHYLENE GLYCOL 3350 17 G/17G
17 POWDER, FOR SOLUTION ORAL DAILY
Status: DISCONTINUED | OUTPATIENT
Start: 2023-08-11 | End: 2023-08-12

## 2023-08-11 RX ORDER — CELECOXIB 100 MG/1
100 CAPSULE ORAL 2 TIMES DAILY
Status: DISCONTINUED | OUTPATIENT
Start: 2023-08-11 | End: 2023-08-12

## 2023-08-11 RX ORDER — GABAPENTIN 100 MG/1
100 CAPSULE ORAL 3 TIMES DAILY
Qty: 90 CAPSULE | Refills: 0 | Status: SHIPPED | OUTPATIENT
Start: 2023-08-11

## 2023-08-11 NOTE — CM/SW NOTE
Department  notified of request for [facility] referrals started. Assigned CM/SW to follow up with pt/family on further discharge planning.      Anyi Night   August 11, 2023   09:05

## 2023-08-11 NOTE — CHRONIC PAIN
Interventional Pain Medicine  New Patient Note    Subjective:     Referring Physician: No referring provider defined for this encounter. Record Review: I personally reviewed *** records    Chief Complaint: Fall     History of Present Illness:  Bright Elizabeth is a 79year old female presents for Fall. Location: ***  Severity: ***/10  Descriptors: ***  Aggravating Factors: ***  Reliving Factors: ***  Associated Symptoms: ***    Functional Goals of Treatment: ***    Previous CURT: ***  Current CURT: ***  PHQ9: ***    Current Medication:   [unfilled]    Previous Pain Medications:  ***    Previous Non-Interventional Treatment:  ***    Previous Interventions/Consults:   ***    Previous Interventional Injections:  ***    Other Medical History  Past Medical History:   Diagnosis Date    Acute, but ill-defined, cerebrovascular disease     Adenomatous colon polyp 09/16/2013    Anxiety     Anxiety state     Arthritis     Asthma     Breast CA (HCC)     Cellulitis and abscess of leg     CVA (cerebral infarction)     Depression     Disorder of thyroid     Esophageal reflux     Essential hypertension     Extrinsic asthma, unspecified     High cholesterol     Hypothyroidism     Liver cirrhosis (HCC)     cirrhosis    Migraines     Neuropathy     Obesity     Obesity (BMI 30-39. 9)     Other and unspecified hyperlipidemia     PONV (postoperative nausea and vomiting)     Shingles     March 2020    Stroke [facility])     aphasia- 10 years ago    Type II or unspecified type diabetes mellitus without mention of complication, not stated as uncontrolled     Vertigo     Visual impairment     glasses       Diabetes: ***      A1C: ***  Anticoagulation: ***    Work Status:  ***    Review of Systems:  CONSTITUTIONAL: denies fevers, chills  HEENT: denies swallowing difficulties, sore throat  CARDIOVASCULAR: denies chest pain, palpitations, syncope  RESPIRATORY: denies shortness of breath, cough, wheezing  GI: denies change in bowel habits, nausea, vomiting  : denies change in bladder function, frequency, dysuria  SKIN: denies rash, skin changes  MSK: Per HPI  NEURO: Per HPI  PSYCH: Per HPI      General Physical Exam:    Constitutional  Oriented to person, place, and time. Appears well-developed and   well-nourished  Head    Normocephalic and atraumatic. Eyes    Pupils are equal, round, and reactive to light.   Neck    Neck supple  Cardiovascular  Minimal to no peripheral edema, intact distal pulses  Pulmonary/Chest  Effort normal, no shortness of breath noted  Neurological   Alert and oriented to person, place, and time  Skin    Skin is warm and dry  Psychiatric   Normal mood and affect, behavior and judgment    Neurologic & Musculoskeletal Exam    Cervical    Region Exam Right  (+/-) Left  (+/-)   Cervical Musculature  Tender w/ Palpation - -   Cervical Facets Pain w/ Extension - -   Upper Extremity  Spurling's - -   Upper Extremity Bakody's - -       Sensation Right Left   Neck Normal Normal   C5: Shoulder Normal Normal   C6: Thumb, radial aspect of hand/forearm (Radial Nerve) Normal Normal   C7: Long finger (Median Nerve) Normal Normal   C8: Little finger, ulnar aspect of hand/forearm (Ulnar n.) Normal Normal   T1; Medial forearm/arm Normal Normal         Motor Strength Right Left   C5: Shoulder abduction (Deltoid) 5/5 5/5   C5: Elbow flexion (Biceps, Brachialis) 5/5 5/5   C6: Wrist extension (ECRB, ECRL) 5/5 5/5   C7: Elbow extension (Triceps) 5/5 5/5   C8: Finger flexion ( strength) 5/5 5/5   T1: Finger abduction  5/5 5/5       Reflexes Right Left   C5: Biceps 2/4 2/4   C6: Brachioradialis  2/4 2/4   C7: Triceps 2/4 2/4   Glover's Absent Absent   Clonus Absent Absent       Lumbar    Region Exam Right (+/-) Left  (+/-)   Lumbar Musculature Tender w/ palpation - -   Lumbar Facet Pain w/ extension - -         Lower Extremity SLR - -   Lower Extremity Crossed SLR - -       Sensation Right Left   L2: Proximal Anterior Thigh Normal Normal   L3: Mid Anterior Thigh Normal Normal   L4: Medial leg/foot, great toe (Saphenous n.) Normal Normal   L5: Dorsum of mid foot Normal Normal   S1: Lateral leg/foot, little toe, back of leg (Sural n.) Normal Normal       Motor Strength Right Left   L2:  5/5 5/5   L3:  5/5 5/5   L4:  5/5 5/5   L5:  5/5 5/5   S1:  5/5 5/5       Reflexes Right Left   L4: Patellar 2/4 2/4   S1: Achilles 2/4 2/4   Babinski Absent Absent   Clonus Absent Absent       Sacroiliac Joint    Exam Right Left   Connor's Finger (PSIS) - -   KIMO - -   Gaenslen's - -   Compression - -   Distraction - -       Hip    Exam Right Left   FADIR - -   Greater Trochanter tenderness - -   Piriformis tenderness - -       Knee    Exam Right Left   Palpation tender - -   Effusion - -   ACL/PCL - -   Valgus/Varus - -   Simi's - -       Shoulder    Exam Right Left   Palpation tender - -   Range of Motion - -   Neer's - -   Hawkin's - -   Speed's - -   Empty Can - -       Imaging:        Assessment & Plan:  Harvey Coto is a 79year old female

## 2023-08-11 NOTE — CM/SW NOTE
08/11/23 1700   CM/SW Referral Data   Referral Source Physician;Social Work (self-referral)   Reason for Referral Discharge planning   Informant Patient   Medical Hx   Does patient have an established PCP? Yes   Patient Info   Patient's Current Mental Status at Time of Assessment Alert;Oriented   Patient's [address]   Number of Levels in Home 1   Patient lives with Spouse/Significant other   Patient Status Prior to Admission   Independent with ADLs and Mobility Yes   Discharge Needs   Anticipated D/C needs Home health care   Services Requested   PASRR Level 1 Submitted Yes   PMR Consult Requested Not clinically appropriate at this time   Choice of Post-Acute Provider   Informed patient of right to choose their preferred provider Yes   List of appropriate post-acute services provided to patient/family with quality data Yes     SW spoke with pt to finalize DC plan. Pt plans to go home with [facility]/ referrals sent. [facility] accepted and pt is aware and agreeable to use their services    [facility] - reserved    Orders/F2F entered    PLAN: Home with [facility] [address] [facility] , LSW, MSW ext.  [phone]

## 2023-08-11 NOTE — PLAN OF CARE
Problem: Patient Centered Care  Goal: Patient preferences are identified and integrated in the patient's plan of care  Description: Interventions:  - What would you like us to know as we care for you?   - Provide timely, complete, and accurate information to patient/family  - Incorporate patient and family knowledge, values, beliefs, and cultural backgrounds into the planning and delivery of care  - Encourage patient/family to participate in care and decision-making at the level they choose  - Honor patient and family perspectives and choices  Outcome: Progressing     Problem: Patient/Family Goals  Goal: Patient/Family Long Term Goal  Description: Patient's Long Term Goal:     Interventions:  - Pain medication  - See additional Care Plan goals for specific interventions  Outcome: Progressing  Goal: Patient/Family Short Term Goal  Description: Patient's Short Term Goal:     Interventions:   - Pain medication  - See additional Care Plan goals for specific interventions  Outcome: Progressing     Problem: PAIN - ADULT  Goal: Verbalizes/displays adequate comfort level or patient's stated pain goal  Description: INTERVENTIONS:  - Encourage pt to monitor pain and request assistance  - Assess pain using appropriate pain scale  - Administer analgesics based on type and severity of pain and evaluate response  - Implement non-pharmacological measures as appropriate and evaluate response  - Consider cultural and social influences on pain and pain management  - Manage/alleviate anxiety  - Utilize distraction and/or relaxation techniques  - Monitor for opioid side effects  - Notify MD/LIP if interventions unsuccessful or patient reports new pain  - Anticipate increased pain with activity and pre-medicate as appropriate  Outcome: Lyle Curtis was up on the chair, ambulating in the room with a standby assist. Her right arm was maintained on a sling.  Pt c/o increased pain upon movement, PRN oral Dilaudid and Oxy were given as ordered. Accucheck AC/HS done. Pt ha her own CGM. Plan for discharge back to home when medically stable.

## 2023-08-11 NOTE — PHYSICAL THERAPY NOTE
PHYSICAL THERAPY TREATMENT NOTE - INPATIENT     Room Number: 046/796-F       Presenting Problem:  (R humeral fx)    Problem List  Principal Problem:    Closed displaced fracture of surgical neck of right humerus, unspecified fracture morphology, initial encounter      PHYSICAL THERAPY ASSESSMENT   Chart reviewed. LEIGH Adams approved participation in physical therapy. PPE worn by therapist: gloves. Patient was not wearing a mask during session. Patient presented in bedside chair with 9/10 pain. Patient with good  progress towards goals during this session. Education provided on Physical therapy plan of care and physiological benefits of out of bed mobility. Patient with good carryover. Pt family at bedside. Pt agreeable to therapy, gait belt donned for all mobility. Pt with RUE sling donned and snug. Pt with unsteady gait at times, furniture walking in room and required use of handrail in hallway for balance and safety. Pt ed provided on use of cane at discharge to prevent falls and promote safe mobility while at home, pt agreeable. Pt states she feels wobbly today and that it might be due to her blood sugar. Ice bag provided for R shoulder for pt comfort and pain control, pillow placed under RUE for support. Pt on track to discharge home with HHPT once medically cleared. Transfers: Contact guard assist for STS transfer from bedside chair  Gait Assistance: Contact guard assist  Distance (ft): 100 ft  Assistive Device: None (recommend use of cane for balance and safety at home, furniture walking in room, use of handrail in hallway)  Pattern:  (reciprocal)          Patient was left in bedside chair at end of session with all needs in reach. The patient's Approx Degree of Impairment: 54.16% has been calculated based on documentation in the [facility] '6 clicks' Inpatient Basic Mobility Short Form.   Research supports that patients with this level of impairment may benefit from Home with home health PT.     RN aware of patient status post session. DISCHARGE RECOMMENDATIONS  PT Discharge Recommendations: 24 hour care/supervision;Home with home health PT/OT     PLAN  PT Treatment Plan: Bed mobility    SUBJECTIVE  I feel a little wobbly today    OBJECTIVE  Precautions:  (RT UE sling)    WEIGHT BEARING RESTRICTION  Weight Bearing Restriction: R upper extremity  R Upper Extremity: Non-Weight Bearing             PAIN ASSESSMENT   Ratin  Location: R shoulder  Management Techniques: Activity promotion;Repositioning    BALANCE                                                                                                                       Static Sitting: Fair  Dynamic Sitting: Fair           Static Standing: Fair -  Dynamic [place] [facility] '6-Clicks' INPATIENT SHORT FORM - BASIC MOBILITY  How much difficulty does the patient currently have. .. Patient Difficulty: Turning over in bed (including adjusting bedclothes, sheets and blankets)?: A Lot   Patient Difficulty: Sitting down on and standing up from a chair with arms (e.g., wheelchair, bedside commode, etc.): A Lot   Patient Difficulty: Moving from lying on back to sitting on the side of the bed?: A Little   How much help from another person does the patient currently need. .. Help from Another: Moving to and from a bed to a chair (including a wheelchair)?: A Little   Help from Another: Need to walk in hospital room?: A Little   Help from Another: Climbing 3-5 steps with a railing?: A Little     AM-PAC Score:  Raw Score: 16   Approx Degree of Impairment: 54.16%   Standardized Score (AM-PAC Scale): 40.78   CMS Modifier (G-Code): CK      Additional information: Pt vended cane for discharge      Patient End of Session: Up in chair;Needs met;Call light within reach;RN aware of session/findings; Ice applied; Family present    CURRENT GOALS   Goals to be met by:   Patient Goal Patient's self-stated goal is: home   Goal #1 Patient is able to demonstrate supine - sit EOB @ level: supervision      Goal #1   Current Status  NT   Goal #2 Patient is able to demonstrate transfers Sit to/from Stand at assistance level: supervision with cane - straight      Goal #2  Current Status  CGA    Goal #3 Patient is able to ambulate 300 feet with assist device: cane - straight at assistance level: modified independent   Goal #3   Current Status  100 ft with CGA, use of handrail   Goal #4 Patient will negotiate 3 stairs/one curb w/ assistive device and supervision   Goal #4   Current Status  NT   Goal #5 Patient to demonstrate independence with home activity/exercise instructions provided to patient in preparation for discharge.    Goal #5   Current Status  in progress   Goal #6     Goal #6  Current Status         Gait Training: 15 minutes  Therapeutic Activities: 8 minutes    [address] [place] [address]  [phone]

## 2023-08-11 NOTE — CHRONIC PAIN
Interventional Pain Medicine  New Patient Note    Subjective:     Referring Physician: No referring provider defined for this encounter. Record Review: I personally reviewed IM records    Chief Complaint: Fall     History of Present Illness:  Mic Rossi is a 79year old female presents for Fall. She sustained a right proximal humerus fracture and is planned for a delayed ORIF. She has history of OA and compensated liver cirrhosis.      Location: right shoulder  Severity: 9/10  Descriptors: sharp  Aggravating Factors: movement  Reliving Factors: pain meds  Associated Symptoms: none    Functional Goals of Treatment: pain relief, home discharge     Current Medication:   gabapentin (Neurontin) cap 100 mg, 100 mg, Oral, TID  polyethylene glycol (PEG 3350) (Miralax) 17 g oral packet 17 g, 17 g, Oral, Daily  celecoxib (CeleBREX) cap 100 mg, 100 mg, Oral, BID  oxyCODONE immediate release tab 10 mg, 10 mg, Oral, Q4H PRN  HYDROmorphone (Dilaudid) tab 2 mg, 2 mg, Oral, Q3H PRN  lidocaine (Xylocaine) 5 % ointment, , Topical, BID  clopidogrel (Plavix) tab 75 mg, 75 mg, Oral, Daily  acetaminophen (Tylenol) tab 650 mg, 650 mg, Oral, Q6H PRN  [COMPLETED] morphINE PF 4 MG/ML injection 4 mg, 4 mg, Intravenous, Once  [COMPLETED] morphINE PF 4 MG/ML injection 4 mg, 4 mg, Intravenous, Once  [COMPLETED] morphINE PF 4 MG/ML injection 4 mg, 4 mg, Intravenous, Once  albuterol (Ventolin HFA) 108 (90 Base) MCG/ACT inhaler 2 puff, 2 puff, Inhalation, Q6H PRN  famotidine (Pepcid) tab 40 mg, 40 mg, Oral, BID PRN  furosemide (Lasix) tab 20 mg, 20 mg, Oral, BID (Diuretic)  potassium chloride (K-Dur) tab 40 mEq, 40 mEq, Oral, BID  letrozole (Femara) tab 2.5 mg, 2.5 mg, Oral, Daily  levothyroxine (Synthroid) tab 88 mcg, 88 mcg, Oral, QAM AC  lidocaine (Xylocaine) 5 % ointment, , Topical, BID PRN  losartan (Cozaar) tab 12.5 mg, 12.5 mg, Oral, Daily  magnesium oxide (Mag-Ox) tab 400 mg, 400 mg, Oral, BID  atorvastatin (Lipitor) tab 10 mg, 10 mg, Oral, Nightly  heparin (Porcine) 5000 UNIT/ML injection 5,000 Units, 5,000 Units, Subcutaneous, Q8H BETHANY  HYDROmorphone (Dilaudid) 1 MG/ML injection 0.4 mg, 0.4 mg, Intravenous, Q2H PRN   Or  HYDROmorphone (Dilaudid) 1 MG/ML injection 0.8 mg, 0.8 mg, Intravenous, Q2H PRN   Or  HYDROmorphone (Dilaudid) 1 MG/ML injection 1.2 mg, 1.2 mg, Intravenous, Q2H PRN  imipramine (Tofranil) tab 50MG (DOSE = 150MG 3 X 50MG) PTS OWN MED, 150 mg, Oral, Nightly         Other Medical History  Past Medical History:   Diagnosis Date    Acute, but ill-defined, cerebrovascular disease     Adenomatous colon polyp 09/16/2013    Anxiety     Anxiety state     Arthritis     Asthma     Breast CA ([facility] [address]     Cellulitis and abscess of leg     CVA (cerebral infarction)     Depression     Disorder of thyroid     Esophageal reflux     Essential hypertension     Extrinsic asthma, unspecified     High cholesterol     Hypothyroidism     Liver cirrhosis (HCC)     cirrhosis    Migraines     Neuropathy     Obesity     Obesity (BMI 30-39. 9)     Other and unspecified hyperlipidemia     PONV (postoperative nausea and vomiting)     Shingles     March 2020    Stroke [facility])     aphasia- 10 years ago    Type II or unspecified type diabetes mellitus without mention of complication, not stated as uncontrolled     Vertigo     Visual impairment     glasses     Review of Systems:  CONSTITUTIONAL: denies fevers, chills  HEENT: denies swallowing difficulties, sore throat  CARDIOVASCULAR: denies chest pain, palpitations, syncope  RESPIRATORY: denies shortness of breath, cough, wheezing  GI: denies change in bowel habits, nausea, vomiting  : denies change in bladder function, frequency, dysuria  SKIN: denies rash, skin changes  MSK: Per HPI  NEURO: Per HPI  PSYCH: Per HPI      General Physical Exam:    Constitutional  Oriented to person, place, and time. Appears well-developed and   well-nourished  Head    Normocephalic and atraumatic.   Eyes    Pupils are equal, round, and reactive to light. Neck    Neck supple  Cardiovascular  Minimal to no peripheral edema, intact distal pulses  Pulmonary/Chest  Effort normal, no shortness of breath noted  Neurological   Alert and oriented to person, place, and time  Skin    Skin is warm and dry  Psychiatric   Normal mood and affect, behavior and judgment    Neurologic & Musculoskeletal Exam    Shoulder    Exam Right Left   Palpation tender ++ -   Range of Motion --- -   Neer's - -   Hawkin's - -   Speed's - -   Empty Can - -       Assessment & Plan:  Rich Dixon is a 79year old female with right proximal humerus fracture. - Currently on Tylenol, gabapentin, lidocaine gel, Oxycodone 10Q4 and Dilauded 2Q3 prn in addition to IV Dilaudid up to 1.2mg every 2 hours. - Adequate pain regimen considering the injury and her underlying liver cirrhosis. - Could consider short course CARROLL-II NSAID   - Pain anesthesia will continue to follow, please call with questions.      Scooby Bacon MD, 08/11/23, 4:32 PM

## 2023-08-11 NOTE — PLAN OF CARE
Patient resting in chair with  at bedside. Waiting for pain to call back as to plan for patient and scripts for pain medications so patient can discharge home tomorrow and follow up with ortho in 10 days for shoulder.   Problem: Patient Centered Care  Goal: Patient preferences are identified and integrated in the patient's plan of care  Description: Interventions:  - What would you like us to know as we care for you?   - Provide timely, complete, and accurate information to patient/family  - Incorporate patient and family knowledge, values, beliefs, and cultural backgrounds into the planning and delivery of care  - Encourage patient/family to participate in care and decision-making at the level they choose  - Honor patient and family perspectives and choices  Outcome: Progressing

## 2023-08-12 VITALS
HEART RATE: 112 BPM | SYSTOLIC BLOOD PRESSURE: 127 MMHG | TEMPERATURE: 99 F | RESPIRATION RATE: 20 BRPM | WEIGHT: 221.31 LBS | DIASTOLIC BLOOD PRESSURE: 60 MMHG | OXYGEN SATURATION: 92 % | BODY MASS INDEX: 39.21 KG/M2 | HEIGHT: 63 IN

## 2023-08-12 LAB — GLUCOSE BLDC GLUCOMTR-MCNC: 165 MG/DL (ref 70–99)

## 2023-08-12 PROCEDURE — 99232 SBSQ HOSP IP/OBS MODERATE 35: CPT | Performed by: INTERNAL MEDICINE

## 2023-08-12 NOTE — PLAN OF CARE
Patient has all clearances to discharge home for a few days, she is ready to discharge today. Pain service came and gave scripts for pain management. Patient stated understanding of pain schedule per Dr. Alvarenga Draft orders. Spent time with patient and wife explaining the importance of pain management without over taking medications and also further reinforced and  explained to patient that pain is not going to be at a 0 level. Patient states understanding of follow up with ortho in 7 more days to discuss surgery and next steps for shoulder. Discharge went over with patient and  and paperwork given to patient with pain script in folder. All questions answered several times over, along with talking about how to take pain medications, not by time but by how patient feels.  This needs reeinforcement and this nurse also told patients  to continue education about taking pain medications as well  Problem: Patient Centered Care  Goal: Patient preferences are identified and integrated in the patient's plan of care  Description: Interventions:  - What would you like us to know as we care for you?   - Provide timely, complete, and accurate information to patient/family  - Incorporate patient and family knowledge, values, beliefs, and cultural backgrounds into the planning and delivery of care  - Encourage patient/family to participate in care and decision-making at the level they choose  - Honor patient and family perspectives and choices  Outcome: Adequate for Discharge

## 2023-08-12 NOTE — PHYSICAL THERAPY NOTE
PHYSICAL THERAPY TREATMENT NOTE - INPATIENT     Room Number: 584/498-M       Presenting Problem:  (R humeral fx)    Problem List  Principal Problem:    Closed displaced fracture of surgical neck of right humerus, unspecified fracture morphology, initial encounter  Active Problems:    Hx of transient ischemic attack (TIA)    Hypercholesterolemia    Other specified hypothyroidism    Obstructive sleep apnea    Liver cirrhosis secondary to BLANCAS (HCC)    Mucinous carcinoma of breast, right (HCC)    Type 2 diabetes mellitus with diabetic neuropathy ([facility] [address]      PHYSICAL THERAPY ASSESSMENT   Chart reviewed. RN  approved participation in physical therapy. Pt spouse is present for session. Spouse reports he has been helping pt with bed mobility in out of bed and ambulation in room for tolieting without difficulty or incident . PPE worn by therapist: mask and gloves. Patient was not wearing a mask during session. Patient presented in bedside chair with 8/10 pain. Pt with decrease safety awareness , impaired memory with recall of pt education within session and from prior sessions. Pt keeping eyes closed during parts of session falling asleep easily when not fully engaged with cues. Spouse is present appears with good understanding of education to help with pt compliance. Pt wearing sling on right UE with need for minor readjustment and padding at neck provided. Pt denies any symptoms during activity . Patient with  decreased  progress towards goals during this session. Education provided on NON Weight bearing statusfor right UE ,  Physical therapy plan of care, physiological benefits of out of bed mobility, and fall risk prevention , sling education proper fit and need to wear at all times , post humeral fx wearing sling with non WB status for right UE ,fxn mobility training , equipment recommendation for use of gait belt , and DC Planning. Pt has a sling in room used for trial during ambulation.   Pt reports feels cane makes her more unsteady. Pt did better with gait belt and HHA on left UE for ambulation support. . Patient with poor carryover. Pt initially wanting to ambulate in her socks with no cane or therapy support. Encouragement required for donning shoes in sitting position. Pt wanting to attempt to tushar shoes in standing. Therapy reinforced fall prevention for donning of shoes . Pt did agree to trial [facility] . Reinforced with both pt and pt spouse need for gait belt with all mobility for pt safety and fall prevention. Pt spouse state they have 2 gait belts at home. Bed mobility:  declined to practice   Per spouse feels confidence helping pt . Transfers: Contact guard assist initially to stand with cues . Once pt standing min support as likes to take a few steps to right self for upright stand. Reviewed with spouse proper support for transfer training. Gait belt use reinforced. Gait Assistance: Minimum assistance (hands on assist with gait belt needed  for safety and fall prevention--_Reinforced this with pt spouse who returned demonstration)  Distance (ft): 120 ft x 2 .   once with therapist using [facility] . Pt reports feels SPC does not help makes it harder . Pt with one LOB when catching toe as distracted in hallway. Min assist with gait belt for fall prevention by therapist.    Rest provided . Education on safety and fall prevention reviewed. ISAÍAS arevalo returning demonstration of proper support of patient  using gait belt and HH support for second ambulation this session. Both vebralized understanding and confidence with ambulation together. Pt reports bed and bathroom very close. Assistive Device: Cane (and HHA with gait belt   Pt reports difficulty with cane use   currently recommending HHA with gait belt)    Decreased step length B --shuffling gait overall. Cues for attention to activity improved stability and safety.  Pt declined any need for stair and stoop training stating they have no stairs. Patient was left in bedside chair and RN in room / spouse present   at end of session with all needs in reach. The patient's Approx Degree of Impairment: 50.57% has been calculated based on documentation in the [facility] '6 clicks' Inpatient Basic Mobility Short Form. Research supports that patients with this level of impairment may benefit from Home with home health PT vs need for CARLEY . Therapy reinforced with pt and pt spouse recommendations for hands on support using gait for all fxn mobility for fall prevention. Fall risk prevention and family training with spouse in room provided today . Pt with goal for DC to home setting. At end of session both pt and pt spouse deny further questions and expressed confidence in DC plan for home today. RN aware of patient status post session. Pt pain has been a limiting factor this admission. Pt also with hx of prior CVA / TIA with decrease safety awareness / decrease insight into current deficits. Spouse in room confirmed he will be providing the recommended 24hr care. Pt has a SPC in room. Pt with plans for ortho sx for right shoulder fx in next week. DISCHARGE RECOMMENDATIONS  PT Discharge Recommendations: 24 hour care/supervision;Home;Home with home health PT     PLAN  PT Treatment Plan: Bed mobility; Body mechanics; Endurance; Energy conservation;Patient education; Family education;Gait training;Balance training;Transfer training (declined need for stair or stoop training)    SUBJECTIVE  Chronic intermittent numbness B Hands     OBJECTIVE  Precautions: Limb alert - right (sling)    WEIGHT BEARING RESTRICTION  Weight Bearing Restriction: R upper extremity  R Upper Extremity: Non-Weight Bearing             PAIN ASSESSMENT   Ratin  Location: right shoulder area   RN working with pt on pain managment  . pt agreeable to participation at current pain levels. Pt is medicated for session  Management Techniques:  Activity promotion; Body mechanics;Breathing techniques;Relaxation;Repositioning    BALANCE                                                                                                                       Static Sitting: Fair  Dynamic Sitting: Not tested           Static Standing: Fair -  Dynamic Standing: Poor +    ACTIVITY TOLERANCE  Pulse: 112 (activity   resting 97  RN aware of vitals)        BP: 127/60 (resting     post activity 132/70)             O2 WALK  Oxygen Therapy  SPO2% on Room Air at Rest: 95  SPO2% Ambulation on Room Air: 88 (seated rest break resat to 92 quickly)    AM-PAC '6-Clicks' INPATIENT SHORT FORM - BASIC MOBILITY  How much difficulty does the patient currently have. .. Patient Difficulty: Turning over in bed (including adjusting bedclothes, sheets and blankets)?: A Little   Patient Difficulty: Sitting down on and standing up from a chair with arms (e.g., wheelchair, bedside commode, etc.): A Little   Patient Difficulty: Moving from lying on back to sitting on the side of the bed?: A Little   How much help from another person does the patient currently need. .. Help from Another: Moving to and from a bed to a chair (including a wheelchair)?: A Little   Help from Another: Need to walk in hospital room?: A Little   Help from Another: Climbing 3-5 steps with a railing?: A Lot     AM-PAC Score:  Raw Score: 17   Approx Degree of Impairment: 50.57%   Standardized Score (AM-PAC Scale): 42.13   CMS Modifier (G-Code): CK        Patient End of Session: Up in chair;Needs met;Call light within reach;RN aware of session/findings; All patient questions and concerns addressed; Family present    CURRENT GOALS   Goals to be met by: 8/24  Patient Goal Patient's self-stated goal is: home   Goal #1 Patient is able to demonstrate supine - sit EOB @ level: supervision      Goal #1   Current Status  NT   Goal #2 Patient is able to demonstrate transfers Sit to/from Stand at assistance level: supervision with cane - straight      Goal #2  Current Status  CGA  to min    Goal #3 Patient is able to ambulate 300 feet with assist device: cane - straight at assistance level: modified independent   Goal #3   Current Status As above    Goal #4 Patient will negotiate 3 stairs/one curb w/ assistive device and supervision   Goal #4   Current Status  NT---pt and spouse both declined pt need for any stair or stoop training stating they have no stairs    Goal #5 Patient to demonstrate independence with home activity/exercise instructions provided to patient in preparation for discharge.    Goal #5   Current Status  in progress   Goal #6     Goal #6  Current Status      Therapy activity 2 units

## 2023-08-14 ENCOUNTER — PATIENT OUTREACH (OUTPATIENT)
Dept: CASE MANAGEMENT | Age: 68
End: 2023-08-14

## 2023-08-14 ENCOUNTER — TELEPHONE (OUTPATIENT)
Dept: INTERNAL MEDICINE CLINIC | Facility: CLINIC | Age: 68
End: 2023-08-14

## 2023-08-14 ENCOUNTER — APPOINTMENT (OUTPATIENT)
Dept: ENDOCRINOLOGY | Facility: HOSPITAL | Age: 68
End: 2023-08-14
Attending: INTERNAL MEDICINE
Payer: MEDICARE

## 2023-08-14 DIAGNOSIS — Z02.9 ENCOUNTERS FOR UNSPECIFIED ADMINISTRATIVE PURPOSE: ICD-10-CM

## 2023-08-14 DIAGNOSIS — S42.211A CLOSED DISPLACED FRACTURE OF SURGICAL NECK OF RIGHT HUMERUS, UNSPECIFIED FRACTURE MORPHOLOGY, INITIAL ENCOUNTER: Primary | ICD-10-CM

## 2023-08-14 PROCEDURE — 1111F DSCHRG MED/CURRENT MED MERGE: CPT

## 2023-08-14 RX ORDER — HYDROMORPHONE HYDROCHLORIDE 2 MG/1
2 TABLET ORAL EVERY 2 HOUR PRN
COMMUNITY
Start: 2023-08-12

## 2023-08-14 NOTE — TELEPHONE ENCOUNTER
Sent as FYI/TCM protocol:    Spoke with pt for TCM today. Pt did receive voicemail from ortho shoulder specialist, Dr. Shandra Granados office on Friday (referred by Dr. Yolie Alvarez did not speak with them, as she was still in the hospital--she will f/u with their office today or tomorrow for surgical date, in the next week or two. Offered TCM appt with Dr. Roseann pressley--prefers to f/u with specialists, only, at this time. TCM/HFU appt recommended by 8/19/2023, as pt is a high risk for readmission. Please advise. BOOK BY DATE (last date for TCM): 8/26/2023      Future Appointments   Date Time Provider Mark [place]   8/28/2023  7:00 AM [facility] MRI RM1 (1.5T) [facility] MRI EM [facility]   10/18/2023 10:30 AM Tyler Mesa MD [facility]   11/28/2023  9:45 AM Leslie Bernstein MD [address] [facility]   2/1/2024 10:30 AM Joe Livingston MD [facility] HEM ONC EMO     Follow up appointments:     This patient's most recent A1C was >= 8%; please consider scheduling a follow-up appointment for Diabetes  Last A1C Value: 11.1% Date: [7/17/2023]     Follow-up Information    Follow up With Specialties Details Why Contact Vinny Delcid MD Internal Medicine Call  Jenni Mcd[address]lel[phone]  [address] [place] [address], MD SURGERY, ORTHOPEDIC Schedule an appointment as soon as possible for a visit in 2 week(s)  [facility] [phone]

## 2023-08-15 ENCOUNTER — TELEPHONE (OUTPATIENT)
Dept: INTERNAL MEDICINE CLINIC | Facility: CLINIC | Age: 68
End: 2023-08-15

## 2023-08-15 NOTE — TELEPHONE ENCOUNTER
Spoke with Heidy//Dr. Dalton's office. States there are no sooner available appointments. Pt is added to a wait list, and will be tested with any cancellations/openings. Pt called and notified.

## 2023-08-15 NOTE — TELEPHONE ENCOUNTER
I have no pull there. I don't know Dr. Beth Gaines. Please call his office and ask, but obviously no guarantee. Pt sustained a severe humerus fracture and was seen in the hospital by Dr. Mehrdad Taylor partner, Dr. Nelly Lowery.

## 2023-08-15 NOTE — TELEPHONE ENCOUNTER
She is not high risk for readmission. She broke her arm. I can't fix it. Ortho appt is more than adequate.   Thank you

## 2023-08-15 NOTE — TELEPHONE ENCOUNTER
Pt called, she fell last week and broke her arm     Pt was referred to Dr Beth Gaines at [facility] and  has an appt on Aug 23     Pt states she is in a lot of pain, asks if Dr Parisa Esquivel can assist in getting an appt sooner,  states pain level is 8 - 10

## 2023-08-21 ENCOUNTER — PATIENT OUTREACH (OUTPATIENT)
Dept: CASE MANAGEMENT | Age: 68
End: 2023-08-21

## 2023-08-21 NOTE — PROGRESS NOTES
1st attempt DM hfu apt request   DM -existing apt (10/18)  PCP -existing apt (10/18)  ORTHO -existing apt (8/28)    Closing encounter

## 2023-08-23 ENCOUNTER — OFFICE VISIT (OUTPATIENT)
Dept: INTERNAL MEDICINE CLINIC | Facility: CLINIC | Age: 68
End: 2023-08-23

## 2023-08-23 ENCOUNTER — TELEPHONE (OUTPATIENT)
Dept: INTERNAL MEDICINE CLINIC | Facility: CLINIC | Age: 68
End: 2023-08-23

## 2023-08-23 VITALS
DIASTOLIC BLOOD PRESSURE: 82 MMHG | SYSTOLIC BLOOD PRESSURE: 142 MMHG | OXYGEN SATURATION: 98 % | TEMPERATURE: 99 F | BODY MASS INDEX: 37.92 KG/M2 | HEIGHT: 63 IN | HEART RATE: 96 BPM | WEIGHT: 214 LBS

## 2023-08-23 DIAGNOSIS — Z01.818 PREOP EXAMINATION: ICD-10-CM

## 2023-08-23 DIAGNOSIS — S42.211A CLOSED DISPLACED FRACTURE OF SURGICAL NECK OF RIGHT HUMERUS, UNSPECIFIED FRACTURE MORPHOLOGY, INITIAL ENCOUNTER: Primary | ICD-10-CM

## 2023-08-23 PROCEDURE — 99214 OFFICE O/P EST MOD 30 MIN: CPT | Performed by: INTERNAL MEDICINE

## 2023-08-23 PROCEDURE — 1125F AMNT PAIN NOTED PAIN PRSNT: CPT | Performed by: INTERNAL MEDICINE

## 2023-08-23 PROCEDURE — 1111F DSCHRG MED/CURRENT MED MERGE: CPT | Performed by: INTERNAL MEDICINE

## 2023-08-23 RX ORDER — TRAMADOL HYDROCHLORIDE 50 MG/1
50 TABLET ORAL EVERY 6 HOURS PRN
COMMUNITY

## 2023-08-23 RX ORDER — LIDOCAINE HYDROCHLORIDE 20 MG/ML
5 SOLUTION OROPHARYNGEAL
COMMUNITY

## 2023-08-23 RX ORDER — LETROZOLE 2.5 MG/1
2.5 TABLET, FILM COATED ORAL DAILY
COMMUNITY

## 2023-08-23 NOTE — TELEPHONE ENCOUNTER
Patient had pre-op visit with Dr. Karla Angela today. Cleared for surgery. OV note, labs, and EKG faxed to Dr. Gisela Choudhary at # [phone]. Confirmation received. Sent to scanning and one week hold.

## 2023-09-01 ENCOUNTER — LAB ENCOUNTER (OUTPATIENT)
Dept: LAB | Facility: REFERENCE LAB | Age: 68
End: 2023-09-01
Attending: ORTHOPAEDIC SURGERY
Payer: MEDICARE

## 2023-09-01 DIAGNOSIS — Z01.818 PRE-OP TESTING: ICD-10-CM

## 2023-09-01 PROCEDURE — 87641 MR-STAPH DNA AMP PROBE: CPT

## 2023-09-02 LAB — MRSA DNA SPEC QL NAA+PROBE: NEGATIVE

## 2023-09-07 ENCOUNTER — APPOINTMENT (OUTPATIENT)
Dept: GENERAL RADIOLOGY | Facility: HOSPITAL | Age: 68
End: 2023-09-07
Attending: ORTHOPAEDIC SURGERY
Payer: MEDICARE

## 2023-09-07 ENCOUNTER — ANESTHESIA (OUTPATIENT)
Dept: SURGERY | Facility: HOSPITAL | Age: 68
DRG: 483 | End: 2023-09-07

## 2023-09-07 ENCOUNTER — ANESTHESIA (OUTPATIENT)
Dept: SURGERY | Facility: HOSPITAL | Age: 68
End: 2023-09-07

## 2023-09-07 ENCOUNTER — HOSPITAL ENCOUNTER (INPATIENT)
Facility: HOSPITAL | Age: 68
LOS: 3 days | Discharge: HOME | End: 2023-09-10
Attending: ORTHOPAEDIC SURGERY | Admitting: ORTHOPAEDIC SURGERY
Payer: MEDICARE

## 2023-09-07 ENCOUNTER — HOSPITAL ENCOUNTER (INPATIENT)
Facility: HOSPITAL | Age: 68
LOS: 3 days | Discharge: HOME HEALTH | End: 2023-09-10
Attending: ORTHOPAEDIC SURGERY | Admitting: ORTHOPAEDIC SURGERY
Payer: MEDICARE

## 2023-09-07 ENCOUNTER — ANESTHESIA EVENT (OUTPATIENT)
Dept: SURGERY | Facility: HOSPITAL | Age: 68
End: 2023-09-07

## 2023-09-07 ENCOUNTER — ANESTHESIA EVENT (OUTPATIENT)
Dept: SURGERY | Facility: HOSPITAL | Age: 68
DRG: 483 | End: 2023-09-07

## 2023-09-07 DIAGNOSIS — Z01.818 PRE-OP TESTING: Primary | ICD-10-CM

## 2023-09-07 LAB
GLUCOSE BLDC GLUCOMTR-MCNC: 200 MG/DL (ref 70–99)
GLUCOSE BLDC GLUCOMTR-MCNC: 223 MG/DL (ref 70–99)
GLUCOSE BLDC GLUCOMTR-MCNC: 277 MG/DL (ref 70–99)

## 2023-09-07 PROCEDURE — 76000 FLUOROSCOPY <1 HR PHYS/QHP: CPT | Performed by: ORTHOPAEDIC SURGERY

## 2023-09-07 PROCEDURE — 0RRJ00Z REPLACEMENT OF RIGHT SHOULDER JOINT WITH REVERSE BALL AND SOCKET SYNTHETIC SUBSTITUTE, OPEN APPROACH: ICD-10-PCS | Performed by: ORTHOPAEDIC SURGERY

## 2023-09-07 PROCEDURE — 3E0T3BZ INTRODUCTION OF ANESTHETIC AGENT INTO PERIPHERAL NERVES AND PLEXI, PERCUTANEOUS APPROACH: ICD-10-PCS | Performed by: ANESTHESIOLOGY

## 2023-09-07 PROCEDURE — 73060 X-RAY EXAM OF HUMERUS: CPT | Performed by: ORTHOPAEDIC SURGERY

## 2023-09-07 DEVICE — GLENOID, HEAD W/RETAINING SCREW, RSP, 32MM/-4MM
Type: IMPLANTABLE DEVICE | Site: SHOULDER | Status: FUNCTIONAL
Brand: DJO SURGICAL

## 2023-09-07 DEVICE — IMPLANTABLE DEVICE
Type: IMPLANTABLE DEVICE | Site: SHOULDER | Status: FUNCTIONAL
Brand: REFOBACIN® BONE CEMENT R

## 2023-09-07 RX ORDER — DEXAMETHASONE SODIUM PHOSPHATE 10 MG/ML
INJECTION, SOLUTION INTRAMUSCULAR; INTRAVENOUS
Status: COMPLETED | OUTPATIENT
Start: 2023-09-07 | End: 2023-09-07

## 2023-09-07 RX ORDER — NICOTINE POLACRILEX 4 MG
15 LOZENGE BUCCAL
Status: DISCONTINUED | OUTPATIENT
Start: 2023-09-07 | End: 2023-09-07 | Stop reason: HOSPADM

## 2023-09-07 RX ORDER — NICOTINE POLACRILEX 4 MG
30 LOZENGE BUCCAL
Status: DISCONTINUED | OUTPATIENT
Start: 2023-09-07 | End: 2023-09-07 | Stop reason: HOSPADM

## 2023-09-07 RX ORDER — POLYETHYLENE GLYCOL 3350 17 G/17G
17 POWDER, FOR SOLUTION ORAL DAILY PRN
Status: DISCONTINUED | OUTPATIENT
Start: 2023-09-07 | End: 2023-09-10

## 2023-09-07 RX ORDER — DEXAMETHASONE SODIUM PHOSPHATE 4 MG/ML
VIAL (ML) INJECTION AS NEEDED
Status: DISCONTINUED | OUTPATIENT
Start: 2023-09-07 | End: 2023-09-09 | Stop reason: CLARIF

## 2023-09-07 RX ORDER — ONDANSETRON 2 MG/ML
4 INJECTION INTRAMUSCULAR; INTRAVENOUS ONCE
Status: DISCONTINUED | OUTPATIENT
Start: 2023-09-07 | End: 2023-09-10

## 2023-09-07 RX ORDER — GLYCOPYRROLATE 0.2 MG/ML
INJECTION, SOLUTION INTRAMUSCULAR; INTRAVENOUS AS NEEDED
Status: DISCONTINUED | OUTPATIENT
Start: 2023-09-07 | End: 2023-09-09 | Stop reason: CLARIF

## 2023-09-07 RX ORDER — OXYCODONE HYDROCHLORIDE 5 MG/1
5 TABLET ORAL EVERY 4 HOURS PRN
Status: DISCONTINUED | OUTPATIENT
Start: 2023-09-07 | End: 2023-09-09

## 2023-09-07 RX ORDER — BISACODYL 10 MG
10 SUPPOSITORY, RECTAL RECTAL
Status: DISCONTINUED | OUTPATIENT
Start: 2023-09-07 | End: 2023-09-10

## 2023-09-07 RX ORDER — SENNOSIDES 8.6 MG
17.2 TABLET ORAL NIGHTLY
Status: DISCONTINUED | OUTPATIENT
Start: 2023-09-07 | End: 2023-09-10

## 2023-09-07 RX ORDER — ONDANSETRON 2 MG/ML
4 INJECTION INTRAMUSCULAR; INTRAVENOUS EVERY 6 HOURS PRN
Status: CANCELLED | OUTPATIENT
Start: 2023-09-07

## 2023-09-07 RX ORDER — HYDROMORPHONE HYDROCHLORIDE 1 MG/ML
0.4 INJECTION, SOLUTION INTRAMUSCULAR; INTRAVENOUS; SUBCUTANEOUS EVERY 2 HOUR PRN
Status: CANCELLED | OUTPATIENT
Start: 2023-09-07

## 2023-09-07 RX ORDER — DEXTROSE MONOHYDRATE 25 G/50ML
50 INJECTION, SOLUTION INTRAVENOUS
Status: DISCONTINUED | OUTPATIENT
Start: 2023-09-07 | End: 2023-09-07 | Stop reason: HOSPADM

## 2023-09-07 RX ORDER — ONDANSETRON 2 MG/ML
4 INJECTION INTRAMUSCULAR; INTRAVENOUS EVERY 6 HOURS PRN
Status: DISCONTINUED | OUTPATIENT
Start: 2023-09-07 | End: 2023-09-10

## 2023-09-07 RX ORDER — HYDROMORPHONE HYDROCHLORIDE 1 MG/ML
0.4 INJECTION, SOLUTION INTRAMUSCULAR; INTRAVENOUS; SUBCUTANEOUS EVERY 5 MIN PRN
Status: DISCONTINUED | OUTPATIENT
Start: 2023-09-07 | End: 2023-09-07 | Stop reason: HOSPADM

## 2023-09-07 RX ORDER — ASPIRIN 81 MG/1
81 TABLET ORAL 2 TIMES DAILY
Status: DISCONTINUED | OUTPATIENT
Start: 2023-09-07 | End: 2023-09-10

## 2023-09-07 RX ORDER — HYDROMORPHONE HYDROCHLORIDE 1 MG/ML
0.2 INJECTION, SOLUTION INTRAMUSCULAR; INTRAVENOUS; SUBCUTANEOUS EVERY 2 HOUR PRN
Status: CANCELLED | OUTPATIENT
Start: 2023-09-07

## 2023-09-07 RX ORDER — SODIUM CHLORIDE, SODIUM LACTATE, POTASSIUM CHLORIDE, CALCIUM CHLORIDE 600; 310; 30; 20 MG/100ML; MG/100ML; MG/100ML; MG/100ML
INJECTION, SOLUTION INTRAVENOUS CONTINUOUS
Status: DISCONTINUED | OUTPATIENT
Start: 2023-09-07 | End: 2023-09-09 | Stop reason: ALTCHOICE

## 2023-09-07 RX ORDER — MORPHINE SULFATE 4 MG/ML
4 INJECTION, SOLUTION INTRAMUSCULAR; INTRAVENOUS EVERY 10 MIN PRN
Status: DISCONTINUED | OUTPATIENT
Start: 2023-09-07 | End: 2023-09-07 | Stop reason: HOSPADM

## 2023-09-07 RX ORDER — METOCLOPRAMIDE 10 MG/1
10 TABLET ORAL ONCE
Status: DISCONTINUED | OUTPATIENT
Start: 2023-09-07 | End: 2023-09-07 | Stop reason: HOSPADM

## 2023-09-07 RX ORDER — SODIUM CHLORIDE, SODIUM LACTATE, POTASSIUM CHLORIDE, CALCIUM CHLORIDE 600; 310; 30; 20 MG/100ML; MG/100ML; MG/100ML; MG/100ML
INJECTION, SOLUTION INTRAVENOUS CONTINUOUS
Status: DISCONTINUED | OUTPATIENT
Start: 2023-09-07 | End: 2023-09-07 | Stop reason: HOSPADM

## 2023-09-07 RX ORDER — HYDROMORPHONE HYDROCHLORIDE 1 MG/ML
0.6 INJECTION, SOLUTION INTRAMUSCULAR; INTRAVENOUS; SUBCUTANEOUS EVERY 5 MIN PRN
Status: DISCONTINUED | OUTPATIENT
Start: 2023-09-07 | End: 2023-09-07 | Stop reason: HOSPADM

## 2023-09-07 RX ORDER — OXYCODONE HYDROCHLORIDE 5 MG/1
2.5 TABLET ORAL EVERY 4 HOURS PRN
Status: DISCONTINUED | OUTPATIENT
Start: 2023-09-07 | End: 2023-09-09

## 2023-09-07 RX ORDER — ROPIVACAINE HYDROCHLORIDE 5 MG/ML
INJECTION, SOLUTION EPIDURAL; INFILTRATION; PERINEURAL
Status: COMPLETED | OUTPATIENT
Start: 2023-09-07 | End: 2023-09-07

## 2023-09-07 RX ORDER — NEOSTIGMINE METHYLSULFATE 1 MG/ML
INJECTION, SOLUTION INTRAVENOUS AS NEEDED
Status: DISCONTINUED | OUTPATIENT
Start: 2023-09-07 | End: 2023-09-08

## 2023-09-07 RX ORDER — DOCUSATE SODIUM 100 MG/1
100 CAPSULE, LIQUID FILLED ORAL 2 TIMES DAILY
Status: DISCONTINUED | OUTPATIENT
Start: 2023-09-07 | End: 2023-09-10

## 2023-09-07 RX ORDER — CEFAZOLIN SODIUM/WATER 2 G/20 ML
2 SYRINGE (ML) INTRAVENOUS ONCE
Status: DISCONTINUED | OUTPATIENT
Start: 2023-09-07 | End: 2023-09-07 | Stop reason: HOSPADM

## 2023-09-07 RX ORDER — DOXEPIN HYDROCHLORIDE 50 MG/1
1 CAPSULE ORAL DAILY
Status: DISCONTINUED | OUTPATIENT
Start: 2023-09-08 | End: 2023-09-10

## 2023-09-07 RX ORDER — ONDANSETRON 2 MG/ML
INJECTION INTRAMUSCULAR; INTRAVENOUS AS NEEDED
Status: DISCONTINUED | OUTPATIENT
Start: 2023-09-07 | End: 2023-09-09 | Stop reason: CLARIF

## 2023-09-07 RX ORDER — MORPHINE SULFATE 10 MG/ML
6 INJECTION, SOLUTION INTRAMUSCULAR; INTRAVENOUS EVERY 10 MIN PRN
Status: DISCONTINUED | OUTPATIENT
Start: 2023-09-07 | End: 2023-09-07 | Stop reason: HOSPADM

## 2023-09-07 RX ORDER — MORPHINE SULFATE 4 MG/ML
2 INJECTION, SOLUTION INTRAMUSCULAR; INTRAVENOUS EVERY 10 MIN PRN
Status: DISCONTINUED | OUTPATIENT
Start: 2023-09-07 | End: 2023-09-07 | Stop reason: HOSPADM

## 2023-09-07 RX ORDER — ENEMA 19; 7 G/133ML; G/133ML
1 ENEMA RECTAL ONCE AS NEEDED
Status: DISCONTINUED | OUTPATIENT
Start: 2023-09-07 | End: 2023-09-10

## 2023-09-07 RX ORDER — CEFAZOLIN SODIUM/WATER 2 G/20 ML
SYRINGE (ML) INTRAVENOUS AS NEEDED
Status: DISCONTINUED | OUTPATIENT
Start: 2023-09-07 | End: 2023-09-09 | Stop reason: CLARIF

## 2023-09-07 RX ORDER — VANCOMYCIN HYDROCHLORIDE 1 G/20ML
INJECTION, POWDER, LYOPHILIZED, FOR SOLUTION INTRAVENOUS AS NEEDED
Status: DISCONTINUED | OUTPATIENT
Start: 2023-09-07 | End: 2023-09-07 | Stop reason: HOSPADM

## 2023-09-07 RX ORDER — NALOXONE HYDROCHLORIDE 0.4 MG/ML
80 INJECTION, SOLUTION INTRAMUSCULAR; INTRAVENOUS; SUBCUTANEOUS AS NEEDED
Status: DISCONTINUED | OUTPATIENT
Start: 2023-09-07 | End: 2023-09-07 | Stop reason: HOSPADM

## 2023-09-07 RX ORDER — HYDROMORPHONE HYDROCHLORIDE 1 MG/ML
0.2 INJECTION, SOLUTION INTRAMUSCULAR; INTRAVENOUS; SUBCUTANEOUS EVERY 5 MIN PRN
Status: DISCONTINUED | OUTPATIENT
Start: 2023-09-07 | End: 2023-09-07 | Stop reason: HOSPADM

## 2023-09-07 RX ORDER — PHENYLEPHRINE HCL 10 MG/ML
VIAL (ML) INJECTION AS NEEDED
Status: DISCONTINUED | OUTPATIENT
Start: 2023-09-07 | End: 2023-09-09 | Stop reason: CLARIF

## 2023-09-07 RX ORDER — TRANEXAMIC ACID 10 MG/ML
INJECTION, SOLUTION INTRAVENOUS AS NEEDED
Status: DISCONTINUED | OUTPATIENT
Start: 2023-09-07 | End: 2023-09-09 | Stop reason: CLARIF

## 2023-09-07 RX ORDER — MIDAZOLAM HYDROCHLORIDE 1 MG/ML
INJECTION INTRAMUSCULAR; INTRAVENOUS
Status: COMPLETED | OUTPATIENT
Start: 2023-09-07 | End: 2023-09-07

## 2023-09-07 RX ORDER — METOCLOPRAMIDE HYDROCHLORIDE 5 MG/ML
10 INJECTION INTRAMUSCULAR; INTRAVENOUS EVERY 8 HOURS PRN
Status: DISCONTINUED | OUTPATIENT
Start: 2023-09-07 | End: 2023-09-10

## 2023-09-07 RX ORDER — ONDANSETRON 2 MG/ML
4 INJECTION INTRAMUSCULAR; INTRAVENOUS EVERY 6 HOURS PRN
Status: DISCONTINUED | OUTPATIENT
Start: 2023-09-07 | End: 2023-09-07 | Stop reason: HOSPADM

## 2023-09-07 RX ORDER — OXYCODONE HYDROCHLORIDE 5 MG/1
2.5 TABLET ORAL EVERY 4 HOURS PRN
Status: CANCELLED | OUTPATIENT
Start: 2023-09-07

## 2023-09-07 RX ORDER — FAMOTIDINE 20 MG/1
20 TABLET, FILM COATED ORAL ONCE
Status: DISCONTINUED | OUTPATIENT
Start: 2023-09-07 | End: 2023-09-07 | Stop reason: HOSPADM

## 2023-09-07 RX ORDER — OXYCODONE HYDROCHLORIDE 5 MG/1
5 TABLET ORAL EVERY 4 HOURS PRN
Status: CANCELLED | OUTPATIENT
Start: 2023-09-07

## 2023-09-07 RX ORDER — CEFAZOLIN SODIUM/WATER 2 G/20 ML
2 SYRINGE (ML) INTRAVENOUS EVERY 8 HOURS
Status: COMPLETED | OUTPATIENT
Start: 2023-09-08 | End: 2023-09-08

## 2023-09-07 RX ORDER — HYDROMORPHONE HYDROCHLORIDE 1 MG/ML
0.4 INJECTION, SOLUTION INTRAMUSCULAR; INTRAVENOUS; SUBCUTANEOUS EVERY 2 HOUR PRN
Status: DISCONTINUED | OUTPATIENT
Start: 2023-09-07 | End: 2023-09-09

## 2023-09-07 RX ORDER — HYDROMORPHONE HYDROCHLORIDE 1 MG/ML
0.2 INJECTION, SOLUTION INTRAMUSCULAR; INTRAVENOUS; SUBCUTANEOUS EVERY 2 HOUR PRN
Status: DISCONTINUED | OUTPATIENT
Start: 2023-09-07 | End: 2023-09-09

## 2023-09-07 RX ORDER — METOCLOPRAMIDE HYDROCHLORIDE 5 MG/ML
10 INJECTION INTRAMUSCULAR; INTRAVENOUS EVERY 8 HOURS PRN
Status: DISCONTINUED | OUTPATIENT
Start: 2023-09-07 | End: 2023-09-07 | Stop reason: HOSPADM

## 2023-09-07 RX ORDER — ROCURONIUM BROMIDE 10 MG/ML
INJECTION, SOLUTION INTRAVENOUS AS NEEDED
Status: DISCONTINUED | OUTPATIENT
Start: 2023-09-07 | End: 2023-09-08

## 2023-09-07 RX ADMIN — ROCURONIUM BROMIDE 5 MG: 10 INJECTION, SOLUTION INTRAVENOUS at 16:48:00

## 2023-09-07 RX ADMIN — ONDANSETRON 4 MG: 2 INJECTION INTRAMUSCULAR; INTRAVENOUS at 18:27:00

## 2023-09-07 RX ADMIN — PHENYLEPHRINE HCL 300 MCG: 10 MG/ML VIAL (ML) INJECTION at 17:02:00

## 2023-09-07 RX ADMIN — SODIUM CHLORIDE, SODIUM LACTATE, POTASSIUM CHLORIDE, CALCIUM CHLORIDE: 600; 310; 30; 20 INJECTION, SOLUTION INTRAVENOUS at 16:44:00

## 2023-09-07 RX ADMIN — TRANEXAMIC ACID 1000 MG: 10 INJECTION, SOLUTION INTRAVENOUS at 17:00:00

## 2023-09-07 RX ADMIN — DEXAMETHASONE SODIUM PHOSPHATE 8 MG: 4 MG/ML VIAL (ML) INJECTION at 16:50:00

## 2023-09-07 RX ADMIN — DEXAMETHASONE SODIUM PHOSPHATE 10 MG: 10 INJECTION, SOLUTION INTRAMUSCULAR; INTRAVENOUS at 16:24:00

## 2023-09-07 RX ADMIN — CEFAZOLIN SODIUM/WATER 2 G: 2 G/20 ML SYRINGE (ML) INTRAVENOUS at 16:50:00

## 2023-09-07 RX ADMIN — ROCURONIUM BROMIDE 20 MG: 10 INJECTION, SOLUTION INTRAVENOUS at 17:27:00

## 2023-09-07 RX ADMIN — NEOSTIGMINE METHYLSULFATE 3 MG: 1 INJECTION, SOLUTION INTRAVENOUS at 19:05:00

## 2023-09-07 RX ADMIN — PHENYLEPHRINE HCL 200 MCG: 10 MG/ML VIAL (ML) INJECTION at 17:12:00

## 2023-09-07 RX ADMIN — SODIUM CHLORIDE, SODIUM LACTATE, POTASSIUM CHLORIDE, CALCIUM CHLORIDE: 600; 310; 30; 20 INJECTION, SOLUTION INTRAVENOUS at 19:05:00

## 2023-09-07 RX ADMIN — ROCURONIUM BROMIDE 25 MG: 10 INJECTION, SOLUTION INTRAVENOUS at 16:55:00

## 2023-09-07 RX ADMIN — ROPIVACAINE HYDROCHLORIDE 30 ML: 5 INJECTION, SOLUTION EPIDURAL; INFILTRATION; PERINEURAL at 16:24:00

## 2023-09-07 RX ADMIN — MIDAZOLAM HYDROCHLORIDE 1 MG: 1 INJECTION INTRAMUSCULAR; INTRAVENOUS at 16:14:00

## 2023-09-07 RX ADMIN — GLYCOPYRROLATE 0.6 MG: 0.2 INJECTION, SOLUTION INTRAMUSCULAR; INTRAVENOUS at 19:05:00

## 2023-09-07 NOTE — ANESTHESIA PROCEDURE NOTES
Airway  Date/Time: 9/7/2023 4:51 PM  Urgency: Elective      General Information and Staff    Patient location during procedure: OR  Anesthesiologist: Jose King MD  Performed: anesthesiologist   Performed by: Jose King MD  Authorized by: Jose King MD      Indications and Patient Condition  Indications for airway management: anesthesia  Sedation level: deep  Preoxygenated: yes  Patient position: sniffing  Mask difficulty assessment: 0 - not attempted    Final Airway Details  Final airway type: endotracheal airway      Successful airway: ETT  Cuffed: yes   Successful intubation technique: Video laryngoscopy  Endotracheal tube insertion site: oral  Blade: GlideScope  Blade size: #3  ETT size (mm): 7.5    Cormack-Lehane Classification: grade I - full view of glottis  Placement verified by: capnometry   Inital cuff pressure (cm H2O): 6  Measured from: teeth  ETT to teeth (cm): 20  Number of attempts at approach: 1

## 2023-09-07 NOTE — ANESTHESIA PROCEDURE NOTES
Peripheral Block    Date/Time: 9/7/2023 4:14 PM    Performed by: Cornelia Odell MD  Authorized by: Cornelia Odell MD      General Information and Staff    Start Time:  9/7/2023 4:14 PM  End Time:  9/7/2023 4:24 PM  Anesthesiologist:  Cornelia Odell MD  Performed by: Anesthesiologist  Patient Location:  PACU    Block Placement: Pre Induction  Site Identification: real time ultrasound guided and image stored and retrievable      Reason for Block: at surgeon's request and post-op pain management    Preanesthetic Checklist: 2 patient identifers, IV checked, risks and benefits discussed, monitors and equipment checked, pre-op evaluation, timeout performed, anesthesia consent and sterile technique used      Procedure Details    Patient Position:  Sitting  Prep: ChloraPrep    Monitoring:  Cardiac monitor  Block Type:   Interscalene  Laterality:  Right  Injection Technique:  Single-shot    Needle    Needle Type:  Short-bevel  Needle Gauge:  22 G  Needle Length:  50 mm  Needle Localization:  Ultrasound guidance and nerve stimulator  Reason for Ultrasound Use: appropriate spread of the medication was noted in real time and no ultrasound evidence of intravascular and/or intraneural injection      Muscle Twitch Response: flexor carpi radialis response      Assessment    Injection Assessment:  Good spread noted, incremental injection, local visualized surrounding nerve on ultrasound, low pressure, negative aspiration for heme and no pain on injection  Heart Rate Change: No        Medications  9/7/2023 4:14 PM  midazolam (VERSED)injection 2mg/2ml - Intravenous   1 mg - 9/7/2023 4:14:00 PM  ropivacaine (NAROPIN) injection 0.5% - Infiltration   30 mL - 9/7/2023 4:24:00 PM  dexamethasone (DECADRON) PF injection 10 mg/ml - Injection   10 mg - 9/7/2023 4:24:00 PM    Additional Comments

## 2023-09-08 LAB
ANION GAP SERPL CALC-SCNC: 4 MMOL/L (ref 0–18)
BUN BLD-MCNC: 16 MG/DL (ref 7–18)
BUN/CREAT SERPL: 18.2 (ref 10–20)
CALCIUM BLD-MCNC: 9.1 MG/DL (ref 8.5–10.1)
CHLORIDE SERPL-SCNC: 103 MMOL/L (ref 98–112)
CO2 SERPL-SCNC: 29 MMOL/L (ref 21–32)
CREAT BLD-MCNC: 0.88 MG/DL
EGFRCR SERPLBLD CKD-EPI 2021: 72 ML/MIN/1.73M2 (ref 60–?)
GLUCOSE BLD-MCNC: 311 MG/DL (ref 70–99)
GLUCOSE BLDC GLUCOMTR-MCNC: 288 MG/DL (ref 70–99)
GLUCOSE BLDC GLUCOMTR-MCNC: 293 MG/DL (ref 70–99)
GLUCOSE BLDC GLUCOMTR-MCNC: 330 MG/DL (ref 70–99)
GLUCOSE BLDC GLUCOMTR-MCNC: 374 MG/DL (ref 70–99)
HCT VFR BLD AUTO: 38.4 %
HGB BLD-MCNC: 12.3 G/DL
OSMOLALITY SERPL CALC.SUM OF ELEC: 295 MOSM/KG (ref 275–295)
POTASSIUM SERPL-SCNC: 5 MMOL/L (ref 3.5–5.1)
SODIUM SERPL-SCNC: 136 MMOL/L (ref 136–145)

## 2023-09-08 PROCEDURE — 99223 1ST HOSP IP/OBS HIGH 75: CPT | Performed by: INTERNAL MEDICINE

## 2023-09-08 RX ORDER — NICOTINE POLACRILEX 4 MG
30 LOZENGE BUCCAL
Status: DISCONTINUED | OUTPATIENT
Start: 2023-09-08 | End: 2023-09-10

## 2023-09-08 RX ORDER — POTASSIUM CHLORIDE 20 MEQ/1
40 TABLET, EXTENDED RELEASE ORAL 2 TIMES DAILY
Status: DISCONTINUED | OUTPATIENT
Start: 2023-09-08 | End: 2023-09-10

## 2023-09-08 RX ORDER — ALBUTEROL SULFATE 90 UG/1
2 AEROSOL, METERED RESPIRATORY (INHALATION) EVERY 6 HOURS PRN
Status: DISCONTINUED | OUTPATIENT
Start: 2023-09-08 | End: 2023-09-10

## 2023-09-08 RX ORDER — CLOPIDOGREL BISULFATE 75 MG/1
75 TABLET ORAL DAILY
Status: DISCONTINUED | OUTPATIENT
Start: 2023-09-09 | End: 2023-09-10

## 2023-09-08 RX ORDER — DEXTROSE MONOHYDRATE 25 G/50ML
50 INJECTION, SOLUTION INTRAVENOUS
Status: DISCONTINUED | OUTPATIENT
Start: 2023-09-08 | End: 2023-09-10

## 2023-09-08 RX ORDER — FUROSEMIDE 20 MG/1
20 TABLET ORAL 2 TIMES DAILY
Status: DISCONTINUED | OUTPATIENT
Start: 2023-09-08 | End: 2023-09-10

## 2023-09-08 RX ORDER — PRAVASTATIN SODIUM 20 MG
40 TABLET ORAL NIGHTLY
Status: DISCONTINUED | OUTPATIENT
Start: 2023-09-08 | End: 2023-09-10

## 2023-09-08 RX ORDER — METOLAZONE 2.5 MG/1
2.5 TABLET ORAL AS NEEDED
Status: DISCONTINUED | OUTPATIENT
Start: 2023-09-08 | End: 2023-09-10

## 2023-09-08 RX ORDER — MECLIZINE HYDROCHLORIDE 25 MG/1
25 TABLET ORAL 3 TIMES DAILY PRN
Status: DISCONTINUED | OUTPATIENT
Start: 2023-09-08 | End: 2023-09-10

## 2023-09-08 RX ORDER — NICOTINE POLACRILEX 4 MG
15 LOZENGE BUCCAL
Status: DISCONTINUED | OUTPATIENT
Start: 2023-09-08 | End: 2023-09-10

## 2023-09-08 RX ORDER — ERGOCALCIFEROL 1.25 MG/1
50000 CAPSULE ORAL
Status: DISCONTINUED | OUTPATIENT
Start: 2023-09-12 | End: 2023-09-10

## 2023-09-08 RX ORDER — LEVOTHYROXINE SODIUM 88 UG/1
88 TABLET ORAL
Status: DISCONTINUED | OUTPATIENT
Start: 2023-09-08 | End: 2023-09-10

## 2023-09-08 RX ORDER — LOSARTAN POTASSIUM 25 MG/1
12.5 TABLET ORAL EVERY EVENING
Status: DISCONTINUED | OUTPATIENT
Start: 2023-09-08 | End: 2023-09-10

## 2023-09-08 RX ORDER — IMIPRAMINE HCL 50 MG
150 TABLET ORAL NIGHTLY
Status: DISCONTINUED | OUTPATIENT
Start: 2023-09-08 | End: 2023-09-10

## 2023-09-08 RX ORDER — LETROZOLE 2.5 MG/1
2.5 TABLET, FILM COATED ORAL NIGHTLY
Status: DISCONTINUED | OUTPATIENT
Start: 2023-09-08 | End: 2023-09-10

## 2023-09-08 RX ORDER — FAMOTIDINE 20 MG/1
40 TABLET, FILM COATED ORAL 2 TIMES DAILY
Status: DISCONTINUED | OUTPATIENT
Start: 2023-09-08 | End: 2023-09-10

## 2023-09-08 RX ORDER — MAGNESIUM OXIDE 400 MG/1
400 TABLET ORAL 2 TIMES DAILY
Status: DISCONTINUED | OUTPATIENT
Start: 2023-09-08 | End: 2023-09-10

## 2023-09-08 NOTE — ANESTHESIA POSTPROCEDURE EVALUATION
Patient: Alessandro Maza    Procedure Summary       Date: 09/07/23 Room / Location: [facility] MAIN OR 02 / [facility] MAIN OR    Anesthesia Start: 1644 Anesthesia Stop:     Procedure: Right shoulder reverse total shoulder for fracture biceps tenodesis (Right) Diagnosis: (Right shoulder proximal humerus fracture biceps tendon)    Surgeons: Almita Bray MD Anesthesiologist: Lexis Parikh MD    Anesthesia Type: general ASA Status: 3            Anesthesia Type: general    Vitals Value Taken Time   /69 09/07/23 1919   Temp 97.3 09/07/23 1919   Pulse 97 09/07/23 1919   Resp 16 09/07/23 1919   SpO2 97 09/07/23 1919       [facility] AN Post Evaluation:   Patient Evaluated in PACU  Patient Participation: complete - patient participated  Level of Consciousness: awake  Pain Score: 0  Pain Management: adequate  Airway Patency:patent  Dental exam unchanged from preop  Yes    Cardiovascular Status: acceptable  Respiratory Status: acceptable and nasal cannula  Postoperative Hydration acceptable      Camron Bliss MD  9/7/2023 7:19 PM

## 2023-09-08 NOTE — PLAN OF CARE
Pt AOX4. RA. Home with . Right arm precautions. Sling and brace in place with aqua manuel. Standby. Tele. Pt refusing SCDs for pvt prophylaxis. Asprin & Plavix for pvt prophylaxis. LLQ insulin pump. ACHS. Plan for home tomorrow. Problem: Patient Centered Care  Goal: Patient preferences are identified and integrated in the patient's plan of care  Description: Interventions:  - What would you like us to know as we care for you? My  and I have been  for more than 40 years. - Provide timely, complete, and accurate information to patient/family  - Incorporate patient and family knowledge, values, beliefs, and cultural backgrounds into the planning and delivery of care  - Encourage patient/family to participate in care and decision-making at the level they choose  - Honor patient and family perspectives and choices  Outcome: Progressing     Problem: PAIN - ADULT  Goal: Verbalizes/displays adequate comfort level or patient's stated pain goal  Description: INTERVENTIONS:  - Encourage pt to monitor pain and request assistance  - Assess pain using appropriate pain scale  - Administer analgesics based on type and severity of pain and evaluate response  - Implement non-pharmacological measures as appropriate and evaluate response  - Consider cultural and social influences on pain and pain management  - Manage/alleviate anxiety  - Utilize distraction and/or relaxation techniques  - Monitor for opioid side effects  - Notify MD/LIP if interventions unsuccessful or patient reports new pain  - Anticipate increased pain with activity and pre-medicate as appropriate  Outcome: Progressing     Problem: SAFETY ADULT - FALL  Goal: Free from fall injury  Description: INTERVENTIONS:  - Assess pt frequently for physical needs  - Identify cognitive and physical deficits and behaviors that affect risk of falls.   - Donna fall precautions as indicated by assessment.  - Educate pt/family on patient safety including physical limitations  - Instruct pt to call for assistance with activity based on assessment  - Modify environment to reduce risk of injury  - Provide assistive devices as appropriate  - Consider OT/PT consult to assist with strengthening/mobility  - Encourage toileting schedule  Outcome: Progressing     Problem: DISCHARGE PLANNING  Goal: Discharge to home or other facility with appropriate resources  Description: INTERVENTIONS:  - Identify barriers to discharge w/pt and caregiver  - Include patient/family/discharge partner in discharge planning  - Arrange for needed discharge resources and transportation as appropriate  - Identify discharge learning needs (meds, wound care, etc)  - Arrange for interpreters to assist at discharge as needed  - Consider post-discharge preferences of patient/family/discharge partner  - Complete POLST form as appropriate  - Assess patient's ability to be responsible for managing their own health  - Refer to Case Management Department for coordinating discharge planning if the patient needs post-hospital services based on physician/LIP order or complex needs related to functional status, cognitive ability or social support system  Outcome: Progressing     Problem: CARDIOVASCULAR - ADULT  Goal: Maintains optimal cardiac output and hemodynamic stability  Description: INTERVENTIONS:  - Monitor vital signs, rhythm, and trends  - Monitor for bleeding, hypotension and signs of decreased cardiac output  - Evaluate effectiveness of vasoactive medications to optimize hemodynamic stability  - Monitor arterial and/or venous puncture sites for bleeding and/or hematoma  - Assess quality of pulses, skin color and temperature  - Assess for signs of decreased coronary artery perfusion - ex.  Angina  - Evaluate fluid balance, assess for edema, trend weights  Outcome: Progressing     Problem: RESPIRATORY - ADULT  Goal: Achieves optimal ventilation and oxygenation  Description: INTERVENTIONS:  - Assess for changes in respiratory status  - Assess for changes in mentation and behavior  - Position to facilitate oxygenation and minimize respiratory effort  - Oxygen supplementation based on oxygen saturation or ABGs  - Provide Smoking Cessation handout, if applicable  - Encourage broncho-pulmonary hygiene including cough, deep breathe, Incentive Spirometry  - Assess the need for suctioning and perform as needed  - Assess and instruct to report SOB or any respiratory difficulty  - Respiratory Therapy support as indicated  - Manage/alleviate anxiety  - Monitor for signs/symptoms of CO2 retention  Outcome: Progressing     Problem: GASTROINTESTINAL - ADULT  Goal: Minimal or absence of nausea and vomiting  Description: INTERVENTIONS:  - Maintain adequate hydration with IV or PO as ordered and tolerated  - Nasogastric tube to low intermittent suction as ordered  - Evaluate effectiveness of ordered antiemetic medications  - Provide nonpharmacologic comfort measures as appropriate  - Advance diet as tolerated, if ordered  - Obtain nutritional consult as needed  - Evaluate fluid balance  Outcome: Progressing     Problem: SKIN/TISSUE INTEGRITY - ADULT  Goal: Incision(s), wounds(s) or drain site(s) healing without S/S of infection  Description: INTERVENTIONS:  - Assess and document risk factors for pressure ulcer development  - Assess and document skin integrity  - Assess and document dressing/incision, wound bed, drain sites and surrounding tissue  - Implement wound care per orders  - Initiate isolation precautions as appropriate  - Initiate Pressure Ulcer prevention bundle as indicated  Outcome: Progressing     Problem: HEMATOLOGIC - ADULT  Goal: Free from bleeding injury  Description: (Example usage: patient with low platelets)  INTERVENTIONS:  - Avoid intramuscular injections, enemas and rectal medication administration  - Ensure safe mobilization of patient  - Hold pressure on venipuncture sites to achieve adequate hemostasis  - Assess for signs and symptoms of internal bleeding  - Monitor lab trends  - Patient is to report abnormal signs of bleeding to staff  - Avoid use of toothpicks and dental floss  - Use electric shaver for shaving  - Use soft bristle tooth brush  - Limit straining and forceful nose blowing  Outcome: Progressing

## 2023-09-08 NOTE — ANESTHESIA POSTPROCEDURE EVALUATION
Patient: Mi Rodríguez    Procedure Summary       Date: 09/07/23 Room / Location: [address] [place] [address] MAIN OR 02 / [address] [place] [address] MAIN OR    Anesthesia Start: 1644 Anesthesia Stop:     Procedure: Right shoulder reverse total shoulder for fracture biceps tenodesis (Right) Diagnosis: (Right shoulder proximal humerus fracture biceps tendon)    Surgeons: Gaylene Severe, MD Anesthesiologist: Montserrat Garcia MD    Anesthesia Type: general ASA Status: 3            Anesthesia Type: general    Vitals Value Taken Time   /64 09/07/23 1930   Temp 97.8 09/07/23 1932   Pulse 99 09/07/23 1931   Resp 24 09/07/23 1931   SpO2 97 % 09/07/23 1931   Vitals shown include unvalidated device data.     [address] [place] [address] AN Post Evaluation:   Patient Evaluated in PACU  Patient Participation: complete - patient participated  Level of Consciousness: awake  Pain Score: 0  Pain Management: adequate  Airway Patency:patent  Dental exam unchanged from preop  Yes    Cardiovascular Status: acceptable  Respiratory Status: nasal cannula  Postoperative Hydration acceptable      Sergo Byrd MD  9/7/2023 7:32 PM

## 2023-09-08 NOTE — PHYSICAL THERAPY NOTE
PHYSICAL THERAPY EVALUATION - INPATIENT     Room Number: 407/407-A  Evaluation Date: 9/8/2023  Type of Evaluation: Initial   Physician Order: PT Eval and Treat    Presenting Problem: S/p right shoulder reverse TSA---sling on at all times right UE and non WB for right UE reinforced. PMH sign for prior fall with right humerus fx / Diabetes / Breast CA / OA B Knee / Neuropathy . Currently requiring 1L O2. Reason for Therapy: Mobility Dysfunction and Discharge Planning    PHYSICAL THERAPY ASSESSMENT   Orders received and chart reviewed. RN  approved participation in physical therapy. Spouse present in room . PPE worn by therapist: mask and gloves. Patient was not wearing a mask during session. Patient is a 79year old female admitted 9/7/2023 for Presenting Problem: S/p right shoulder reverse TSA---sling on at all times right UE and non WB for right UE reinforced. PMH sign for prior fall with right humerus fx / Diabetes / Breast CA / OA B Knee / Neuropathy . Currently requiring 1L O2. .   See below from primary care team regarding pt HPI. Pt with recent admission from 8/09--8/12/23 after fall with humeral fx , pt was DC home with spouse. Pt confusion last admission related to meds. Patient presented in bed with 6/10 pain agreeable to limited mobility . Pt is alert and oriented to situation. Pt following one step directions. Pt with decrease safety awareness overall requiring consistent cues for safety and attention to lines , environmental barriers and right UE in sling type immobilizer brace. Pt reports numbness right little finger-_RN aware. Pt reports was told not to adjust sling immobilizer brace in any way by MD---therefore no adjustments made.         Education provided on need for right sling brace on at all times ,  right UE  non  Weight bearing status, Physical therapy plan of care, physiological benefits of out of bed mobility, and post op reverse TSA therapy pt safety education , fxn mobility training , fall risk prevention, B AP and DC Planning. . Patient with fair carryover. Spouse also present for education. Bed mobility: Min assist pt also required use of bed rail with raised HOB ,   -pt able to sit at EOB with Supervision. Transfers: Contact guard assist cues provided . Pt taking a little time for improved stability once standing--therapy provided close min to CGA support with initial stand. Gait Assistance: Minimum assistance;Contact guard assist  Distance (ft): 40 ft x 1  declining further mobility at this time  Assistive Device: None---pt declined need or declined willingness to try [facility] . No AD used. Pattern: R Steppage;L Steppage;R Foot flat;L Foot flat (Cues needed for safety awareness attention to O2 line and right UE / environmental barriers)       Patient was left in bedside chair, alarm activated, and spouse is present   at end of session with all needs in reach. Patient's current functional deficits include limitations in bed mobility , transfers . Ambulation and stoop steps. Pt presents with high fall risk with prior hx of falls, decrease safety awareness, generalized mild weakness related to HPI , decrease balance, right UE immobilized in sling type brace , and decrease activity tolerance. The patient's Approx Degree of Impairment: 50.57% has been calculated based on documentation in the [facility] '6 clicks' Inpatient Basic Mobility Short Form. Research supports that patients with this level of impairment may benefit from Home with home health PT. Pt with goal for DC to home setting. Spouse confirms can provide recommended 24hr support at DC . Pt declined need or willingness to trial an AD yet has a SPC at home. DC Plans pending  pt progress and further acute management. Pt with difficulty with pain control and need for 1L O2 . Therapy anticipates pt will progress  to DC home with 24hr care of spouse . RN aware of patient status post session.   Patient will benefit from continued IP PT services to address these deficits in preparation for discharge. DISCHARGE RECOMMENDATIONS  PT Discharge Recommendations: 24 hour care/supervision;Home;Home with home health PT    PLAN  PT Treatment Plan: Bed mobility; Body mechanics; Endurance; Energy conservation;Patient education; Family education;Gait training;Balance training;Transfer training;Stoop training  Rehab Potential : Good  Frequency (Obs): Daily       PHYSICAL THERAPY MEDICAL/SOCIAL HISTORY     History related to current admission: from primary care MD below      Problem List    R humerus fracture s/p mechanical fall  Admission from 8/9 reviewed, manage medically at that time. Followed up with orthopedic surgery. Status post right shoulder reverse total arthroplasty for humerus fracture with Dr. Garfield Aguirre care as directed by Dr. Ching Ortiz  -Pain management per orthopedic surgery, on oxycodone 5 mg every 4 hours as needed  - Aspirin 81 mg twice a day  - Aggressive bowel regimen  - On 1.5 L/min NC - wean as able. Continue with aggressive IS  - Hemoglobin within normal limits.    - PT/OT when able     Breast carcinoma (mucinous carcinoma)  -s/p excisional bx on 8/30/22 -- mucinous carcinoma, extending to the surgical margins  -s/p R central breast resection, R axillary sentinel LN bx 9/28/22 -- 3/3 LN's negative for carcinoma. R breast resection negative for residual carcinoma  -s/p RT with Dr. Hernesto Rodriguez (completed 1/2023)  -followed by med onc Dr. Arnold Alfaro  -on Letrozole 2.5mg/day since 12/2022  -last mammo 5/9/23     Type 2 diabetes mellitus ([facility] [address] with neuropathy (numbness in distal toes) and retinopathy (dx'ed 2018)  -Per endocrine, Dr. Nancy Jones  -HgbA1c 10.8; Has CGM and insulin pump.  -Sees ophtho (Dr. Kristel Sargent in [facility] regularly --  found to have retinopathy on exam 2019.   - We will consult with Dr. Nancy Jones with regards to insulin pump management  - Hypoglycemia protocol     Hypercholesterolemia  -Continue Pravachol   -lipids at goal; LDL 76, TG 90, HDL 78     Hypothyroidism  Cont synthroid. TSH 1.84 (4/2023)      Obstructive sleep apnea  Unable to tolerate CPAP. Again discussed trial of nasal pillows, but pt states she did try it and couldn't tolerate. Likely contributing in part to her fatigue. History of adenomatous colonic polyps  Found on colonoscopy 9/11/13   Colonoscopy 1/2019 -- 3 adenomatous polyps. Colonoscopy 4/4/22 (Dr. Osmar Trevizo) -- inadequate bowel prep; to have 2 day prep for repeat colonoscopy  Repeat colonoscopy 5/2022 -- large tubulovillous adenoma; still with only fair bowel prep, so advised to have repeat colonoscopy in 1 year (5/2023). Pt reminded to call to schedule. Hx of transient ischemic attack (TIA) (expressive aphasia)  Continue Plavix (initially held in case pt needed surgery this admission; restarted). Pt with occasional difficulty with word finding. Routine health maintenance  Pt is s/p HERMELINDA/BSO. DEXA normal 10/2022   Tdap given 9/7/16. Rec Shingrix shingles vaccine. CT calcium score 0 in 8/2020. Vitamin B12 deficiency  Vit B12 level normal 10/2022 (1487)     Hx of BLE edema  K+ normal on KDur 40meq BID. Cont Lasix 20mg/day. No edema on exam     NAFLD, prob BLANCAS cirrhosis  -Fatty liver seen on CT in 9/2013. Iron sats, Hep A/B/C panel, ceruloplasmin, DONTAE, AMA normal.    -followed by hepatology Dr. Marisa Lewis  Has u/s every 6 months. Had EGD 1/2019 and 4/2022 (Dr. Osmar Trevizo) -- small esophageal varices; o/w unremarkable. Hep B series completed 7/22/20. Left and right knee OA  Had arthroscopic surgery with Dr. Aj Orellana in the past.  Seeing Dr. López Hanson. Morbid obesity  Sees bariatrics Dr. Tam Form  On topiramate, but pt states it is making her nauseated, so will hold     Hypomagnesemia  Takes Mg oxide 400mg BID. Hypertension  On losartan 12.5mg/day.       Depression  Stable on imipramine 150mg/day     Vitamin D deficiency  On weekly vitamin D     VTE prophylaxis  Subcut heparin     Isabella Almanzar MD, 09/08/23, 8:31 AM            Past Medical History  Past Medical History:   Diagnosis Date    Acute, but ill-defined, cerebrovascular disease     Adenomatous colon polyp 09/16/2013    Anxiety     Anxiety state     Arthritis     Asthma     Breast CA ([address]     Cellulitis and abscess of leg     CVA (cerebral infarction)     Depression     Disorder of thyroid     Esophageal reflux     Essential hypertension     Exposure to medical diagnostic radiation     Extrinsic asthma, unspecified     High blood pressure     High cholesterol     Hypothyroidism     Liver cirrhosis (HCC)     cirrhosis    Migraines     Neuropathy     Obesity     Obesity (BMI 30-39. 9)     Osteoarthritis     Other and unspecified hyperlipidemia     PFO (patent foramen ovale)     PONV (postoperative nausea and vomiting)     Shingles     March 2020    Stroke [facility])     aphasia- 10 years ago    Type II or unspecified type diabetes mellitus without mention of complication, not stated as uncontrolled     Vertigo     Visual impairment     glasses       Past Surgical History  Past Surgical History:   Procedure Laterality Date    APPENDECTOMY      CHOLECYSTECTOMY      COLONOSCOPY  03/2022    COLONOSCOPY N/A 01/21/2019    Procedure: COLONOSCOPY;  Surgeon: Lorna Romero MD;  Location: [facility]    COLONOSCOPY N/A 04/04/2022    Procedure: COLONOSCOPY;  Surgeon: Lorna Romero MD;  Location: [facility]    COLONOSCOPY N/A 05/13/2022    Procedure: COLONOSCOPY;  Surgeon: Lorna Romero MD;  Location: [facility]    COLONOSCOPY  06/2023    COLONOSCOPY N/A 6/30/2023    Procedure: COLONOSCOPY;  Surgeon: Lorna Romero MD;  Location: [facility]    COLONOSCOPY & POLYPECTOMY  09/16/2013    adenoma    CORTISONE INJECTION Left     Left knee, Dr. Genoveva Avila      4 of them    EGD  09/16/2013    EGD  01/2019    EGD  03/2022    EGD  06/2023    ELECTROCARDIOGRAM, COMPLETE  03/07/2013    scanned to media EXTRACTION ERUPTED TOOTH/EXR  2018    HERNIA SURGERY      HYSTERECTOMY            RADIATION RIGHT      TOOTH IMPLANTATION      UMBILICAL HERNIA REPAIR         HOME SITUATION  Type of Home: House   Home Layout: One level  Stairs to Enter : 1 (curb step)           Lives With: Spouse  Drives:  (has not driven in awhile due to right UE / fx and sx)  Patient Owned Equipment: Cane (shower chair ( walk in or tub ))  Patient Regularly Uses: None (shower chair)    Prior Level of [place]: Assisted dressing , tolieting and  bathing. Indep household ambulation with no AD . Supervised limited comm ambulation with no AD . Pt with hx of falls. SUBJECTIVE  Post op pain  Goal for DC to home     PHYSICAL THERAPY EXAMINATION     OBJECTIVE  Precautions: Limb alert - right;Bed/chair alarm (new to O2)  Fall Risk: High fall risk    WEIGHT BEARING RESTRICTION  Weight Bearing Restriction: R upper extremity  R Upper Extremity: Non-Weight Bearing (Sling at all times)             PAIN ASSESSMENT     Location: 6/10 right shoulder sx site  Management Techniques: Activity promotion; Body mechanics;Breathing techniques;Relaxation;Repositioning    COGNITION  Orientation Level:  oriented to place, oriented to time, oriented to situation, and oriented to person  Safety Judgement:  decreased awareness of need for assistance and decreased awareness of need for safety  Awareness of Deficits:  decreased awareness of deficits    RANGE OF MOTION AND STRENGTH ASSESSMENT  Left Upper extremity ROM and strength are within functional limits     Right UE not assessed immobilized in sling type brace     Lower extremity ROM is within functional limits     Lower extremity strength is within functional limits --mild generalized fxn weakness observed with decrease gait quality and activity tolerance .  Pt with intact and strong B ankle DF and PF     BALANCE  Static Sitting: Fair +  Dynamic Sitting: Not tested  Static Standing: Fair  Dynamic Standing: Fair -    ADDITIONAL TESTS      Bandage on right shoulder --right shoulder area and UE swollen ---immobilizer sling type brace on right UE post sx                               NEUROLOGICAL FINDINGS  Neurological Findings:  (small finger on right UE numb post sx --RN aware  --chronic   Diabetic neuropathy)                   ACTIVITY TOLERANCE  Pulse: 91 (resting  post activity    97)        BP: 102/59 (resting   post activity  104/46--pt reporting mild light headedness during activity decreased by end of session)             O2 WALK  Oxygen Therapy  SPO2% on Oxygen at Rest: 97  At rest oxygen flow (liters per minute): 1  SPO2% Ambulation on Oxygen: 98  Ambulation oxygen flow (liters per minute): 1    AM-PAC '6-Clicks' INPATIENT SHORT FORM - BASIC MOBILITY  How much difficulty does the patient currently have. .. Patient Difficulty: Turning over in bed (including adjusting bedclothes, sheets and blankets)?: A Little   Patient Difficulty: Sitting down on and standing up from a chair with arms (e.g., wheelchair, bedside commode, etc.): A Little   Patient Difficulty: Moving from lying on back to sitting on the side of the bed?: A Little   How much help from another person does the patient currently need. .. Help from Another: Moving to and from a bed to a chair (including a wheelchair)?: A Little   Help from Another: Need to walk in hospital room?: A Little   Help from Another: Climbing 3-5 steps with a railing?: A Lot     AM-PAC Score:  Raw Score: 17   Approx Degree of Impairment: 50.57%   Standardized Score (AM-PAC Scale): 42.13   CMS Modifier (G-Code): CK      Patient End of Session: Up in chair;Needs met;Call light within reach;RN aware of session/findings; All patient questions and concerns addressed; Alarm set; Family present    CURRENT GOALS    Goals to be met by: 9/30/23  Patient Goal Patient's self-stated goal is: home    Goal #1 Patient is able to demonstrate supine - sit EOB @ level: supervision     Goal #1   Current Status    Goal #2 Patient is able to demonstrate transfers EOB to/from Chair/Wheelchair at assistance level: supervision with  LRAD     Goal #2  Current Status    Goal #3 Patient is able to ambulate 200  feet with assist device:  LRAD  at assistance level: supervision   Goal #3   Current Status    Goal #4 Patient will negotiate 1 stairs/one curb w/ assistive device and supervision   Goal #4   Current Status    Goal #5 Patient to demonstrate independence with home activity/exercise instructions provided to patient in preparation for discharge.    Goal #5   Current Status    Goal #6    Goal #6  Current Status     Patient Evaluation Complexity Level:  History Moderate - 1 or 2 personal factors and/or co-morbidities   Examination of body systems Moderate - addressing a total of 3 or more elements   Clinical Presentation Low - Stable   Clinical Decision Making Moderate Complexity   PT eval and therapy activity 2 units

## 2023-09-08 NOTE — PHYSICAL THERAPY NOTE
Chart reviewed    Attempt x 1 this AM   per RN working on pain control. Pt resting in bed. PT will return later in day .

## 2023-09-08 NOTE — PLAN OF CARE
Problem: Patient Centered Care  Goal: Patient preferences are identified and integrated in the patient's plan of care  Description: Interventions:  - What would you like us to know as we care for you? My  and I have been  for more than 40 years. - Provide timely, complete, and accurate information to patient/family  - Incorporate patient and family knowledge, values, beliefs, and cultural backgrounds into the planning and delivery of care  - Encourage patient/family to participate in care and decision-making at the level they choose  - Honor patient and family perspectives and choices  Outcome: Progressing     Problem: PAIN - ADULT  Goal: Verbalizes/displays adequate comfort level or patient's stated pain goal  Description: INTERVENTIONS:  - Encourage pt to monitor pain and request assistance  - Assess pain using appropriate pain scale  - Administer analgesics based on type and severity of pain and evaluate response  - Implement non-pharmacological measures as appropriate and evaluate response  - Consider cultural and social influences on pain and pain management  - Manage/alleviate anxiety  - Utilize distraction and/or relaxation techniques  - Monitor for opioid side effects  - Notify MD/LIP if interventions unsuccessful or patient reports new pain  - Anticipate increased pain with activity and pre-medicate as appropriate  Outcome: Progressing     Problem: SAFETY ADULT - FALL  Goal: Free from fall injury  Description: INTERVENTIONS:  - Assess pt frequently for physical needs  - Identify cognitive and physical deficits and behaviors that affect risk of falls.   - [place] fall precautions as indicated by assessment.  - Educate pt/family on patient safety including physical limitations  - Instruct pt to call for assistance with activity based on assessment  - Modify environment to reduce risk of injury  - Provide assistive devices as appropriate  - Consider OT/PT consult to assist with strengthening/mobility  - Encourage toileting schedule  Outcome: Progressing     Problem: DISCHARGE PLANNING  Goal: Discharge to home or other facility with appropriate resources  Description: INTERVENTIONS:  - Identify barriers to discharge w/pt and caregiver  - Include patient/family/discharge partner in discharge planning  - Arrange for needed discharge resources and transportation as appropriate  - Identify discharge learning needs (meds, wound care, etc)  - Arrange for interpreters to assist at discharge as needed  - Consider post-discharge preferences of patient/family/discharge partner  - Complete POLST form as appropriate  - Assess patient's ability to be responsible for managing their own health  - Refer to Case Management Department for coordinating discharge planning if the patient needs post-hospital services based on physician/LIP order or complex needs related to functional status, cognitive ability or social support system  Outcome: Progressing     Problem: CARDIOVASCULAR - ADULT  Goal: Maintains optimal cardiac output and hemodynamic stability  Description: INTERVENTIONS:  - Monitor vital signs, rhythm, and trends  - Monitor for bleeding, hypotension and signs of decreased cardiac output  - Evaluate effectiveness of vasoactive medications to optimize hemodynamic stability  - Monitor arterial and/or venous puncture sites for bleeding and/or hematoma  - Assess quality of pulses, skin color and temperature  - Assess for signs of decreased coronary artery perfusion - ex.  Angina  - Evaluate fluid balance, assess for edema, trend weights  Outcome: Progressing     Problem: RESPIRATORY - ADULT  Goal: Achieves optimal ventilation and oxygenation  Description: INTERVENTIONS:  - Assess for changes in respiratory status  - Assess for changes in mentation and behavior  - Position to facilitate oxygenation and minimize respiratory effort  - Oxygen supplementation based on oxygen saturation or ABGs  - Provide Smoking Cessation handout, if applicable  - Encourage broncho-pulmonary hygiene including cough, deep breathe, Incentive Spirometry  - Assess the need for suctioning and perform as needed  - Assess and instruct to report SOB or any respiratory difficulty  - Respiratory Therapy support as indicated  - Manage/alleviate anxiety  - Monitor for signs/symptoms of CO2 retention  Outcome: Progressing     Problem: GASTROINTESTINAL - ADULT  Goal: Minimal or absence of nausea and vomiting  Description: INTERVENTIONS:  - Maintain adequate hydration with IV or PO as ordered and tolerated  - Nasogastric tube to low intermittent suction as ordered  - Evaluate effectiveness of ordered antiemetic medications  - Provide nonpharmacologic comfort measures as appropriate  - Advance diet as tolerated, if ordered  - Obtain nutritional consult as needed  - Evaluate fluid balance  Outcome: Progressing     Problem: SKIN/TISSUE INTEGRITY - ADULT  Goal: Incision(s), wounds(s) or drain site(s) healing without S/S of infection  Description: INTERVENTIONS:  - Assess and document risk factors for pressure ulcer development  - Assess and document skin integrity  - Assess and document dressing/incision, wound bed, drain sites and surrounding tissue  - Implement wound care per orders  - Initiate isolation precautions as appropriate  - Initiate Pressure Ulcer prevention bundle as indicated  Outcome: Progressing     Problem: HEMATOLOGIC - ADULT  Goal: Free from bleeding injury  Description: (Example usage: patient with low platelets)  INTERVENTIONS:  - Avoid intramuscular injections, enemas and rectal medication administration  - Ensure safe mobilization of patient  - Hold pressure on venipuncture sites to achieve adequate hemostasis  - Assess for signs and symptoms of internal bleeding  - Monitor lab trends  - Patient is to report abnormal signs of bleeding to staff  - Avoid use of toothpicks and dental floss  - Use electric shaver for shaving  - Use soft bristle tooth brush  - Limit straining and forceful nose blowing  Outcome: Progressing

## 2023-09-09 LAB
ANION GAP SERPL CALC-SCNC: 5 MMOL/L (ref 0–18)
BUN BLD-MCNC: 20 MG/DL (ref 7–18)
BUN/CREAT SERPL: 23.8 (ref 10–20)
CALCIUM BLD-MCNC: 9.1 MG/DL (ref 8.5–10.1)
CHLORIDE SERPL-SCNC: 103 MMOL/L (ref 98–112)
CO2 SERPL-SCNC: 29 MMOL/L (ref 21–32)
CREAT BLD-MCNC: 0.84 MG/DL
EGFRCR SERPLBLD CKD-EPI 2021: 77 ML/MIN/1.73M2 (ref 60–?)
GLUCOSE BLD-MCNC: 243 MG/DL (ref 70–99)
GLUCOSE BLDC GLUCOMTR-MCNC: 196 MG/DL (ref 70–99)
GLUCOSE BLDC GLUCOMTR-MCNC: 287 MG/DL (ref 70–99)
GLUCOSE BLDC GLUCOMTR-MCNC: 335 MG/DL (ref 70–99)
GLUCOSE BLDC GLUCOMTR-MCNC: 363 MG/DL (ref 70–99)
OSMOLALITY SERPL CALC.SUM OF ELEC: 295 MOSM/KG (ref 275–295)
POTASSIUM SERPL-SCNC: 5.4 MMOL/L (ref 3.5–5.1)
SODIUM SERPL-SCNC: 137 MMOL/L (ref 136–145)

## 2023-09-09 RX ORDER — NALOXONE HYDROCHLORIDE 0.4 MG/ML
0.4 INJECTION, SOLUTION INTRAMUSCULAR; INTRAVENOUS; SUBCUTANEOUS ONCE
Status: DISCONTINUED | OUTPATIENT
Start: 2023-09-09 | End: 2023-09-10

## 2023-09-09 RX ORDER — HYDROMORPHONE HYDROCHLORIDE 2 MG/1
2 TABLET ORAL
Status: DISCONTINUED | OUTPATIENT
Start: 2023-09-09 | End: 2023-09-09

## 2023-09-09 RX ORDER — PSEUDOEPHEDRINE HCL 30 MG
100 TABLET ORAL 2 TIMES DAILY
Qty: 30 CAPSULE | Refills: 0 | Status: SHIPPED | OUTPATIENT
Start: 2023-09-09

## 2023-09-09 RX ORDER — POLYETHYLENE GLYCOL 3350 17 G/17G
17 POWDER, FOR SOLUTION ORAL DAILY PRN
Qty: 30 EACH | Refills: 0 | Status: SHIPPED | OUTPATIENT
Start: 2023-09-09

## 2023-09-09 RX ORDER — GABAPENTIN 300 MG/1
300 CAPSULE ORAL 3 TIMES DAILY
Status: DISCONTINUED | OUTPATIENT
Start: 2023-09-09 | End: 2023-09-10

## 2023-09-09 RX ORDER — ACETAMINOPHEN 325 MG/1
650 TABLET ORAL ONCE
Status: COMPLETED | OUTPATIENT
Start: 2023-09-09 | End: 2023-09-09

## 2023-09-09 RX ORDER — HYDROMORPHONE HYDROCHLORIDE 2 MG/1
2 TABLET ORAL EVERY 2 HOUR PRN
Status: DISCONTINUED | OUTPATIENT
Start: 2023-09-09 | End: 2023-09-10

## 2023-09-09 NOTE — PLAN OF CARE
Post-op day 2. Pain medication/management provided. Blood sugar and blood pressure monitoring. Doctor aware of patient's low blood pressure. Voiding in bathroom with a standby assist. Ambulated in room. Patient wears CGM and Insulin pump, signed papers on patient's physical chart. Patient mentions feeling worried about the sensation on her small Right finger, she mentions having already mentioned it to the Doctor. Patient also mentions that during Physical Therapy, when she straightened her arm that sensation went away. Copy of POA document provided by patient and  on physical chart; the original papers provided were returned to patient . Possible Discharge to home today in medically cleared and pain managed. Frequent rounds by nursing staff. Items and call light within reach. Bed locked in lowest position.  at bedside throughout shift. Problem: Patient Centered Care  Goal: Patient preferences are identified and integrated in the patient's plan of care  Description: Interventions:  - What would you like us to know as we care for you? My  and I have been  for more than 40 years.   - Provide timely, complete, and accurate information to patient/family  - Incorporate patient and family knowledge, values, beliefs, and cultural backgrounds into the planning and delivery of care  - Encourage patient/family to participate in care and decision-making at the level they choose  - Honor patient and family perspectives and choices  Outcome: Progressing     Problem: PAIN - ADULT  Goal: Verbalizes/displays adequate comfort level or patient's stated pain goal  Description: INTERVENTIONS:  - Encourage pt to monitor pain and request assistance  - Assess pain using appropriate pain scale  - Administer analgesics based on type and severity of pain and evaluate response  - Implement non-pharmacological measures as appropriate and evaluate response  - Consider cultural and social influences on pain and pain management  - Manage/alleviate anxiety  - Utilize distraction and/or relaxation techniques  - Monitor for opioid side effects  - Notify MD/LIP if interventions unsuccessful or patient reports new pain  - Anticipate increased pain with activity and pre-medicate as appropriate  Outcome: Progressing     Problem: SAFETY ADULT - FALL  Goal: Free from fall injury  Description: INTERVENTIONS:  - Assess pt frequently for physical needs  - Identify cognitive and physical deficits and behaviors that affect risk of falls.   - [place] fall precautions as indicated by assessment.  - Educate pt/family on patient safety including physical limitations  - Instruct pt to call for assistance with activity based on assessment  - Modify environment to reduce risk of injury  - Provide assistive devices as appropriate  - Consider OT/PT consult to assist with strengthening/mobility  - Encourage toileting schedule  Outcome: Progressing     Problem: DISCHARGE PLANNING  Goal: Discharge to home or other facility with appropriate resources  Description: INTERVENTIONS:  - Identify barriers to discharge w/pt and caregiver  - Include patient/family/discharge partner in discharge planning  - Arrange for needed discharge resources and transportation as appropriate  - Identify discharge learning needs (meds, wound care, etc)  - Arrange for interpreters to assist at discharge as needed  - Consider post-discharge preferences of patient/family/discharge partner  - Complete POLST form as appropriate  - Assess patient's ability to be responsible for managing their own health  - Refer to Case Management Department for coordinating discharge planning if the patient needs post-hospital services based on physician/LIP order or complex needs related to functional status, cognitive ability or social support system  Outcome: Progressing     Problem: CARDIOVASCULAR - ADULT  Goal: Maintains optimal cardiac output and hemodynamic stability  Description: INTERVENTIONS:  - Monitor vital signs, rhythm, and trends  - Monitor for bleeding, hypotension and signs of decreased cardiac output  - Evaluate effectiveness of vasoactive medications to optimize hemodynamic stability  - Monitor arterial and/or venous puncture sites for bleeding and/or hematoma  - Assess quality of pulses, skin color and temperature  - Assess for signs of decreased coronary artery perfusion - ex.  Angina  - Evaluate fluid balance, assess for edema, trend weights  Outcome: Progressing     Problem: RESPIRATORY - ADULT  Goal: Achieves optimal ventilation and oxygenation  Description: INTERVENTIONS:  - Assess for changes in respiratory status  - Assess for changes in mentation and behavior  - Position to facilitate oxygenation and minimize respiratory effort  - Oxygen supplementation based on oxygen saturation or ABGs  - Provide Smoking Cessation handout, if applicable  - Encourage broncho-pulmonary hygiene including cough, deep breathe, Incentive Spirometry  - Assess the need for suctioning and perform as needed  - Assess and instruct to report SOB or any respiratory difficulty  - Respiratory Therapy support as indicated  - Manage/alleviate anxiety  - Monitor for signs/symptoms of CO2 retention  Outcome: Progressing     Problem: GASTROINTESTINAL - ADULT  Goal: Minimal or absence of nausea and vomiting  Description: INTERVENTIONS:  - Maintain adequate hydration with IV or PO as ordered and tolerated  - Nasogastric tube to low intermittent suction as ordered  - Evaluate effectiveness of ordered antiemetic medications  - Provide nonpharmacologic comfort measures as appropriate  - Advance diet as tolerated, if ordered  - Obtain nutritional consult as needed  - Evaluate fluid balance  Outcome: Progressing     Problem: SKIN/TISSUE INTEGRITY - ADULT  Goal: Incision(s), wounds(s) or drain site(s) healing without S/S of infection  Description: INTERVENTIONS:  - Assess and document risk factors for pressure ulcer development  - Assess and document skin integrity  - Assess and document dressing/incision, wound bed, drain sites and surrounding tissue  - Implement wound care per orders  - Initiate isolation precautions as appropriate  - Initiate Pressure Ulcer prevention bundle as indicated  Outcome: Progressing     Problem: HEMATOLOGIC - ADULT  Goal: Free from bleeding injury  Description: (Example usage: patient with low platelets)  INTERVENTIONS:  - Avoid intramuscular injections, enemas and rectal medication administration  - Ensure safe mobilization of patient  - Hold pressure on venipuncture sites to achieve adequate hemostasis  - Assess for signs and symptoms of internal bleeding  - Monitor lab trends  - Patient is to report abnormal signs of bleeding to staff  - Avoid use of toothpicks and dental floss  - Use electric shaver for shaving  - Use soft bristle tooth brush  - Limit straining and forceful nose blowing  Outcome: Progressing     Problem: Patient/Family Goals  Goal: Patient/Family Long Term Goal  Description: Patient's Long Term Goal: Discharge from the hospital    Interventions:  - Vital signs monitoring  - Appropriate labs monitoring  - Pain management  - blood glucose monitoring with appropriate interventions per order/protocol   - Medication administration per order  - Follow Doctor's orders  - Update patient/family of plan of care  - See additional Care Plan goals for specific interventions    Outcome: Progressing  Goal: Patient/Family Short Term Goal  Description: Patient's Short Term Goal: Achieves No/tolerable pain level    Interventions:   - Vital signs monitoring  - Pain management  - Pain assessment and reassessment  - Medication administration per order  - Follow Doctor's orders  - See additional Care Plan goals for specific interventions  Outcome: Progressing

## 2023-09-09 NOTE — PLAN OF CARE
UP with assist to chair; weeping and stated pain is 10/10, refusing to allow brace to be adjusted, refusing pillow in chair for immobilizer support, refusing ice to operative site; requesting IV medication or something else to help with pain; Dr Srinath Zaidi notified. DC plan will be home at discharge. 36- Dr Srinath Zaidi stated no IV med and to continue Dilaudid PO and monitor her response, Tylenol was given, Patient refused Gabapentin and Dr Srinath Zaidi informed. 1445- pain improved to 6/10. Will continue to monitor to see if able to discharge to home if pain controlled enough. 1700- Patient states is not going home until pain is controlled and  her pain is not controlled enough to go home and currently severe. Problem: Patient Centered Care  Goal: Patient preferences are identified and integrated in the patient's plan of care  Description: Interventions:  - What would you like us to know as we care for you? My  and I have been  for more than 40 years.   - Provide timely, complete, and accurate information to patient/family  - Incorporate patient and family knowledge, values, beliefs, and cultural backgrounds into the planning and delivery of care  - Encourage patient/family to participate in care and decision-making at the level they choose  - Honor patient and family perspectives and choices  Outcome: Progressing     Problem: PAIN - ADULT  Goal: Verbalizes/displays adequate comfort level or patient's stated pain goal  Description: INTERVENTIONS:  - Encourage pt to monitor pain and request assistance  - Assess pain using appropriate pain scale  - Administer analgesics based on type and severity of pain and evaluate response  - Implement non-pharmacological measures as appropriate and evaluate response  - Consider cultural and social influences on pain and pain management  - Manage/alleviate anxiety  - Utilize distraction and/or relaxation techniques  - Monitor for opioid side effects  - Notify MD/LIP if interventions unsuccessful or patient reports new pain  - Anticipate increased pain with activity and pre-medicate as appropriate  Outcome: Progressing     Problem: SAFETY ADULT - FALL  Goal: Free from fall injury  Description: INTERVENTIONS:  - Assess pt frequently for physical needs  - Identify cognitive and physical deficits and behaviors that affect risk of falls.   - [place] fall precautions as indicated by assessment.  - Educate pt/family on patient safety including physical limitations  - Instruct pt to call for assistance with activity based on assessment  - Modify environment to reduce risk of injury  - Provide assistive devices as appropriate  - Consider OT/PT consult to assist with strengthening/mobility  - Encourage toileting schedule  Outcome: Progressing     Problem: DISCHARGE PLANNING  Goal: Discharge to home or other facility with appropriate resources  Description: INTERVENTIONS:  - Identify barriers to discharge w/pt and caregiver  - Include patient/family/discharge partner in discharge planning  - Arrange for needed discharge resources and transportation as appropriate  - Identify discharge learning needs (meds, wound care, etc)  - Arrange for interpreters to assist at discharge as needed  - Consider post-discharge preferences of patient/family/discharge partner  - Complete POLST form as appropriate  - Assess patient's ability to be responsible for managing their own health  - Refer to Case Management Department for coordinating discharge planning if the patient needs post-hospital services based on physician/LIP order or complex needs related to functional status, cognitive ability or social support system  Outcome: Progressing     Problem: CARDIOVASCULAR - ADULT  Goal: Maintains optimal cardiac output and hemodynamic stability  Description: INTERVENTIONS:  - Monitor vital signs, rhythm, and trends  - Monitor for bleeding, hypotension and signs of decreased cardiac output  - Evaluate effectiveness of vasoactive medications to optimize hemodynamic stability  - Monitor arterial and/or venous puncture sites for bleeding and/or hematoma  - Assess quality of pulses, skin color and temperature  - Assess for signs of decreased coronary artery perfusion - ex.  Angina  - Evaluate fluid balance, assess for edema, trend weights  Outcome: Progressing     Problem: RESPIRATORY - ADULT  Goal: Achieves optimal ventilation and oxygenation  Description: INTERVENTIONS:  - Assess for changes in respiratory status  - Assess for changes in mentation and behavior  - Position to facilitate oxygenation and minimize respiratory effort  - Oxygen supplementation based on oxygen saturation or ABGs  - Provide Smoking Cessation handout, if applicable  - Encourage broncho-pulmonary hygiene including cough, deep breathe, Incentive Spirometry  - Assess the need for suctioning and perform as needed  - Assess and instruct to report SOB or any respiratory difficulty  - Respiratory Therapy support as indicated  - Manage/alleviate anxiety  - Monitor for signs/symptoms of CO2 retention  Outcome: Progressing     Problem: GASTROINTESTINAL - ADULT  Goal: Minimal or absence of nausea and vomiting  Description: INTERVENTIONS:  - Maintain adequate hydration with IV or PO as ordered and tolerated  - Nasogastric tube to low intermittent suction as ordered  - Evaluate effectiveness of ordered antiemetic medications  - Provide nonpharmacologic comfort measures as appropriate  - Advance diet as tolerated, if ordered  - Obtain nutritional consult as needed  - Evaluate fluid balance  Outcome: Progressing     Problem: SKIN/TISSUE INTEGRITY - ADULT  Goal: Incision(s), wounds(s) or drain site(s) healing without S/S of infection  Description: INTERVENTIONS:  - Assess and document risk factors for pressure ulcer development  - Assess and document skin integrity  - Assess and document dressing/incision, wound bed, drain sites and surrounding tissue  - Implement wound care per orders  - Initiate isolation precautions as appropriate  - Initiate Pressure Ulcer prevention bundle as indicated  Outcome: Progressing     Problem: HEMATOLOGIC - ADULT  Goal: Free from bleeding injury  Description: (Example usage: patient with low platelets)  INTERVENTIONS:  - Avoid intramuscular injections, enemas and rectal medication administration  - Ensure safe mobilization of patient  - Hold pressure on venipuncture sites to achieve adequate hemostasis  - Assess for signs and symptoms of internal bleeding  - Monitor lab trends  - Patient is to report abnormal signs of bleeding to staff  - Avoid use of toothpicks and dental floss  - Use electric shaver for shaving  - Use soft bristle tooth brush  - Limit straining and forceful nose blowing  Outcome: Progressing     Problem: Patient/Family Goals  Goal: Patient/Family Long Term Goal  Description: Patient's Long Term Goal: Discharge from the hospital    Interventions:  - Vital signs monitoring  - Appropriate labs monitoring  - Pain management  - blood glucose monitoring with appropriate interventions per order/protocol   - Medication administration per order  - Follow Doctor's orders  - Update patient/family of plan of care  - See additional Care Plan goals for specific interventions    Outcome: Progressing  Goal: Patient/Family Short Term Goal  Description: Patient's Short Term Goal: Achieves No/tolerable pain level    Interventions:   - Vital signs monitoring  - Pain management  - Pain assessment and reassessment  - Medication administration per order  - Follow Doctor's orders  - See additional Care Plan goals for specific interventions  Outcome: Progressing

## 2023-09-09 NOTE — PHYSICAL THERAPY NOTE
PHYSICAL THERAPY TREATMENT NOTE - INPATIENT     Room Number: 407/407-A       Presenting Problem: S/p right shoulder reverse TSA---sling on at all times right UE and non WB for right UE reinforced. PMH sign for prior fall with right humerus fx / Diabetes / Breast CA / OA B Knee / Neuropathy . Currently requiring 1L O2. Problem List  Active Problems:    Pre-op testing      PHYSICAL THERAPY ASSESSMENT     Orders received and chart reviewed. RN  approved participation in physical therapy. Spouse present in room . PPE worn by therapist: mask and gloves. Patient was not wearing a mask during session. Patient is a 79year old female admitted 9/7/2023 for Presenting Problem: S/p right shoulder reverse TSA---sling on at all times right UE and non WB for right UE reinforced. PMH sign for prior fall with right humerus fx / Diabetes / Breast CA / OA B Knee / Neuropathy . Currently requiring on RA. Jeff Mireles Pt with recent admission from 8/09--8/12/23 after fall with humeral fx , pt was DC home with spouse. Pt with confusion last admission related to meds. Pt is alert oriented x 3. Pt reporting recently received IV pain meds for pain management still with significant pain yet willing to attempt mobility. Pt  reports of pain right shoulder area sx 10/10 during activity crying stating \" I need more pain meds \".   RN informed and in communication with MD team working on pain management with pt. Per RN can have another dose of IV pain meds in 1 hour . Pt pain is limiting pt ability to tolerate activity. Therapy observing current immobilizer sling with need for minor adjustment which may help with pain control  --pt spouse and pt declining  adjustment at this time stating they were told by MD to keep straps loose. Therapy contacted OT team who plans to stop in to assess positioning of brace and if pt willing may adjust prn for improved fit and comfort .          Patient with poor progress towards goals during this session. Pt reports sensation is intact denies any little finger numbness. Education provided on need for right sling brace on at all times ,  right UE  non  Weight bearing status, Physical therapy plan of care, physiological benefits of out of bed mobility, and post op reverse TSA therapy pt safety education , fxn mobility training , fall risk prevention, B AP and DC Planning. . Patient with fair carryover. Spouse also present for education. Bed mobility: Max assist cues for proper sequencing   Poor tolerance during activity pt starting to cry due to pain at 10/10 . Pt able to sit at EOB with increase pain yet agreeable to mobility to chair. Transfers: Min assist to Mod assist--sit to stand and SPT bed to chair taking only a few steps. Pt reports unable to participate in any mobility due to 10/10  pain   RN Informed. Gait Assistance: Minimum assistance (poor tolerance pain primary limiting factor)  Distance (ft): SPT bed to chair   unable to progress to ambulate due to pain --pt in tears at end of session  Assistive Device:  (HHA on left side UE)  Pattern: R Steppage;L Steppage; Shuffle (flexed posture)      Patient was left in bedside chair and spoiuse present   at end of session with all needs in reach. The patient's Approx Degree of Impairment: 68.66% has been calculated based on documentation in the [facility] '6 clicks' Inpatient Basic Mobility Short Form. Research supports that patients with this level of impairment may benefit from Subacute Rehab. Pain is primary limiting factor at this time. RN aware reaching out to MD .   RN / MD team working on pain management for pt. OT asked for follow up later today to assess brace and provide any adjustment as needed and pt willing. Pt was offered ice declined. Patient's current functional deficits include limitations in bed mobility , transfers . Ambulation and stoop steps.    Pt presents with high fall risk with prior hx of falls, decrease safety awareness, generalized mild weakness related to HPI , decrease balance, right UE immobilized in sling type brace , and decrease activity tolerance. Pt with goal for DC to home setting. Spouse confirms can provide recommended 24hr support at DC . Mulu Barrera DC Plans pending pt progress, pain management  and further acute management. Pt with difficulty with pain control . Therapy anticipates pt will progress to DC home with 24hr care of spouse . RN aware of patient status post session. DISCHARGE RECOMMENDATIONS  PT Discharge Recommendations: 24 hour care/supervision;Home;Home with home health PT     PLAN  PT Treatment Plan: Bed mobility; Body mechanics; Endurance; Energy conservation;Patient education; Family education;Gait training;Balance training;Transfer training;Stoop training    SUBJECTIVE  \" I need more pain meds \"     \" The ice pack is to much pressure \"     OBJECTIVE  Precautions: Limb alert - right;Bed/chair alarm (new to O2)    WEIGHT BEARING RESTRICTION  Weight Bearing Restriction: R upper extremity  R Upper Extremity: Non-Weight Bearing (sling immobilizer brace)             PAIN ASSESSMENT   Rating: 10  Location: right shoulder pain at sx site  Management Techniques: Activity promotion; Body mechanics;Breathing techniques;Relaxation;Repositioning    BALANCE                                                                                                                       Static Sitting: Fair -  Dynamic Sitting: Not tested           Static Standing: Poor +  Dynamic Standing: Poor +    ACTIVITY TOLERANCE  Pulse: 95        BP: 139/80             O2 WALK  Oxygen Therapy  SPO2% on Oxygen at Rest: 94    AM-PAC '6-Clicks' INPATIENT SHORT FORM - BASIC MOBILITY  How much difficulty does the patient currently have. ..   Patient Difficulty: Turning over in bed (including adjusting bedclothes, sheets and blankets)?: A Lot   Patient Difficulty: Sitting down on and standing up from a chair with arms (e.g., wheelchair, bedside commode, etc.): A Lot   Patient Difficulty: Moving from lying on back to sitting on the side of the bed?: A Lot   How much help from another person does the patient currently need. .. Help from Another: Moving to and from a bed to a chair (including a wheelchair)?: A Lot   Help from Another: Need to walk in hospital room?: A Lot   Help from Another: Climbing 3-5 steps with a railing?: A Lot     AM-PAC Score:  Raw Score: 12   Approx Degree of Impairment: 68.66%   Standardized Score (AM-PAC Scale): 35.33   CMS Modifier (G-Code): CL        Patient End of Session: Up in chair;Needs met; All patient questions and concerns addressed; Family present (spouse declined therapy turning on alarm)    CURRENT GOALS     Goals to be met by: 9/30/23  Patient Goal Patient's self-stated goal is: home    Goal #1 Patient is able to demonstrate supine - sit EOB @ level: supervision      Goal #1   Current Status  max assist    Goal #2 Patient is able to demonstrate transfers EOB to/from Chair/Wheelchair at assistance level: supervision with  LRAD      Goal #2  Current Status  min to mod A   Goal #3 Patient is able to ambulate 200  feet with assist device:  LRAD  at assistance level: supervision   Goal #3   Current Status  NT due to pain    Goal #4 Patient will negotiate 1 stairs/one curb w/ assistive device and supervision   Goal #4   Current Status  NT due to pain    Goal #5 Patient to demonstrate independence with home activity/exercise instructions provided to patient in preparation for discharge.    Goal #5   Current Status  NA    Goal #6     Goal #6  Current Status      Therapy activity 1 unit

## 2023-09-09 NOTE — DIABETES ED
[facility]  [facility]patient Diabetes Clinician Note    Alessandro Maza Patient Status:  Inpatient   1955 MRN [id number]  Location [facility] 4W/SW/SE Attending Almita Bray MD  Hosp Day # 2 PCP Zac Farris MD      Patient admitted to hospital with insulin pump and CGM. Known to [address] from prior visit. Pt reports prior to admission, lows at night, related to 20# loss and strict diet. No lows, but high sugars while in hospital.   Filling pods with pens    Insulin Pump Settings: (Retrieved from pump)    Type of Pump:  Omnipod  Type of Insulin:  U-500  Automated Insulin Deliver System:yes    Last site change: Yesterday am, changes site q 2 days due to skin irritation    Basal:   12MN  0.30 units/hr  6a 0.70 units/hr    Insulin to Carbohydrate Ratios:   12MN 1:20    Sensitivity:  1:100    Target Goal(s): 120, correct above 150    Active Insulin:  6 hrs      Insulin pump and Continuous Glucose agreement signed and in chart. Pt is alert and orientated and currently able to self-manage insulin pump. Patient has additional insulin and supplies for insulin pump use while hospitalized. Patient able to demonstrate appropriate carb counting skills.          Pao Adame RD  Diabetes Educator  2023

## 2023-09-10 VITALS
OXYGEN SATURATION: 91 % | SYSTOLIC BLOOD PRESSURE: 115 MMHG | BODY MASS INDEX: 37.54 KG/M2 | HEART RATE: 97 BPM | TEMPERATURE: 97 F | WEIGHT: 204 LBS | DIASTOLIC BLOOD PRESSURE: 68 MMHG | RESPIRATION RATE: 18 BRPM | HEIGHT: 62 IN

## 2023-09-10 LAB
ANION GAP SERPL CALC-SCNC: 1 MMOL/L (ref 0–18)
BUN BLD-MCNC: 19 MG/DL (ref 7–18)
BUN/CREAT SERPL: 23.2 (ref 10–20)
CALCIUM BLD-MCNC: 8.6 MG/DL (ref 8.5–10.1)
CHLORIDE SERPL-SCNC: 102 MMOL/L (ref 98–112)
CO2 SERPL-SCNC: 35 MMOL/L (ref 21–32)
CREAT BLD-MCNC: 0.82 MG/DL
EGFRCR SERPLBLD CKD-EPI 2021: 79 ML/MIN/1.73M2 (ref 60–?)
GLUCOSE BLD-MCNC: 219 MG/DL (ref 70–99)
GLUCOSE BLDC GLUCOMTR-MCNC: 206 MG/DL (ref 70–99)
OSMOLALITY SERPL CALC.SUM OF ELEC: 295 MOSM/KG (ref 275–295)
POTASSIUM SERPL-SCNC: 4.3 MMOL/L (ref 3.5–5.1)
SODIUM SERPL-SCNC: 138 MMOL/L (ref 136–145)

## 2023-09-10 RX ORDER — HYDROMORPHONE HYDROCHLORIDE 2 MG/1
2 TABLET ORAL EVERY 2 HOUR PRN
Qty: 60 TABLET | Refills: 0 | Status: SHIPPED | OUTPATIENT
Start: 2023-09-10 | End: 2023-09-15

## 2023-09-10 NOTE — PLAN OF CARE
No acute changes overnite. Pain management is the main issue holding up discharge. Oral dilaudid given q2 prn. Ice wrap and sling to rt shoulder. Voids freely. Remote tele, no calls. Problem: Patient Centered Care  Goal: Patient preferences are identified and integrated in the patient's plan of care  Description: Interventions:  - What would you like us to know as we care for you? My  and I have been  for more than 40 years. - Provide timely, complete, and accurate information to patient/family  - Incorporate patient and family knowledge, values, beliefs, and cultural backgrounds into the planning and delivery of care  - Encourage patient/family to participate in care and decision-making at the level they choose  - Honor patient and family perspectives and choices  Outcome: Progressing     Problem: PAIN - ADULT  Goal: Verbalizes/displays adequate comfort level or patient's stated pain goal  Description: INTERVENTIONS:  - Encourage pt to monitor pain and request assistance  - Assess pain using appropriate pain scale  - Administer analgesics based on type and severity of pain and evaluate response  - Implement non-pharmacological measures as appropriate and evaluate response  - Consider cultural and social influences on pain and pain management  - Manage/alleviate anxiety  - Utilize distraction and/or relaxation techniques  - Monitor for opioid side effects  - Notify MD/LIP if interventions unsuccessful or patient reports new pain  - Anticipate increased pain with activity and pre-medicate as appropriate  Outcome: Progressing     Problem: SAFETY ADULT - FALL  Goal: Free from fall injury  Description: INTERVENTIONS:  - Assess pt frequently for physical needs  - Identify cognitive and physical deficits and behaviors that affect risk of falls.   - [place] fall precautions as indicated by assessment.  - Educate pt/family on patient safety including physical limitations  - Instruct pt to call for assistance with activity based on assessment  - Modify environment to reduce risk of injury  - Provide assistive devices as appropriate  - Consider OT/PT consult to assist with strengthening/mobility  - Encourage toileting schedule  Outcome: Progressing     Problem: DISCHARGE PLANNING  Goal: Discharge to home or other facility with appropriate resources  Description: INTERVENTIONS:  - Identify barriers to discharge w/pt and caregiver  - Include patient/family/discharge partner in discharge planning  - Arrange for needed discharge resources and transportation as appropriate  - Identify discharge learning needs (meds, wound care, etc)  - Arrange for interpreters to assist at discharge as needed  - Consider post-discharge preferences of patient/family/discharge partner  - Complete POLST form as appropriate  - Assess patient's ability to be responsible for managing their own health  - Refer to Case Management Department for coordinating discharge planning if the patient needs post-hospital services based on physician/LIP order or complex needs related to functional status, cognitive ability or social support system  Outcome: Progressing     Problem: CARDIOVASCULAR - ADULT  Goal: Maintains optimal cardiac output and hemodynamic stability  Description: INTERVENTIONS:  - Monitor vital signs, rhythm, and trends  - Monitor for bleeding, hypotension and signs of decreased cardiac output  - Evaluate effectiveness of vasoactive medications to optimize hemodynamic stability  - Monitor arterial and/or venous puncture sites for bleeding and/or hematoma  - Assess quality of pulses, skin color and temperature  - Assess for signs of decreased coronary artery perfusion - ex.  Angina  - Evaluate fluid balance, assess for edema, trend weights  Outcome: Progressing     Problem: RESPIRATORY - ADULT  Goal: Achieves optimal ventilation and oxygenation  Description: INTERVENTIONS:  - Assess for changes in respiratory status  - Assess for changes in mentation and behavior  - Position to facilitate oxygenation and minimize respiratory effort  - Oxygen supplementation based on oxygen saturation or ABGs  - Provide Smoking Cessation handout, if applicable  - Encourage broncho-pulmonary hygiene including cough, deep breathe, Incentive Spirometry  - Assess the need for suctioning and perform as needed  - Assess and instruct to report SOB or any respiratory difficulty  - Respiratory Therapy support as indicated  - Manage/alleviate anxiety  - Monitor for signs/symptoms of CO2 retention  Outcome: Progressing

## 2023-09-10 NOTE — DISCHARGE INSTRUCTIONS
Follow up with Dr Zenobia Newby on September 19th at 56 AM. Address is [address]. Printed discharge instructions from Dr Zenobia Newby given at discharge. Your prescriptions have been sent to your pharmacy.      Resume services with Nessa   [phone]

## 2023-09-10 NOTE — PHYSICAL THERAPY NOTE
PHYSICAL THERAPY TREATMENT NOTE - INPATIENT     Room Number: 407/407-A       Presenting Problem: S/p right shoulder reverse TSA---sling on at all times right UE and non WB for right UE reinforced. PMH sign for prior fall with right humerus fx / Diabetes / Breast CA / OA B Knee / Neuropathy . Currently requiring 1L O2. Problem List  Active Problems:    Pre-op testing      PHYSICAL THERAPY ASSESSMENT   Chart reviewed. RN Flaca approved participation in physical therapy. PPE worn by therapist: mask and gloves. Patient was not wearing a mask during session. Patient presented in bedside chair with 9/10 pain. Patient with good  progress towards goals during this session. Education provided on Weight bearing status, Physical therapy plan of care, and physiological benefits of out of bed mobility. Patient with good carryover. Pt is received in the chair with her R UE immobilizer donned at all times. Pt's spouse was also present throughout the session. Discussed home safety and educated both pt and spouse. Pt and spouse reported that there is no stairs at home to negotiate. Pt is SBA with sit<>stand transfers with no AD. Pt declined to practice with a cane but reported that she has one at home if needed. Pt was able to stand for a few minutes with SBA. Pt was able to AMB about [de-identified]' with no AD SBA for balance and safety. Pt with better pain control today and improved mobility this session. Pt with good balance and safety awareness. Returned pt back to the room and to sitting in the chair with all needs within reach. Pt is on track to dc to home once medically cleared. Reported to the RN on the status of the pt. Bed mobility:  NT  Transfers: Supervision  Gait Assistance: Supervision  Distance (ft): 80'  Assistive Device: None  Pattern: R Steppage;L Steppage; Shuffle (flexed posture)          . Patient was left in bedside chair at end of session with all needs in reach.  The patient's Approx Degree of Impairment: 46.58% has been calculated based on documentation in the [facility] '6 clicks' Inpatient Basic Mobility Short Form. Research supports that patients with this level of impairment may benefit from Home with home health PT. RN aware of patient status post session. DISCHARGE RECOMMENDATIONS  PT Discharge Recommendations: Home with home health PT;24 hour care/supervision     PLAN  PT Treatment Plan: Bed mobility; Body mechanics; Endurance; Energy conservation;Patient education; Family education;Gait training;Balance training;Transfer training;Stoop training    SUBJECTIVE  Pt was agreeable to therapy session. OBJECTIVE  Precautions: Limb alert - right;Bed/chair alarm (new to O2)    WEIGHT BEARING RESTRICTION  Weight Bearing Restriction: R upper extremity  R Upper Extremity: Non-Weight Bearing (sling immobilizer brace)             PAIN ASSESSMENT   Ratin  Location: R shoulder  Management Techniques: Activity promotion; Body mechanics; Relaxation;Repositioning    BALANCE                                                                                                                       Static Sitting: Good  Dynamic Sitting: Fair +           Static Standing: Fair  Dynamic Standing: Fair    ACTIVITY TOLERANCE                         O2 WALK       AM-PAC '6-Clicks' INPATIENT SHORT FORM - BASIC MOBILITY  How much difficulty does the patient currently have. .. Patient Difficulty: Turning over in bed (including adjusting bedclothes, sheets and blankets)?: A Little   Patient Difficulty: Sitting down on and standing up from a chair with arms (e.g., wheelchair, bedside commode, etc.): A Little   Patient Difficulty: Moving from lying on back to sitting on the side of the bed?: A Little   How much help from another person does the patient currently need. ..    Help from Another: Moving to and from a bed to a chair (including a wheelchair)?: A Little   Help from Another: Need to walk in hospital room?: A Little   Help from Another: Climbing 3-5 steps with a railing?: A Little     AM-PAC Score:  Raw Score: 18   Approx Degree of Impairment: 46.58%   Standardized Score (AM-PAC Scale): 43.63   CMS Modifier (G-Code): CK          Patient End of Session: Up in chair;Needs met;Call light within reach;RN aware of session/findings; All patient questions and concerns addressed; Family present    CURRENT GOALS   Goals to be met by: 9/30/23  Patient Goal Patient's self-stated goal is: home    Goal #1 Patient is able to demonstrate supine - sit EOB @ level: supervision      Goal #1   Current Status NT received in the chair   Goal #2 Patient is able to demonstrate transfers EOB to/from Chair/Wheelchair at assistance level: supervision with  LRAD      Goal #2  Current Status  SBA with no AD   Goal #3 Patient is able to ambulate 200  feet with assist device:  LRAD  at assistance level: supervision   Goal #3   Current Status [de-identified]' with no AD SBA   Goal #4 Patient will negotiate 1 stairs/one curb w/ assistive device and supervision   Goal #4   Current Status  NT Pt reported that she has no stairs to negotiate at home. Goal #5 Patient to demonstrate independence with home activity/exercise instructions provided to patient in preparation for discharge.    Goal #5   Current Status  NA    Goal #6     Goal #6  Current Status             Therapeutic Activity: 25 minutes

## 2023-09-10 NOTE — HOME CARE LIAISON
Patient is current with [facility] for RN and PT services. What Type Of Note Output Would You Prefer (Optional)?: Bullet Format What Is The Reason For Today's Visit?: Full Body Skin Examination What Is The Reason For Today's Visit? (Being Monitored For X): the development of new lesions

## 2023-09-10 NOTE — PLAN OF CARE
Progress adequate for discharge per MDs and therapists. Scripts sent to her pharmacy, printed instructions from Dr Ines Orellana given and discharge instructions given. Immobilizer brace on right upper extremity.

## 2023-09-10 NOTE — CM/SW NOTE
PT rec for HHC-PT    Cm spoke with ptre rec. Pt is agreeable and requested [facility] if available due to prior hx with agency. CM sent ref, f2f done rot RN/PT/OT    SW/CM will f/u for choice when list is final.    Plan  Home with [facility] pending choice. / to remain available for support and/or discharge planning.      Jairo Toney RN    Ext [phone]

## 2023-09-11 ENCOUNTER — PATIENT OUTREACH (OUTPATIENT)
Dept: CASE MANAGEMENT | Age: 68
End: 2023-09-11

## 2023-09-11 NOTE — PROGRESS NOTES
NCM placed call to patient for TCM, LM requesting a call back to [phone] with a condition update.     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

## 2023-09-12 ENCOUNTER — TELEPHONE (OUTPATIENT)
Dept: INTERNAL MEDICINE CLINIC | Facility: CLINIC | Age: 68
End: 2023-09-12

## 2023-09-12 NOTE — PROGRESS NOTES
NCM placed call to patient for TCM, LM requesting a call back to [phone] with a condition update. TAN sent message to MD's office to request that they try to schedule patient for a TCM appointment.

## 2023-09-12 NOTE — TELEPHONE ENCOUNTER
Multiple attempts to reach patient have been unsuccessful. Pt does not have an appointment scheduled at this time. TCM appt recommended by 9/17/2021 as pt is at High risk for readmission. [facility] sent message to MD's office to request that they try to schedule patient for a TCM appointment. BOOK BY DATE (last date for TCM): 9/24/2023    Clinical staff:  Please call patient and try to schedule the TCM appointment within the TCM timeframe. Thank you!

## 2023-09-12 NOTE — TELEPHONE ENCOUNTER
She is following up with ortho. That should be sufficient. She had elective repair of a humerus fracture. I really don't understand what makes every one of my patients high risk for readmission.   Thank you

## 2023-09-20 ENCOUNTER — TELEPHONE (OUTPATIENT)
Dept: INTERNAL MEDICINE CLINIC | Facility: CLINIC | Age: 68
End: 2023-09-20

## 2023-09-20 RX ORDER — TOPIRAMATE 25 MG/1
25 TABLET ORAL EVERY EVENING
Qty: 30 TABLET | Refills: 2 | OUTPATIENT
Start: 2023-09-20

## 2023-09-20 NOTE — TELEPHONE ENCOUNTER
Cindy/physical therapist/[facility] called    Patient is refusing further home physical therapy   Will discharge patient from [facility] physical therapy    Pt will continue with OT & Nursing

## 2023-09-26 ENCOUNTER — TELEPHONE (OUTPATIENT)
Dept: INTERNAL MEDICINE CLINIC | Facility: CLINIC | Age: 68
End: 2023-09-26

## 2023-09-26 NOTE — TELEPHONE ENCOUNTER
Nichole Faxed over a message for prescriber    Message:  Please verify directions    HYDROMORPHONE HCL 2MG TABLETS    Is this patient taking every 2 hours? Max dose per day?     Fax placed in green folder

## 2023-09-28 ENCOUNTER — TELEPHONE (OUTPATIENT)
Dept: INTERNAL MEDICINE CLINIC | Facility: CLINIC | Age: 68
End: 2023-09-28

## 2023-09-28 NOTE — TELEPHONE ENCOUNTER
Damir Almendarez from [place] [id number]    Discharging patient form home health care today.  # [phone], confidential voicemail. No call back needed. YASMEEN.

## 2023-09-30 NOTE — OPERATIVE REPORT
Pre-Operative Diagnosis: Right shoulder proximal humerus fracture biceps tendon     Post-Operative Diagnosis: Right shoulder proximal humerus fracture biceps tendon      Procedure Performed:   Right shoulder reverse total shoulder for fracture biceps tenodesis    Surgeon(s) and Role:     Nelson Franks MD - Primary    Assistant(s):  PA: TONY Alanis was present for the positioning, draping, prepping, exposure, safe careful manipulation of the retractors to allow for safe application of hardware for the reverse total shoulder. Her assistance was critical throughout the case. Surgical Findings: 4 part proximal humerus fracture and there were arthritic changes of the glenoid and comminuted proximal humerus fracture with poor bone quality for reconstruction, and biceps tendinopathy. Specimen: none     Estimated Blood Loss: No data recorded    Indication:  The patient is an 73-year-old right-hand dominant white female who had a fall resulting in a proximal humerus fracture and she was deemed a candidate for reverse total shoulder arthroplasty. The risks and benefits of the surgery, as well as the expected postoperative course were discussed with the patient at length. The patient understood the risks and benefits, and wished to proceed with the operation. Procedure: On the day of surgery, the patient was seen in the preoperative area. The planned surgical procedure and the correct surgical site were again reviewed with the patient and the right upper extremity was marked. Prior to being taken back to the operating room, the patient did receive an interscalene nerve block in her right upper extremity for postoperative pain control. Preoperative antibiotics were given. The patient was then taken back to the operating room and intubated without complications.  She was placed in the beach chair position, and the right upper extremity was prepped and draped in the usual sterile fashion. A standard 10-15 cm deltopectoral incision was made along the anterior aspect of the right shoulder. The incision was carried sharply down to the level of the deep fascia. The cephalic vein was identified and taken laterally with the deltoid, while the pectoralis major was taken medially, and dissection was taken through the deltopectoral interval. The upper 1-1.5 cm of the pectoralis major tendon was released from its attachment site on the humerus for greater exposure. The subdeltoid and subacromial spaces were developed deeply. A self-retaining retractor was placed after these spaces were adequately freed up. We then incised the clavipectoral fascia just lateral to the conjoined tendon, and the conjoined tendon was developed medially. The interval was freed up to the coracoacromial ligament, but this was not taken. Digital palpation was used to verify the integrity of the axillary nerve, which was protected throughout the procedure. The axillary nerve was palpated along its course from anteromedial to posterolateral, as it passed inferior to the lower border of the subscapularis. The musculocutaneous nerve was attempted to be palpated within the surgical field along the undersurface of the conjoined tendon, but it was not noted to be palpable in this proximal location. The conjoined tendon was then retracted with the self-retaining retractor medially to expose the subscapularis tendon deep to this. The anterior humeral circumflex vessels were next clamped and coagulated. We then incised the soft tissue sleeve over the long head of the biceps tendon, just deep to the pectoralis major tendon insertion. This was opened up from distally to proximally, up to the rotator interval and to the biceps origin at the superior aspect of the glenoid. The biceps showed advanced degenerative changes proximally consistent with tendinopathy.  Therefore, the tendon was tenodesed to the upper border of the pectoralis major tendon insertion. The degenerative, more proximal remaining portion of the tendon was then excised up to its origin at the superior aspect of the glenoid. The bicipital groove was then further cleaned of soft tissue to better expose the lesser tuberosity and subscapularis insertion. The subscapularis tendon and anterior capsule were then taken down in one sleeve of tissue subperiosteally off of the lesser tuberosity. The tissue was released down to the 6 o'clock position on the anatomic neck of the humeral head to allow delivery of the humeral head into the wound with simultaneous adduction, extension, and external rotation. With the humeral head dislocated and exposed, and the fractures of the lesser tuberosity and surgical neck were identified and cleared of hematoma and fracture debris. The greater and lesser tuberosities were tagged with # 2 fiberwire. I then brought the shoulder into an abducted, extended, and externally rotated position for exposure of the glenoid. The humerus was retracted posteriorly with a Fukuda retractor. The capsule was released anteriorly from the labrum initially and anterior glenoid retractors were then placed. The remaining capsule was then circumferentially released from the glenoid rim and labrum. Care was taken to adequately release the capsule inferiorly for adequate exposure of the inferior glenoid rim. Part of the triceps origin was released inferiorly as part of this exposure. Following this, the labrum was excised circumferentially to completely expose the glenoid rim. The articular cartilage was removed with a currette and I first placed the guide pin for reaming of the glenoid. This pin was placed in an inferior position on the glenoid, in the midline, and also to correct version. I then brought in the cannulated reamer over this guide pin and reamed the inferior glenoid just to the start of bleeding bone. I then tapped for a 6.5 screw and inserted the base plate. The inferior and superior screws were then placed into the base plate. The inferior screw hole was first drilled into the scapular neck and did measure 26 mm in length. A 26 mm locking screw was then placed in this hole. Next, the superior drill hole was made into the base of the coracoid. This drill hole did measure 26 mm in length and a 26 mm locking screw was placed in this hole. Next, the anterior hole was drilled and measured and a  14 mm locking screw was placed in this hole. Next, the posterior hole was drilled and measured 14  mm in length and a 14 mm locking screw was placed in this hole. These screws did nicely compress the baseplate to the glenoid bone graft and native glenoid prior to locking the screws. A 32 -4 glenosphere was placed over baseplate and secured with the machine screw, and then the proximal humerus was redelivered into the wound with adduction, extension, and external rotation. The shaft of the humerus was then prepared. A 6-mm entry reamer was placed down into the humeral intramedullary canal. The canal was reamed up to a size #12 reamer. The trial size #10 humeral implant was then placed, again making sure to place the implant in approximately 30 degrees of retroversion. I then placed a neutral liner and reduced the shoulder to determine the best fit. The size 12 with a neutral liner showed the best soft tissue tensioning, with no areas of impingement, including inferiorly with adduction. The height and reduction of the tuberosities were confirmed with xray. The humerus was, therefore, redislocated and the decision was made to proceed with a size 12 stem with neutral liner. A cemented #12 stem with a +4 polyethylene liner were opened. Prior to placing the humeral stem two, 2-0 fiberwires suture were then passed in a cerclage fashion through the greater and lesser tuberosity pieces and through the medial hole on the implant.  I then impacted the final humeral prosthesis into the humeral canal with bone graft. Once the stem was securely impacted, polyethylene liner was impacted into the humeral implant. The shoulder was then reduced, and the wound was copiously irrigated. The greater and lesser tuberosities were then tied down the implant. The proximal humerus moved as a unit. Final xrays were taken confirming the reduction of the tuberosities. A #2 Fiberwire stitch was then passed in a figure-of-eight fashion to close the lateral rotator interval.  Following this, all retractors were removed, and digital palpation was again used to confirm the integrity of the axillary nerve. The wound was again thoroughly irrigated. A total of 5 L of pulse irrigation was used throughout the case. The deltopectoral interval was loosely closed with interrupted #1 Vicryl stitches. The subcutaneous layer was then closed with interrupted 2-0 Vicryl stitches. A running subcuticular 3-0 Prolene suture was then used to close the skin. Steri-Strips were placed over the incision site, and the wound was sterilely dressed with aquacell dressing. The shoulder was then placed in an abduction sling. The patient was awoken without complication and extubated. She was transferred to the PACU in stable condition. ESTIMATED BLOOD LOSS: 500 mL. DRAINS: None  SPECIMENS: None  COMPLICATIONS: None apparent.     Denise Peres MD

## 2023-10-04 ENCOUNTER — TELEPHONE (OUTPATIENT)
Dept: HEMATOLOGY/ONCOLOGY | Facility: HOSPITAL | Age: 68
End: 2023-10-04

## 2023-10-04 RX ORDER — LETROZOLE 2.5 MG/1
2.5 TABLET, FILM COATED ORAL NIGHTLY
Qty: 90 TABLET | Refills: 3 | Status: SHIPPED | OUTPATIENT
Start: 2023-10-04

## 2023-10-04 NOTE — TELEPHONE ENCOUNTER
[facility] pharmacy calling says patients prescription refill has been closed by Provider. ( [place])  [phone].

## 2023-10-10 ENCOUNTER — LAB ENCOUNTER (OUTPATIENT)
Dept: LAB | Facility: REFERENCE LAB | Age: 68
End: 2023-10-10
Attending: INTERNAL MEDICINE
Payer: MEDICARE

## 2023-10-10 DIAGNOSIS — Z79.4 TYPE 2 DIABETES MELLITUS WITHOUT COMPLICATION, WITH LONG-TERM CURRENT USE OF INSULIN (HCC): ICD-10-CM

## 2023-10-10 DIAGNOSIS — E03.8 OTHER SPECIFIED HYPOTHYROIDISM: ICD-10-CM

## 2023-10-10 DIAGNOSIS — E83.42 HYPOMAGNESEMIA: ICD-10-CM

## 2023-10-10 DIAGNOSIS — E55.9 VITAMIN D DEFICIENCY: ICD-10-CM

## 2023-10-10 DIAGNOSIS — E11.9 TYPE 2 DIABETES MELLITUS WITHOUT COMPLICATION, WITH LONG-TERM CURRENT USE OF INSULIN (HCC): ICD-10-CM

## 2023-10-10 DIAGNOSIS — E53.8 VITAMIN B12 DEFICIENCY: ICD-10-CM

## 2023-10-10 DIAGNOSIS — E78.5 HYPERLIPEMIA: ICD-10-CM

## 2023-10-10 DIAGNOSIS — E78.00 HYPERCHOLESTEROLEMIA: ICD-10-CM

## 2023-10-10 DIAGNOSIS — E11.9 DIABETES MELLITUS (HCC): Primary | ICD-10-CM

## 2023-10-10 LAB
ALBUMIN SERPL-MCNC: 3.1 G/DL (ref 3.4–5)
ALBUMIN/GLOB SERPL: 0.8 {RATIO} (ref 1–2)
ALP LIVER SERPL-CCNC: 128 U/L
ALT SERPL-CCNC: 36 U/L
ANION GAP SERPL CALC-SCNC: 5 MMOL/L (ref 0–18)
AST SERPL-CCNC: 32 U/L (ref 15–37)
BASOPHILS # BLD AUTO: 0.04 X10(3) UL (ref 0–0.2)
BASOPHILS NFR BLD AUTO: 1.3 %
BILIRUB SERPL-MCNC: 0.5 MG/DL (ref 0.1–2)
BUN BLD-MCNC: 11 MG/DL (ref 7–18)
BUN/CREAT SERPL: 14.3 (ref 10–20)
CALCIUM BLD-MCNC: 9.3 MG/DL (ref 8.5–10.1)
CHLORIDE SERPL-SCNC: 105 MMOL/L (ref 98–112)
CHOLEST SERPL-MCNC: 152 MG/DL (ref ?–200)
CO2 SERPL-SCNC: 31 MMOL/L (ref 21–32)
CREAT BLD-MCNC: 0.77 MG/DL
CREAT UR-SCNC: 177 MG/DL
DEPRECATED RDW RBC AUTO: 45.7 FL (ref 35.1–46.3)
EGFRCR SERPLBLD CKD-EPI 2021: 85 ML/MIN/1.73M2 (ref 60–?)
EOSINOPHIL # BLD AUTO: 0.13 X10(3) UL (ref 0–0.7)
EOSINOPHIL NFR BLD AUTO: 4.3 %
ERYTHROCYTE [DISTWIDTH] IN BLOOD BY AUTOMATED COUNT: 13.2 % (ref 11–15)
EST. AVERAGE GLUCOSE BLD GHB EST-MCNC: 171 MG/DL (ref 68–126)
FASTING PATIENT LIPID ANSWER: YES
FASTING STATUS PATIENT QL REPORTED: YES
GLOBULIN PLAS-MCNC: 4 G/DL (ref 2.8–4.4)
GLUCOSE BLD-MCNC: 157 MG/DL (ref 70–99)
HBA1C MFR BLD: 7.6 % (ref ?–5.7)
HCT VFR BLD AUTO: 39.3 %
HDLC SERPL-MCNC: 64 MG/DL (ref 40–59)
HGB BLD-MCNC: 12.3 G/DL
IMM GRANULOCYTES # BLD AUTO: 0.01 X10(3) UL (ref 0–1)
IMM GRANULOCYTES NFR BLD: 0.3 %
LDLC SERPL CALC-MCNC: 73 MG/DL (ref ?–100)
LYMPHOCYTES # BLD AUTO: 1.09 X10(3) UL (ref 1–4)
LYMPHOCYTES NFR BLD AUTO: 36.5 %
MAGNESIUM SERPL-MCNC: 2.4 MG/DL (ref 1.6–2.6)
MCH RBC QN AUTO: 29.5 PG (ref 26–34)
MCHC RBC AUTO-ENTMCNC: 31.3 G/DL (ref 31–37)
MCV RBC AUTO: 94.2 FL
MICROALBUMIN UR-MCNC: 4.17 MG/DL
MICROALBUMIN/CREAT 24H UR-RTO: 23.6 UG/MG (ref ?–30)
MONOCYTES # BLD AUTO: 0.41 X10(3) UL (ref 0.1–1)
MONOCYTES NFR BLD AUTO: 13.7 %
NEUTROPHILS # BLD AUTO: 1.31 X10 (3) UL (ref 1.5–7.7)
NEUTROPHILS # BLD AUTO: 1.31 X10(3) UL (ref 1.5–7.7)
NEUTROPHILS NFR BLD AUTO: 43.9 %
NONHDLC SERPL-MCNC: 88 MG/DL (ref ?–130)
OSMOLALITY SERPL CALC.SUM OF ELEC: 295 MOSM/KG (ref 275–295)
PLATELET # BLD AUTO: 156 10(3)UL (ref 150–450)
POTASSIUM SERPL-SCNC: 4.1 MMOL/L (ref 3.5–5.1)
PROT SERPL-MCNC: 7.1 G/DL (ref 6.4–8.2)
RBC # BLD AUTO: 4.17 X10(6)UL
SODIUM SERPL-SCNC: 141 MMOL/L (ref 136–145)
TRIGL SERPL-MCNC: 80 MG/DL (ref 30–149)
TSI SER-ACNC: 1.99 MIU/ML (ref 0.36–3.74)
VIT B12 SERPL-MCNC: 768 PG/ML (ref 193–986)
VIT D+METAB SERPL-MCNC: 90.9 NG/ML (ref 30–100)
VLDLC SERPL CALC-MCNC: 12 MG/DL (ref 0–30)
WBC # BLD AUTO: 3 X10(3) UL (ref 4–11)

## 2023-10-10 PROCEDURE — 80061 LIPID PANEL: CPT

## 2023-10-10 PROCEDURE — 83036 HEMOGLOBIN GLYCOSYLATED A1C: CPT

## 2023-10-10 PROCEDURE — 85025 COMPLETE CBC W/AUTO DIFF WBC: CPT

## 2023-10-10 PROCEDURE — 80053 COMPREHEN METABOLIC PANEL: CPT

## 2023-10-10 PROCEDURE — 83735 ASSAY OF MAGNESIUM: CPT

## 2023-10-10 PROCEDURE — 82570 ASSAY OF URINE CREATININE: CPT

## 2023-10-10 PROCEDURE — 82306 VITAMIN D 25 HYDROXY: CPT

## 2023-10-10 PROCEDURE — 84443 ASSAY THYROID STIM HORMONE: CPT

## 2023-10-10 PROCEDURE — 82043 UR ALBUMIN QUANTITATIVE: CPT

## 2023-10-10 PROCEDURE — 36415 COLL VENOUS BLD VENIPUNCTURE: CPT

## 2023-10-10 PROCEDURE — 82607 VITAMIN B-12: CPT

## 2023-10-18 ENCOUNTER — OFFICE VISIT (OUTPATIENT)
Dept: INTERNAL MEDICINE CLINIC | Facility: CLINIC | Age: 68
End: 2023-10-18

## 2023-10-18 VITALS
BODY MASS INDEX: 36.86 KG/M2 | OXYGEN SATURATION: 98 % | HEIGHT: 63 IN | DIASTOLIC BLOOD PRESSURE: 78 MMHG | TEMPERATURE: 98 F | SYSTOLIC BLOOD PRESSURE: 128 MMHG | HEART RATE: 90 BPM | WEIGHT: 208 LBS | RESPIRATION RATE: 16 BRPM

## 2023-10-18 DIAGNOSIS — E78.00 HYPERCHOLESTEROLEMIA: ICD-10-CM

## 2023-10-18 DIAGNOSIS — K75.81 LIVER CIRRHOSIS SECONDARY TO NASH (HCC): ICD-10-CM

## 2023-10-18 DIAGNOSIS — Z79.4 TYPE 2 DIABETES MELLITUS WITHOUT COMPLICATION, WITH LONG-TERM CURRENT USE OF INSULIN (HCC): Primary | ICD-10-CM

## 2023-10-18 DIAGNOSIS — K74.60 LIVER CIRRHOSIS SECONDARY TO NASH (HCC): ICD-10-CM

## 2023-10-18 DIAGNOSIS — E53.8 VITAMIN B12 DEFICIENCY: ICD-10-CM

## 2023-10-18 DIAGNOSIS — C50.911 MUCINOUS CARCINOMA OF BREAST, RIGHT (HCC): ICD-10-CM

## 2023-10-18 DIAGNOSIS — E83.42 HYPOMAGNESEMIA: ICD-10-CM

## 2023-10-18 DIAGNOSIS — K21.9 GASTROESOPHAGEAL REFLUX DISEASE, UNSPECIFIED WHETHER ESOPHAGITIS PRESENT: ICD-10-CM

## 2023-10-18 DIAGNOSIS — G47.33 OBSTRUCTIVE SLEEP APNEA: ICD-10-CM

## 2023-10-18 DIAGNOSIS — M17.0 PRIMARY OSTEOARTHRITIS OF BOTH KNEES: ICD-10-CM

## 2023-10-18 DIAGNOSIS — Z86.010 HISTORY OF ADENOMATOUS POLYP OF COLON: ICD-10-CM

## 2023-10-18 DIAGNOSIS — E03.8 OTHER SPECIFIED HYPOTHYROIDISM: ICD-10-CM

## 2023-10-18 DIAGNOSIS — D72.819 LEUKOPENIA, UNSPECIFIED TYPE: ICD-10-CM

## 2023-10-18 DIAGNOSIS — E11.9 TYPE 2 DIABETES MELLITUS WITHOUT COMPLICATION, WITH LONG-TERM CURRENT USE OF INSULIN (HCC): Primary | ICD-10-CM

## 2023-10-18 DIAGNOSIS — Z00.00 ROUTINE HEALTH MAINTENANCE: ICD-10-CM

## 2023-10-18 DIAGNOSIS — Z86.73 HX OF TRANSIENT ISCHEMIC ATTACK (TIA): ICD-10-CM

## 2023-10-18 PROCEDURE — G0008 ADMIN INFLUENZA VIRUS VAC: HCPCS | Performed by: INTERNAL MEDICINE

## 2023-10-18 PROCEDURE — 1125F AMNT PAIN NOTED PAIN PRSNT: CPT | Performed by: INTERNAL MEDICINE

## 2023-10-18 PROCEDURE — 90662 IIV NO PRSV INCREASED AG IM: CPT | Performed by: INTERNAL MEDICINE

## 2023-10-18 PROCEDURE — 99214 OFFICE O/P EST MOD 30 MIN: CPT | Performed by: INTERNAL MEDICINE

## 2023-10-18 RX ORDER — TRAMADOL HYDROCHLORIDE 50 MG/1
50 TABLET ORAL EVERY 8 HOURS PRN
COMMUNITY

## 2023-10-18 RX ORDER — BISACODYL 5 MG/1
5 TABLET, DELAYED RELEASE ORAL AS NEEDED
COMMUNITY
Start: 2023-09-06

## 2023-10-18 RX ORDER — IBUPROFEN 800 MG/1
800 TABLET ORAL EVERY 8 HOURS PRN
COMMUNITY
Start: 2023-09-06

## 2023-10-18 RX ORDER — NALOXONE HYDROCHLORIDE 4 MG/.1ML
4 SPRAY NASAL AS NEEDED
COMMUNITY
Start: 2023-08-24

## 2023-10-18 RX ORDER — HYDROMORPHONE HYDROCHLORIDE 2 MG/1
2 TABLET ORAL EVERY 4 HOURS PRN
COMMUNITY
Start: 2023-09-06

## 2023-10-19 ENCOUNTER — MED REC SCAN ONLY (OUTPATIENT)
Dept: INTERNAL MEDICINE CLINIC | Facility: CLINIC | Age: 68
End: 2023-10-19

## 2023-10-23 ENCOUNTER — HOSPITAL ENCOUNTER (OUTPATIENT)
Dept: MRI IMAGING | Facility: HOSPITAL | Age: 68
Discharge: HOME | End: 2023-10-23
Attending: INTERNAL MEDICINE

## 2023-10-23 DIAGNOSIS — K75.81 LIVER CIRRHOSIS SECONDARY TO NASH (HCC): ICD-10-CM

## 2023-10-23 DIAGNOSIS — K74.60 LIVER CIRRHOSIS SECONDARY TO NASH (HCC): ICD-10-CM

## 2023-10-23 PROCEDURE — 74183 MRI ABD W/O CNTR FLWD CNTR: CPT | Performed by: INTERNAL MEDICINE

## 2023-10-23 PROCEDURE — A9575 INJ GADOTERATE MEGLUMI 0.1ML: HCPCS | Performed by: INTERNAL MEDICINE

## 2023-10-23 RX ORDER — GADOTERATE MEGLUMINE 376.9 MG/ML
20 INJECTION INTRAVENOUS
Status: COMPLETED | OUTPATIENT
Start: 2023-10-23 | End: 2023-10-23

## 2023-10-23 RX ADMIN — GADOTERATE MEGLUMINE 19 ML: 376.9 INJECTION INTRAVENOUS at 08:00:00

## 2023-10-27 RX ORDER — POTASSIUM CHLORIDE 750 MG/1
TABLET, FILM COATED, EXTENDED RELEASE ORAL
Qty: 720 TABLET | Refills: 3 | Status: SHIPPED | OUTPATIENT
Start: 2023-10-27

## 2023-10-27 NOTE — TELEPHONE ENCOUNTER
Refill request is for a maintenance medication and has met the criteria specified in the Ambulatory Medication Refill Standing Order for eligibility, visits, laboratory, alerts and was sent to the requested pharmacy.     Requested Prescriptions     Signed Prescriptions Disp Refills    KLOR-CON 10 10 MEQ Oral Tab  tablet 3     Sig: TAKE 4 TABLETS BY MOUTH TWICE DAILY     Authorizing Provider: Jaky Denson     Ordering User: Bea Fischer

## 2023-11-09 ENCOUNTER — LAB ENCOUNTER (OUTPATIENT)
Dept: LAB | Age: 68
End: 2023-11-09
Attending: INTERNAL MEDICINE
Payer: MEDICARE

## 2023-11-09 DIAGNOSIS — Z79.4 TYPE 2 DIABETES MELLITUS WITHOUT COMPLICATION, WITH LONG-TERM CURRENT USE OF INSULIN (HCC): Primary | ICD-10-CM

## 2023-11-09 DIAGNOSIS — K74.60 CIRRHOSIS (HCC): ICD-10-CM

## 2023-11-09 DIAGNOSIS — K75.81 LIVER CIRRHOSIS SECONDARY TO NASH (HCC): Primary | ICD-10-CM

## 2023-11-09 DIAGNOSIS — D72.819 LEUKOPENIA, UNSPECIFIED TYPE: ICD-10-CM

## 2023-11-09 DIAGNOSIS — E11.9 TYPE 2 DIABETES MELLITUS WITHOUT COMPLICATION, WITH LONG-TERM CURRENT USE OF INSULIN (HCC): Primary | ICD-10-CM

## 2023-11-09 DIAGNOSIS — E78.00 HYPERCHOLESTEROLEMIA: ICD-10-CM

## 2023-11-09 DIAGNOSIS — D69.6 THROMBOCYTOPENIA, UNSPECIFIED (HCC): ICD-10-CM

## 2023-11-09 DIAGNOSIS — K74.60 LIVER CIRRHOSIS SECONDARY TO NASH (HCC): Primary | ICD-10-CM

## 2023-11-09 LAB
AFP-TM SERPL-MCNC: 3.8 NG/ML
AFP-TM SERPL-MCNC: 4.9 NG/ML (ref ?–8)
ALBUMIN SERPL-MCNC: 3.7 G/DL (ref 3.2–4.8)
ALBUMIN/GLOB SERPL: 1.2 {RATIO} (ref 1–2)
ALP LIVER SERPL-CCNC: 131 U/L
ALT SERPL-CCNC: 31 U/L
ANION GAP SERPL CALC-SCNC: 4 MMOL/L (ref 0–18)
AST SERPL-CCNC: 34 U/L (ref ?–34)
BASOPHILS # BLD AUTO: 0.03 X10(3) UL (ref 0–0.2)
BASOPHILS NFR BLD AUTO: 0.8 %
BILIRUB SERPL-MCNC: 0.4 MG/DL (ref 0.2–1.1)
BUN BLD-MCNC: 11 MG/DL (ref 9–23)
BUN/CREAT SERPL: 12.2 (ref 10–20)
CALCIUM BLD-MCNC: 9.7 MG/DL (ref 8.7–10.4)
CHLORIDE SERPL-SCNC: 104 MMOL/L (ref 98–112)
CO2 SERPL-SCNC: 30 MMOL/L (ref 21–32)
CREAT BLD-MCNC: 0.9 MG/DL
DEPRECATED RDW RBC AUTO: 44.6 FL (ref 35.1–46.3)
EGFRCR SERPLBLD CKD-EPI 2021: 71 ML/MIN/1.73M2 (ref 60–?)
EOSINOPHIL # BLD AUTO: 0.2 X10(3) UL (ref 0–0.7)
EOSINOPHIL NFR BLD AUTO: 5.6 %
ERYTHROCYTE [DISTWIDTH] IN BLOOD BY AUTOMATED COUNT: 13.2 % (ref 11–15)
FASTING STATUS PATIENT QL REPORTED: YES
GLOBULIN PLAS-MCNC: 3.2 G/DL (ref 2.8–4.4)
GLUCOSE BLD-MCNC: 245 MG/DL (ref 70–99)
HCT VFR BLD AUTO: 40.7 %
HGB BLD-MCNC: 13.1 G/DL
IMM GRANULOCYTES # BLD AUTO: 0.01 X10(3) UL (ref 0–1)
IMM GRANULOCYTES NFR BLD: 0.3 %
LYMPHOCYTES # BLD AUTO: 1.25 X10(3) UL (ref 1–4)
LYMPHOCYTES NFR BLD AUTO: 34.8 %
MCH RBC QN AUTO: 29.4 PG (ref 26–34)
MCHC RBC AUTO-ENTMCNC: 32.2 G/DL (ref 31–37)
MCV RBC AUTO: 91.5 FL
MONOCYTES # BLD AUTO: 0.48 X10(3) UL (ref 0.1–1)
MONOCYTES NFR BLD AUTO: 13.4 %
NEUTROPHILS # BLD AUTO: 1.62 X10 (3) UL (ref 1.5–7.7)
NEUTROPHILS # BLD AUTO: 1.62 X10(3) UL (ref 1.5–7.7)
NEUTROPHILS NFR BLD AUTO: 45.1 %
OSMOLALITY SERPL CALC.SUM OF ELEC: 294 MOSM/KG (ref 275–295)
PLATELET # BLD AUTO: 139 10(3)UL (ref 150–450)
POTASSIUM SERPL-SCNC: 4.1 MMOL/L (ref 3.5–5.1)
PROT SERPL-MCNC: 6.9 G/DL (ref 5.7–8.2)
RBC # BLD AUTO: 4.45 X10(6)UL
SODIUM SERPL-SCNC: 138 MMOL/L (ref 136–145)
WBC # BLD AUTO: 3.6 X10(3) UL (ref 4–11)

## 2023-11-09 PROCEDURE — 36415 COLL VENOUS BLD VENIPUNCTURE: CPT

## 2023-11-09 PROCEDURE — 80053 COMPREHEN METABOLIC PANEL: CPT

## 2023-11-09 PROCEDURE — 85025 COMPLETE CBC W/AUTO DIFF WBC: CPT

## 2023-11-09 PROCEDURE — 82105 ALPHA-FETOPROTEIN SERUM: CPT

## 2023-11-09 RX ORDER — FUROSEMIDE 20 MG/1
TABLET ORAL
Qty: 180 TABLET | Refills: 3 | OUTPATIENT
Start: 2023-11-09

## 2023-11-09 RX ORDER — FAMOTIDINE 40 MG/1
TABLET, FILM COATED ORAL
Qty: 180 TABLET | Refills: 3 | OUTPATIENT
Start: 2023-11-09

## 2023-11-09 NOTE — TELEPHONE ENCOUNTER
Current refill request refused due to refill is either a duplicate request or has active refills at the pharmacy. Check previous templates.     Requested Prescriptions     Refused Prescriptions Disp Refills    FUROSEMIDE 20 MG Oral Tab [Pharmacy Med Name: FUROSEMIDE 20MG TABLETS] 180 tablet 3     Sig: TAKE 1 TABLET(20 MG) BY MOUTH TWICE DAILY     Refused By: Criss Medina     Reason for Refusal: Request already responded to by other means (e.g. phone or fax)    FAMOTIDINE 40 MG Oral Tab [Pharmacy Med Name: FAMOTIDINE 40MG TABLETS] 180 tablet 3     Sig: TAKE 1 TABLET(40 MG) BY MOUTH TWICE DAILY AS NEEDED FOR HEARTBURN     Refused By: Criss Medina     Reason for Refusal: Request already responded to by other means (e.g. phone or fax)

## 2023-11-20 RX ORDER — PRAVASTATIN SODIUM 40 MG
40 TABLET ORAL NIGHTLY
Qty: 90 TABLET | Refills: 3 | Status: SHIPPED | OUTPATIENT
Start: 2023-11-20

## 2023-11-20 NOTE — TELEPHONE ENCOUNTER
Refill request is for a maintenance medication and has met the criteria specified in the Ambulatory Medication Refill Standing Order for eligibility, visits, laboratory, alerts and was sent to the requested pharmacy.     Requested Prescriptions     Signed Prescriptions Disp Refills    PRAVASTATIN 40 MG Oral Tab 90 tablet 3     Sig: TAKE 1 TABLET(40 MG) BY MOUTH EVERY NIGHT     Authorizing Provider: Loida Mccoy     Ordering User: Tor Ochoa

## 2023-11-28 ENCOUNTER — OFFICE VISIT (OUTPATIENT)
Dept: SURGERY | Facility: CLINIC | Age: 68
End: 2023-11-28
Payer: MEDICARE

## 2023-11-28 VITALS
HEART RATE: 92 BPM | HEIGHT: 61.1 IN | BODY MASS INDEX: 40.33 KG/M2 | WEIGHT: 213.63 LBS | DIASTOLIC BLOOD PRESSURE: 80 MMHG | OXYGEN SATURATION: 100 % | SYSTOLIC BLOOD PRESSURE: 136 MMHG

## 2023-11-28 DIAGNOSIS — M17.0 PRIMARY OSTEOARTHRITIS OF BOTH KNEES: ICD-10-CM

## 2023-11-28 DIAGNOSIS — Z79.4 TYPE 2 DIABETES MELLITUS WITHOUT COMPLICATION, WITH LONG-TERM CURRENT USE OF INSULIN (HCC): Primary | ICD-10-CM

## 2023-11-28 DIAGNOSIS — E66.01 MORBID OBESITY WITH BMI OF 40.0-44.9, ADULT (HCC): ICD-10-CM

## 2023-11-28 DIAGNOSIS — K76.0 FATTY LIVER: ICD-10-CM

## 2023-11-28 DIAGNOSIS — E11.9 TYPE 2 DIABETES MELLITUS WITHOUT COMPLICATION, WITH LONG-TERM CURRENT USE OF INSULIN (HCC): Primary | ICD-10-CM

## 2023-11-28 PROCEDURE — 99214 OFFICE O/P EST MOD 30 MIN: CPT | Performed by: INTERNAL MEDICINE

## 2023-11-28 RX ORDER — DIETHYLPROPION HYDROCHLORIDE 25 MG/1
1 TABLET ORAL DAILY
Qty: 30 TABLET | Refills: 2 | Status: SHIPPED | OUTPATIENT
Start: 2023-11-28 | End: 2023-12-28

## 2023-12-26 ENCOUNTER — TELEPHONE (OUTPATIENT)
Dept: HEMATOLOGY/ONCOLOGY | Facility: HOSPITAL | Age: 68
End: 2023-12-26

## 2023-12-26 NOTE — TELEPHONE ENCOUNTER
Pranav Zhong asking if there are any tests she needs to have done prior to her appt on 2/6. Leatha Prado

## 2024-01-02 RX ORDER — FAMOTIDINE 40 MG/1
TABLET, FILM COATED ORAL
Qty: 180 TABLET | Refills: 3 | Status: SHIPPED | OUTPATIENT
Start: 2024-01-02

## 2024-01-02 NOTE — TELEPHONE ENCOUNTER
Refill request is for a maintenance medication and has met the criteria specified in the Ambulatory Medication Refill Standing Order for eligibility, visits, laboratory, alerts and was sent to the requested pharmacy.    Requested Prescriptions     Signed Prescriptions Disp Refills    FAMOTIDINE 40 MG Oral Tab 180 tablet 3     Sig: TAKE 1 TABLET(40 MG) BY MOUTH TWICE DAILY AS NEEDED FOR HEARTBURN     Authorizing Provider: ANDRES TINOCO     Ordering User: BHUIM MCDANIELS

## 2024-02-01 ENCOUNTER — APPOINTMENT (OUTPATIENT)
Dept: HEMATOLOGY/ONCOLOGY | Facility: HOSPITAL | Age: 69
End: 2024-02-01
Attending: INTERNAL MEDICINE
Payer: MEDICARE

## 2024-02-05 RX ORDER — IMIPRAMINE HCL 50 MG
TABLET ORAL
Qty: 270 TABLET | Refills: 3 | Status: SHIPPED | OUTPATIENT
Start: 2024-02-05

## 2024-02-05 NOTE — TELEPHONE ENCOUNTER
Refill request is for a maintenance medication and has met the criteria specified in the Ambulatory Medication Refill Standing Order for eligibility, visits, laboratory, alerts and was sent to the requested pharmacy.    Requested Prescriptions     Signed Prescriptions Disp Refills    IMIPRAMINE 50 MG Oral Tab 270 tablet 3     Sig: TAKE 3 TABLETS BY MOUTH EVERY NIGHT     Authorizing Provider: ANDRES TINOCO     Ordering User: EDITH FELIX

## 2024-02-06 ENCOUNTER — OFFICE VISIT (OUTPATIENT)
Dept: HEMATOLOGY/ONCOLOGY | Facility: HOSPITAL | Age: 69
End: 2024-02-06
Attending: INTERNAL MEDICINE
Payer: MEDICARE

## 2024-02-06 VITALS
BODY MASS INDEX: 38.45 KG/M2 | OXYGEN SATURATION: 99 % | DIASTOLIC BLOOD PRESSURE: 76 MMHG | HEIGHT: 63 IN | TEMPERATURE: 98 F | HEART RATE: 96 BPM | WEIGHT: 217 LBS | RESPIRATION RATE: 18 BRPM | SYSTOLIC BLOOD PRESSURE: 164 MMHG

## 2024-02-06 DIAGNOSIS — C50.911 MUCINOUS CARCINOMA OF BREAST, RIGHT (HCC): Primary | ICD-10-CM

## 2024-02-06 DIAGNOSIS — Z79.811 ENCOUNTER FOR MONITORING AROMATASE INHIBITOR THERAPY: ICD-10-CM

## 2024-02-06 DIAGNOSIS — Z85.3 ENCOUNTER FOR FOLLOW-UP SURVEILLANCE OF BREAST CANCER: ICD-10-CM

## 2024-02-06 DIAGNOSIS — Z51.81 ENCOUNTER FOR MONITORING AROMATASE INHIBITOR THERAPY: ICD-10-CM

## 2024-02-06 DIAGNOSIS — Z12.31 SCREENING MAMMOGRAM, ENCOUNTER FOR: ICD-10-CM

## 2024-02-06 DIAGNOSIS — I89.0 LYMPHEDEMA OF RIGHT ARM: ICD-10-CM

## 2024-02-06 DIAGNOSIS — Z08 ENCOUNTER FOR FOLLOW-UP SURVEILLANCE OF BREAST CANCER: ICD-10-CM

## 2024-02-06 PROCEDURE — 99214 OFFICE O/P EST MOD 30 MIN: CPT | Performed by: INTERNAL MEDICINE

## 2024-02-06 RX ORDER — ERGOCALCIFEROL 1.25 MG/1
50000 CAPSULE ORAL WEEKLY
Qty: 13 CAPSULE | Refills: 3 | Status: SHIPPED | OUTPATIENT
Start: 2024-02-06

## 2024-02-06 NOTE — PROGRESS NOTES
HPI   Teresa Wilson is a 68 year old female here for follow up of   Encounter Diagnoses   Name Primary?    Mucinous carcinoma of breast, right (HCC) Yes    Encounter for monitoring aromatase inhibitor therapy     Encounter for follow-up surveillance of breast cancer     Screening mammogram, encounter for        Compliance with SBE: Yes. Changes on SBE: No.      Compliance with letrozole:  compliance most of the time.  Did run out for a week and promptly refill it.     Side effects from letrozole: Yes hot flashes, decreased.  Yes arthralgias or myalgias.      Had a fall in Sept and had a fracture of the R shoulder, had it replaced and is still doing PT.  Limited ROM on the R arm now.        ECOG PS 2      Review of Systems:   Review of Systems   Constitutional:  Positive for fatigue. Negative for appetite change and unexpected weight change.   Respiratory:  Negative for cough and shortness of breath.    Cardiovascular:  Negative for chest pain.   Gastrointestinal:  Positive for constipation. Negative for abdominal pain and nausea.   Endocrine: Positive for hot flashes.   Musculoskeletal:  Positive for arthralgias (knees and hips) and back pain.        From DJD   Skin:         Hair loss.    Neurological:  Negative for dizziness and headaches.   Hematological:  Negative for adenopathy.   Psychiatric/Behavioral:  Positive for sleep disturbance.            Current Outpatient Medications   Medication Sig Dispense Refill    ERGOCALCIFEROL 1.25 MG (73732 UT) Oral Cap TAKE 1 CAPSULE BY MOUTH 1 TIME A WEEK 13 capsule 3    IMIPRAMINE 50 MG Oral Tab TAKE 3 TABLETS BY MOUTH EVERY NIGHT 270 tablet 3    FAMOTIDINE 40 MG Oral Tab TAKE 1 TABLET(40 MG) BY MOUTH TWICE DAILY AS NEEDED FOR HEARTBURN 180 tablet 3    PRAVASTATIN 40 MG Oral Tab TAKE 1 TABLET(40 MG) BY MOUTH EVERY NIGHT 90 tablet 3    KLOR-CON 10 10 MEQ Oral Tab CR TAKE 4 TABLETS BY MOUTH TWICE DAILY 720 tablet 3    bisacodyl (DULCOLAX) 5 MG Oral Tab EC Take 1  tablet (5 mg total) by mouth as needed.      Naloxone HCl 4 MG/0.1ML Nasal Liquid 4 mg as needed.      traMADol 50 MG Oral Tab Take 1 tablet (50 mg total) by mouth every 8 (eight) hours as needed.      letrozole 2.5 MG Oral Tab Take 1 tablet (2.5 mg total) by mouth at bedtime. 90 tablet 3    docusate sodium 100 MG Oral Cap Take 100 mg by mouth 2 (two) times daily. 30 capsule 0    polyethylene glycol, PEG 3350, 17 g Oral Powd Pack Take 17 g by mouth daily as needed. 30 each 0    CLOPIDOGREL 75 MG Oral Tab TAKE 1 TABLET(75 MG) BY MOUTH DAILY (Patient taking differently: Take 1 tablet (75 mg total) by mouth every morning. Pt was instructed to stop 5 days prior to surgery) 90 tablet 3    Continuous Blood Gluc Sensor (FREESTYLE MURALI 2 SENSOR) Does not apply Misc 1 Device every 14 (fourteen) days.      Insulin Disposable Pump (OMNIPOD DASH PODS, GEN 4,) Does not apply Misc use 1 pod      HUMULIN R U-500 KWIKPEN 500 UNIT/ML Subcutaneous Solution Pen-injector Pt with Insulin pump. Pt was instructed to verify with Endocrinogist if she needs to adjust dose a day before surgery. Pt was instructed no insulin os DOS and removed CGM and Insulin pump per Anesthesia protocol.      losartan 25 MG Oral Tab TAKE 1/2 TABLET BY MOUTH EVERY DAY (Patient taking differently: Take by mouth every evening. TAKE 1/2 TABLET BY MOUTH EVERY DAY) 45 tablet 3    FUROSEMIDE 20 MG Oral Tab TAKE 1 TABLET(20 MG) BY MOUTH TWICE DAILY 180 tablet 3    ALBUTEROL 108 (90 Base) MCG/ACT Inhalation Aero Soln INHALE 2 PUFFS INTO THE LUNGS EVERY 6 HOURS AS NEEDED FOR WHEEZING 3 each 3    Insulin Disposable Pump (OMNIPOD DASH PODS, GEN 4,) Does not apply Misc USE 1 POD UNDER THE SKIN EVERY 2.5 DAYS      Continuous Blood Gluc Sensor (FREESTYLE MURALI 2 SENSOR SYSTM) Does not apply Misc       BD PEN NEEDLE SKY 2ND GEN 32G X 4 MM Does not apply Misc USE AS DIRECTED 200 each 3    ondansetron (ZOFRAN ODT) 8 MG Oral Tablet Dispersible Take 1 tablet (8 mg total) by  mouth every 6 (six) hours as needed for Nausea. Place on top of the tongue where it will dissolve, then swallow 30 tablet 2    BUTALBITAL-APAP-CAFFEINE -40 MG Oral Tab TAKE 1 TABLET BY MOUTH EVERY 4 HOURS AS NEEDED. DO NOT EXCEED 6 TABLETS IN 24 HOURS 30 tablet 3    Blood Glucose Monitoring Suppl (ONETOUCH VERIO) w/Device Does not apply Kit       Cyanocobalamin (VITAMIN B 12 OR) Take 500 mcg by mouth every morning.      Glucose Blood In Vitro Strip Use 1 strip by percutaneous route 4-6 times every day 200 each 0    Levothyroxine Sodium 88 MCG Oral Tab Take 1 tablet (88 mcg total) by mouth every morning before breakfast. 30 tablet 11    Magnesium Oxide 400 (240 Mg) MG Oral Tab Take 1 tablet (400 mg total) by mouth 2 (two) times daily. 30 tablet 0     Allergies:   Allergies   Allergen Reactions    Adhesive Tape HIVES    Diltiazem SWELLING    Pioglitazone SWELLING    Acetaminophen NAUSEA AND VOMITING     cirrhosis    Erythromycin NAUSEA AND VOMITING    Ibuprofen OTHER (SEE COMMENTS)     Mouth ulcers      Lisinopril NAUSEA AND VOMITING and DIZZINESS    Metformin Hcl PAIN     Other reaction(s): abdominal cramps    Potassium Chloride NAUSEA AND VOMITING      In Liquid form    Sitagliptin PAIN     stomach aches    Sulfamethoxazole W/Trimethoprim FEVER     Per patient    Amiloride OTHER (SEE COMMENTS)     Other reaction(s): Sores in Throat    Eucerin ITCHING and RASH    Farxiga [Dapagliflozin] ITCHING     Vaginal itchiness    Latex RASH and ITCHING    Metoprolol Tartrate FATIGUE     feeling fuzzy    Pantoprazole Sodium NAUSEA ONLY    Propoxyphene UNKNOWN     Pt can't remember the reaction    Spironolactone NAUSEA ONLY       Past Medical History:   Diagnosis Date    Acute, but ill-defined, cerebrovascular disease     Adenomatous colon polyp 09/16/2013    Anxiety     Anxiety state     Arthritis     Asthma     Breast CA (HCC)     Cellulitis and abscess of leg     CVA (cerebral infarction)     Depression     Disorder of  thyroid     Esophageal reflux     Essential hypertension     Exposure to medical diagnostic radiation     Extrinsic asthma, unspecified     High blood pressure     High cholesterol     Hypothyroidism     Liver cirrhosis (HCC)     cirrhosis    Migraines     Neuropathy     Obesity     Obesity (BMI 30-39.9)     Osteoarthritis     Other and unspecified hyperlipidemia     PFO (patent foramen ovale)     PONV (postoperative nausea and vomiting)     Shingles     2020    Stroke (HCC)     aphasia- 10 years ago    Type II or unspecified type diabetes mellitus without mention of complication, not stated as uncontrolled     Vertigo     Visual impairment     glasses     Past Surgical History:   Procedure Laterality Date    APPENDECTOMY      CHOLECYSTECTOMY      COLONOSCOPY  2022    COLONOSCOPY N/A 2019    Procedure: COLONOSCOPY;  Surgeon: Mello Rowland MD;  Location: [facility] ENDOSCOPY    COLONOSCOPY N/A 2022    Procedure: COLONOSCOPY;  Surgeon: Mello Rowland MD;  Location: [facility] ENDOSCOPY    COLONOSCOPY N/A 2022    Procedure: COLONOSCOPY;  Surgeon: Mello Rowland MD;  Location: [facility] ENDOSCOPY    COLONOSCOPY  2023    COLONOSCOPY N/A 2023    Procedure: COLONOSCOPY;  Surgeon: Mello Rowland MD;  Location: [facility] ENDOSCOPY    COLONOSCOPY & POLYPECTOMY  2013    adenoma    CORTISONE INJECTION Left     Left knee, Dr. Murphy    D & C      4 of them    EGD  2013    EGD  2019    EGD  2022    EGD  2023    ELECTROCARDIOGRAM, COMPLETE  2013    scanned to media    EXTRACTION ERUPTED TOOTH/EXR  2018    HERNIA SURGERY      HYSTERECTOMY            RADIATION RIGHT      TOOTH IMPLANTATION  2018    TOTAL SHOULDER REPLACEMENT  2023    UMBILICAL HERNIA REPAIR       Social History     Socioeconomic History    Marital status:    Tobacco Use    Smoking status: Never     Passive exposure: Never    Smokeless tobacco: Never   Vaping Use    Vaping Use: Never used    Substance and Sexual Activity    Alcohol use: Yes     Comment: socially    Drug use: No   Other Topics Concern    Caffeine Concern Yes     Comment: soda    Reaction to local anesthetic No     Social Determinants of Health     Financial Resource Strain: Low Risk  (8/14/2023)    Financial Resource Strain     Difficulty of Paying Living Expenses: Not very hard     Med Affordability: No   Transportation Needs: No Transportation Needs (8/14/2023)    Transportation Needs     Lack of Transportation: No       Family History   Problem Relation Age of Onset    Breast Cancer Self 67    Hypertension Mother     Skin cancer Mother     Cancer Mother     Dementia Mother     Diabetes Father     Skin cancer Father     Dementia Father     Diabetes Sister     Asthma Sister     Depression Sister     Obesity Daughter     Hypertension Maternal Grandmother     Cancer Maternal Grandfather     Stroke Paternal Grandmother          PHYSICAL EXAM:    BP (!) 164/76 (BP Location: Left arm, Patient Position: Sitting, Cuff Size: adult)   Pulse 96   Temp 98.2 °F (36.8 °C) (Oral)   Resp 18   Ht 1.6 m (5' 3\")   Wt 98.4 kg (217 lb)   SpO2 99%   BMI 38.44 kg/m²   Wt Readings from Last 6 Encounters:   02/06/24 98.4 kg (217 lb)   11/28/23 96.9 kg (213 lb 9.6 oz)   10/18/23 94.3 kg (208 lb)   09/07/23 92.5 kg (204 lb)   08/23/23 97.1 kg (214 lb)   08/09/23 100.4 kg (221 lb 4.8 oz)     Physical Exam  General: Patient is alert, not in acute distress.  HEENT: EOMs intact. PERRL.   Neck: No JVD. No palpable lymphadenopathy. Neck is supple.  Chest: Clear to auscultation.  Heart: Regular rate and rhythm.   Abdomen: Soft, non tender with good bowel sounds.  Extremities: No edema.  Neurological: Grossly intact.   Lymphatics: There is no palpable lymphadenopathy throughout in the cervical, supraclavicular, axillary, or inguinal regions.  Psych/Depression: nl  Breasts:  R breast no masses, surgical scars on the axilla and central lumpectomy.  Lymphedema at  axilla on the R.  Shoulder on the R with new scar from surgery.  L breast no masses.        ASSESSMENT/PLAN:     1. Mucinous carcinoma of breast, right (HCC)    2. Encounter for monitoring aromatase inhibitor therapy    3. Encounter for follow-up surveillance of breast cancer    4. Screening mammogram, encounter for       Cancer Staging   Mucinous carcinoma of breast, right (HCC)  Staging form: Breast, AJCC 8th Edition  - Clinical stage from 10/18/2022: Stage IA (cT1c, cN0(sn), cM0, G1, ER+, KS+, HER2-) - Signed by Kyle Mcgill MD on 10/18/2022    Discussed with the patient the natural history of her type of breast cancer.  Discussed that this is a very favorable prognosis as pure mucinous.  No indication for Oncotype Dx.    Patient has completed her surgical management. Completed RT to complete local regional management.    On an AI for 5-10 yrs, depending on new emerging data.  On letrozole, 5 years will be an December 2027.    Baseline DEXA on October 2022 was normal.  Next due October 2024.    Mammogram in May 2023 BI-RADS 2, due in 1 year, May 2024.      Follow up in 6 months.    MDM moderate.    No orders of the defined types were placed in this encounter.      Results From Past 48 Hours:  No results found for this or any previous visit (from the past 48 hour(s)).    Imaging & Referrals:  None   No orders of the defined types were placed in this encounter.

## 2024-02-06 NOTE — TELEPHONE ENCOUNTER
Refill request is for a maintenance medication and has met the criteria specified in the Ambulatory Medication Refill Standing Order for eligibility, visits, laboratory, alerts and was sent to the requested pharmacy.    Requested Prescriptions     Signed Prescriptions Disp Refills    ERGOCALCIFEROL 1.25 MG (21028 UT) Oral Cap 13 capsule 3     Sig: TAKE 1 CAPSULE BY MOUTH 1 TIME A WEEK     Authorizing Provider: ANDRES TINOCO     Ordering User: BHUMI MCDANIELS

## 2024-02-17 ENCOUNTER — LAB ENCOUNTER (OUTPATIENT)
Dept: LAB | Age: 69
End: 2024-02-17
Attending: INTERNAL MEDICINE
Payer: MEDICARE

## 2024-02-17 DIAGNOSIS — E78.2 MIXED HYPERLIPIDEMIA: ICD-10-CM

## 2024-02-17 DIAGNOSIS — E11.9 DIABETES MELLITUS (HCC): Primary | ICD-10-CM

## 2024-02-17 LAB
ALBUMIN SERPL-MCNC: 3.7 G/DL (ref 3.2–4.8)
ALBUMIN/GLOB SERPL: 1.1 {RATIO} (ref 1–2)
ALP LIVER SERPL-CCNC: 122 U/L
ALT SERPL-CCNC: 34 U/L
ANION GAP SERPL CALC-SCNC: <0 MMOL/L (ref 0–18)
AST SERPL-CCNC: 35 U/L (ref ?–34)
BILIRUB SERPL-MCNC: 0.7 MG/DL (ref 0.2–1.1)
BUN BLD-MCNC: 15 MG/DL (ref 9–23)
BUN/CREAT SERPL: 18.5 (ref 10–20)
CALCIUM BLD-MCNC: 9.3 MG/DL (ref 8.7–10.4)
CHLORIDE SERPL-SCNC: 108 MMOL/L (ref 98–112)
CHOLEST SERPL-MCNC: 166 MG/DL (ref ?–200)
CO2 SERPL-SCNC: 32 MMOL/L (ref 21–32)
CREAT BLD-MCNC: 0.81 MG/DL
CREAT UR-SCNC: 33.5 MG/DL
EGFRCR SERPLBLD CKD-EPI 2021: 80 ML/MIN/1.73M2 (ref 60–?)
EST. AVERAGE GLUCOSE BLD GHB EST-MCNC: 260 MG/DL (ref 68–126)
FASTING PATIENT LIPID ANSWER: YES
FASTING STATUS PATIENT QL REPORTED: YES
GLOBULIN PLAS-MCNC: 3.3 G/DL (ref 2.8–4.4)
GLUCOSE BLD-MCNC: 111 MG/DL (ref 70–99)
HBA1C MFR BLD: 10.7 % (ref ?–5.7)
HDLC SERPL-MCNC: 66 MG/DL (ref 40–59)
LDLC SERPL CALC-MCNC: 81 MG/DL (ref ?–100)
MICROALBUMIN UR-MCNC: <0.3 MG/DL
NONHDLC SERPL-MCNC: 100 MG/DL (ref ?–130)
OSMOLALITY SERPL CALC.SUM OF ELEC: 288 MOSM/KG (ref 275–295)
POTASSIUM SERPL-SCNC: 3.9 MMOL/L (ref 3.5–5.1)
PROT SERPL-MCNC: 7 G/DL (ref 5.7–8.2)
SODIUM SERPL-SCNC: 138 MMOL/L (ref 136–145)
TRIGL SERPL-MCNC: 104 MG/DL (ref 30–149)
VLDLC SERPL CALC-MCNC: 16 MG/DL (ref 0–30)

## 2024-02-17 PROCEDURE — 80053 COMPREHEN METABOLIC PANEL: CPT

## 2024-02-17 PROCEDURE — 82043 UR ALBUMIN QUANTITATIVE: CPT

## 2024-02-17 PROCEDURE — 82570 ASSAY OF URINE CREATININE: CPT

## 2024-02-17 PROCEDURE — 83036 HEMOGLOBIN GLYCOSYLATED A1C: CPT

## 2024-02-17 PROCEDURE — 36415 COLL VENOUS BLD VENIPUNCTURE: CPT

## 2024-02-17 PROCEDURE — 80061 LIPID PANEL: CPT

## 2024-03-08 RX ORDER — LOSARTAN POTASSIUM 25 MG/1
TABLET ORAL
Qty: 45 TABLET | Refills: 3 | Status: SHIPPED | OUTPATIENT
Start: 2024-03-08

## 2024-03-08 NOTE — TELEPHONE ENCOUNTER
Refill request is for a maintenance medication and has met the criteria specified in the Ambulatory Medication Refill Standing Order for eligibility, visits, laboratory, alerts and was sent to the requested pharmacy.    Requested Prescriptions     Signed Prescriptions Disp Refills    LOSARTAN 25 MG Oral Tab 45 tablet 3     Sig: TAKE 1/2 TABLET BY MOUTH EVERY DAY     Authorizing Provider: ANDRES TINOCO     Ordering User: BHUMI MCDANIELS

## 2024-04-25 ENCOUNTER — LAB ENCOUNTER (OUTPATIENT)
Dept: LAB | Age: 69
End: 2024-04-25
Attending: INTERNAL MEDICINE
Payer: MEDICARE

## 2024-04-25 DIAGNOSIS — E78.00 HYPERCHOLESTEROLEMIA: ICD-10-CM

## 2024-04-25 DIAGNOSIS — Z79.4 TYPE 2 DIABETES MELLITUS WITHOUT COMPLICATION, WITH LONG-TERM CURRENT USE OF INSULIN (HCC): ICD-10-CM

## 2024-04-25 DIAGNOSIS — E11.9 TYPE 2 DIABETES MELLITUS WITHOUT COMPLICATION, WITH LONG-TERM CURRENT USE OF INSULIN (HCC): ICD-10-CM

## 2024-04-25 DIAGNOSIS — D69.6 THROMBOCYTOPENIA, UNSPECIFIED (HCC): ICD-10-CM

## 2024-04-25 LAB
ALBUMIN SERPL-MCNC: 3.8 G/DL (ref 3.2–4.8)
ALBUMIN/GLOB SERPL: 1.2 {RATIO} (ref 1–2)
ALP LIVER SERPL-CCNC: 115 U/L
ALT SERPL-CCNC: 29 U/L
ANION GAP SERPL CALC-SCNC: 4 MMOL/L (ref 0–18)
AST SERPL-CCNC: 32 U/L (ref ?–34)
BASOPHILS # BLD AUTO: 0.03 X10(3) UL (ref 0–0.2)
BASOPHILS NFR BLD AUTO: 0.8 %
BILIRUB SERPL-MCNC: 0.6 MG/DL (ref 0.2–1.1)
BUN BLD-MCNC: 14 MG/DL (ref 9–23)
BUN/CREAT SERPL: 14.9 (ref 10–20)
CALCIUM BLD-MCNC: 9.7 MG/DL (ref 8.7–10.4)
CHLORIDE SERPL-SCNC: 106 MMOL/L (ref 98–112)
CHOLEST SERPL-MCNC: 178 MG/DL (ref ?–200)
CO2 SERPL-SCNC: 32 MMOL/L (ref 21–32)
CREAT BLD-MCNC: 0.94 MG/DL
DEPRECATED RDW RBC AUTO: 45.1 FL (ref 35.1–46.3)
EGFRCR SERPLBLD CKD-EPI 2021: 67 ML/MIN/1.73M2 (ref 60–?)
EOSINOPHIL # BLD AUTO: 0.18 X10(3) UL (ref 0–0.7)
EOSINOPHIL NFR BLD AUTO: 4.7 %
ERYTHROCYTE [DISTWIDTH] IN BLOOD BY AUTOMATED COUNT: 13.3 % (ref 11–15)
EST. AVERAGE GLUCOSE BLD GHB EST-MCNC: 255 MG/DL (ref 68–126)
FASTING PATIENT LIPID ANSWER: YES
FASTING STATUS PATIENT QL REPORTED: YES
GLOBULIN PLAS-MCNC: 3.3 G/DL (ref 2.8–4.4)
GLUCOSE BLD-MCNC: 148 MG/DL (ref 70–99)
HBA1C MFR BLD: 10.5 % (ref ?–5.7)
HCT VFR BLD AUTO: 43.5 %
HDLC SERPL-MCNC: 72 MG/DL (ref 40–59)
HGB BLD-MCNC: 13.7 G/DL
IMM GRANULOCYTES # BLD AUTO: 0.01 X10(3) UL (ref 0–1)
IMM GRANULOCYTES NFR BLD: 0.3 %
LDLC SERPL CALC-MCNC: 91 MG/DL (ref ?–100)
LYMPHOCYTES # BLD AUTO: 1.54 X10(3) UL (ref 1–4)
LYMPHOCYTES NFR BLD AUTO: 40.4 %
MCH RBC QN AUTO: 28.8 PG (ref 26–34)
MCHC RBC AUTO-ENTMCNC: 31.5 G/DL (ref 31–37)
MCV RBC AUTO: 91.4 FL
MONOCYTES # BLD AUTO: 0.46 X10(3) UL (ref 0.1–1)
MONOCYTES NFR BLD AUTO: 12.1 %
NEUTROPHILS # BLD AUTO: 1.59 X10 (3) UL (ref 1.5–7.7)
NEUTROPHILS # BLD AUTO: 1.59 X10(3) UL (ref 1.5–7.7)
NEUTROPHILS NFR BLD AUTO: 41.7 %
NONHDLC SERPL-MCNC: 106 MG/DL (ref ?–130)
OSMOLALITY SERPL CALC.SUM OF ELEC: 297 MOSM/KG (ref 275–295)
PLATELET # BLD AUTO: 138 10(3)UL (ref 150–450)
POTASSIUM SERPL-SCNC: 4.2 MMOL/L (ref 3.5–5.1)
PROT SERPL-MCNC: 7.1 G/DL (ref 5.7–8.2)
RBC # BLD AUTO: 4.76 X10(6)UL
SODIUM SERPL-SCNC: 142 MMOL/L (ref 136–145)
TRIGL SERPL-MCNC: 80 MG/DL (ref 30–149)
VLDLC SERPL CALC-MCNC: 13 MG/DL (ref 0–30)
WBC # BLD AUTO: 3.8 X10(3) UL (ref 4–11)

## 2024-04-25 PROCEDURE — 83036 HEMOGLOBIN GLYCOSYLATED A1C: CPT

## 2024-04-25 PROCEDURE — 80053 COMPREHEN METABOLIC PANEL: CPT

## 2024-04-25 PROCEDURE — 85025 COMPLETE CBC W/AUTO DIFF WBC: CPT

## 2024-04-25 PROCEDURE — 36415 COLL VENOUS BLD VENIPUNCTURE: CPT

## 2024-04-25 PROCEDURE — 80061 LIPID PANEL: CPT

## 2024-05-02 ENCOUNTER — OFFICE VISIT (OUTPATIENT)
Dept: INTERNAL MEDICINE CLINIC | Facility: CLINIC | Age: 69
End: 2024-05-02
Payer: MEDICARE

## 2024-05-02 VITALS
TEMPERATURE: 99 F | HEIGHT: 63 IN | DIASTOLIC BLOOD PRESSURE: 68 MMHG | OXYGEN SATURATION: 97 % | HEART RATE: 96 BPM | WEIGHT: 214.63 LBS | BODY MASS INDEX: 38.03 KG/M2 | SYSTOLIC BLOOD PRESSURE: 120 MMHG

## 2024-05-02 DIAGNOSIS — E83.42 HYPOMAGNESEMIA: ICD-10-CM

## 2024-05-02 DIAGNOSIS — M17.0 PRIMARY OSTEOARTHRITIS OF BOTH KNEES: ICD-10-CM

## 2024-05-02 DIAGNOSIS — E11.9 TYPE 2 DIABETES MELLITUS WITHOUT COMPLICATION, WITH LONG-TERM CURRENT USE OF INSULIN (HCC): ICD-10-CM

## 2024-05-02 DIAGNOSIS — Z79.4 TYPE 2 DIABETES MELLITUS WITH DIABETIC NEUROPATHY, WITH LONG-TERM CURRENT USE OF INSULIN (HCC): ICD-10-CM

## 2024-05-02 DIAGNOSIS — Z01.818 PRE-OP EVALUATION: Primary | ICD-10-CM

## 2024-05-02 DIAGNOSIS — Z86.73 HX OF TRANSIENT ISCHEMIC ATTACK (TIA): ICD-10-CM

## 2024-05-02 DIAGNOSIS — E55.9 VITAMIN D DEFICIENCY: ICD-10-CM

## 2024-05-02 DIAGNOSIS — C50.911 MUCINOUS CARCINOMA OF BREAST, RIGHT (HCC): ICD-10-CM

## 2024-05-02 DIAGNOSIS — Z79.4 TYPE 2 DIABETES MELLITUS WITHOUT COMPLICATION, WITH LONG-TERM CURRENT USE OF INSULIN (HCC): ICD-10-CM

## 2024-05-02 DIAGNOSIS — E11.40 TYPE 2 DIABETES MELLITUS WITH DIABETIC NEUROPATHY, WITH LONG-TERM CURRENT USE OF INSULIN (HCC): ICD-10-CM

## 2024-05-02 DIAGNOSIS — D69.6 THROMBOCYTOPENIA, UNSPECIFIED (HCC): ICD-10-CM

## 2024-05-02 DIAGNOSIS — K74.60 LIVER CIRRHOSIS SECONDARY TO NASH (HCC): ICD-10-CM

## 2024-05-02 DIAGNOSIS — K75.81 LIVER CIRRHOSIS SECONDARY TO NASH (HCC): ICD-10-CM

## 2024-05-02 DIAGNOSIS — E78.00 HYPERCHOLESTEROLEMIA: ICD-10-CM

## 2024-05-02 DIAGNOSIS — E03.8 OTHER SPECIFIED HYPOTHYROIDISM: ICD-10-CM

## 2024-05-02 DIAGNOSIS — Z00.00 ROUTINE HEALTH MAINTENANCE: ICD-10-CM

## 2024-05-02 DIAGNOSIS — E53.8 VITAMIN B12 DEFICIENCY: ICD-10-CM

## 2024-05-02 DIAGNOSIS — E66.01 MORBID OBESITY WITH BMI OF 40.0-44.9, ADULT (HCC): ICD-10-CM

## 2024-05-02 DIAGNOSIS — Z86.010 HISTORY OF ADENOMATOUS POLYP OF COLON: ICD-10-CM

## 2024-05-02 DIAGNOSIS — K21.9 GASTROESOPHAGEAL REFLUX DISEASE, UNSPECIFIED WHETHER ESOPHAGITIS PRESENT: ICD-10-CM

## 2024-05-02 DIAGNOSIS — G47.33 OBSTRUCTIVE SLEEP APNEA: ICD-10-CM

## 2024-05-02 PROBLEM — S42.211A CLOSED DISPLACED FRACTURE OF SURGICAL NECK OF RIGHT HUMERUS, UNSPECIFIED FRACTURE MORPHOLOGY, INITIAL ENCOUNTER: Status: RESOLVED | Noted: 2023-08-09 | Resolved: 2024-05-02

## 2024-05-02 LAB
ATRIAL RATE: 90 BPM
P AXIS: -1 DEGREES
P-R INTERVAL: 162 MS
Q-T INTERVAL: 370 MS
QRS DURATION: 88 MS
QTC CALCULATION (BEZET): 452 MS
R AXIS: 2 DEGREES
T AXIS: 10 DEGREES
VENTRICULAR RATE: 90 BPM

## 2024-05-02 RX ORDER — CARVEDILOL 3.12 MG/1
3.12 TABLET ORAL 2 TIMES DAILY
COMMUNITY
Start: 2024-02-28

## 2024-05-02 RX ORDER — DOXYCYCLINE HYCLATE 100 MG/1
1 CAPSULE ORAL EVERY 12 HOURS
COMMUNITY

## 2024-05-02 NOTE — PROGRESS NOTES
Teresa Wilson is a 68 year old female.  Chief Complaint   Patient presents with    Physical     Annual Medicare Px, left 2nd toe surgery tomorrow    Pre-Op Exam      5/20/24 [facility] on [address], right hand surgery.        HPI:   Teresa Wilson is a 68 year old female who presents for a MAW exam and follow up.    Having distal left 2nd toe amputation tomorrow at [facility] (had MRI that showed osteo);  Started as a callus on distal end of toe a few years ago -- has a hammertoe deformity which caused her distal toe to rub on her shoe and ulcerate.  To be done under local anesthesia. Was told she could eat breakfast.  Held plavix 5 days ago.    Had reverse R shoulder replacement 8 months ago -- still unable to abduct her arm despite months of PT/OT.  Now with weakness in R hand -- was told carpal tunnel syndrome. To have carpal tunnel release on 5/20/24 at the [facility] in [place], under MAC (Dr. Boland).  Needs pre-op eval.  No CP or SOB or dizziness.  Able to climb a flight of stairs and walk a block     Still losing hair since starting RT for the breast cancer in early 2023.  Dr. Mcgill mentioned biotin but Dr. Velazquez advised against it due to her liver cirrhosis.       Still on the insulin pump.  Sees dr. Ulrich next month.        Wt Readings from Last 6 Encounters:   05/02/24 214 lb 9.6 oz (97.3 kg)   02/06/24 217 lb (98.4 kg)   11/28/23 213 lb 9.6 oz (96.9 kg)   10/18/23 208 lb (94.3 kg)   09/07/23 204 lb (92.5 kg)   08/23/23 214 lb (97.1 kg)     Body mass index is 38.01 kg/m².       Current Outpatient Medications   Medication Sig Dispense Refill    carvedilol 3.125 MG Oral Tab Take 1 tablet (3.125 mg total) by mouth 2 (two) times daily.      doxycycline 100 MG Oral Cap Take 1 capsule (100 mg total) by mouth Q12H.      LOSARTAN 25 MG Oral Tab TAKE 1/2 TABLET BY MOUTH EVERY DAY 45 tablet 3    ERGOCALCIFEROL 1.25 MG (38106 UT) Oral Cap TAKE 1 CAPSULE BY MOUTH 1 TIME A WEEK 13  capsule 3    IMIPRAMINE 50 MG Oral Tab TAKE 3 TABLETS BY MOUTH EVERY NIGHT 270 tablet 3    FAMOTIDINE 40 MG Oral Tab TAKE 1 TABLET(40 MG) BY MOUTH TWICE DAILY AS NEEDED FOR HEARTBURN 180 tablet 3    PRAVASTATIN 40 MG Oral Tab TAKE 1 TABLET(40 MG) BY MOUTH EVERY NIGHT 90 tablet 3    KLOR-CON 10 10 MEQ Oral Tab CR TAKE 4 TABLETS BY MOUTH TWICE DAILY 720 tablet 3    Naloxone HCl 4 MG/0.1ML Nasal Liquid 4 mg as needed.      traMADol 50 MG Oral Tab Take 1 tablet (50 mg total) by mouth every 8 (eight) hours as needed.      letrozole 2.5 MG Oral Tab Take 1 tablet (2.5 mg total) by mouth at bedtime. 90 tablet 3    polyethylene glycol, PEG 3350, 17 g Oral Powd Pack Take 17 g by mouth daily as needed. 30 each 0    CLOPIDOGREL 75 MG Oral Tab TAKE 1 TABLET(75 MG) BY MOUTH DAILY (Patient taking differently: Take 1 tablet (75 mg total) by mouth every morning. Pt was instructed to stop 5 days prior to surgery) 90 tablet 3    Continuous Blood Gluc Sensor (FREESTYLE MURALI 2 SENSOR) Does not apply Misc 1 Device every 14 (fourteen) days.      Insulin Disposable Pump (OMNIPOD DASH PODS, GEN 4,) Does not apply Misc use 1 pod      HUMULIN R U-500 KWIKPEN 500 UNIT/ML Subcutaneous Solution Pen-injector Pt with Insulin pump. Pt was instructed to verify with Endocrinogist if she needs to adjust dose a day before surgery. Pt was instructed no insulin os DOS and removed CGM and Insulin pump per Anesthesia protocol.      FUROSEMIDE 20 MG Oral Tab TAKE 1 TABLET(20 MG) BY MOUTH TWICE DAILY 180 tablet 3    ALBUTEROL 108 (90 Base) MCG/ACT Inhalation Aero Soln INHALE 2 PUFFS INTO THE LUNGS EVERY 6 HOURS AS NEEDED FOR WHEEZING 3 each 3    Insulin Disposable Pump (OMNIPOD DASH PODS, GEN 4,) Does not apply Misc USE 1 POD UNDER THE SKIN EVERY 2.5 DAYS      Continuous Blood Gluc Sensor (FREESTYLE MURALI 2 SENSOR SYSTM) Does not apply Misc       BD PEN NEEDLE SKY 2ND GEN 32G X 4 MM Does not apply Misc USE AS DIRECTED 200 each 3    ondansetron (ZOFRAN ODT)  8 MG Oral Tablet Dispersible Take 1 tablet (8 mg total) by mouth every 6 (six) hours as needed for Nausea. Place on top of the tongue where it will dissolve, then swallow 30 tablet 2    BUTALBITAL-APAP-CAFFEINE -40 MG Oral Tab TAKE 1 TABLET BY MOUTH EVERY 4 HOURS AS NEEDED. DO NOT EXCEED 6 TABLETS IN 24 HOURS 30 tablet 3    Blood Glucose Monitoring Suppl (ONETOUCH VERIO) w/Device Does not apply Kit       Cyanocobalamin (VITAMIN B 12 OR) Take 500 mcg by mouth every morning.      Glucose Blood In Vitro Strip Use 1 strip by percutaneous route 4-6 times every day 200 each 0    Levothyroxine Sodium 88 MCG Oral Tab Take 1 tablet (88 mcg total) by mouth every morning before breakfast. 30 tablet 11    Magnesium Oxide 400 (240 Mg) MG Oral Tab Take 1 tablet (400 mg total) by mouth 2 (two) times daily. 30 tablet 0      Past Medical History:    Acute, but ill-defined, cerebrovascular disease    Adenomatous colon polyp    Anxiety    Anxiety state    Arthritis    Asthma (HCC)    Breast CA (HCC)    Cellulitis and abscess of leg    CVA (cerebral infarction)    Depression    Disorder of thyroid    Esophageal reflux    Essential hypertension    Exposure to medical diagnostic radiation    Extrinsic asthma, unspecified    High blood pressure    High cholesterol    Hypothyroidism    Liver cirrhosis (HCC)    cirrhosis    Migraines    Neuropathy    Obesity    Obesity (BMI 30-39.9)    Osteoarthritis    Other and unspecified hyperlipidemia    PFO (patent foramen ovale) (HCC)    PONV (postoperative nausea and vomiting)    Shingles    March 2020    Stroke (HCC)    aphasia- 10 years ago    Type II or unspecified type diabetes mellitus without mention of complication, not stated as uncontrolled    Vertigo    Visual impairment    glasses      Past Surgical History:   Procedure Laterality Date    Appendectomy      Cholecystectomy      Colonoscopy  03/2022    Colonoscopy N/A 01/21/2019    Procedure: COLONOSCOPY;  Surgeon: Mello Rowland,  MD;  Location: [facility] ENDOSCOPY    Colonoscopy N/A 2022    Procedure: COLONOSCOPY;  Surgeon: Mello Rowland MD;  Location: [facility] ENDOSCOPY    Colonoscopy N/A 2022    Procedure: COLONOSCOPY;  Surgeon: Mello Rowland MD;  Location: [facility] ENDOSCOPY    Colonoscopy  2023    Colonoscopy N/A 2023    Procedure: COLONOSCOPY;  Surgeon: Mello Rowland MD;  Location: [facility] ENDOSCOPY    Colonoscopy & polypectomy  2013    adenoma    Cortisone injection Left     Left knee, Dr. Murphy    D & c      4 of them    Egd  2013    Egd  2019    Egd  2022    Egd  2023    Electrocardiogram, complete  2013    scanned to media    Extraction erupted tooth/exr  2018    Hernia surgery      Hysterectomy            Radiation right      Tooth implantation  2018    Total shoulder replacement  2023    Umbilical hernia repair        Family History   Problem Relation Age of Onset    Breast Cancer Self 67    Hypertension Mother     Skin cancer Mother     Cancer Mother     Dementia Mother     Diabetes Father     Skin cancer Father     Dementia Father     Diabetes Sister     Asthma Sister     Depression Sister     Obesity Daughter     Hypertension Maternal Grandmother     Cancer Maternal Grandfather     Stroke Paternal Grandmother       Social History:   Social History     Socioeconomic History    Marital status:    Tobacco Use    Smoking status: Never     Passive exposure: Never    Smokeless tobacco: Never   Vaping Use    Vaping status: Never Used   Substance and Sexual Activity    Alcohol use: Yes     Comment: socially    Drug use: No   Other Topics Concern    Caffeine Concern Yes     Comment: soda    Reaction to local anesthetic No     Social Determinants of Health     Financial Resource Strain: Low Risk  (2023)    Financial Resource Strain     Difficulty of Paying Living Expenses: Not very hard     Med Affordability: No   Transportation Needs: No Transportation Needs  (8/14/2023)    Transportation Needs     Lack of Transportation: No                General Health     In the past six months, have you lost more than 10 pounds without trying?: 2 - No    Has your appetite been poor?: No    Type of Diet: Diabetic;Other    How does the patient maintain a good energy level?: Other    How would you describe your daily physical activity?: Light    How would you describe your current health state?: Fair    How do you maintain positive mental well-being?: Visiting Family         Have you had any immunizations at another office such as Influenza, Hepatitis B, Tetanus, or Pneumococcal?: Yes     Functional Ability     Bathing or Showering: Able without help    Toileting: Able without help    Dressing: Able without help    Eating: Able without help    Driving: Able without help    Preparing your meals: Able without help    Managing money/bills: Able without help    Taking medications as prescribed: Able without help    Are you able to afford your medications?: No    Hearing Problems?: No     Functional Status     Hearing Problems?: No    Vision Problems? : No    Difficulty walking?: Yes    Difficulty dressing or bathing?: No    Problems with daily activities? : No    Memory Problems?: No      Fall/Risk Assessment                                                              Depression Screening (PHQ-2/PHQ-9): Over the LAST 2 WEEKS                      Advance Directives     Do you have a healthcare power of ?: Yes    Do you have a living will?: Yes     Hearing Assessment (Required for AWV/SWV)      Hearing Screening    Screening Method: Whisper Test  Whisper Test Result: Pass         Visual Acuity                   Cognitive Assessment     What day of the week is this?: Correct    What month is it?: Correct    What year is it?: Correct    Recall \"Ball\": Correct    Recall \"Flag\": Correct    Recall \"Tree\": Correct        Teresa Wilson's SCREENING SCHEDULE   Tests on this list are  recommended by your physician but may not be covered, or covered at this frequency, by your insurer. Please check with your insurance carrier before scheduling to verify coverage.   PREVENTATIVE SERVICES  INDICATIONS AND SCHEDULE Internal Lab or Procedure External Lab or Procedure   Diabetes Screening      HbgA1C   Annually HgbA1C (%)   Date Value   04/25/2024 10.5 (H)         No data to display                Fasting Blood Sugar (FSB)Annually Glucose (mg/dL)   Date Value   04/25/2024 148 (H)         No data to display               Cardiovascular Disease Screening     LDL Annually LDL Cholesterol (mg/dL)   Date Value   04/25/2024 91        EKG One Time y    Colorectal Cancer Screening      Colonoscopy Screen every 10 years Health Maintenance   Topic Date Due    Colorectal Cancer Screening  06/30/2026    Update Health Maintenance if applicable    Flex Sigmoidoscopy Screen every 5 years No results found for this or any previous visit.      No data to display                 Fecal Occult Blood Annually No results found for: \"FOB\", \"OCCULTSTOOL\"      No data to display                Glaucoma Screening      Ophthalmology Visit Annually y    Bone Density Screening      Dexascan Every two years Last Dexa Scan:    XR DEXA BONE DENSITOMETRY (CPT=77080) 10/21/2022        No data to display               Pap and Pelvic      Pap: Every 3 yrs age 21-65 or Pap+HPV every 5 yrs age 30-65, age 65 and older at high risk   No recommendations at this time Update Health Maintenance if applicable    Chlamydia  Annually if high risk No results found for: \"CHLAMYDIA\"      No data to display                Screening Mammogram      Mammogram  Annually Health Maintenance   Topic Date Due    Mammogram  Discontinued    Update Health Maintenance if applicable   Immunizations      Influenza No orders found for this or any previous visit. Update Immunization Activity if applicable    Pneumococcal No orders found for this or any previous visit.  Update Immunization Activity if applicable    Hepatitis B Orders placed or performed in visit on 07/22/20    HEPATITIS B VACCINE, OVER 20    Update Immunization Activity if applicable    Tetanus Orders placed or performed in visit on 09/07/16    TETANUS, DIPHTHERIA TOXOIDS AND ACELLULAR PERTUSIS VACCINE (TDAP), >7 YEARS, IM USE    Update Immunization Activity if applicable    Zoster (Not covered by Medicare Part B) No orders found for this or any previous visit. Update Immunization Activity if applicable     SPECIFIC DISEASE MONITORING Internal Lab or Procedure External Lab or Procedure   Annual Monitoring of Persistent     Medications (ACE/ARB, digoxin, diuretics)    Potassium  Annually Potassium (mmol/L)   Date Value   04/25/2024 4.2         No data to display                Creatinine  Annually Creatinine (mg/dL)   Date Value   04/25/2024 0.94         No data to display                Digoxin Serum Conc  Annually No results found for: \"DIGOXIN\"      No data to display                Diabetes      HgbA1C  Annually HgbA1C (%)   Date Value   04/25/2024 10.5 (H)         No data to display                Creat/alb ratio  Annually      LDL  Annually LDL Cholesterol (mg/dL)   Date Value   04/25/2024 91         No data to display                 Dilated Eye exam  Annually     9/11/2018    11:40 AM   Data entered on:   Last Dilated Eye Exam 9/10/2018          No data to display                COPD      Spirometry Testing Annually No results found for this or any previous visit.      No data to display                        REVIEW OF SYSTEMS:   GENERAL: feels well otherwise  SKIN: denies any unusual skin lesions  EYES:denies blurred vision or double vision  HEENT: denies nasal congestion, sinus pain or ST  LUNGS: denies shortness of breath with exertion or cough  CARDIOVASCULAR: denies chest pain, pressure, or palpitations  GI: denies abdominal pain, nausea, vomiting, diarrhea, constipation, hematochezia, or  melena  NEURO: denies headaches or dizziness    EXAM:   /68   Pulse 96   Temp 98.6 °F (37 °C)   Ht 5' 3\" (1.6 m)   Wt 214 lb 9.6 oz (97.3 kg)   SpO2 97%   BMI 38.01 kg/m²     GENERAL: well developed, well nourished, in no apparent distress  HEENT: normal oropharynx, normal TM's  EYES: PERRLA, EOMI, conjunctivae are pink  NECK: supple, no cervical or supraclavicular LAD, no carotid bruits  LUNGS: clear to auscultation  CARDIO: RRR, normal S1S2, no gallops or murmurs  GI: soft, NT, ND, NABS, no HSM  EXTREMITIES: minimal trace BLE edema, +2 DP pulses, +small, approx 5-6mm shallow ulcer on distal left 2nd toe (w/hammertoe deformity)  BREASTS: +scar tissue R breast. No masses palpated in R or L breast      ASSESSMENT AND PLAN:     Routine health maintenance  Pt is s/p HERMELINDA/BSO    DEXA normal 10/2022   Tdap given 9/7/16  Rec Shingrix shingles vaccine  CT calcium score 0 in 8/2020  Prevnar 13 done 1/2023; will do prevnar 20 at next appt    Hx of R breast cancer (mucinous carcinoma)  -s/p excisional bx on 8/30/22 -- mucinous carcinoma, extending to the surgical margins  -s/p R central breast resection, R axillary sentinel LN bx 9/28/22 -- 3/3 LN's negative for carcinoma.  R breast resection negative for residual carcinoma  -s/p RT with Dr. Cortez Francis (completed 1/2023)  -followed by med onc Dr. 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 Ulrich  -HgbA1c up to 10.5  -Has CGM and insulin pump.  -Sees ophtho (Dr. Crispin Scott in [place]) regularly (last in 3/2024) --  found to have retinopathy on exam 2019.     -upcoming appt with Dr. Ulrich in 5/2024     Hypercholesterolemia  -Continue Pravachol   -lipids at goal     Hypothyroidism  Cont synthroid. TSH 1.99 (10/2023)     Obstructive sleep apnea  Unable to tolerate CPAP mask or nasal pillows      History of adenomatous colonic polyps  Found on colonoscopy 9/11/13   Colonoscopy 1/2019 -- 3 adenomatous polyps.   Colonoscopy 4/4/22 (Dr. Rowland) -- inadequate bowel prep; to have 2 day prep for repeat colonoscopy  Repeat colonoscopy 5/2022 -- large tubulovillous adenoma; still with only fair bowel prep, so advised to have repeat colonoscopy in 1 year (5/2023).  Repeat colonoscopy (6/30/23) -- 1 adenomatous polyp -- repeat in 3 years (6/2026)    Hx of transient ischemic attack (TIA) (expressive aphasia)  Continue Plavix.  Pt with occasional difficulty with word finding       Vitamin B12 deficiency  Vit B12 level normal 10/2023     Venous insufficiency  -on KDur 40meq BID, Lasix 20mg/day     NAFLD, prob BLANCAS cirrhosis  -Fatty liver seen on CT in 9/2013. Iron sats, Hep A/B/C panel, ceruloplasmin, DONTAE, AMA normal.    -EGD 1/2019 and 4/2022 (Dr. Rowland) -- small esophageal varices; o/w unremarkable.   -Hep B series completed 7/22/20  -followed by hepatology Dr. Velazquez  -started on carvedilol 3.125mg BID (for portal hypertension)  -mild stable thrombocytopenia (plts 138)    GERD  -stable on pepcid     Left and right knee OA  -s/p arthroscopic surgery with Dr. Lucho Evans in the past.    -has since seen ortho Dr. Carrillo Murphy.  Needs TKR but ideally only after DM better controlled     Morbid obesity  -has seen med bariatrics Dr. Doherty though not since 3/2023  -no longer on topiramate  -she plans to schedule another appt    Hypomagnesemia  Takes Mg oxide 400mg BID.  Level normal 10/2023 (2.4)    Hypertension  On losartan 12.5mg/day  Carvedilol 3.125mg BID added in 2/2024 by hepatologist Dr. Velazquez (for portal HTN)    Depression  Stable on imipramine 150mg/day    Vitamin D deficiency  On weekly vitamin D  Level normal 90    Hx R humerus fracture (s/p fall 8/7/23)  -s/p right reverse total shoulder replacement 9/7/23 (Dr. Luan Boland)  -s/p PT; still with limited ROM    Left 2nd toe osteomyelitis 2/2 infected ulcer    -scheduled for distal L 2nd toe amputation tomorrow under local anesthesia (at [facility])    R carpal tunnel syndrome  Pre-op eval  -pt is asymptomatic from a cardiopulm standpoint  -EKG neg for ischemic changes; unchanged from prior EKG in 8/2023  -able to achieve 4 METs  -CBC, CMP okay  -pt deemed low risk for carpal tunnel release under MAC anesthesia (scheduled for 5/20/24)     RTC 6 mos       >60 min spent reviewed labs, examining pt, reviewing EKG, and discussing treatment plans    Erica Young MD, 05/02/24, 5:17 PM

## 2024-05-09 ENCOUNTER — HOSPITAL ENCOUNTER (OUTPATIENT)
Dept: ULTRASOUND IMAGING | Age: 69
Discharge: HOME | End: 2024-05-09
Attending: INTERNAL MEDICINE
Payer: MEDICARE

## 2024-05-09 ENCOUNTER — LAB ENCOUNTER (OUTPATIENT)
Dept: LAB | Age: 69
End: 2024-05-09
Attending: INTERNAL MEDICINE
Payer: MEDICARE

## 2024-05-09 ENCOUNTER — HOSPITAL ENCOUNTER (OUTPATIENT)
Dept: MAMMOGRAPHY | Facility: HOSPITAL | Age: 69
Discharge: HOME | End: 2024-05-09
Attending: INTERNAL MEDICINE
Payer: MEDICARE

## 2024-05-09 DIAGNOSIS — Z85.3 ENCOUNTER FOR FOLLOW-UP SURVEILLANCE OF BREAST CANCER: Primary | ICD-10-CM

## 2024-05-09 DIAGNOSIS — K74.60 LIVER CIRRHOSIS SECONDARY TO NASH (HCC): ICD-10-CM

## 2024-05-09 DIAGNOSIS — K74.60 LIVER CIRRHOSIS SECONDARY TO NASH (HCC): Primary | ICD-10-CM

## 2024-05-09 DIAGNOSIS — Z08 ENCOUNTER FOR FOLLOW-UP SURVEILLANCE OF BREAST CANCER: Primary | ICD-10-CM

## 2024-05-09 DIAGNOSIS — K74.60 CIRRHOSIS (HCC): ICD-10-CM

## 2024-05-09 DIAGNOSIS — K75.81 LIVER CIRRHOSIS SECONDARY TO NASH (HCC): ICD-10-CM

## 2024-05-09 DIAGNOSIS — Z17.0 MALIGNANT NEOPLASM OF CENTRAL PORTION OF RIGHT BREAST IN FEMALE, ESTROGEN RECEPTOR POSITIVE (HCC): ICD-10-CM

## 2024-05-09 DIAGNOSIS — K75.81 LIVER CIRRHOSIS SECONDARY TO NASH (HCC): Primary | ICD-10-CM

## 2024-05-09 DIAGNOSIS — Z12.31 SCREENING MAMMOGRAM, ENCOUNTER FOR: ICD-10-CM

## 2024-05-09 DIAGNOSIS — C50.111 MALIGNANT NEOPLASM OF CENTRAL PORTION OF RIGHT BREAST IN FEMALE, ESTROGEN RECEPTOR POSITIVE (HCC): ICD-10-CM

## 2024-05-09 LAB
AFP-TM SERPL-MCNC: 4.8 NG/ML
ALBUMIN SERPL-MCNC: 3.9 G/DL (ref 3.2–4.8)
ALBUMIN/GLOB SERPL: 1.1 {RATIO} (ref 1–2)
ALP LIVER SERPL-CCNC: 116 U/L
ALT SERPL-CCNC: 26 U/L
ANION GAP SERPL CALC-SCNC: 3 MMOL/L (ref 0–18)
AST SERPL-CCNC: 29 U/L (ref ?–34)
BILIRUB SERPL-MCNC: 0.6 MG/DL (ref 0.2–1.1)
BUN BLD-MCNC: 14 MG/DL (ref 9–23)
BUN/CREAT SERPL: 14.9 (ref 10–20)
CALCIUM BLD-MCNC: 9.8 MG/DL (ref 8.7–10.4)
CHLORIDE SERPL-SCNC: 105 MMOL/L (ref 98–112)
CO2 SERPL-SCNC: 34 MMOL/L (ref 21–32)
CREAT BLD-MCNC: 0.94 MG/DL
EGFRCR SERPLBLD CKD-EPI 2021: 67 ML/MIN/1.73M2 (ref 60–?)
FASTING STATUS PATIENT QL REPORTED: YES
GLOBULIN PLAS-MCNC: 3.4 G/DL (ref 2–3.5)
GLUCOSE BLD-MCNC: 161 MG/DL (ref 70–99)
OSMOLALITY SERPL CALC.SUM OF ELEC: 298 MOSM/KG (ref 275–295)
POTASSIUM SERPL-SCNC: 4.5 MMOL/L (ref 3.5–5.1)
PROT SERPL-MCNC: 7.3 G/DL (ref 5.7–8.2)
SODIUM SERPL-SCNC: 142 MMOL/L (ref 136–145)

## 2024-05-09 PROCEDURE — 80053 COMPREHEN METABOLIC PANEL: CPT

## 2024-05-09 PROCEDURE — 76705 ECHO EXAM OF ABDOMEN: CPT | Performed by: INTERNAL MEDICINE

## 2024-05-09 PROCEDURE — 82105 ALPHA-FETOPROTEIN SERUM: CPT

## 2024-05-09 PROCEDURE — 36415 COLL VENOUS BLD VENIPUNCTURE: CPT

## 2024-05-29 ENCOUNTER — HOSPITAL ENCOUNTER (OUTPATIENT)
Dept: MAMMOGRAPHY | Facility: HOSPITAL | Age: 69
Discharge: HOME | End: 2024-05-29
Attending: INTERNAL MEDICINE
Payer: MEDICARE

## 2024-05-29 ENCOUNTER — TELEPHONE (OUTPATIENT)
Dept: INTERNAL MEDICINE CLINIC | Facility: CLINIC | Age: 69
End: 2024-05-29

## 2024-05-29 ENCOUNTER — HOSPITAL ENCOUNTER (OUTPATIENT)
Dept: ULTRASOUND IMAGING | Facility: HOSPITAL | Age: 69
Discharge: HOME | End: 2024-05-29
Attending: INTERNAL MEDICINE
Payer: MEDICARE

## 2024-05-29 ENCOUNTER — TELEPHONE (OUTPATIENT)
Dept: HEMATOLOGY/ONCOLOGY | Facility: HOSPITAL | Age: 69
End: 2024-05-29

## 2024-05-29 DIAGNOSIS — Z85.3 ENCOUNTER FOR FOLLOW-UP SURVEILLANCE OF BREAST CANCER: ICD-10-CM

## 2024-05-29 DIAGNOSIS — C50.111 MALIGNANT NEOPLASM OF CENTRAL PORTION OF RIGHT BREAST IN FEMALE, ESTROGEN RECEPTOR POSITIVE (HCC): ICD-10-CM

## 2024-05-29 DIAGNOSIS — Z08 ENCOUNTER FOR FOLLOW-UP SURVEILLANCE OF BREAST CANCER: ICD-10-CM

## 2024-05-29 DIAGNOSIS — Z17.0 MALIGNANT NEOPLASM OF CENTRAL PORTION OF RIGHT BREAST IN FEMALE, ESTROGEN RECEPTOR POSITIVE (HCC): ICD-10-CM

## 2024-05-29 PROCEDURE — 77066 DX MAMMO INCL CAD BI: CPT | Performed by: INTERNAL MEDICINE

## 2024-05-29 PROCEDURE — 76642 ULTRASOUND BREAST LIMITED: CPT | Performed by: INTERNAL MEDICINE

## 2024-05-29 PROCEDURE — 77062 BREAST TOMOSYNTHESIS BI: CPT | Performed by: INTERNAL MEDICINE

## 2024-05-29 NOTE — IMAGING NOTE
Discussed recommended breast biopsy with patient.  Patient is being recommended for stereotactic biopsy of the us left breast  us Bx and rt gregorio Breast for calcs also surgical  consult for excision for left breast palpable mid chest regions  by Dr. Lorenzo .  Has super orders placed. Spouse Ash in with pt for consult per her request. HAS A CONTINUOUS GLUCOSE MONITOR TOOK OF FOR MAMMOGRAM TODAY.   ALLERGY TO LATEX AND ADHESIVES  Hx stroke 11 years ago on Plavix . She was instructed to contact ordering md for Plavix for guidelines if able to go off safely. She was informed once we received of guidelines we would be able to schedule her for her bx. Last dose was this morning. She will contact Dr Young today.   Pt history discussed as below:  Pt history of biopsy:        yes rt breast                                                                        Family history of cancer: no  Pt history of breast cancer: yes rt breast dx 66    Hx BCP use:    no                HRT use:   progesterone x 1 year  iv    BI-RADS CATEGORY:     DIAGNOSTIC CATEGORY 4--SUSPICIOUS       RECOMMENDATIONS:   ULTRASOUND-GUIDED CORE BIOPSY: LEFT BREASTMASS 09:00 O'CLOCK 5 CENTIMETERS FROM THE NIPPLE      STEREOTACTIC BIOPSY WITH 3D/GREGORIO RIGHT BREAST: CALCIFICATIONS CENTRAL RIGHT BREAST      SURGICAL EXCISIONAL BIOPSY: LEFT BREAST POSSIBLE SKIN/SUBCUTANEOUS MASS 10:00 O'CLOCK 18 CENTIMETERS FROM THE NIPPLE       Recommedations :                      see [facility] for dictated radiology report   Reviewed pertinent patient history, family history of cancer, and patient medications  Discussed with patient Radiology’s protocol regarding medications, as well as over-the-counter vitamin or herbal supplements, as follows:  -All herbal supplements, Vitamin E, Fish Oil    -All NSAIDs (Ibuprofen, Motrin, Advil, Aleve, or other antiinflammatory medication)  and Aspirin  81mg currently being taken    not  recommended or prescribed by  your physician   should be held for 5  days prior to biopsy.  Denies usage   -Aspirin 81 mg being taken related to a cardiac condition  or prescribed by your  physician should be held at the  direction of your physician.  Informed patient to call ordering physician for guidelines denies usage    -Blood thinners/antiplatelet medications (Coumadin, Plavix  ect) should be held at the  direction of your physician.  Informed patient to call ordering physician for guidelines on plavix will need a note  Reviewed Stereotactic biopsy procedure, as below.  For this procedure,   stereotactic biopsy is being performed with tomosynthesis , you will sitting upright  with your breast in compression.  Mammography imaging will be used to locate the area in question that was seen on your previous breast imaging.  The Radiologist will then inject a local numbing medication into the area and then use a needle to collect cells from the site.  A marker, or clip, will then be placed in the biopsied area.  This marker is placed so this biopsy site is able to be accurately located upon future breast imaging.  After the clip is placed,  a dressing will be placed on the biopsy site.  Additional mammography films will then be taken to assure correct placement of the marker.    Educated the patient they will be awake during this procedure and are able to drive themselves home if they wish.    Educated patient that some soreness may occur after biopsy.  Discussed use of a supportive bra and ice packs after procedure, to decrease soreness.    Discussed with patient no swimming, bathing,  hot tubs , or submerging underwater for 5 days and   until the incision is closed and healed.  Educated patient on lifting restrictions - nothing heavier than a gallon of milk for  48 hours after the procedure.      Discussed with patient that some soreness and bruising is normal after biopsy but that prolonged or increased pain and bruising should be reported to the ordering  physician.  An ace wrap will be applied over bra for added support and should be left on for 24 hours post procedure.  Reviewed results process with patient and shared that pathology results will be available within 2-3 business days of their biopsy.  Discussed results will be communicated by their ordering physician unless otherwise indicated.  Educated patient that once we receive an order from their physician our radiology secretaries will be calling them to schedule their biopsy.

## 2024-05-29 NOTE — TELEPHONE ENCOUNTER
Teresa calling she went for her mammo and us today and they want her to go for bx. They also found something else between the breast that a general surgeon should remove her dr seals retired and asking for referral. Lisa

## 2024-05-29 NOTE — TELEPHONE ENCOUNTER
Pt. Called stating she needs a Ultra Sound Guided Biopsy done.  They informed her to call here to find out how long she needs to be off of the Plavix befor her procedure.

## 2024-05-29 NOTE — TELEPHONE ENCOUNTER
Patient called per Dr. Mcgill's request and notified that She from Mammography will be assisting with scheduling 2 biopsies and Leatha the breast navigator will be assisting with scheduling excisional biopsy with surgeon. Patient verbalizes understanding.    Patient stated had carpal tunnel surgery 1 week ago and a toe amputated 3 weeks ago. Emotional support given.   Alert and oriented to person, place, time/situation. normal mood and affect.

## 2024-05-30 NOTE — TELEPHONE ENCOUNTER
Spoke with patient and relayed MD's message. Verbalized understanding and agreement with plan.     I called radiology & spoke with Maranda. She confirmed with RN that as long as instructions are documented within a telephone encounter or [employer] message, no need to provide a verbal or written order.      none

## 2024-05-31 NOTE — DISCHARGE INSTRUCTIONS
The Doctor (Radiologist) who performed your procedure was: DR CRAIG    Place an ice pack over the biopsy site on top of your bra or on top of the ACE wrap (never apply ice directly over skin) for 10-15 minutes of every hour until bedtime for your comfort and to decrease bleeding.  Keep your sports bra or the ACE wrap (stereotactic and MRI biopsy) in place for 24 hours after your biopsy. This compression decreases bleeding and breast movement for your comfort. Wear a supportive bra for the next couple of days for comfort (sports bra for sleep).   Continue to wear, preferably, a sports bra or good supportive bra for 1 week and take off only to shower.  No baths or showers during the first 24 hours after biopsy. After this time you may take a shower. It's okay if the strips get wet but do not soak them. NO saunas, hot tubs or swimming until steri-strips fall off (approx. 5 days). This prevents infection and allows time for them to completely close and heal.  DO NOT remove the steri-strips. They will fall off in 5 days. If any type of irritation (redness, itching or blisters) develops in the area around the steri-strips, remove them gently. If the steri-strips do not fall off after 5 days, gently remove them. Keep the area clean and dry.  It is normal to have mild discomfort and bruising at the biopsy site.  You may take Tylenol as needed for discomfort, as long as you have no allergies to Tylenol. Do not take aspirin, motrin, ibuprofen or any medication containing NSAID (non-steroidal anti-inflammatory drug) product for 48 hours.  DO NOT participate in strenuous activity (aerobics, heavy lifting, housework, gardening, etc.) 48 hours after your biopsy to prevent bleeding.  You will receive results in 2-3 business days.  If you are having an MRI breast biopsy or an Ultrasound guided breast biopsy, you will be billed for the biopsy and unilateral mammogram separately.  If you have any questions about the procedure or  your results, please contact the Breast Care Coordinator Nurse at [phone].  Notify your ordering physician or primary physician for increased bleeding, pain or fever over 100. Or contact a Radiology Nurse at [phone] between 8am-4pm (after 4pm, your call will be directed to the [facility]).

## 2024-06-04 NOTE — IMAGING NOTE
Pt arrived to room #4.  scans taken by [facility] ultrasound technologist    Hx taken and is as follows:   Impression   CONCLUSION:    1. Grouped amorphous calcifications in the central right breast best seen on CC view are suspicious.  Stereotactic/tomosynthesis guided biopsy is recommended.    2. Left breast mass at 09:00 o'clock 5 centimeters from the nipple accounting for a mammographic mass in the medial central left breast is suspicious.  Ultrasound-guided biopsy is recommended.  3. Left breast mass at 10:00 o'clock 18 centimeters from the nipple accounting for the mammographic mass seen in the posterior medial left breast may represent a skin/subcutaneous lesion given close proximity to the skin.  Biopsy is recommended.  Given  location, surgical excisional biopsy is recommended.  Findings and recommendations were discussed with the patient immediately following exam. Patient verbalized understanding.     BI-RADS CATEGORY:    DIAGNOSTIC CATEGORY 4--SUSPICIOUS    RECOMMENDATIONS:  ULTRASOUND-GUIDED CORE BIOPSY: LEFT BREAST MASS 09:00 O'CLOCK 5 CENTIMETERS FROM THE NIPPLE  STEREOTACTIC BIOPSY WITH 3D/GREGORIO RIGHT BREAST: CALCIFICATIONS CENTRAL RIGHT BREAST  SURGICAL EXCISIONAL BIOPSY: LEFT BREAST POSSIBLE SKIN/SUBCUTANEOUS MASS 10:00 O'CLOCK 18 CENTIMETERS FROM THE NIPPLE      0650 Post instructions given verbal et written. Avs summary sheet provided to patient. Patient verbalizes understanding and agreement.    0650 Consent verified and obtained     0711 Imaging Completed by Dr CRAIG    0711 Time out taken     0712 Skin prep with chloro prep sterile towels to site. Site marked LEFT    0714 Lidocaine 1% 10 milligrams per ml given from kit  total amount 3 ml given.    0714 Lidocaine 1% with epinephrine  1:100,000 units 200 milligrams per  20 ml given total amount 5ml given.    14 Gauge Changbaera biopsy device to be used for core samples    cores taken at 0716  Total amount cores taken 4 placed in formalin at  0720    0719 COIL Clip placed      0825 Procedure completed. Pressure to site . No active bleeding noted. Area cleaned steri strips to site. Ice pack to site.    0727 To mammography department  for post clip images. PT TO WAIT IN SPA FOR NEXT BIOPSY. GIVEN JUICE/WATER AND COOKIES IN MEANTIME.    0730 Specimen taken to pathology by TD

## 2024-06-04 NOTE — IMAGING NOTE
History taken and as follows:   Impression   CONCLUSION:    1. Grouped amorphous calcifications in the central right breast best seen on CC view are suspicious.  Stereotactic/tomosynthesis guided biopsy is recommended.    2. Left breast mass at 09:00 o'clock 5 centimeters from the nipple accounting for a mammographic mass in the medial central left breast is suspicious.  Ultrasound-guided biopsy is recommended.  3. Left breast mass at 10:00 o'clock 18 centimeters from the nipple accounting for the mammographic mass seen in the posterior medial left breast may represent a skin/subcutaneous lesion given close proximity to the skin.  Biopsy is recommended.  Given  location, surgical excisional biopsy is recommended.  Findings and recommendations were discussed with the patient immediately following exam. Patient verbalized understanding.     BI-RADS CATEGORY:    DIAGNOSTIC CATEGORY 4--SUSPICIOUS    RECOMMENDATIONS:  ULTRASOUND-GUIDED CORE BIOPSY: LEFT BREAST MASS 09:00 O'CLOCK 5 CENTIMETERS FROM THE NIPPLE  STEREOTACTIC BIOPSY WITH 3D/GREGORIO RIGHT BREAST: CALCIFICATIONS CENTRAL RIGHT BREAST  SURGICAL EXCISIONAL BIOPSY: LEFT BREAST POSSIBLE SKIN/SUBCUTANEOUS MASS 10:00 O'CLOCK 18 CENTIMETERS FROM THE NIPPLE     0900 Dr CRAIG here to explain risk and benefits of procedure. Questions answered by the physcian    Pt consented at  SITE MARKED RIGHT  Breast    0900 Time out taken    Placed in chair at  0903 scouts taken    0914 Chloro prep as skin prep. Lidocaine 1% 10  Milligrams per ml 5 ml given for superficial anesthetic affect    0916 Lidocaine  1% with epinephrine 1:100,000 units for deeper anesthetic affect 15ML given.  More images taken after local  was given.    Sampling begins at 0919    Sampling complete at  0920 Core tainer taken to be imaged.    0924 Formalin added to samples.    0920 X clip inserted. Procedure completed . Pressure to site manually by nurse and by machine compression for 20 minutes. No active  bleeding noted. Area cleaned steri strips to site. Ice pack to site.    3880 mammography department TO DO post clip images. Dressing dry and intact. Nipple markers removed by MAMMO STAFF. Bra applied per patient. 6\" ace secured over bra THEN PT TO BE DISCHARGED BY MAMMO STAFF.    0539 Cores taken to pathology by TIFFANIE

## 2024-06-05 ENCOUNTER — HOSPITAL ENCOUNTER (OUTPATIENT)
Dept: ULTRASOUND IMAGING | Facility: HOSPITAL | Age: 69
Discharge: HOME | End: 2024-06-05
Attending: INTERNAL MEDICINE
Payer: MEDICARE

## 2024-06-05 ENCOUNTER — HOSPITAL ENCOUNTER (OUTPATIENT)
Dept: MAMMOGRAPHY | Facility: HOSPITAL | Age: 69
Discharge: HOME | End: 2024-06-05
Attending: INTERNAL MEDICINE
Payer: MEDICARE

## 2024-06-05 DIAGNOSIS — R92.1 BREAST CALCIFICATION, RIGHT: ICD-10-CM

## 2024-06-05 DIAGNOSIS — N63.20 BREAST MASS, LEFT: ICD-10-CM

## 2024-06-05 PROCEDURE — 88305 TISSUE EXAM BY PATHOLOGIST: CPT | Performed by: INTERNAL MEDICINE

## 2024-06-05 PROCEDURE — A4648 IMPLANTABLE TISSUE MARKER: HCPCS

## 2024-06-05 PROCEDURE — 19083 BX BREAST 1ST LESION US IMAG: CPT | Performed by: INTERNAL MEDICINE

## 2024-06-05 PROCEDURE — 19081 BX BREAST 1ST LESION STRTCTC: CPT | Performed by: INTERNAL MEDICINE

## 2024-06-05 PROCEDURE — 77065 DX MAMMO INCL CAD UNI: CPT | Performed by: INTERNAL MEDICINE

## 2024-06-05 NOTE — PROCEDURES
[facility]  part of [facility]  Procedure Note    Teresa Ericajennifer Wilson Patient Status:  Outpatient    1955 MRN [id number]   Location [facility] ULTRASOUND [facility] Attending Kyle Mcgill MD   Hosp Day # 0 PCP Erica Young MD     Procedure: Left breast ultrasound guided biopsy    Pre-Procedure Diagnosis:  Left breast mass    Post-Procedure Diagnosis: Left breast mass    Anesthesia:  Local    Findings:  Left breast mass    Specimens: multiple cores    Blood Loss:  minimal    Tourniquet Time: none  Complications:  None  Drains:  none    Sadie Lorenzo MD  2024

## 2024-06-05 NOTE — PROCEDURES
[facility]  part of [facility]  Procedure Note    Teresa Erica Katie Patient Status:  Outpatient    1955 MRN [id number]   Location [facility] MAMMOGRAPHY [facility] Attending Kyle Mcgill MD   Hosp Day # 0 PCP Erica Young MD     Procedure: Right breast stereotactic/shant guided biopsy    Pre-Procedure Diagnosis:  Right breast calcifications     Post-Procedure Diagnosis: Right breast calcifications     Anesthesia:  Local    Findings:  Right breast calcifications     Specimens: multiple cores    Blood Loss:  minimal    Tourniquet Time: none  Complications:  None  Drains:  none      Sadie Lorenzo MD  2024

## 2024-06-06 ENCOUNTER — TELEPHONE (OUTPATIENT)
Dept: HEMATOLOGY/ONCOLOGY | Facility: HOSPITAL | Age: 69
End: 2024-06-06

## 2024-06-06 DIAGNOSIS — R92.8 ABNORMAL MAMMOGRAM OF LEFT BREAST: Primary | ICD-10-CM

## 2024-06-06 NOTE — TELEPHONE ENCOUNTER
Teresa [phone]  I would like to know if Dr. Mcgill has any of my results would like a return call. Thanks Pia

## 2024-06-06 NOTE — IMAGING NOTE
Teresa is s/p biopsy .  Phoned and introduced myself as breast coordinator .  Reinforced to patient  post biopsy care and instructions .    Informed  and shared the pathology results as well as the recommendations from Dr Lorenzo for her breast imaging  as follows:      Pathology results shared (see epic for dictated pathology and radiology procedure report)  and recommendations are as follows:    CONCLUSION: Uneventful stereotactic with tomosynthesis guidance biopsy of the calcifications in the central right breast  performed without immediate complication and marked with an X shape clip in appropriate positioning.      Pathology demonstrates benign findings and is concordant with imaging.          Dictated by (CST): Sadie Lorenzo MD on 6/05/2024 at 10:08 AM       Finalized by (CST): Sadie Lorenzo MD on 6/06/2024 at 2:20 PM          CONCLUSION:   Uneventful ultrasound-guided biopsy of the 09:00 o'clock left breast mass 5 cm FN without immediate complications and marked with a [employer]rom[employer] coil   shape clip in appropriate positioning.       Pathology demonstrates benign findings and is concordant with imaging.       Dictated by (CST): Sadie Lorenzo MD on 6/05/2024 at 8:09 AM       Finalized by (CST): Sadie Lorenzo MD on 6/06/2024 at 2:19 PM       ADDENDUM:  Amendment to add recommendations.  Six-month follow-up right breast diagnostic mammogram is recommended.        Dictated by (CST): Sadie Lorenzo MD on 6/06/2024 at 2:55 PM       Finalized by (CST): Sadie Lorenzo MD on 6/06/2024 at 2:55 PM       Patient states she has mild soreness, minimal bruising. Patient plans to resume Plavix today. Instructed patient to take tylenol and ice as needed for soreness and follow up if she has issues with bleed once plavix resumed. Patient verbalizes understanding and agreeable with plan of care.          Teresa acknowledges the above and denies questions. Teresa was also instructed to  perform breast self exams and if any changes  develops any changes to contact ordering  physician immediately  for re evaluation..  Teresa verbalizes understanding and agreement.

## 2024-06-07 ENCOUNTER — TELEPHONE (OUTPATIENT)
Dept: MAMMOGRAPHY | Facility: HOSPITAL | Age: 69
End: 2024-06-07

## 2024-06-17 RX ORDER — FUROSEMIDE 20 MG/1
20 TABLET ORAL 2 TIMES DAILY
Qty: 180 TABLET | Refills: 3 | Status: SHIPPED | OUTPATIENT
Start: 2024-06-17

## 2024-06-17 NOTE — TELEPHONE ENCOUNTER
Refill request is for a maintenance medication and has met the criteria specified in the Ambulatory Medication Refill Standing Order for eligibility, visits, laboratory, alerts and was sent to the requested pharmacy.    Requested Prescriptions     Signed Prescriptions Disp Refills    furosemide 20 MG Oral Tab 180 tablet 3     Sig: Take 1 tablet (20 mg total) by mouth 2 (two) times daily.     Authorizing Provider: ANDRES TINOCO     Ordering User: BHUMI MCDANIELS       
Opt out

## 2024-06-24 ENCOUNTER — LAB ENCOUNTER (OUTPATIENT)
Dept: LAB | Age: 69
End: 2024-06-24
Attending: INTERNAL MEDICINE

## 2024-06-24 DIAGNOSIS — E78.5 HYPERLIPEMIA: ICD-10-CM

## 2024-06-24 DIAGNOSIS — E55.9 VITAMIN D DEFICIENCY: ICD-10-CM

## 2024-06-24 DIAGNOSIS — E11.9 DIABETES MELLITUS (HCC): ICD-10-CM

## 2024-06-24 DIAGNOSIS — E03.9 PRIMARY HYPOTHYROIDISM: Primary | ICD-10-CM

## 2024-06-24 LAB
ALBUMIN SERPL-MCNC: 4 G/DL (ref 3.2–4.8)
ALBUMIN/GLOB SERPL: 1.3 {RATIO} (ref 1–2)
ALP LIVER SERPL-CCNC: 120 U/L
ALT SERPL-CCNC: 30 U/L
ANION GAP SERPL CALC-SCNC: 6 MMOL/L (ref 0–18)
AST SERPL-CCNC: 33 U/L (ref ?–34)
BILIRUB SERPL-MCNC: 0.7 MG/DL (ref 0.2–1.1)
BUN BLD-MCNC: 15 MG/DL (ref 9–23)
BUN/CREAT SERPL: 15.5 (ref 10–20)
CALCIUM BLD-MCNC: 9.9 MG/DL (ref 8.7–10.4)
CHLORIDE SERPL-SCNC: 105 MMOL/L (ref 98–112)
CHOLEST SERPL-MCNC: 183 MG/DL (ref ?–200)
CO2 SERPL-SCNC: 31 MMOL/L (ref 21–32)
CREAT BLD-MCNC: 0.97 MG/DL
EGFRCR SERPLBLD CKD-EPI 2021: 64 ML/MIN/1.73M2 (ref 60–?)
EST. AVERAGE GLUCOSE BLD GHB EST-MCNC: 266 MG/DL (ref 68–126)
FASTING PATIENT LIPID ANSWER: YES
FASTING STATUS PATIENT QL REPORTED: YES
GLOBULIN PLAS-MCNC: 3.1 G/DL (ref 2–3.5)
GLUCOSE BLD-MCNC: 193 MG/DL (ref 70–99)
HBA1C MFR BLD: 10.9 % (ref ?–5.7)
HDLC SERPL-MCNC: 64 MG/DL (ref 40–59)
LDLC SERPL CALC-MCNC: 96 MG/DL (ref ?–100)
NONHDLC SERPL-MCNC: 119 MG/DL (ref ?–130)
OSMOLALITY SERPL CALC.SUM OF ELEC: 300 MOSM/KG (ref 275–295)
POTASSIUM SERPL-SCNC: 4.4 MMOL/L (ref 3.5–5.1)
PROT SERPL-MCNC: 7.1 G/DL (ref 5.7–8.2)
SODIUM SERPL-SCNC: 142 MMOL/L (ref 136–145)
T4 FREE SERPL-MCNC: 0.9 NG/DL (ref 0.8–1.7)
TRIGL SERPL-MCNC: 133 MG/DL (ref 30–149)
TSI SER-ACNC: 2.63 MIU/ML (ref 0.55–4.78)
VIT D+METAB SERPL-MCNC: 70.2 NG/ML (ref 30–100)
VLDLC SERPL CALC-MCNC: 22 MG/DL (ref 0–30)

## 2024-06-24 PROCEDURE — 82306 VITAMIN D 25 HYDROXY: CPT

## 2024-06-24 PROCEDURE — 83036 HEMOGLOBIN GLYCOSYLATED A1C: CPT

## 2024-06-24 PROCEDURE — 36415 COLL VENOUS BLD VENIPUNCTURE: CPT

## 2024-06-24 PROCEDURE — 80061 LIPID PANEL: CPT

## 2024-06-24 PROCEDURE — 84439 ASSAY OF FREE THYROXINE: CPT

## 2024-06-24 PROCEDURE — 84443 ASSAY THYROID STIM HORMONE: CPT

## 2024-06-24 PROCEDURE — 80053 COMPREHEN METABOLIC PANEL: CPT

## 2024-07-08 RX ORDER — CLOPIDOGREL BISULFATE 75 MG/1
TABLET ORAL
Qty: 90 TABLET | Refills: 3 | Status: SHIPPED | OUTPATIENT
Start: 2024-07-08

## 2024-07-08 NOTE — TELEPHONE ENCOUNTER
Refill request is for a maintenance medication and has met the criteria specified in the Ambulatory Medication Refill Standing Order for eligibility, visits, laboratory, alerts and was sent to the requested pharmacy.    Requested Prescriptions     Signed Prescriptions Disp Refills    CLOPIDOGREL 75 MG Oral Tab 90 tablet 3     Sig: TAKE 1 TABLET(75 MG) BY MOUTH DAILY     Authorizing Provider: ANDRES TINOCO     Ordering User: EDITH FELIX

## 2024-08-06 ENCOUNTER — OFFICE VISIT (OUTPATIENT)
Dept: HEMATOLOGY/ONCOLOGY | Facility: HOSPITAL | Age: 69
End: 2024-08-06
Attending: INTERNAL MEDICINE
Payer: MEDICARE

## 2024-08-06 VITALS
HEIGHT: 63 IN | SYSTOLIC BLOOD PRESSURE: 134 MMHG | TEMPERATURE: 98 F | RESPIRATION RATE: 18 BRPM | WEIGHT: 216 LBS | BODY MASS INDEX: 38.27 KG/M2 | HEART RATE: 85 BPM | OXYGEN SATURATION: 98 % | DIASTOLIC BLOOD PRESSURE: 79 MMHG

## 2024-08-06 DIAGNOSIS — C50.111 MALIGNANT NEOPLASM OF CENTRAL PORTION OF RIGHT BREAST IN FEMALE, ESTROGEN RECEPTOR POSITIVE (HCC): Primary | ICD-10-CM

## 2024-08-06 DIAGNOSIS — Z79.811 ENCOUNTER FOR MONITORING AROMATASE INHIBITOR THERAPY: ICD-10-CM

## 2024-08-06 DIAGNOSIS — Z78.0 POST-MENOPAUSAL: ICD-10-CM

## 2024-08-06 DIAGNOSIS — Z12.31 SCREENING MAMMOGRAM, ENCOUNTER FOR: ICD-10-CM

## 2024-08-06 DIAGNOSIS — Z08 ENCOUNTER FOR FOLLOW-UP SURVEILLANCE OF BREAST CANCER: ICD-10-CM

## 2024-08-06 DIAGNOSIS — Z51.81 ENCOUNTER FOR MONITORING AROMATASE INHIBITOR THERAPY: ICD-10-CM

## 2024-08-06 DIAGNOSIS — I89.0 LYMPHEDEMA OF RIGHT ARM: ICD-10-CM

## 2024-08-06 DIAGNOSIS — Z17.0 MALIGNANT NEOPLASM OF CENTRAL PORTION OF RIGHT BREAST IN FEMALE, ESTROGEN RECEPTOR POSITIVE (HCC): Primary | ICD-10-CM

## 2024-08-06 DIAGNOSIS — Z85.3 ENCOUNTER FOR FOLLOW-UP SURVEILLANCE OF BREAST CANCER: ICD-10-CM

## 2024-08-06 PROCEDURE — 99214 OFFICE O/P EST MOD 30 MIN: CPT | Performed by: INTERNAL MEDICINE

## 2024-08-06 PROCEDURE — G2211 COMPLEX E/M VISIT ADD ON: HCPCS | Performed by: INTERNAL MEDICINE

## 2024-08-06 NOTE — PROGRESS NOTES
HPI   Teresa Wilson is a 68 year old female here for follow up of   Encounter Diagnoses   Name Primary?    Malignant neoplasm of central portion of right breast in female, estrogen receptor positive (HCC) Yes    Encounter for monitoring aromatase inhibitor therapy     Encounter for follow-up surveillance of breast cancer     Screening mammogram, encounter for     Lymphedema of right arm     Post-menopausal        Compliance with SBE: Yes. Changes on SBE: No.      Compliance with letrozole:  compliance most of the time.      Side effects from letrozole: Yes hot flashes, decreased.  Yes arthralgias or myalgias.      Had a fall in Sept and had a fracture of the R shoulder, had it replaced.  Limited ROM on the R arm now.  Some lymphedema on the R arm.      Bilateral mammogram on 5/29/2024, for which bilateral breast biopsies were recommended and performed on 6/5/2024.  Both of which benign.  She was recommended for 6 months postlumpectomy follow-up at the right breast.  This will be December 2024.    States was told by radiologist to have sebaceous cyst removed from between the breasts.        ECOG PS 2      Review of Systems:   Review of Systems   Constitutional:  Positive for fatigue. Negative for appetite change and unexpected weight change.   Respiratory:  Negative for cough and shortness of breath.    Cardiovascular:  Negative for chest pain.   Gastrointestinal:  Positive for constipation. Negative for abdominal pain.   Endocrine: Positive for hot flashes.   Musculoskeletal:  Positive for arthralgias (knees and hips) and back pain. Negative for neck pain.        From DJD   Skin:         Hair loss.    Neurological:  Negative for dizziness and headaches (once in a while, chronic).   Hematological:  Negative for adenopathy.   Psychiatric/Behavioral:  Positive for sleep disturbance.            Current Outpatient Medications   Medication Sig Dispense Refill    CLOPIDOGREL 75 MG Oral Tab TAKE 1 TABLET(75 MG) BY  MOUTH DAILY 90 tablet 3    furosemide 20 MG Oral Tab Take 1 tablet (20 mg total) by mouth 2 (two) times daily. 180 tablet 3    carvedilol 3.125 MG Oral Tab Take 1 tablet (3.125 mg total) by mouth 2 (two) times daily.      doxycycline 100 MG Oral Cap Take 1 capsule (100 mg total) by mouth Q12H.      LOSARTAN 25 MG Oral Tab TAKE 1/2 TABLET BY MOUTH EVERY DAY 45 tablet 3    ERGOCALCIFEROL 1.25 MG (71675 UT) Oral Cap TAKE 1 CAPSULE BY MOUTH 1 TIME A WEEK 13 capsule 3    IMIPRAMINE 50 MG Oral Tab TAKE 3 TABLETS BY MOUTH EVERY NIGHT 270 tablet 3    FAMOTIDINE 40 MG Oral Tab TAKE 1 TABLET(40 MG) BY MOUTH TWICE DAILY AS NEEDED FOR HEARTBURN 180 tablet 3    PRAVASTATIN 40 MG Oral Tab TAKE 1 TABLET(40 MG) BY MOUTH EVERY NIGHT 90 tablet 3    KLOR-CON 10 10 MEQ Oral Tab CR TAKE 4 TABLETS BY MOUTH TWICE DAILY 720 tablet 3    Naloxone HCl 4 MG/0.1ML Nasal Liquid 4 mg as needed.      traMADol 50 MG Oral Tab Take 1 tablet (50 mg total) by mouth every 8 (eight) hours as needed.      letrozole 2.5 MG Oral Tab Take 1 tablet (2.5 mg total) by mouth at bedtime. 90 tablet 3    polyethylene glycol, PEG 3350, 17 g Oral Powd Pack Take 17 g by mouth daily as needed. 30 each 0    Continuous Blood Gluc Sensor (FREESTYLE MURALI 2 SENSOR) Does not apply Misc 1 Device every 14 (fourteen) days.      Insulin Disposable Pump (OMNIPOD DASH PODS, GEN 4,) Does not apply Misc use 1 pod      HUMULIN R U-500 KWIKPEN 500 UNIT/ML Subcutaneous Solution Pen-injector Pt with Insulin pump. Pt was instructed to verify with Endocrinogist if she needs to adjust dose a day before surgery. Pt was instructed no insulin os DOS and removed CGM and Insulin pump per Anesthesia protocol.      ALBUTEROL 108 (90 Base) MCG/ACT Inhalation Aero Soln INHALE 2 PUFFS INTO THE LUNGS EVERY 6 HOURS AS NEEDED FOR WHEEZING 3 each 3    Insulin Disposable Pump (OMNIPOD DASH PODS, GEN 4,) Does not apply Misc USE 1 POD UNDER THE SKIN EVERY 2.5 DAYS      Continuous Blood Gluc Sensor  (FREESTYLE MURALI 2 SENSOR SYSTM) Does not apply Misc       BD PEN NEEDLE KSY 2ND GEN 32G X 4 MM Does not apply Misc USE AS DIRECTED 200 each 3    ondansetron (ZOFRAN ODT) 8 MG Oral Tablet Dispersible Take 1 tablet (8 mg total) by mouth every 6 (six) hours as needed for Nausea. Place on top of the tongue where it will dissolve, then swallow 30 tablet 2    BUTALBITAL-APAP-CAFFEINE -40 MG Oral Tab TAKE 1 TABLET BY MOUTH EVERY 4 HOURS AS NEEDED. DO NOT EXCEED 6 TABLETS IN 24 HOURS 30 tablet 3    Blood Glucose Monitoring Suppl (ONETOUCH VERIO) w/Device Does not apply Kit       Cyanocobalamin (VITAMIN B 12 OR) Take 500 mcg by mouth every morning.      Glucose Blood In Vitro Strip Use 1 strip by percutaneous route 4-6 times every day 200 each 0    Levothyroxine Sodium 88 MCG Oral Tab Take 1 tablet (88 mcg total) by mouth every morning before breakfast. 30 tablet 11    Magnesium Oxide 400 (240 Mg) MG Oral Tab Take 1 tablet (400 mg total) by mouth 2 (two) times daily. 30 tablet 0     Allergies:   Allergies   Allergen Reactions    Adhesive Tape HIVES    Diltiazem SWELLING    Pioglitazone SWELLING    Acetaminophen NAUSEA AND VOMITING     cirrhosis    Erythromycin NAUSEA AND VOMITING    Ibuprofen OTHER (SEE COMMENTS)     Mouth ulcers      Lisinopril NAUSEA AND VOMITING and DIZZINESS    Metformin Hcl PAIN     Other reaction(s): abdominal cramps    Potassium Chloride NAUSEA AND VOMITING      In Liquid form    Sitagliptin PAIN     stomach aches    Sulfamethoxazole W/Trimethoprim FEVER     Per patient    Amiloride OTHER (SEE COMMENTS)     Other reaction(s): Sores in Throat    Eucerin ITCHING and RASH    Farxiga [Dapagliflozin] ITCHING     Vaginal itchiness    Latex RASH and ITCHING    Metoprolol Tartrate FATIGUE     feeling fuzzy    Pantoprazole Sodium NAUSEA ONLY    Propoxyphene UNKNOWN     Pt can't remember the reaction    Spironolactone NAUSEA ONLY       Past Medical History:    Acute, but ill-defined, cerebrovascular  disease    Adenomatous colon polyp    Anxiety    Anxiety state    Arthritis    Asthma (HCC)    Breast CA (HCC)    Cellulitis and abscess of leg    Closed displaced fracture of surgical neck of right humerus, unspecified fracture morphology, initial encounter    CVA (cerebral infarction)    Depression    Disorder of thyroid    Esophageal reflux    Essential hypertension    Exposure to medical diagnostic radiation    Extrinsic asthma, unspecified    High blood pressure    High cholesterol    Hypothyroidism    Liver cirrhosis (HCC)    cirrhosis    Migraines    Neuropathy    Obesity    Obesity (BMI 30-39.9)    Osteoarthritis    Other and unspecified hyperlipidemia    PFO (patent foramen ovale) (HCC)    PONV (postoperative nausea and vomiting)    Shingles    March 2020    Stroke (HCC)    aphasia- 10 years ago    Type II or unspecified type diabetes mellitus without mention of complication, not stated as uncontrolled    Vertigo    Visual impairment    glasses     Past Surgical History:   Procedure Laterality Date    Appendectomy      Cholecystectomy      Colonoscopy  03/2022    Colonoscopy N/A 01/21/2019    Procedure: COLONOSCOPY;  Surgeon: Mello Rowland MD;  Location: [facility] ENDOSCOPY    Colonoscopy N/A 04/04/2022    Procedure: COLONOSCOPY;  Surgeon: Mello Rowland MD;  Location: [facility] ENDOSCOPY    Colonoscopy N/A 05/13/2022    Procedure: COLONOSCOPY;  Surgeon: Mello Rowland MD;  Location: [facility] ENDOSCOPY    Colonoscopy  06/2023    Colonoscopy N/A 06/30/2023    Procedure: COLONOSCOPY;  Surgeon: Mello Rowland MD;  Location: [facility] ENDOSCOPY    Colonoscopy & polypectomy  09/16/2013    adenoma    Cortisone injection Left     Left knee, Dr. Murphy    D & c      4 of them    Egd  09/16/2013    Egd  01/2019    Egd  03/2022    Egd  06/2023    Electrocardiogram, complete  03/07/2013    scanned to media    Extraction erupted tooth/exr  06/01/2018    Hernia surgery      Hysterectomy      Talia biopsy stereo nodule 1 site right  (esi=[id number]) Right 2024    X clip- calcs    Needle biopsy left            Radiation right      Tooth implantation  2018    Total shoulder replacement  2023    Umbilical hernia repair       Social History     Socioeconomic History    Marital status:    Tobacco Use    Smoking status: Never     Passive exposure: Never    Smokeless tobacco: Never   Vaping Use    Vaping status: Never Used   Substance and Sexual Activity    Alcohol use: Yes     Comment: socially    Drug use: No   Other Topics Concern    Caffeine Concern Yes     Comment: soda    Reaction to local anesthetic No     Social Determinants of Health     Financial Resource Strain: Low Risk  (2023)    Financial Resource Strain     Difficulty of Paying Living Expenses: Not very hard     Med Affordability: No   Transportation Needs: No Transportation Needs (2023)    Transportation Needs     Lack of Transportation: No    Received [facility]    Housing Stability       Family History   Problem Relation Age of Onset    Breast Cancer Self 67    Hypertension Mother     Skin cancer Mother     Cancer Mother     Dementia Mother     Diabetes Father     Skin cancer Father     Dementia Father     Diabetes Sister     Asthma Sister     Depression Sister     Obesity Daughter     Hypertension Maternal Grandmother     Cancer Maternal Grandfather     Stroke Paternal Grandmother          PHYSICAL EXAM:    /79 (BP Location: Radial, Patient Position: Sitting, Cuff Size: adult)   Pulse 85   Temp 97.6 °F (36.4 °C)   Resp 18   Ht 1.6 m (5' 3\")   Wt 98 kg (216 lb)   SpO2 98%   BMI 38.26 kg/m²   Wt Readings from Last 6 Encounters:   24 98 kg (216 lb)   24 97.3 kg (214 lb 9.6 oz)   24 98.4 kg (217 lb)   23 96.9 kg (213 lb 9.6 oz)   10/18/23 94.3 kg (208 lb)   23 92.5 kg (204 lb)     Physical Exam  General: Patient is alert, not in acute distress.  HEENT: EOMs intact. PERRL.   Neck: No  JVD. No palpable lymphadenopathy. Neck is supple.  Chest: Clear to auscultation.  Heart: Regular rate and rhythm.   Abdomen: Soft, non tender with good bowel sounds.  Extremities: No edema.  Neurological: Grossly intact.   Lymphatics: There is no palpable lymphadenopathy throughout in the cervical, supraclavicular, axillary, or inguinal regions.  Psych/Depression: nl  Breasts:  R breast no masses, surgical scars on the axilla and central lumpectomy.  Lymphedema at axilla on the R and on the R arm.  Shoulder on the R with new scar from surgery.  L breast no masses.        ASSESSMENT/PLAN:     1. Malignant neoplasm of central portion of right breast in female, estrogen receptor positive (HCC)    2. Encounter for monitoring aromatase inhibitor therapy    3. Encounter for follow-up surveillance of breast cancer    4. Screening mammogram, encounter for    5. Lymphedema of right arm    6. Post-menopausal       Cancer Staging   Mucinous carcinoma of breast, right (HCC)  Staging form: Breast, AJCC 8th Edition  - Clinical stage from 10/18/2022: Stage IA (cT1c, cN0(sn), cM0, G1, ER+, OR+, HER2-) - Signed by Kyle Mcgill MD on 10/18/2022    Discussed with the patient the natural history of her type of breast cancer.  Discussed that this is a very favorable prognosis as pure mucinous.  No indication for Oncotype Dx.    Patient has completed her surgical management. Completed RT to complete local regional management.    On an AI for 5-10 yrs, depending on new emerging data.  On letrozole, 5 years will be an December 2027.    Baseline DEXA on October 2022 was normal.  Next due October 2024.  Will do in December of 2024.    As above with mammograms in May 2024 for which bilateral biopsies performed both of which benign.  Right breast mammogram in December 2024.      Follow up in 6 months.    MDM moderate.  Continuity of complex medical care.    No orders of the defined types were placed in this encounter.      Results From Past  48 Hours:  No results found for this or any previous visit (from the past 48 hour(s)).    Imaging & Referrals:  OP REFERRAL TO OT/PT [facility]  [facility] GREG[facility]IO 2D+3D DIAGNOSTIC AMANDA RIGHT (CPT=77065/58050)   Orders Placed This Encounter   Procedures    OP REFERRAL TO OT/PT [facility] CL[facility]   Collected 6/5/2024 09:19       Status: Final result       Visible to patient: Yes (seen)    0 Result Notes      Component  Ref Range & Units    Case Report   Surgical Pathology                                Case: [id number]                                   Authorizing Provider:  Kyle Mcgill MD         Collected:           06/05/2024 09:19 AM           Ordering Location:     [facility]          Received:            06/05/2024 10:14 AM                                  Mammography - [facility]                                                                       Pathologist:           Margo Barrientos MD                                                             Specimen:    Breast, right, RIGHT CENTRAL BREAST BX, X CLIP                                            Final Diagnosis:      Right breast, central; stereotactic/tomosynthesis guided core biopsies:   Fragments of benign breast tissue and lobulated fibroadipose tissue with dense stromal fibrosis and calcifications.   Blood vessels with medial calcific sclerosis..  No evidence of ductal epithelial atypia or malignancy is identified.     Comment:  Clinical and radiologic correlations are recommended.   Electronically signed by Margo Barrientos MD on 6/6/2024 at 14        Addendum    ADDENDUM:  Amendment to add additional recommendations.  There are stable post lumpectomy changes seen in the right breast unchanged when compared to patient's 1st post lumpectomy exam of May 2023. Six-month follow-up right breast diagnostic mammogram is   recommended.  The remainder of the findings and  recommendations are unchanged.          Dictated by (CST): Sadie Lorenzo MD on 6/06/2024 at 2:54 PM       Finalized by (CST): Sadie Lorenzo MD on 6/06/2024 at 2:55 PM                    Addended by Sadie Lorenzo MD on 6/6/2024  2:55 PM     Study Result    Narrative   PROCEDURE: [facility] GREGORIO 2D+3D DIAGNOSTIC AMANDA BILAT (CPT=77066/75692)     COMPARISON: [facility] BREAST LEFT LIMITED  (CPT=76642), 5/29/2024, 9:23 AM.  [facility] GREGORIO 2D+3D DIAGNOSTIC AMANDA BILAT (GXU=[id number]), 9/24/2021, 7:05 AM.  [facility] POST PROCEDURAL IMAGE RIGHT (CPT=77065), 10/21/2021, 7:46 AM.  [facility] GREGORIO 2D+3D DIAGNOSTIC AMANDA BILAT (WZK=[id number]), 5/09/2023, 10:39 AM.     INDICATIONS: Z17.0 Malignant neoplasm of central portion of right breast in female, estrogen receptor positive (HCC) C50.111 Malignant neoplasm of central portion of right *     TECHNIQUE: Digital diagnostic mammography was performed and images were reviewed with the Fi[employer] MARISELA 1.5.1.5 CAD device.  3D tomosynthesis was performed and reviewed.        BREAST COMPOSITION:   Category b-Scattered areas fibroglandular density.        FINDINGS: Bilateral CC, MLO, right XCCL, right spot magnification views, and left spot compression views were obtained.  Stable post lumpectomy changes are seen in the right breast.  Amorphous calcifications are seen in the central right breast best seen   on CC view.  A 0.8 centimeter mass is seen in the medial central left breast.  A focal asymmetry is seen in the left breast lower outer quadrant which effaces on spot compression views.  A 1.2 centimeter mass is seen in the posterior medial left breast  localizing superiorly.  Vascular calcifications are seen bilaterally.  The parenchyma pattern is otherwise stable with no other new suspicious asymmetry, mass, architectural distortion, or microcalcifications  identified in either breast.       Targeted ultrasound of the media left breast was performed.  A 1.2 x 0.8 x 1.1 centimeter hypoechoic mass abutting the skin is seen at 10:00 o'clock 18 centimeters from the nipple accounting for mammographic mass seen in the posterior medial left breast.   No internal vascularity is seen on color Doppler imaging.  A 0.8 x 0.4 x 0.6 centimeter hypoechoic mass is seen at 09:00 o'clock 5 centimeters from the nipple accounting for the mammographic mass seen in the medial central left breast. No internal  vascularity is seen on color Doppler imaging.       Targeted ultrasound the left breast lower outer quadrant was performed.  No mass or sonographic abnormality is seen.  Specifically, no sonographic abnormality seen to correspond to the previously noted left breast mammogram focal asymmetry which effaced  on spot compression views.  This presumably represented summation artifact.        Ultrasound of the left axilla shows no abnormal lymph nodes.                  Impression   CONCLUSION:    1. Grouped amorphous calcifications in the central right breast best seen on CC view are suspicious.  Stereotactic/tomosynthesis guided biopsy is recommended.       2. Left breast mass at 09:00 o'clock 5 centimeters from the nipple accounting for a mammographic mass in the medial central left breast is suspicious.  Ultrasound-guided biopsy is recommended.     3. Left breast mass at 10:00 o'clock 18 centimeters from the nipple accounting for the mammographic mass seen in the posterior medial left breast may represent a skin/subcutaneous lesion given close proximity to the skin.  Biopsy is recommended.  Given  location, surgical excisional biopsy is recommended.     Findings and recommendations were discussed with the patient immediately following exam. Patient verbalized understanding.     BI-RADS CATEGORY:    DIAGNOSTIC CATEGORY 4--SUSPICIOUS       RECOMMENDATIONS:  ULTRASOUND-GUIDED CORE BIOPSY:  LEFT BREASTMASS 09:00 O'CLOCK 5 CENTIMETERS FROM THE NIPPLE     STEREOTACTIC BIOPSY WITH 3D/GREGORIO RIGHT BREAST: CALCIFICATIONS CENTRAL RIGHT BREAST     SURGICAL EXCISIONAL BIOPSY: LEFT BREAST POSSIBLE SKIN/SUBCUTANEOUS MASS 10:00 O'CLOCK 18 CENTIMETERS FROM THE NIPPLE             PLEASE NOTE: NORMAL MAMMOGRAM DOES NOT EXCLUDE THE POSSIBILITY OF BREAST CANCER.  A CLINICALLY SUSPICIOUS PALPABLE LUMP SHOULD BE BIOPSIED.       For patients over the age of 40, the target due date for the patient's next mammogram has been entered into a reminder system.       Patient received a discharge summary from the technologist after completion of exam.     Breast marker legend used on images    Triangle = Palpable lump  [place] = Skin tag or mole  BB = Nipple  Linear cortes = Scar  Square = Pain           Dictated by (CST): Sadie Lorenzo MD on 5/29/2024 at 8:39 AM      Finalized by (CST): Sadie Lorenzo MD on 5/29/2024 at 1:08 PM

## 2024-09-23 ENCOUNTER — TELEPHONE (OUTPATIENT)
Dept: PHYSICAL THERAPY | Facility: HOSPITAL | Age: 69
End: 2024-09-23

## 2024-09-23 RX ORDER — LETROZOLE 2.5 MG/1
2.5 TABLET, FILM COATED ORAL NIGHTLY
Qty: 90 TABLET | Refills: 3 | Status: SHIPPED | OUTPATIENT
Start: 2024-09-23

## 2024-09-24 ENCOUNTER — TELEPHONE (OUTPATIENT)
Dept: PHYSICAL THERAPY | Facility: HOSPITAL | Age: 69
End: 2024-09-24

## 2024-09-26 ENCOUNTER — OFFICE VISIT (OUTPATIENT)
Dept: PHYSICAL THERAPY | Facility: HOSPITAL | Age: 69
End: 2024-09-26
Attending: INTERNAL MEDICINE
Payer: MEDICARE

## 2024-09-26 DIAGNOSIS — I89.0 LYMPHEDEMA OF RIGHT ARM: Primary | ICD-10-CM

## 2024-09-26 PROCEDURE — 97161 PT EVAL LOW COMPLEX 20 MIN: CPT | Performed by: PHYSICAL THERAPIST

## 2024-09-26 PROCEDURE — 97530 THERAPEUTIC ACTIVITIES: CPT | Performed by: PHYSICAL THERAPIST

## 2024-09-26 PROCEDURE — 97140 MANUAL THERAPY 1/> REGIONS: CPT | Performed by: PHYSICAL THERAPIST

## 2024-09-26 NOTE — PROGRESS NOTES
Referring Provider:  Artem  Diagnosis: Lymphedema of right arm (I89.0)     Date of onset: Aug 2023 Evaluation Date: 9/26/2024         LYMPHEDEMA EVALUATION:        PATIENT SUMMARY   Teresa Wilson is a 69 year old female who presents to therapy today for lymphedema evaluation.    History of current condition: R breast cancer 2022, lumpectomy w/ SLNB (0/3). + radiation. Recent R humerus fracture (Aug 2023,s/p reverse total shoulder 9/27/2023), had PT for her shoulder. Pt reporting she has had shoulder swelling since her injury.   Pain level 1/10  R shoulder (chronic from R shld fracture/reverse total shoulder) and R trunk    Current functional limitations: difficulty using RUE (due to fracture), at risk for swelling    Teresa describes prior level of function as independent w/ ADLs   Social History:  Lives w/ spouse  Hand Dominance: right handed  Pt goals include decrease swelling     Are you being hurt, frightened, demeaned, or taken advantage of by anyone at your home or in your life?  No        Have you recently had thoughts of hurting yourself?  No    Have you tried to hurt yourself in the past?  No        Past medical history was reviewed with Teresa.   Past Medical History:    Acute, but ill-defined, cerebrovascular disease    Adenomatous colon polyp    Anxiety    Anxiety state    Arthritis    Asthma (HCC)    Breast CA (HCC)    Cellulitis and abscess of leg    Closed displaced fracture of surgical neck of right humerus, unspecified fracture morphology, initial encounter    CVA (cerebral infarction)    Depression    Disorder of thyroid    Esophageal reflux    Essential hypertension    Exposure to medical diagnostic radiation    Extrinsic asthma, unspecified    High blood pressure    High cholesterol    Hypothyroidism    Liver cirrhosis (HCC)    cirrhosis    Migraines    Neuropathy    Obesity    Obesity (BMI 30-39.9)    Osteoarthritis    Other and unspecified hyperlipidemia    PFO (patent foramen ovale)  (HCC)    PONV (postoperative nausea and vomiting)    Shingles    March 2020    Stroke (HCC)    aphasia- 10 years ago    Type II or unspecified type diabetes mellitus without mention of complication, not stated as uncontrolled    Vertigo    Visual impairment    glasses         Precautions:  R reverse shld replacement, breast cancer   Education or treatment limitation: limited R shld ROM, large pannus compressing B inguinals  Rehab Potential:good        ASSESSMENT:  Teresa presents to [facility] evaluation with swelling R breast and trunk, slight swelling R shld/deltoid region (but this appears to be related to recent R shld surgery).     Reason for lymphedema: Secondary Lymphedema, appears to be due to breast cancer and shld surgery   Stage of lymphedema: 1  Phase of treatment: Phase 1: Reduction Phase  Has patient been treated for lymphedema before, if yes, when: no  Does the patient have a compression garment: no     Pt and therapist discussed evaluation findings, lymphedema education, POC and self care/management. Skilled Physical Therapy is medically necessary for stretching, strengthening, posture re-education,  Complete Decongestive Therapy to reduce swelling and decrease risk of infection, garment prescription , compression device prescription and education/self management.        OBJECTIVE:    9/26/2024 (Evaluation):  Sensation:  occasional numbness (pt reports R Carpal tunnel)    AROM/Strength:  Deferred- pt w/ limited ROM R shld, just completed therapy for reverse shoulder replacement      Edema/Tissue Observations:    Swelling R breast, slight pitting  Swelling R trunk (lateral to breast)    Swelling R shld (deltoid area)  Increased adipose B upper arm (lumpy, irregular)  R upper arm larger than L, but appears to be due to mostly increased adipose and muscular (recent reverse total shld replacement)  Decreased flexibility R pect  Marked tenderness R deltoid (due to recent reverse total shld  replacement)        Trunk Measurement:  15 cm inf to sternal notch: 126.8 cm  24 cm inf to sternal notch: 129.4 cm      Stemmer's Sign: negative  Arm Volume Measurements:       9/26/2024     6:00 AM   Lymphedema Calculations   DATE MEASURED 9/26/2024   LOCATION/MEASUREMENTS RUE   PATIENT POSITION  supine   LIMB POSITION 75 degrees abd   STARTING POINT 18 cm from 3rd cuticle/at styloid   RIGHT HAND VOLUME 18.6 across palm   LEFT HAND VOLUME 18.5 across palm   MEASUREMENT A 15.5   MEASUREMENT B 16.3   MEASUREMENT C 19.3   MEASUREMENT D 22.6   MEASUREMENT E 24.7   MEASUREMENT F 24.5   MEASUREMENT G 27.7   MEASUREMENT H 32.8   MEASUREMENT I 39.6    TOTAL VOLUME  [id number]   DATE MEASURED 9/26/2024   LOCATION/MEASUREMENTS LUE   MEASUREMENT A 15.8   MEASUREMENT B 16.1   MEASUREMENT C 19.1   MEASUREMENT D 22.3   MEASUREMENT E 24.1   MEASUREMENT F 25.1   MEASUREMENT G 25.3   MEASUREMENT H 30.3   MEASUREMENT I 38.8    TOTAL VOLUME  [id number]   % DIFFERENCE [id number]             Lymphedema Life Impact Scale: Evaluation Score 9/26/2024: LLIS Score Evaluation: 19 % impaired. (0% is no impairment, 100% total impairment)         Today’s Treatment and Response:   Date 9/26/2024 Date: * Date: * Date: * Date: * Date: *   Visit # 1/10  Certification From: 9/26/2024  To:12/25/2024    Visit # 2/* Visit: #3/* Visit: #4/* Visit: #5/* Visit: #6/*   Manual Therapy (30 minutes)    MLD: neck sequence, deep breathing R inguinal, R A-I (supine and sidely), L axilla, A-A (Ant and post), R breast, R axilla, R shld/upper arm     Compression:  - pt w/ poor fitting bra (old, stretched out, no support), adjusted straps which did improve fit, discussed w/ pt she may need to purchase new bras             There Ex (  minutes):            ThereAct (10 min):       Self-Care:  Management/Education: Explained Lymphedema diagnosis; informed patient of lymphedema precautions and risk reduction practices, and importance of skin care.                     Goals  (discussed and planned with patient involvement):      To be met in 10 visits:  1) Decrease swelling R breast/trunk by 2 cm to decrease risks of infection   2) Pt to be independent with self MLD, ROM exercises, and donning/doffing compression bandages/garments to facilitate swelling reduction and to be independent at self management once discharged        PLAN:  Frequency / Duration: Patient will be seen for 1-2 x/week or a total of 10 visits over a 90 day period. Frequency will decrease as symptoms improve.     Treatment will include: Complete Decongestive Therapy: Manual Therapy, Self-Care/Home Management Training, Therapeutic Exercise, Neuromuscular Re-education, Orthotic Management and Training        Charges: Physical Therapy Eval: Low Complexity, mm2, act1      Total Timed Treatment: 40 min     Total Treatment Time: 70 min     Based on clinical rationale and outcome measures, this evaluation is   stable (low eval)        Patient/Family/Caregiver was advised of these findings, precautions, and treatment options and has agreed to actively participate in planning and for this course of care.    Thank you for your referral. Please co-sign or sign and return this letter via fax as soon as possible to [phone]. If you have any questions, please contact me at Dept: [phone]    Sincerely,  Electronically signed by therapist: Yessi Gonzalez PT, DPT, WALKER  Physician's certification required: Yes  I certify the need for these services furnished under this plan of treatment and while under my care.    X___________________________________________________ Date____________________    Certification From: 9/26/2024  To:12/25/2024

## 2024-09-28 ENCOUNTER — LAB ENCOUNTER (OUTPATIENT)
Dept: LAB | Age: 69
End: 2024-09-28
Attending: INTERNAL MEDICINE
Payer: MEDICARE

## 2024-09-28 DIAGNOSIS — E11.9 DIABETES MELLITUS (HCC): Primary | ICD-10-CM

## 2024-09-28 DIAGNOSIS — E78.2 MIXED HYPERLIPIDEMIA: ICD-10-CM

## 2024-09-28 LAB
ALBUMIN SERPL-MCNC: 3.7 G/DL (ref 3.2–4.8)
ALBUMIN/GLOB SERPL: 1.2 {RATIO} (ref 1–2)
ALP LIVER SERPL-CCNC: 116 U/L
ALT SERPL-CCNC: 30 U/L
ANION GAP SERPL CALC-SCNC: 8 MMOL/L (ref 0–18)
AST SERPL-CCNC: 32 U/L (ref ?–34)
BILIRUB SERPL-MCNC: 0.6 MG/DL (ref 0.2–1.1)
BUN BLD-MCNC: 15 MG/DL (ref 9–23)
BUN/CREAT SERPL: 16.3 (ref 10–20)
CALCIUM BLD-MCNC: 9.7 MG/DL (ref 8.7–10.4)
CHLORIDE SERPL-SCNC: 105 MMOL/L (ref 98–112)
CHOLEST SERPL-MCNC: 174 MG/DL (ref ?–200)
CO2 SERPL-SCNC: 29 MMOL/L (ref 21–32)
CREAT BLD-MCNC: 0.92 MG/DL
EGFRCR SERPLBLD CKD-EPI 2021: 68 ML/MIN/1.73M2 (ref 60–?)
EST. AVERAGE GLUCOSE BLD GHB EST-MCNC: 295 MG/DL (ref 68–126)
FASTING PATIENT LIPID ANSWER: YES
FASTING STATUS PATIENT QL REPORTED: YES
GLOBULIN PLAS-MCNC: 3.2 G/DL (ref 2–3.5)
GLUCOSE BLD-MCNC: 130 MG/DL (ref 70–99)
HBA1C MFR BLD: 11.9 % (ref ?–5.7)
HDLC SERPL-MCNC: 58 MG/DL (ref 40–59)
LDLC SERPL CALC-MCNC: 92 MG/DL (ref ?–100)
NONHDLC SERPL-MCNC: 116 MG/DL (ref ?–130)
OSMOLALITY SERPL CALC.SUM OF ELEC: 297 MOSM/KG (ref 275–295)
POTASSIUM SERPL-SCNC: 4 MMOL/L (ref 3.5–5.1)
PROT SERPL-MCNC: 6.9 G/DL (ref 5.7–8.2)
SODIUM SERPL-SCNC: 142 MMOL/L (ref 136–145)
TRIGL SERPL-MCNC: 139 MG/DL (ref 30–149)
VLDLC SERPL CALC-MCNC: 23 MG/DL (ref 0–30)

## 2024-09-28 PROCEDURE — 36415 COLL VENOUS BLD VENIPUNCTURE: CPT

## 2024-09-28 PROCEDURE — 80061 LIPID PANEL: CPT

## 2024-09-28 PROCEDURE — 80053 COMPREHEN METABOLIC PANEL: CPT

## 2024-09-28 PROCEDURE — 83036 HEMOGLOBIN GLYCOSYLATED A1C: CPT

## 2024-10-03 ENCOUNTER — LAB ENCOUNTER (OUTPATIENT)
Dept: LAB | Age: 69
End: 2024-10-03
Attending: INTERNAL MEDICINE
Payer: MEDICARE

## 2024-10-03 DIAGNOSIS — E53.8 VITAMIN B12 DEFICIENCY: ICD-10-CM

## 2024-10-03 DIAGNOSIS — E83.42 HYPOMAGNESEMIA: ICD-10-CM

## 2024-10-03 DIAGNOSIS — E55.9 VITAMIN D DEFICIENCY: ICD-10-CM

## 2024-10-03 DIAGNOSIS — K75.81 LIVER CIRRHOSIS SECONDARY TO NASH (HCC): ICD-10-CM

## 2024-10-03 DIAGNOSIS — E78.00 HYPERCHOLESTEROLEMIA: ICD-10-CM

## 2024-10-03 DIAGNOSIS — E11.9 TYPE 2 DIABETES MELLITUS WITHOUT COMPLICATION, WITH LONG-TERM CURRENT USE OF INSULIN (HCC): ICD-10-CM

## 2024-10-03 DIAGNOSIS — Z79.4 TYPE 2 DIABETES MELLITUS WITHOUT COMPLICATION, WITH LONG-TERM CURRENT USE OF INSULIN (HCC): ICD-10-CM

## 2024-10-03 DIAGNOSIS — D69.6 THROMBOCYTOPENIA, UNSPECIFIED (HCC): ICD-10-CM

## 2024-10-03 DIAGNOSIS — K74.60 LIVER CIRRHOSIS SECONDARY TO NASH (HCC): ICD-10-CM

## 2024-10-03 LAB
ALBUMIN SERPL-MCNC: 3.9 G/DL (ref 3.2–4.8)
ALBUMIN/GLOB SERPL: 1.1 {RATIO} (ref 1–2)
ALP LIVER SERPL-CCNC: 126 U/L
ALT SERPL-CCNC: 33 U/L
ANION GAP SERPL CALC-SCNC: 5 MMOL/L (ref 0–18)
AST SERPL-CCNC: 32 U/L (ref ?–34)
BASOPHILS # BLD AUTO: 0.05 X10(3) UL (ref 0–0.2)
BASOPHILS NFR BLD AUTO: 0.9 %
BILIRUB SERPL-MCNC: 0.7 MG/DL (ref 0.2–1.1)
BUN BLD-MCNC: 14 MG/DL (ref 9–23)
BUN/CREAT SERPL: 15.7 (ref 10–20)
CALCIUM BLD-MCNC: 9.8 MG/DL (ref 8.7–10.4)
CHLORIDE SERPL-SCNC: 104 MMOL/L (ref 98–112)
CHOLEST SERPL-MCNC: 171 MG/DL (ref ?–200)
CO2 SERPL-SCNC: 33 MMOL/L (ref 21–32)
CREAT BLD-MCNC: 0.89 MG/DL
CREAT UR-SCNC: 166.8 MG/DL
DEPRECATED RDW RBC AUTO: 44.1 FL (ref 35.1–46.3)
EGFRCR SERPLBLD CKD-EPI 2021: 71 ML/MIN/1.73M2 (ref 60–?)
EOSINOPHIL # BLD AUTO: 0.22 X10(3) UL (ref 0–0.7)
EOSINOPHIL NFR BLD AUTO: 3.8 %
ERYTHROCYTE [DISTWIDTH] IN BLOOD BY AUTOMATED COUNT: 12.9 % (ref 11–15)
EST. AVERAGE GLUCOSE BLD GHB EST-MCNC: 283 MG/DL (ref 68–126)
FASTING PATIENT LIPID ANSWER: YES
FASTING STATUS PATIENT QL REPORTED: YES
GLOBULIN PLAS-MCNC: 3.4 G/DL (ref 2–3.5)
GLUCOSE BLD-MCNC: 131 MG/DL (ref 70–99)
HBA1C MFR BLD: 11.5 % (ref ?–5.7)
HCT VFR BLD AUTO: 43.9 %
HDLC SERPL-MCNC: 65 MG/DL (ref 40–59)
HGB BLD-MCNC: 14.6 G/DL
IMM GRANULOCYTES # BLD AUTO: 0.01 X10(3) UL (ref 0–1)
IMM GRANULOCYTES NFR BLD: 0.2 %
LDLC SERPL CALC-MCNC: 90 MG/DL (ref ?–100)
LYMPHOCYTES # BLD AUTO: 2.02 X10(3) UL (ref 1–4)
LYMPHOCYTES NFR BLD AUTO: 35.3 %
MAGNESIUM SERPL-MCNC: 2 MG/DL (ref 1.6–2.6)
MCH RBC QN AUTO: 30.7 PG (ref 26–34)
MCHC RBC AUTO-ENTMCNC: 33.3 G/DL (ref 31–37)
MCV RBC AUTO: 92.2 FL
MICROALBUMIN UR-MCNC: 5.7 MG/DL
MICROALBUMIN/CREAT 24H UR-RTO: 34.2 UG/MG (ref ?–30)
MONOCYTES # BLD AUTO: 0.74 X10(3) UL (ref 0.1–1)
MONOCYTES NFR BLD AUTO: 12.9 %
NEUTROPHILS # BLD AUTO: 2.68 X10 (3) UL (ref 1.5–7.7)
NEUTROPHILS # BLD AUTO: 2.68 X10(3) UL (ref 1.5–7.7)
NEUTROPHILS NFR BLD AUTO: 46.9 %
NONHDLC SERPL-MCNC: 106 MG/DL (ref ?–130)
OSMOLALITY SERPL CALC.SUM OF ELEC: 296 MOSM/KG (ref 275–295)
PLATELET # BLD AUTO: 151 10(3)UL (ref 150–450)
POTASSIUM SERPL-SCNC: 4.1 MMOL/L (ref 3.5–5.1)
PROT SERPL-MCNC: 7.3 G/DL (ref 5.7–8.2)
RBC # BLD AUTO: 4.76 X10(6)UL
SODIUM SERPL-SCNC: 142 MMOL/L (ref 136–145)
TRIGL SERPL-MCNC: 90 MG/DL (ref 30–149)
TSI SER-ACNC: 2.04 MIU/ML (ref 0.55–4.78)
VIT B12 SERPL-MCNC: 1110 PG/ML (ref 211–911)
VIT D+METAB SERPL-MCNC: 80.4 NG/ML (ref 30–100)
VLDLC SERPL CALC-MCNC: 14 MG/DL (ref 0–30)
WBC # BLD AUTO: 5.7 X10(3) UL (ref 4–11)

## 2024-10-03 PROCEDURE — 83036 HEMOGLOBIN GLYCOSYLATED A1C: CPT

## 2024-10-03 PROCEDURE — 36415 COLL VENOUS BLD VENIPUNCTURE: CPT

## 2024-10-03 PROCEDURE — 82570 ASSAY OF URINE CREATININE: CPT

## 2024-10-03 PROCEDURE — 82306 VITAMIN D 25 HYDROXY: CPT

## 2024-10-03 PROCEDURE — 85025 COMPLETE CBC W/AUTO DIFF WBC: CPT

## 2024-10-03 PROCEDURE — 83735 ASSAY OF MAGNESIUM: CPT

## 2024-10-03 PROCEDURE — 80053 COMPREHEN METABOLIC PANEL: CPT

## 2024-10-03 PROCEDURE — 84443 ASSAY THYROID STIM HORMONE: CPT

## 2024-10-03 PROCEDURE — 82607 VITAMIN B-12: CPT

## 2024-10-03 PROCEDURE — 80061 LIPID PANEL: CPT

## 2024-10-03 PROCEDURE — 82043 UR ALBUMIN QUANTITATIVE: CPT

## 2024-10-07 ENCOUNTER — APPOINTMENT (OUTPATIENT)
Dept: PHYSICAL THERAPY | Facility: HOSPITAL | Age: 69
End: 2024-10-07
Attending: INTERNAL MEDICINE
Payer: MEDICARE

## 2024-10-08 ENCOUNTER — OFFICE VISIT (OUTPATIENT)
Dept: INTERNAL MEDICINE CLINIC | Facility: CLINIC | Age: 69
End: 2024-10-08
Payer: MEDICARE

## 2024-10-08 VITALS
WEIGHT: 219 LBS | HEIGHT: 63 IN | HEART RATE: 83 BPM | OXYGEN SATURATION: 99 % | SYSTOLIC BLOOD PRESSURE: 110 MMHG | DIASTOLIC BLOOD PRESSURE: 64 MMHG | BODY MASS INDEX: 38.8 KG/M2

## 2024-10-08 DIAGNOSIS — Z86.0101 HISTORY OF ADENOMATOUS POLYP OF COLON: ICD-10-CM

## 2024-10-08 DIAGNOSIS — E78.00 HYPERCHOLESTEROLEMIA: ICD-10-CM

## 2024-10-08 DIAGNOSIS — E53.8 VITAMIN B12 DEFICIENCY: ICD-10-CM

## 2024-10-08 DIAGNOSIS — K21.9 GASTROESOPHAGEAL REFLUX DISEASE WITHOUT ESOPHAGITIS: ICD-10-CM

## 2024-10-08 DIAGNOSIS — Z00.00 ROUTINE HEALTH MAINTENANCE: ICD-10-CM

## 2024-10-08 DIAGNOSIS — C50.911 MUCINOUS CARCINOMA OF BREAST, RIGHT (HCC): ICD-10-CM

## 2024-10-08 DIAGNOSIS — M17.0 PRIMARY OSTEOARTHRITIS OF BOTH KNEES: ICD-10-CM

## 2024-10-08 DIAGNOSIS — E03.8 OTHER SPECIFIED HYPOTHYROIDISM: ICD-10-CM

## 2024-10-08 DIAGNOSIS — E11.9 TYPE 2 DIABETES MELLITUS WITHOUT COMPLICATION, WITH LONG-TERM CURRENT USE OF INSULIN (HCC): Primary | ICD-10-CM

## 2024-10-08 DIAGNOSIS — Z79.4 TYPE 2 DIABETES MELLITUS WITHOUT COMPLICATION, WITH LONG-TERM CURRENT USE OF INSULIN (HCC): Primary | ICD-10-CM

## 2024-10-08 DIAGNOSIS — E11.40 TYPE 2 DIABETES MELLITUS WITH DIABETIC NEUROPATHY, WITH LONG-TERM CURRENT USE OF INSULIN (HCC): ICD-10-CM

## 2024-10-08 DIAGNOSIS — K75.81 LIVER CIRRHOSIS SECONDARY TO NASH (HCC): ICD-10-CM

## 2024-10-08 DIAGNOSIS — E66.01 MORBID OBESITY WITH BMI OF 40.0-44.9, ADULT (HCC): ICD-10-CM

## 2024-10-08 DIAGNOSIS — Z79.4 TYPE 2 DIABETES MELLITUS WITH DIABETIC NEUROPATHY, WITH LONG-TERM CURRENT USE OF INSULIN (HCC): ICD-10-CM

## 2024-10-08 DIAGNOSIS — G47.33 OBSTRUCTIVE SLEEP APNEA: ICD-10-CM

## 2024-10-08 DIAGNOSIS — E83.42 HYPOMAGNESEMIA: ICD-10-CM

## 2024-10-08 DIAGNOSIS — K74.60 LIVER CIRRHOSIS SECONDARY TO NASH (HCC): ICD-10-CM

## 2024-10-08 DIAGNOSIS — D69.6 THROMBOCYTOPENIA, UNSPECIFIED (HCC): ICD-10-CM

## 2024-10-08 DIAGNOSIS — E55.9 VITAMIN D DEFICIENCY: ICD-10-CM

## 2024-10-08 DIAGNOSIS — Z86.73 HX OF TRANSIENT ISCHEMIC ATTACK (TIA): ICD-10-CM

## 2024-10-08 PROCEDURE — 99499 UNLISTED E&M SERVICE: CPT | Performed by: INTERNAL MEDICINE

## 2024-10-08 PROCEDURE — 90662 IIV NO PRSV INCREASED AG IM: CPT | Performed by: INTERNAL MEDICINE

## 2024-10-08 PROCEDURE — 90677 PCV20 VACCINE IM: CPT | Performed by: INTERNAL MEDICINE

## 2024-10-08 PROCEDURE — G0009 ADMIN PNEUMOCOCCAL VACCINE: HCPCS | Performed by: INTERNAL MEDICINE

## 2024-10-08 PROCEDURE — 99214 OFFICE O/P EST MOD 30 MIN: CPT | Performed by: INTERNAL MEDICINE

## 2024-10-08 PROCEDURE — G0008 ADMIN INFLUENZA VIRUS VAC: HCPCS | Performed by: INTERNAL MEDICINE

## 2024-10-08 NOTE — PROGRESS NOTES
Teresa Wilson is a 69 year old female.  Chief Complaint   Patient presents with    Checkup     6 month        HPI:   Teresa Wilson is a 69 year old female who presents for 6 mo check up.    Dr. Ulrich just added Ozempic -- has not started yet.     Had R carpal tunnel release by Dr. Boland a few months ago; needs left done.    Doing okay after left toe amputation    In PT for lymphedema RUE      Wt Readings from Last 6 Encounters:   10/08/24 219 lb (99.3 kg)   08/06/24 216 lb (98 kg)   05/02/24 214 lb 9.6 oz (97.3 kg)   02/06/24 217 lb (98.4 kg)   11/28/23 213 lb 9.6 oz (96.9 kg)   10/18/23 208 lb (94.3 kg)     Body mass index is 38.79 kg/m².       Current Outpatient Medications   Medication Sig Dispense Refill    letrozole 2.5 MG Oral Tab Take 1 tablet (2.5 mg total) by mouth nightly. 90 tablet 3    CLOPIDOGREL 75 MG Oral Tab TAKE 1 TABLET(75 MG) BY MOUTH DAILY 90 tablet 3    furosemide 20 MG Oral Tab Take 1 tablet (20 mg total) by mouth 2 (two) times daily. 180 tablet 3    carvedilol 3.125 MG Oral Tab Take 1 tablet (3.125 mg total) by mouth 2 (two) times daily.      doxycycline 100 MG Oral Cap Take 1 capsule (100 mg total) by mouth Q12H.      LOSARTAN 25 MG Oral Tab TAKE 1/2 TABLET BY MOUTH EVERY DAY 45 tablet 3    ERGOCALCIFEROL 1.25 MG (02041 UT) Oral Cap TAKE 1 CAPSULE BY MOUTH 1 TIME A WEEK 13 capsule 3    IMIPRAMINE 50 MG Oral Tab TAKE 3 TABLETS BY MOUTH EVERY NIGHT 270 tablet 3    FAMOTIDINE 40 MG Oral Tab TAKE 1 TABLET(40 MG) BY MOUTH TWICE DAILY AS NEEDED FOR HEARTBURN 180 tablet 3    PRAVASTATIN 40 MG Oral Tab TAKE 1 TABLET(40 MG) BY MOUTH EVERY NIGHT 90 tablet 3    KLOR-CON 10 10 MEQ Oral Tab CR TAKE 4 TABLETS BY MOUTH TWICE DAILY 720 tablet 3    Naloxone HCl 4 MG/0.1ML Nasal Liquid 4 mg as needed.      traMADol 50 MG Oral Tab Take 1 tablet (50 mg total) by mouth every 8 (eight) hours as needed.      polyethylene glycol, PEG 3350, 17 g Oral Powd Pack Take 17 g by mouth daily as needed. 30  each 0    Continuous Blood Gluc Sensor (FREESTYLE MURALI 2 SENSOR) Does not apply Misc 1 Device every 14 (fourteen) days.      Insulin Disposable Pump (OMNIPOD DASH PODS, GEN 4,) Does not apply Misc use 1 pod      HUMULIN R U-500 KWIKPEN 500 UNIT/ML Subcutaneous Solution Pen-injector Pt with Insulin pump. Pt was instructed to verify with Endocrinogist if she needs to adjust dose a day before surgery. Pt was instructed no insulin os DOS and removed CGM and Insulin pump per Anesthesia protocol.      ALBUTEROL 108 (90 Base) MCG/ACT Inhalation Aero Soln INHALE 2 PUFFS INTO THE LUNGS EVERY 6 HOURS AS NEEDED FOR WHEEZING 3 each 3    Insulin Disposable Pump (OMNIPOD DASH PODS, GEN 4,) Does not apply Misc USE 1 POD UNDER THE SKIN EVERY 2.5 DAYS      Continuous Blood Gluc Sensor (FREESTYLE MURALI 2 SENSOR SYSTM) Does not apply Misc       BD PEN NEEDLE SKY 2ND GEN 32G X 4 MM Does not apply Misc USE AS DIRECTED 200 each 3    ondansetron (ZOFRAN ODT) 8 MG Oral Tablet Dispersible Take 1 tablet (8 mg total) by mouth every 6 (six) hours as needed for Nausea. Place on top of the tongue where it will dissolve, then swallow 30 tablet 2    BUTALBITAL-APAP-CAFFEINE -40 MG Oral Tab TAKE 1 TABLET BY MOUTH EVERY 4 HOURS AS NEEDED. DO NOT EXCEED 6 TABLETS IN 24 HOURS 30 tablet 3    Blood Glucose Monitoring Suppl (ONETOUCH VERIO) w/Device Does not apply Kit       Cyanocobalamin (VITAMIN B 12 OR) Take 500 mcg by mouth every morning.      Glucose Blood In Vitro Strip Use 1 strip by percutaneous route 4-6 times every day 200 each 0    Levothyroxine Sodium 88 MCG Oral Tab Take 1 tablet (88 mcg total) by mouth every morning before breakfast. 30 tablet 11    Magnesium Oxide 400 (240 Mg) MG Oral Tab Take 1 tablet (400 mg total) by mouth 2 (two) times daily. 30 tablet 0      Past Medical History:    Acute, but ill-defined, cerebrovascular disease    Adenomatous colon polyp    Anxiety    Anxiety state    Arthritis    Asthma (HCC)    Breast CA  (HCC)    Cellulitis and abscess of leg    Closed displaced fracture of surgical neck of right humerus, unspecified fracture morphology, initial encounter    CVA (cerebral infarction)    Depression    Disorder of thyroid    Esophageal reflux    Essential hypertension    Exposure to medical diagnostic radiation    Extrinsic asthma, unspecified    High blood pressure    High cholesterol    Hypothyroidism    Liver cirrhosis (HCC)    cirrhosis    Migraines    Neuropathy    Obesity    Obesity (BMI 30-39.9)    Osteoarthritis    Other and unspecified hyperlipidemia    PFO (patent foramen ovale) (HCC)    PONV (postoperative nausea and vomiting)    Shingles    2020    Stroke (HCC)    aphasia- 10 years ago    Type II or unspecified type diabetes mellitus without mention of complication, not stated as uncontrolled    Vertigo    Visual impairment    glasses      Past Surgical History:   Procedure Laterality Date    Appendectomy      Cholecystectomy      Colonoscopy  2022    Colonoscopy N/A 2019    Procedure: COLONOSCOPY;  Surgeon: Mello Rowland MD;  Location: [facility] ENDOSCOPY    Colonoscopy N/A 2022    Procedure: COLONOSCOPY;  Surgeon: Mello Rowland MD;  Location: [facility] ENDOSCOPY    Colonoscopy N/A 2022    Procedure: COLONOSCOPY;  Surgeon: Mello Rowland MD;  Location: [facility] ENDOSCOPY    Colonoscopy  2023    Colonoscopy N/A 2023    Procedure: COLONOSCOPY;  Surgeon: Mello Rowland MD;  Location: [facility] ENDOSCOPY    Colonoscopy & polypectomy  2013    adenoma    Cortisone injection Left     Left knee, Dr. Murphy    D & c      4 of them    Egd  2013    Egd  2019    Egd  2022    Egd  2023    Electrocardiogram, complete  2013    scanned to media    Extraction erupted tooth/exr  2018    Hernia surgery      Hysterectomy      Talia biopsy stereo nodule 1 site right (cpt=19081) Right 2024    X clip- calcs    Needle biopsy left            Radiation right       Tooth implantation  06/01/2018    Total shoulder replacement  09/07/2023    Umbilical hernia repair        Family History   Problem Relation Age of Onset    Breast Cancer Self 67    Hypertension Mother     Skin cancer Mother     Cancer Mother     Dementia Mother     Diabetes Father     Skin cancer Father     Dementia Father     Diabetes Sister     Asthma Sister     Depression Sister     Obesity Daughter     Hypertension Maternal Grandmother     Cancer Maternal Grandfather     Stroke Paternal Grandmother       Social History:   Social History     Socioeconomic History    Marital status:    Tobacco Use    Smoking status: Never     Passive exposure: Never    Smokeless tobacco: Never   Vaping Use    Vaping status: Never Used   Substance and Sexual Activity    Alcohol use: Yes     Comment: socially    Drug use: No   Other Topics Concern    Caffeine Concern Yes     Comment: soda    Reaction to local anesthetic No     Social Determinants of Health     Financial Resource Strain: Low Risk  (8/14/2023)    Financial Resource Strain     Difficulty of Paying Living Expenses: Not very hard     Med Affordability: No   Transportation Needs: No Transportation Needs (8/14/2023)    Transportation Needs     Lack of Transportation: No    Received from [facility]    Housing Stability                General Health                                                   Functional Ability                                                  Hearing Problems?: No     Functional Status     Hearing Problems?: No    Vision Problems? : No    Difficulty walking?: No    Difficulty dressing or bathing?: No    Problems with daily activities? : No    Memory Problems?: No      Fall/Risk Assessment                                                              Depression Screening (PHQ-2/PHQ-9): Over the LAST 2 WEEKS                      Advance Directives                Hearing Assessment (Required for AWV/SWV)      Hearing Screening     No data filed         Visual Acuity                   Cognitive Assessment                                       Teresa Wilson's SCREENING SCHEDULE   Tests on this list are recommended by your physician but may not be covered, or covered at this frequency, by your insurer. Please check with your insurance carrier before scheduling to verify coverage.   PREVENTATIVE SERVICES  INDICATIONS AND SCHEDULE Internal Lab or Procedure External Lab or Procedure   Diabetes Screening      HbgA1C   Annually HgbA1C (%)   Date Value   10/03/2024 11.5 (H)         No data to display                Fasting Blood Sugar (FSB)Annually Glucose (mg/dL)   Date Value   10/03/2024 131 (H)         No data to display               Cardiovascular Disease Screening     LDL Annually LDL Cholesterol (mg/dL)   Date Value   10/03/2024 90        EKG One Time y    Colorectal Cancer Screening      Colonoscopy Screen every 10 years Health Maintenance   Topic Date Due    Colorectal Cancer Screening  06/30/2026    Update Health Maintenance if applicable    Flex Sigmoidoscopy Screen every 5 years No results found for this or any previous visit.      No data to display                 Fecal Occult Blood Annually No results found for: \"FOB\", \"OCCULTSTOOL\"      No data to display                Glaucoma Screening      Ophthalmology Visit Annually y    Bone Density Screening      Dexascan Every two years Last Dexa Scan:    XR DEXA BONE DENSITOMETRY (CPT=77080) 10/21/2022        No data to display               Pap and Pelvic      Pap: Every 3 yrs age 21-65 or Pap+HPV every 5 yrs age 30-65, age 65 and older at high risk   No recommendations at this time Update Health Maintenance if applicable    Chlamydia  Annually if high risk No results found for: \"CHLAMYDIA\"      No data to display                Screening Mammogram      Mammogram  Annually Health Maintenance   Topic Date Due    Mammogram  Discontinued    Update Health Maintenance if applicable    Immunizations      Influenza No orders found for this or any previous visit. Update Immunization Activity if applicable    Pneumococcal No orders found for this or any previous visit. Update Immunization Activity if applicable    Hepatitis B Orders placed or performed in visit on 07/22/20    HEPATITIS B VACCINE, OVER 20    Update Immunization Activity if applicable    Tetanus Orders placed or performed in visit on 09/07/16    TETANUS, DIPHTHERIA TOXOIDS AND ACELLULAR PERTUSIS VACCINE (TDAP), >7 YEARS, IM USE    Update Immunization Activity if applicable    Zoster (Not covered by Medicare Part B) No orders found for this or any previous visit. Update Immunization Activity if applicable     SPECIFIC DISEASE MONITORING Internal Lab or Procedure External Lab or Procedure   Annual Monitoring of Persistent     Medications (ACE/ARB, digoxin, diuretics)    Potassium  Annually Potassium (mmol/L)   Date Value   10/03/2024 4.1         No data to display                Creatinine  Annually Creatinine (mg/dL)   Date Value   10/03/2024 0.89         No data to display                Digoxin Serum Conc  Annually No results found for: \"DIGOXIN\"      No data to display                Diabetes      HgbA1C  Annually HgbA1C (%)   Date Value   10/03/2024 11.5 (H)         No data to display                Creat/alb ratio  Annually      LDL  Annually LDL Cholesterol (mg/dL)   Date Value   10/03/2024 90         No data to display                 Dilated Eye exam  Annually     9/11/2018    11:40 AM   Data entered on:   Last Dilated Eye Exam 9/10/2018          No data to display                COPD      Spirometry Testing Annually No results found for this or any previous visit.      No data to display                        REVIEW OF SYSTEMS:   GENERAL: feels well otherwise  SKIN: denies any unusual skin lesions  EYES:denies blurred vision or double vision  HEENT: denies nasal congestion, sinus pain or ST  LUNGS: denies shortness of breath  with exertion or cough  CARDIOVASCULAR: denies chest pain, pressure, or palpitations  GI: denies abdominal pain, nausea, vomiting, diarrhea, constipation, hematochezia, or melena  NEURO: denies headaches or dizziness    EXAM:   /64   Pulse 83   Ht 5' 3\" (1.6 m)   Wt 219 lb (99.3 kg)   SpO2 99%   BMI 38.79 kg/m²     GENERAL: well developed, well nourished, in no apparent distress  HEENT: normal oropharynx, normal TM's  EYES: PERRLA, EOMI, conjunctivae are pink  NECK: supple, no cervical or supraclavicular LAD, no carotid bruits  LUNGS: clear to auscultation  CARDIO: RRR, normal S1S2, no gallops or murmurs  GI: soft, NT, ND, NABS, no HSM  EXTREMITIES: minimal trace BLE edema, +2 DP pulses        ASSESSMENT AND PLAN:     Routine health maintenance  Pt is s/p HERMELINDA/BSO    DEXA normal 10/2022   Tdap given 9/7/16  Rec Shingrix shingles vaccine  CT calcium score 0 in 8/2020  Prevnar 13 done 1/2023; Prevnar 20 today 10/8/24  Flu shot today    Hx of R breast cancer (mucinous carcinoma)  -s/p excisional bx on 8/30/22 -- mucinous carcinoma, extending to the surgical margins  -s/p R central breast resection, R axillary sentinel LN bx 9/28/22 -- 3/3 LN's negative for carcinoma.  R breast resection negative for residual carcinoma  -s/p RT with Dr. Cortez Francis (completed 1/2023)  -followed by med onc Dr. 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 Ulrich  -HgbA1c 11.9>11.5  -Has CGM and insulin pump.  -Sees ophtho (Dr. Crispin Scott in [place]) regularly (last in 3/2024) --  found to have retinopathy on exam 2019.   -Dr. Ulrich just added Ozempic       Hypercholesterolemia  -Continue Pravachol   -lipids at goal     Hypothyroidism  Cont synthroid. TSH 1.99 (10/2024)     Obstructive sleep apnea  Unable to tolerate CPAP mask or nasal pillows      History of adenomatous colonic polyps  Found on colonoscopy 9/11/13   Colonoscopy 1/2019 -- 3 adenomatous polyps.   Colonoscopy 4/4/22 (Dr. Rowland) -- inadequate bowel prep; to have 2 day prep for repeat colonoscopy  Repeat colonoscopy 5/2022 -- large tubulovillous adenoma; still with only fair bowel prep, so advised to have repeat colonoscopy in 1 year (5/2023).  Repeat colonoscopy (6/30/23) -- 1 adenomatous polyp -- repeat in 3 years (6/2026)    Hx of transient ischemic attack (TIA) (expressive aphasia)  Continue Plavix.  Pt with occasional difficulty with word finding       Vitamin B12 deficiency  Vit B12 level normal 10/2024     Venous insufficiency  -on KDur 40meq BID, Lasix 20mg/day     NAFLD, prob BLANCAS cirrhosis  Thrombocytopenia  -Fatty liver seen on CT in 9/2013. Iron sats, Hep A/B/C panel, ceruloplasmin, DONTAE, AMA normal.    -EGD 1/2019 and 4/2022 (Dr. Rowland) -- small esophageal varices; o/w unremarkable.   -Hep B series completed 7/22/20  -followed by hepatology Dr. Velazquez  -on carvedilol 3.125mg BID (for portal hypertension)  -mild stable thrombocytopenia (plts normal this time 151)    GERD  -stable on pepcid     Left and right knee OA  -s/p arthroscopic surgery with Dr. Lucho Evans in the past.    -has since seen ortho Dr. Carrillo Murphy.  Needs TKR but ideally only after DM better controlled    Morbid obesity  -has seen med bariatrics Dr. Doherty though not since 3/2023  -no longer on topiramate  -encouraged her to schedule another appt    Hypomagnesemia  Takes Mg oxide 400mg BID.  Level normal 10/2024 (2.0)    Hypertension  On losartan 12.5mg/day  Carvedilol 3.125mg BID added in 2/2024 by hepatologist Dr. Velazquez (for portal HTN)    Depression  Stable on imipramine 150mg/day    Vitamin D deficiency  On weekly vitamin D  Level normal 80.4    Hx R humerus fracture (s/p fall 8/7/23)  -s/p right reverse total shoulder replacement 9/7/23 (Dr. Luan Boland)    Left shoulder OA  -seeing Dr. Boland    Hx Left 2nd  toe osteomyelitis 2/2 infected ulcer   -s/p distal L 2nd toe amputation 5/20/24 (at [facility])         RTC 6 mos MAW PE

## 2024-10-09 ENCOUNTER — APPOINTMENT (OUTPATIENT)
Dept: PHYSICAL THERAPY | Facility: HOSPITAL | Age: 69
End: 2024-10-09
Attending: INTERNAL MEDICINE
Payer: MEDICARE

## 2024-10-14 ENCOUNTER — OFFICE VISIT (OUTPATIENT)
Dept: PHYSICAL THERAPY | Facility: HOSPITAL | Age: 69
End: 2024-10-14
Attending: INTERNAL MEDICINE
Payer: MEDICARE

## 2024-10-14 PROCEDURE — 97140 MANUAL THERAPY 1/> REGIONS: CPT

## 2024-10-14 NOTE — PROGRESS NOTES
Physical Therapy Lymphedema Daily Note          Precautions:  R reverse shld replacement, breast cancer   Education or treatment limitation: limited R shld ROM, large pannus compressing B inguinals  Rehab Potential:good  Allergies: EUCERIN LOTION  Past Medical History:    Acute, but ill-defined, cerebrovascular disease    Adenomatous colon polyp    Anxiety    Anxiety state    Arthritis    Asthma (HCC)    Breast CA (HCC)    Cellulitis and abscess of leg    Closed displaced fracture of surgical neck of right humerus, unspecified fracture morphology, initial encounter    CVA (cerebral infarction)    Depression    Disorder of thyroid    Esophageal reflux    Essential hypertension    Exposure to medical diagnostic radiation    Extrinsic asthma, unspecified    High blood pressure    High cholesterol    Hypothyroidism    Liver cirrhosis (HCC)    cirrhosis    Migraines    Neuropathy    Obesity    Obesity (BMI 30-39.9)    Osteoarthritis    Other and unspecified hyperlipidemia    PFO (patent foramen ovale) (HCC)    PONV (postoperative nausea and vomiting)    Shingles    March 2020    Stroke ([facility])    aphasia- 10 years ago    Type II or unspecified type diabetes mellitus without mention of complication, not stated as uncontrolled    Vertigo    Visual impairment    glasses         Pain:  1/10  R shoulder (chronic from R shld fracture/reverse total shoulder) and R trunk        Subjective: Pt reports that she had to cancel a couple of appointments because she fell ill after receiving her flu and pneumonia vaccines.  Pt states that she felt better after the first session.  \"I have an allergy to Eucerin lotion.\"    *asked pt to bring in her own lotion to use during treatment.      Objective:    9/26/2024 (Evaluation):  Sensation:  occasional numbness (pt reports R Carpal tunnel)     AROM/Strength:  Deferred- pt w/ limited ROM R shld, just completed therapy for reverse shoulder replacement        Edema/Tissue Observations:     Swelling R breast, slight pitting  Swelling R trunk (lateral to breast)    Swelling R shld (deltoid area)  Increased adipose B upper arm (lumpy, irregular)  R upper arm larger than L, but appears to be due to mostly increased adipose and muscular (recent reverse total shld replacement)  Decreased flexibility R pect  Marked tenderness R deltoid (due to recent reverse total shld replacement)           Trunk Measurement:  15 cm inf to sternal notch: 126.8 cm  24 cm inf to sternal notch: 129.4 cm       Stemmer's Sign: negative  Arm Volume Measurements:        9/26/2024     6:00 AM   Lymphedema Calculations   DATE MEASURED 9/26/2024   LOCATION/MEASUREMENTS RUE   PATIENT POSITION  supine   LIMB POSITION 75 degrees abd   STARTING POINT 18 cm from 3rd cuticle/at styloid   RIGHT HAND VOLUME 18.6 across palm   LEFT HAND VOLUME 18.5 across palm   MEASUREMENT A 15.5   MEASUREMENT B 16.3   MEASUREMENT C 19.3   MEASUREMENT D 22.6   MEASUREMENT E 24.7   MEASUREMENT F 24.5   MEASUREMENT G 27.7   MEASUREMENT H 32.8   MEASUREMENT I 39.6    TOTAL VOLUME  2024.07790   DATE MEASURED 9/26/2024   LOCATION/MEASUREMENTS LUE   MEASUREMENT A 15.8   MEASUREMENT B 16.1   MEASUREMENT C 19.1   MEASUREMENT D 22.3   MEASUREMENT E 24.1   MEASUREMENT F 25.1   MEASUREMENT G 25.3   MEASUREMENT H 30.3   MEASUREMENT I 38.8    TOTAL VOLUME  1889.48364   % DIFFERENCE 7.75853                           Lymphedema Life Impact Scale: Evaluation Score 9/26/2024: LLIS Score Evaluation: 19 % impaired. (0% is no impairment, 100% total impairment)           Today’s Treatment and Response:   Date 9/26/2024 Date: 10/14/2024 Date: * Date: * Date: * Date: *   Visit # 1/10  Certification From: 9/26/2024  To:12/25/2024     Visit # 2/10  Certification From: 9/26/2024  To:12/25/2024   ALLERGY TO EUCERIN (pt to bring in her own lotion to use in clinic) Visit: #3/* Visit: #4/* Visit: #5/* Visit: #6/*   Manual Therapy (30 minutes)     MLD: neck sequence, deep breathing R  inguinal, R A-I (supine and sidely), L axilla, A-A (Ant and post), R breast, R axilla, R shld/upper arm      Compression:  - pt w/ poor fitting bra (old, stretched out, no support), adjusted straps which did improve fit, discussed w/ pt she may need to purchase new bras         Manual therapy (40 minutes)      MLD: neck sequence, deep breathing R inguinal, R A-I (supine and sidely), L axilla, A-A (Ant and post), R breast, R axilla, R shld/upper arm (lotion used during session)  Pt encouraged to bring her own lotion d/t reported allergies.    Compression:  -may need better fitting bras.             There Ex (  minutes):                   ThereAct (10 min):         Self-Care:  Management/Education: Explained Lymphedema diagnosis; informed patient of lymphedema precautions and risk reduction practices, and importance of skin care.      There Act (5 minutes)    -discussed importance of use of lotion daily to chest/breast/trunk/axilla and arm.               Assessment: Good response to MLD of R arm, breast, chest, axilla and trunk.  Reviewed importance of using lotion daily. (Pt reports allergies to Eucerin lotion)        Goals (discussed and planned with patient involvement):      To be met in 10 visits:  1) Decrease swelling R breast/trunk by 2 cm to decrease risks of infection   2) Pt to be independent with self MLD, ROM exercises, and donning/doffing compression bandages/garments to facilitate swelling reduction and to be independent at self management once discharged           PLAN:  Frequency / Duration: Patient will be seen for 1-2 x/week or a total of 10 visits over a 90 day period. Frequency will decrease as symptoms improve.      Treatment will include: Complete Decongestive Therapy: Manual Therapy, Self-Care/Home Management Training, Therapeutic Exercise, Neuromuscular Re-education, Orthotic Manageme         Charges: MM3   Total Timed Treatment: 45 min  Total Treatment Time: 45 min

## 2024-10-16 ENCOUNTER — OFFICE VISIT (OUTPATIENT)
Dept: PHYSICAL THERAPY | Facility: HOSPITAL | Age: 69
End: 2024-10-16
Attending: INTERNAL MEDICINE
Payer: MEDICARE

## 2024-10-16 PROCEDURE — 97140 MANUAL THERAPY 1/> REGIONS: CPT | Performed by: PHYSICAL THERAPIST

## 2024-10-16 NOTE — PROGRESS NOTES
Physical Therapy Lymphedema Daily Note          Precautions:  R reverse shld replacement, breast cancer   Education or treatment limitation: limited R shld ROM, large pannus compressing B inguinals  Rehab Potential:good  Allergies: EUCERIN LOTION  Past Medical History:    Acute, but ill-defined, cerebrovascular disease    Adenomatous colon polyp    Anxiety    Anxiety state    Arthritis    Asthma (HCC)    Breast CA (HCC)    Cellulitis and abscess of leg    Closed displaced fracture of surgical neck of right humerus, unspecified fracture morphology, initial encounter    CVA (cerebral infarction)    Depression    Disorder of thyroid    Esophageal reflux    Essential hypertension    Exposure to medical diagnostic radiation    Extrinsic asthma, unspecified    High blood pressure    High cholesterol    Hypothyroidism    Liver cirrhosis (HCC)    cirrhosis    Migraines    Neuropathy    Obesity    Obesity (BMI 30-39.9)    Osteoarthritis    Other and unspecified hyperlipidemia    PFO (patent foramen ovale) (HCC)    PONV (postoperative nausea and vomiting)    Shingles    March 2020    Stroke (HCC)    aphasia- 10 years ago    Type II or unspecified type diabetes mellitus without mention of complication, not stated as uncontrolled    Vertigo    Visual impairment    glasses         Pain:  1/10  R shoulder (chronic from R shld fracture/reverse total shoulder) and R trunk        Subjective: My bra was bothering my shoulder so I had to loosen it     Objective:  10/16/2024:  Slight swelling R breast, slight induration from bra   Trunk Measurement:  15 cm inf to sternal notch: 126.9 (IE: 126.8 cm)  24 cm inf to sternal notch: 124.8 (IE: 129.4 cm)        9/26/2024 (Evaluation):  Sensation:  occasional numbness (pt reports R Carpal tunnel)     AROM/Strength:  Deferred- pt w/ limited ROM R shld, just completed therapy for reverse shoulder replacement        Edema/Tissue Observations:    Swelling R breast, slight pitting  Swelling R  trunk (lateral to breast)    Swelling R shld (deltoid area)  Increased adipose B upper arm (lumpy, irregular)  R upper arm larger than L, but appears to be due to mostly increased adipose and muscular (recent reverse total shld replacement)  Decreased flexibility R pect  Marked tenderness R deltoid (due to recent reverse total shld replacement)           Trunk Measurement:  15 cm inf to sternal notch: 126.8 cm  24 cm inf to sternal notch: 129.4 cm       Stemmer's Sign: negative  Arm Volume Measurements:        9/26/2024     6:00 AM   Lymphedema Calculations   DATE MEASURED 9/26/2024   LOCATION/MEASUREMENTS RUE   PATIENT POSITION  supine   LIMB POSITION 75 degrees abd   STARTING POINT 18 cm from 3rd cuticle/at styloid   RIGHT HAND VOLUME 18.6 across palm   LEFT HAND VOLUME 18.5 across palm   MEASUREMENT A 15.5   MEASUREMENT B 16.3   MEASUREMENT C 19.3   MEASUREMENT D 22.6   MEASUREMENT E 24.7   MEASUREMENT F 24.5   MEASUREMENT G 27.7   MEASUREMENT H 32.8   MEASUREMENT I 39.6    TOTAL VOLUME  [id number]   DATE MEASURED 9/26/2024   LOCATION/MEASUREMENTS LUE   MEASUREMENT A 15.8   MEASUREMENT B 16.1   MEASUREMENT C 19.1   MEASUREMENT D 22.3   MEASUREMENT E 24.1   MEASUREMENT F 25.1   MEASUREMENT G 25.3   MEASUREMENT H 30.3   MEASUREMENT I 38.8    TOTAL VOLUME  [id number]   % DIFFERENCE 7.52114                           Lymphedema Life Impact Scale: Evaluation Score 9/26/2024: LLIS Score Evaluation: 19 % impaired. (0% is no impairment, 100% total impairment)           Today’s Treatment and Response:   Date 9/26/2024 Date: 10/14/2024 Date: 10/16/2024 Date: * Date: * Date: *   Visit # 1/10  Certification From: 9/26/2024  To:12/25/2024     Visit # 2/10  Certification From: 9/26/2024  To:12/25/2024   ALLERGY TO EUCERIN (pt to bring in her own lotion to use in clinic) Visit: #3/10  Certification From: 9/26/2024  To:12/25/2024   ALLERGY TO EUCERIN (pt to bring in her own lotion to use in clinic) Visit: #4/* Visit: #5/* Visit:  #6/*   Manual Therapy (30 minutes)     MLD: neck sequence, deep breathing R inguinal, R A-I (supine and sidely), L axilla, A-A (Ant and post), R breast, R axilla, R shld/upper arm      Compression:  - pt w/ poor fitting bra (old, stretched out, no support), adjusted straps which did improve fit, discussed w/ pt she may need to purchase new bras         Manual therapy (40 minutes)      MLD: neck sequence, deep breathing R inguinal, R A-I (supine and sidely), L axilla, A-A (Ant and post), R breast, R axilla, R shld/upper arm (lotion used during session)  Pt encouraged to bring her own lotion d/t reported allergies.    Compression:  -may need better fitting bras.    Manual Therapy (40 min)    MLD: neck sequence, deep breathing R inguinal, R A-I (supine and sidely), L axilla, A-A (Ant and post), R breast, R axilla, R shld/upper arm (lotion used during session) - pt brought her own lotion        Initiated self MLD, given written handout. Discussed directing R breast towards left axilla best option for self MLD (due to R A-I difficult due to chronic shld pain/limited ROM and due to abd pannus)          There Ex (  minutes):                   ThereAct (10 min):         Self-Care:  Management/Education: Explained Lymphedema diagnosis; informed patient of lymphedema precautions and risk reduction practices, and importance of skin care.      There Act (5 minutes)    -discussed importance of use of lotion daily to chest/breast/trunk/axilla and arm.               Assessment: Swelling decreased, initiated self management         Goals (discussed and planned with patient involvement):      To be met in 10 visits:  1) Decrease swelling R breast/trunk by 2 cm to decrease risks of infection   2) Pt to be independent with self MLD, ROM exercises, and donning/doffing compression bandages/garments to facilitate swelling reduction and to be independent at self management once discharged           PLAN:  Progress towards self  management  If swelling still reduced by next session consider decreasing to once a week      Treatment will include: Complete Decongestive Therapy: Manual Therapy, Self-Care/Home Management Training, Therapeutic Exercise, Neuromuscular Re-education, Orthotic Manageme         Charges: MM3    Total Timed Treatment: 40 min  Total Treatment Time: 45 min

## 2024-10-21 ENCOUNTER — OFFICE VISIT (OUTPATIENT)
Dept: PHYSICAL THERAPY | Facility: HOSPITAL | Age: 69
End: 2024-10-21
Attending: INTERNAL MEDICINE
Payer: MEDICARE

## 2024-10-21 PROCEDURE — 97140 MANUAL THERAPY 1/> REGIONS: CPT

## 2024-10-21 NOTE — PROGRESS NOTES
Physical Therapy Lymphedema Daily Note          Precautions:  R reverse shld replacement, breast cancer   Education or treatment limitation: limited R shld ROM, large pannus compressing B inguinals  Rehab Potential:good  Allergies: EUCERIN LOTION  Past Medical History:    Acute, but ill-defined, cerebrovascular disease    Adenomatous colon polyp    Anxiety    Anxiety state    Arthritis    Asthma (HCC)    Breast CA (HCC)    Cellulitis and abscess of leg    Closed displaced fracture of surgical neck of right humerus, unspecified fracture morphology, initial encounter    CVA (cerebral infarction)    Depression    Disorder of thyroid    Esophageal reflux    Essential hypertension    Exposure to medical diagnostic radiation    Extrinsic asthma, unspecified    High blood pressure    High cholesterol    Hypothyroidism    Liver cirrhosis (HCC)    cirrhosis    Migraines    Neuropathy    Obesity    Obesity (BMI 30-39.9)    Osteoarthritis    Other and unspecified hyperlipidemia    PFO (patent foramen ovale) (HCC)    PONV (postoperative nausea and vomiting)    Shingles    March 2020    Stroke (HCC)    aphasia- 10 years ago    Type II or unspecified type diabetes mellitus without mention of complication, not stated as uncontrolled    Vertigo    Visual impairment    glasses         Pain:  1/10  R shoulder (chronic from R shld fracture/reverse total shoulder) and R trunk        Subjective: \"It's hard for me to tell if the swelling has reduced.\"    Objective:    10/21/2024  Slight swelling R breast, slight induration from bra   Trunk Measurement:  15 cm inf to sternal notch: 126.6. (IE: 126.8 cm)  24 cm inf to sternal notch: 124.5 (IE: 129.4 cm)    10/16/2024:  Slight swelling R breast, slight induration from bra   Trunk Measurement:  15 cm inf to sternal notch: 126.9 (IE: 126.8 cm)  24 cm inf to sternal notch: 124.8 (IE: 129.4 cm)        9/26/2024 (Evaluation):  Sensation:  occasional numbness (pt reports R Carpal tunnel)      AROM/Strength:  Deferred- pt w/ limited ROM R shld, just completed therapy for reverse shoulder replacement        Edema/Tissue Observations:    Swelling R breast, slight pitting  Swelling R trunk (lateral to breast)    Swelling R shld (deltoid area)  Increased adipose B upper arm (lumpy, irregular)  R upper arm larger than L, but appears to be due to mostly increased adipose and muscular (recent reverse total shld replacement)  Decreased flexibility R pect  Marked tenderness R deltoid (due to recent reverse total shld replacement)           Trunk Measurement:  15 cm inf to sternal notch: 126.8 cm  24 cm inf to sternal notch: 129.4 cm       Stemmer's Sign: negative  Arm Volume Measurements:        9/26/2024     6:00 AM   Lymphedema Calculations   DATE MEASURED 9/26/2024   LOCATION/MEASUREMENTS RUE   PATIENT POSITION  supine   LIMB POSITION 75 degrees abd   STARTING POINT 18 cm from 3rd cuticle/at styloid   RIGHT HAND VOLUME 18.6 across palm   LEFT HAND VOLUME 18.5 across palm   MEASUREMENT A 15.5   MEASUREMENT B 16.3   MEASUREMENT C 19.3   MEASUREMENT D 22.6   MEASUREMENT E 24.7   MEASUREMENT F 24.5   MEASUREMENT G 27.7   MEASUREMENT H 32.8   MEASUREMENT I 39.6    TOTAL VOLUME  [id number]   DATE MEASURED 9/26/2024   LOCATION/MEASUREMENTS LUE   MEASUREMENT A 15.8   MEASUREMENT B 16.1   MEASUREMENT C 19.1   MEASUREMENT D 22.3   MEASUREMENT E 24.1   MEASUREMENT F 25.1   MEASUREMENT G 25.3   MEASUREMENT H 30.3   MEASUREMENT I 38.8    TOTAL VOLUME  1889.60137   % DIFFERENCE 7.05940                           Lymphedema Life Impact Scale: Evaluation Score 9/26/2024: LLIS Score Evaluation: 19 % impaired. (0% is no impairment, 100% total impairment)           Today’s Treatment and Response:   Date 9/26/2024 Date: 10/14/2024 Date: 10/16/2024 Date:10/21/2024 Date: * Date: *   Visit # 1/10  Certification From: 9/26/2024  To:12/25/2024     Visit # 2/10  Certification From: 9/26/2024  To:12/25/2024   ALLERGY TO EUCERIN (pt to  bring in her own lotion to use in clinic) Visit: #3/10  Certification From: 9/26/2024  To:12/25/2024   ALLERGY TO EUCERIN (pt to bring in her own lotion to use in clinic) Visit: #4/10  Certification From: 9/26/2024  To:12/25/2024   ALLERGY TO EUCERIN (pt to bring in her own lotion to use in clinic) Visit: #5/* Visit: #6/*   Manual Therapy (30 minutes)     MLD: neck sequence, deep breathing R inguinal, R A-I (supine and sidely), L axilla, A-A (Ant and post), R breast, R axilla, R shld/upper arm      Compression:  - pt w/ poor fitting bra (old, stretched out, no support), adjusted straps which did improve fit, discussed w/ pt she may need to purchase new bras         Manual therapy (40 minutes)      MLD: neck sequence, deep breathing R inguinal, R A-I (supine and sidely), L axilla, A-A (Ant and post), R breast, R axilla, R shld/upper arm (lotion used during session)  Pt encouraged to bring her own lotion d/t reported allergies.    Compression:  -may need better fitting bras.    Manual Therapy (40 min)    MLD: neck sequence, deep breathing R inguinal, R A-I (supine and sidely), L axilla, A-A (Ant and post), R breast, R axilla, R shld/upper arm (lotion used during session) - pt brought her own lotion        Initiated self MLD, given written handout. Discussed directing R breast towards left axilla best option for self MLD (due to R A-I difficult due to chronic shld pain/limited ROM and due to abd pannus)   Manual therapy (40 minutes)    MLD: neck sequence, deep breathing R inguinal, R A-I (supine and sidely), L axilla, A-A (Ant and post), R breast, R axilla, R shld/upper arm (lotion used during session) - pt brought her own lotion       There Ex (  minutes):            There Ex (5 minutes)  -PROM R shoulder in supine       ThereAct (10 min):         Self-Care:  Management/Education: Explained Lymphedema diagnosis; informed patient of lymphedema precautions and risk reduction practices, and importance of skin care.       There Act (5 minutes)    -discussed importance of use of lotion daily to chest/breast/trunk/axilla and arm.               Assessment: Swelling maintained.  Pt instructed on self MLD using A-A pathway only.  (Too difficult for patient to reach her lateral trunk for A-I pathway)       Goals (discussed and planned with patient involvement):      To be met in 10 visits:  1) Decrease swelling R breast/trunk by 2 cm to decrease risks of infection   2) Pt to be independent with self MLD, ROM exercises, and donning/doffing compression bandages/garments to facilitate swelling reduction and to be independent at self management once discharged           PLAN:  Progress towards self management  If swelling still reduced by next session consider decreasing to once a week      Treatment will include: Complete Decongestive Therapy: Manual Therapy, Self-Care/Home Management Training, Therapeutic Exercise, Neuromuscular Re-education, Orthotic Manageme         Charges: MM3    Total Timed Treatment: 45 min  Total Treatment Time: 45 min

## 2024-10-23 ENCOUNTER — APPOINTMENT (OUTPATIENT)
Dept: PHYSICAL THERAPY | Facility: HOSPITAL | Age: 69
End: 2024-10-23
Attending: INTERNAL MEDICINE
Payer: MEDICARE

## 2024-10-24 ENCOUNTER — IMMUNIZATION (OUTPATIENT)
Dept: LAB | Age: 69
End: 2024-10-24
Attending: EMERGENCY MEDICINE
Payer: MEDICARE

## 2024-10-24 DIAGNOSIS — Z23 NEED FOR VACCINATION: Primary | ICD-10-CM

## 2024-10-24 PROCEDURE — 90480 ADMN SARSCOV2 VAC 1/ONLY CMP: CPT

## 2024-10-25 ENCOUNTER — TELEPHONE (OUTPATIENT)
Dept: INTERNAL MEDICINE CLINIC | Facility: CLINIC | Age: 69
End: 2024-10-25

## 2024-10-25 NOTE — TELEPHONE ENCOUNTER
Patient is calling yesterday she had a Pfizer covid booster  Today her mouth is hurting the inside of her mouth is peeling and her tongue is sore    Patient has only had Moderna vaccines in the past    Is this a side affect of the booster?  What is recommended?

## 2024-10-25 NOTE — TELEPHONE ENCOUNTER
I spoke to patient and relayed Dr Murdock's recommendation to her.   She verbalized understanding and will go to immediate care today.

## 2024-10-25 NOTE — TELEPHONE ENCOUNTER
I spoke to Mi   Yesterday had pfizer covid booster, has only had moderna covid vaccines previously   Last night around 8pm says her \"mouth started peeling\"   Notices this to her lips and inside her mouth. Denies that it is dry, but that it is peeling.   Also says now her tongue feels rough and she is starting to have a slight sore throat.    She denies any swelling to her face lips or tongue.  Denies rash to her body.  Denies trouble breathing.    She is wondering if this is a side effect of the vaccine?      To Dr Murdock on call to please review and advise---

## 2024-10-28 ENCOUNTER — OFFICE VISIT (OUTPATIENT)
Dept: PHYSICAL THERAPY | Facility: HOSPITAL | Age: 69
End: 2024-10-28
Attending: INTERNAL MEDICINE
Payer: MEDICARE

## 2024-10-28 PROCEDURE — 97140 MANUAL THERAPY 1/> REGIONS: CPT

## 2024-10-28 RX ORDER — POTASSIUM CHLORIDE 750 MG/1
40 TABLET, FILM COATED, EXTENDED RELEASE ORAL 2 TIMES DAILY
Qty: 720 TABLET | Refills: 3 | Status: SHIPPED | OUTPATIENT
Start: 2024-10-28

## 2024-10-28 NOTE — TELEPHONE ENCOUNTER
Refill request is for a maintenance medication and has met the criteria specified in the Ambulatory Medication Refill Standing Order for eligibility, visits, laboratory, alerts and was sent to the requested pharmacy.    Requested Prescriptions     Signed Prescriptions Disp Refills    KLOR-CON 10 10 MEQ Oral Tab  tablet 3     Sig: TAKE 4 TABLET BY MOUTH TWICE DAILY     Authorizing Provider: ANDRES TINOCO     Ordering User: YASMEEN ULLOA

## 2024-10-28 NOTE — PROGRESS NOTES
Physical Therapy Lymphedema Daily Note          Precautions:  R reverse shld replacement, breast cancer   Education or treatment limitation: limited R shld ROM, large pannus compressing B inguinals  Rehab Potential:good  Allergies: EUCERIN LOTION  Past Medical History:    Acute, but ill-defined, cerebrovascular disease    Adenomatous colon polyp    Anxiety    Anxiety state    Arthritis    Asthma (HCC)    Breast CA (HCC)    Cellulitis and abscess of leg    Closed displaced fracture of surgical neck of right humerus, unspecified fracture morphology, initial encounter    CVA (cerebral infarction)    Depression    Disorder of thyroid    Esophageal reflux    Essential hypertension    Exposure to medical diagnostic radiation    Extrinsic asthma, unspecified    High blood pressure    High cholesterol    Hypothyroidism    Liver cirrhosis (HCC)    cirrhosis    Migraines    Neuropathy    Obesity    Obesity (BMI 30-39.9)    Osteoarthritis    Other and unspecified hyperlipidemia    PFO (patent foramen ovale) (HCC)    PONV (postoperative nausea and vomiting)    Shingles    March 2020    Stroke (HCC)    aphasia- 10 years ago    Type II or unspecified type diabetes mellitus without mention of complication, not stated as uncontrolled    Vertigo    Visual impairment    glasses         Pain:  1/10  R shoulder (chronic from R shld fracture/reverse total shoulder) and R trunk        Subjective: Pt reports that she had the flu shot and hasn't been feeling great over the weekend.    Objective:    10/21/2024  Slight swelling R breast, slight induration from bra   Trunk Measurement:  15 cm inf to sternal notch: 126.6. (IE: 126.8 cm)  24 cm inf to sternal notch: 124.5 (IE: 129.4 cm)    10/16/2024:  Slight swelling R breast, slight induration from bra   Trunk Measurement:  15 cm inf to sternal notch: 126.9 (IE: 126.8 cm)  24 cm inf to sternal notch: 124.8 (IE: 129.4 cm)        9/26/2024 (Evaluation):  Sensation:  occasional numbness (pt  reports R Carpal tunnel)     AROM/Strength:  Deferred- pt w/ limited ROM R shld, just completed therapy for reverse shoulder replacement        Edema/Tissue Observations:    Swelling R breast, slight pitting  Swelling R trunk (lateral to breast)    Swelling R shld (deltoid area)  Increased adipose B upper arm (lumpy, irregular)  R upper arm larger than L, but appears to be due to mostly increased adipose and muscular (recent reverse total shld replacement)  Decreased flexibility R pect  Marked tenderness R deltoid (due to recent reverse total shld replacement)           Trunk Measurement:  15 cm inf to sternal notch: 126.8 cm  24 cm inf to sternal notch: 129.4 cm       Stemmer's Sign: negative  Arm Volume Measurements:        9/26/2024     6:00 AM   Lymphedema Calculations   DATE MEASURED 9/26/2024   LOCATION/MEASUREMENTS RUE   PATIENT POSITION  supine   LIMB POSITION 75 degrees abd   STARTING POINT 18 cm from 3rd cuticle/at styloid   RIGHT HAND VOLUME 18.6 across palm   LEFT HAND VOLUME 18.5 across palm   MEASUREMENT A 15.5   MEASUREMENT B 16.3   MEASUREMENT C 19.3   MEASUREMENT D 22.6   MEASUREMENT E 24.7   MEASUREMENT F 24.5   MEASUREMENT G 27.7   MEASUREMENT H 32.8   MEASUREMENT I 39.6    TOTAL VOLUME  2024.08481   DATE MEASURED 9/26/2024   LOCATION/MEASUREMENTS LUE   MEASUREMENT A 15.8   MEASUREMENT B 16.1   MEASUREMENT C 19.1   MEASUREMENT D 22.3   MEASUREMENT E 24.1   MEASUREMENT F 25.1   MEASUREMENT G 25.3   MEASUREMENT H 30.3   MEASUREMENT I 38.8    TOTAL VOLUME  1889.62644   % DIFFERENCE 7.96263                           Lymphedema Life Impact Scale: Evaluation Score 9/26/2024: LLIS Score Evaluation: 19 % impaired. (0% is no impairment, 100% total impairment)           Today’s Treatment and Response:   Date 9/26/2024 Date: 10/14/2024 Date: 10/16/2024 Date:10/21/2024 Date: 10/28/2024 Date: *   Visit # 1/10  Certification From: 9/26/2024  To:12/25/2024     Visit # 2/10  Certification From: 9/26/2024   To:12/25/2024   ALLERGY TO EUCERIN (pt to bring in her own lotion to use in clinic) Visit: #3/10  Certification From: 9/26/2024  To:12/25/2024   ALLERGY TO EUCERIN (pt to bring in her own lotion to use in clinic) Visit: #4/10  Certification From: 9/26/2024  To:12/25/2024   ALLERGY TO EUCERIN (pt to bring in her own lotion to use in clinic) Visit: #5/10  Certification From: 9/26/2024  To:12/25/2024   ALLERGY TO EUCERIN (pt to bring in her own lotion to use in clinic) Visit: #6/*   Manual Therapy (30 minutes)     MLD: neck sequence, deep breathing R inguinal, R A-I (supine and sidely), L axilla, A-A (Ant and post), R breast, R axilla, R shld/upper arm      Compression:  - pt w/ poor fitting bra (old, stretched out, no support), adjusted straps which did improve fit, discussed w/ pt she may need to purchase new bras         Manual therapy (40 minutes)      MLD: neck sequence, deep breathing R inguinal, R A-I (supine and sidely), L axilla, A-A (Ant and post), R breast, R axilla, R shld/upper arm (lotion used during session)  Pt encouraged to bring her own lotion d/t reported allergies.    Compression:  -may need better fitting bras.    Manual Therapy (40 min)    MLD: neck sequence, deep breathing R inguinal, R A-I (supine and sidely), L axilla, A-A (Ant and post), R breast, R axilla, R shld/upper arm (lotion used during session) - pt brought her own lotion        Initiated self MLD, given written handout. Discussed directing R breast towards left axilla best option for self MLD (due to R A-I difficult due to chronic shld pain/limited ROM and due to abd pannus)   Manual therapy (40 minutes)    MLD: neck sequence, deep breathing R inguinal, R A-I (supine and sidely), L axilla, A-A (Ant and post), R breast, R axilla, R shld/upper arm (lotion used during session) - pt brought her own lotion  Manual therapy (43 minutes)      MLD: neck sequence, deep breathing R inguinal, R A-I (supine and sidely), L axilla, A-A (Ant and  post), R breast, R axilla, R shld/upper arm (lotion used during session) - pt brought her own lotion     There Ex (  minutes):            There Ex (5 minutes)  -PROM R shoulder in supine  There Ex (5 minutes)  -PROM R shoulder in supine     ThereAct (10 min):         Self-Care:  Management/Education: Explained Lymphedema diagnosis; informed patient of lymphedema precautions and risk reduction practices, and importance of skin care.      There Act (5 minutes)    -discussed importance of use of lotion daily to chest/breast/trunk/axilla and arm.               Assessment: Will add 1-2 more sessions leading up to pt's trip to [place].     Goals (discussed and planned with patient involvement):      To be met in 10 visits:  1) Decrease swelling R breast/trunk by 2 cm to decrease risks of infection   2) Pt to be independent with self MLD, ROM exercises, and donning/doffing compression bandages/garments to facilitate swelling reduction and to be independent at self management once discharged           PLAN:  Progress towards self management  If swelling still reduced by next session consider decreasing to once a week      Treatment will include: Complete Decongestive Therapy: Manual Therapy, Self-Care/Home Management Training, Therapeutic Exercise, Neuromuscular Re-education, Orthotic Manageme         Charges: MM3    Total Timed Treatment: 45 min  Total Treatment Time: 45 min

## 2024-10-29 NOTE — TELEPHONE ENCOUNTER
Per chart review, no [facility] record.    Left message to call back. [employer] message also sent.

## 2024-10-30 ENCOUNTER — APPOINTMENT (OUTPATIENT)
Dept: PHYSICAL THERAPY | Facility: HOSPITAL | Age: 69
End: 2024-10-30
Attending: INTERNAL MEDICINE
Payer: MEDICARE

## 2024-10-31 RX ORDER — IMIPRAMINE HYDROCHLORIDE 50 MG/1
TABLET, FILM COATED ORAL
Qty: 270 TABLET | Refills: 3 | OUTPATIENT
Start: 2024-10-31

## 2024-10-31 NOTE — TELEPHONE ENCOUNTER
Current refill request refused due to refill is either a duplicate request or has active refills at the pharmacy.  Check previous templates.    Requested Prescriptions     Refused Prescriptions Disp Refills    IMIPRAMINE 50 MG Oral Tab [Pharmacy Med Name: IMIPRAMINE 50MG TABLETS] 270 tablet 3     Sig: TAKE 3 TABLETS BY MOUTH EVERY NIGHT     Refused By: JASON PAREDES     Reason for Refusal: Patient has requested refill too soon      Sent 2/5/2024 with enough refills for one year, too early to refill now

## 2024-11-04 ENCOUNTER — OFFICE VISIT (OUTPATIENT)
Dept: PHYSICAL THERAPY | Facility: HOSPITAL | Age: 69
End: 2024-11-04
Attending: INTERNAL MEDICINE
Payer: MEDICARE

## 2024-11-04 PROCEDURE — 97140 MANUAL THERAPY 1/> REGIONS: CPT | Performed by: PHYSICAL THERAPIST

## 2024-11-04 NOTE — PROGRESS NOTES
Progress Summary    Pt has attended 6 visits in Physical Therapy.       Assessment: Pt w/ good reduction of R breast swelling. Pt w/ chronic pain and occasional swelling of RUE but this appears to be related to her past shoulder issues (reverse shoulder replacement), not lymphedema.      Plan:  Discharge from PT         Lymphedema Life Impact Scale: Score 11/4/2024: LLIS Score Current: 5 % impaired. (0% is no impairment, 100% total impairment)      Patient/Family/Caregiver was advised of these findings, precautions, and treatment options and has agreed to actively participate in planning and for this course of care.            Physical Therapy Lymphedema Daily Note          Precautions:  R reverse shld replacement, breast cancer   Education or treatment limitation: limited R shld ROM, large pannus compressing B inguinals  Rehab Potential:good  Allergies: EUCERIN LOTION  Past Medical History:    Acute, but ill-defined, cerebrovascular disease    Adenomatous colon polyp    Anxiety    Anxiety state    Arthritis    Asthma (HCC)    Breast CA (HCC)    Cellulitis and abscess of leg    Closed displaced fracture of surgical neck of right humerus, unspecified fracture morphology, initial encounter    CVA (cerebral infarction)    Depression    Disorder of thyroid    Esophageal reflux    Essential hypertension    Exposure to medical diagnostic radiation    Extrinsic asthma, unspecified    High blood pressure    High cholesterol    Hypothyroidism    Liver cirrhosis (HCC)    cirrhosis    Migraines    Neuropathy    Obesity    Obesity (BMI 30-39.9)    Osteoarthritis    Other and unspecified hyperlipidemia    PFO (patent foramen ovale) (HCC)    PONV (postoperative nausea and vomiting)    Shingles    March 2020    Stroke (HCC)    aphasia- 10 years ago    Type II or unspecified type diabetes mellitus without mention of complication, not stated as uncontrolled    Vertigo    Visual impairment    glasses         Pain:  1/10  R shoulder  (chronic from R shld fracture/reverse total shoulder) and R trunk        Subjective: \"I'm fine\"     Objective:  11/4/2024:  No arm swelling noted, no breast swelling noted   Trunk Measurement:  15 cm inf to sternal notch: 126.4 (IE: 126.8 cm)  24 cm inf to sternal notch: 124.4 (IE: 129.4 cm)    10/21/2024  Slight swelling R breast, slight induration from bra   Trunk Measurement:  15 cm inf to sternal notch: 126.6. (IE: 126.8 cm)  24 cm inf to sternal notch: 124.5 (IE: 129.4 cm)    10/16/2024:  Slight swelling R breast, slight induration from bra   Trunk Measurement:  15 cm inf to sternal notch: 126.9 (IE: 126.8 cm)  24 cm inf to sternal notch: 124.8 (IE: 129.4 cm)        9/26/2024 (Evaluation):  Sensation:  occasional numbness (pt reports R Carpal tunnel)     AROM/Strength:  Deferred- pt w/ limited ROM R shld, just completed therapy for reverse shoulder replacement        Edema/Tissue Observations:    Swelling R breast, slight pitting  Swelling R trunk (lateral to breast)    Swelling R shld (deltoid area)  Increased adipose B upper arm (lumpy, irregular)  R upper arm larger than L, but appears to be due to mostly increased adipose and muscular (recent reverse total shld replacement)  Decreased flexibility R pect  Marked tenderness R deltoid (due to recent reverse total shld replacement)           Trunk Measurement:  15 cm inf to sternal notch: 126.8 cm  24 cm inf to sternal notch: 129.4 cm       Stemmer's Sign: negative  Arm Volume Measurements:        9/26/2024     6:00 AM   Lymphedema Calculations   DATE MEASURED 9/26/2024   LOCATION/MEASUREMENTS RUE   PATIENT POSITION  supine   LIMB POSITION 75 degrees abd   STARTING POINT 18 cm from 3rd cuticle/at styloid   RIGHT HAND VOLUME 18.6 across palm   LEFT HAND VOLUME 18.5 across palm   MEASUREMENT A 15.5   MEASUREMENT B 16.3   MEASUREMENT C 19.3   MEASUREMENT D 22.6   MEASUREMENT E 24.7   MEASUREMENT F 24.5   MEASUREMENT G 27.7   MEASUREMENT H 32.8   MEASUREMENT I 39.6     TOTAL VOLUME  [id number]   DATE MEASURED 9/26/2024   LOCATION/MEASUREMENTS LUE   MEASUREMENT A 15.8   MEASUREMENT B 16.1   MEASUREMENT C 19.1   MEASUREMENT D 22.3   MEASUREMENT E 24.1   MEASUREMENT F 25.1   MEASUREMENT G 25.3   MEASUREMENT H 30.3   MEASUREMENT I 38.8    TOTAL VOLUME  1889.63752   % DIFFERENCE 7.55781                           Lymphedema Life Impact Scale: Evaluation Score 9/26/2024: LLIS Score Evaluation: 19 % impaired. (0% is no impairment, 100% total impairment)           Today’s Treatment and Response:   Date 9/26/2024 Date: 10/14/2024 Date: 10/16/2024 Date:10/21/2024 Date: 10/28/2024 Date: 11/4/2024   Visit # 1/10  Certification From: 9/26/2024  To:12/25/2024     Visit # 2/10  Certification From: 9/26/2024  To:12/25/2024   ALLERGY TO EUCERIN (pt to bring in her own lotion to use in clinic) Visit: #3/10  Certification From: 9/26/2024  To:12/25/2024   ALLERGY TO EUCERIN (pt to bring in her own lotion to use in clinic) Visit: #4/10  Certification From: 9/26/2024  To:12/25/2024   ALLERGY TO EUCERIN (pt to bring in her own lotion to use in clinic) Visit: #5/10  Certification From: 9/26/2024  To:12/25/2024   ALLERGY TO EUCERIN (pt to bring in her own lotion to use in clinic) Visit: #6/10  Certification From: 9/26/2024  To:12/25/2024   ALLERGY TO EUCERIN (pt to bring in her own lotion to use in clinic)   Manual Therapy (30 minutes)     MLD: neck sequence, deep breathing R inguinal, R A-I (supine and sidely), L axilla, A-A (Ant and post), R breast, R axilla, R shld/upper arm      Compression:  - pt w/ poor fitting bra (old, stretched out, no support), adjusted straps which did improve fit, discussed w/ pt she may need to purchase new bras         Manual therapy (40 minutes)      MLD: neck sequence, deep breathing R inguinal, R A-I (supine and sidely), L axilla, A-A (Ant and post), R breast, R axilla, R shld/upper arm (lotion used during session)  Pt encouraged to bring her own lotion d/t reported  allergies.    Compression:  -may need better fitting bras.    Manual Therapy (40 min)    MLD: neck sequence, deep breathing R inguinal, R A-I (supine and sidely), L axilla, A-A (Ant and post), R breast, R axilla, R shld/upper arm (lotion used during session) - pt brought her own lotion        Initiated self MLD, given written handout. Discussed directing R breast towards left axilla best option for self MLD (due to R A-I difficult due to chronic shld pain/limited ROM and due to abd pannus)   Manual therapy (40 minutes)    MLD: neck sequence, deep breathing R inguinal, R A-I (supine and sidely), L axilla, A-A (Ant and post), R breast, R axilla, R shld/upper arm (lotion used during session) - pt brought her own lotion  Manual therapy (43 minutes)      MLD: neck sequence, deep breathing R inguinal, R A-I (supine and sidely), L axilla, A-A (Ant and post), R breast, R axilla, R shld/upper arm (lotion used during session) - pt brought her own lotion  Manual Therapy (35 min)    Trunk measurements    Reviewed self MLD    STM R upper arm followed by MLD    There Ex (  minutes):            There Ex (5 minutes)  -PROM R shoulder in supine  There Ex (5 minutes)  -PROM R shoulder in supine     ThereAct (10 min):         Self-Care:  Management/Education: Explained Lymphedema diagnosis; informed patient of lymphedema precautions and risk reduction practices, and importance of skin care.      There Act (5 minutes)    -discussed importance of use of lotion daily to chest/breast/trunk/axilla and arm.                    Goals (discussed and planned with patient involvement):      To be met in 10 visits:  1) Decrease swelling R breast/trunk by 2 cm to decrease risks of infection -MET  2) Pt to be independent with self MLD, ROM exercises, and donning/doffing compression bandages/garments to facilitate swelling reduction and to be independent at self management once discharged- MET         PLAN:  Discharge from PT         Charges: MM2     Total Timed Treatment:  35  min  Total Treatment Time: 35 min

## 2024-11-13 ENCOUNTER — APPOINTMENT (OUTPATIENT)
Dept: PHYSICAL THERAPY | Facility: HOSPITAL | Age: 69
End: 2024-11-13
Attending: INTERNAL MEDICINE
Payer: MEDICARE

## 2024-11-25 RX ORDER — PRAVASTATIN SODIUM 40 MG
40 TABLET ORAL NIGHTLY
Qty: 90 TABLET | Refills: 3 | Status: SHIPPED | OUTPATIENT
Start: 2024-11-25

## 2024-11-25 NOTE — TELEPHONE ENCOUNTER
Refill request is for a maintenance medication and has met the criteria specified in the Ambulatory Medication Refill Standing Order for eligibility, visits, laboratory, alerts and was sent to the requested pharmacy.    Requested Prescriptions     Signed Prescriptions Disp Refills    PRAVASTATIN 40 MG Oral Tab 90 tablet 3     Sig: TAKE 1 TABLET(40 MG) BY MOUTH EVERY NIGHT     Authorizing Provider: ANDRES TINOCO     Ordering User: EDITH FELIX

## 2024-11-27 ENCOUNTER — TELEPHONE (OUTPATIENT)
Dept: INTERNAL MEDICINE CLINIC | Facility: CLINIC | Age: 69
End: 2024-11-27

## 2024-11-27 ENCOUNTER — OFFICE VISIT (OUTPATIENT)
Dept: INTERNAL MEDICINE CLINIC | Facility: CLINIC | Age: 69
End: 2024-11-27

## 2024-11-27 VITALS
TEMPERATURE: 98 F | WEIGHT: 218.81 LBS | OXYGEN SATURATION: 100 % | HEART RATE: 73 BPM | DIASTOLIC BLOOD PRESSURE: 74 MMHG | SYSTOLIC BLOOD PRESSURE: 126 MMHG | BODY MASS INDEX: 38.77 KG/M2 | HEIGHT: 63 IN

## 2024-11-27 DIAGNOSIS — G43.809 OTHER MIGRAINE WITHOUT STATUS MIGRAINOSUS, NOT INTRACTABLE: Primary | ICD-10-CM

## 2024-11-27 DIAGNOSIS — J06.9 VIRAL URI WITH COUGH: ICD-10-CM

## 2024-11-27 PROCEDURE — 99213 OFFICE O/P EST LOW 20 MIN: CPT | Performed by: INTERNAL MEDICINE

## 2024-11-27 RX ORDER — PREDNISONE 20 MG/1
40 TABLET ORAL DAILY
Qty: 10 TABLET | Refills: 0 | Status: SHIPPED | OUTPATIENT
Start: 2024-11-27 | End: 2024-12-02

## 2024-11-27 RX ORDER — BENZONATATE 100 MG/1
100 CAPSULE ORAL 3 TIMES DAILY PRN
Qty: 30 CAPSULE | Refills: 1 | Status: SHIPPED | OUTPATIENT
Start: 2024-11-27

## 2024-11-27 RX ORDER — BUTALBITAL, ACETAMINOPHEN AND CAFFEINE 50; 325; 40 MG/1; MG/1; MG/1
1 TABLET ORAL EVERY 6 HOURS PRN
Qty: 30 TABLET | Refills: 3 | Status: SHIPPED | OUTPATIENT
Start: 2024-11-27

## 2024-11-27 NOTE — TELEPHONE ENCOUNTER
Patient called and states she came by from [place] 10 days ago and has been sick ever since.  Patient states she is congested, coughing up yellow phlegm and having sinus issues.  Patient made a sick visit appointment with   Dr Young for today at 2:00pm.  Patient was advise to take a Covid test and to call back with the results.

## 2024-11-27 NOTE — TELEPHONE ENCOUNTER
Left message to call back.   Need to be triaged - will take covid test - scheduled for 2 pm with

## 2024-11-27 NOTE — PROGRESS NOTES
Teresa Wilson is a 69 year old female.  Chief Complaint   Patient presents with    Nasal Congestion     Sinus and chest congestion for past 12 days since [place]. Ears plug, runny nose, and cough. Head pain 5/10     HPI:     Sick x 12 days.  Went to [place] and stayed with family and their kids who were sick.  Then went to [place] and got sick 2 days after she got to [place].   Severe cough with phlegm (green), nasal congestion.  Now with headache - took some old fioricet which helped      Current Outpatient Medications   Medication Sig Dispense Refill    PRAVASTATIN 40 MG Oral Tab TAKE 1 TABLET(40 MG) BY MOUTH EVERY NIGHT 90 tablet 3    KLOR-CON 10 10 MEQ Oral Tab CR TAKE 4 TABLET BY MOUTH TWICE DAILY 720 tablet 3    letrozole 2.5 MG Oral Tab Take 1 tablet (2.5 mg total) by mouth nightly. 90 tablet 3    CLOPIDOGREL 75 MG Oral Tab TAKE 1 TABLET(75 MG) BY MOUTH DAILY 90 tablet 3    furosemide 20 MG Oral Tab Take 1 tablet (20 mg total) by mouth 2 (two) times daily. 180 tablet 3    carvedilol 3.125 MG Oral Tab Take 1 tablet (3.125 mg total) by mouth 2 (two) times daily.      doxycycline 100 MG Oral Cap Take 1 capsule (100 mg total) by mouth Q12H. (Patient not taking: Reported on 11/27/2024)      LOSARTAN 25 MG Oral Tab TAKE 1/2 TABLET BY MOUTH EVERY DAY 45 tablet 3    ERGOCALCIFEROL 1.25 MG (30869 UT) Oral Cap TAKE 1 CAPSULE BY MOUTH 1 TIME A WEEK 13 capsule 3    IMIPRAMINE 50 MG Oral Tab TAKE 3 TABLETS BY MOUTH EVERY NIGHT 270 tablet 3    FAMOTIDINE 40 MG Oral Tab TAKE 1 TABLET(40 MG) BY MOUTH TWICE DAILY AS NEEDED FOR HEARTBURN 180 tablet 3    Naloxone HCl 4 MG/0.1ML Nasal Liquid 4 mg as needed.      traMADol 50 MG Oral Tab Take 1 tablet (50 mg total) by mouth every 8 (eight) hours as needed.      polyethylene glycol, PEG 3350, 17 g Oral Powd Pack Take 17 g by mouth daily as needed. 30 each 0    Continuous Blood Gluc Sensor (FREESTYLE MURALI 2 SENSOR) Does not apply Misc 1 Device every 14 (fourteen) days.       Insulin Disposable Pump (OMNIPOD DASH PODS, GEN 4,) Does not apply Misc use 1 pod      HUMULIN R U-500 KWIKPEN 500 UNIT/ML Subcutaneous Solution Pen-injector Pt with Insulin pump. Pt was instructed to verify with Endocrinogist if she needs to adjust dose a day before surgery. Pt was instructed no insulin os DOS and removed CGM and Insulin pump per Anesthesia protocol.      ALBUTEROL 108 (90 Base) MCG/ACT Inhalation Aero Soln INHALE 2 PUFFS INTO THE LUNGS EVERY 6 HOURS AS NEEDED FOR WHEEZING 3 each 3    Insulin Disposable Pump (OMNIPOD DASH PODS, GEN 4,) Does not apply Misc USE 1 POD UNDER THE SKIN EVERY 2.5 DAYS      Continuous Blood Gluc Sensor (FREESTYLE MURALI 2 SENSOR SYSTM) Does not apply Misc       BD PEN NEEDLE SKY 2ND GEN 32G X 4 MM Does not apply Misc USE AS DIRECTED 200 each 3    ondansetron (ZOFRAN ODT) 8 MG Oral Tablet Dispersible Take 1 tablet (8 mg total) by mouth every 6 (six) hours as needed for Nausea. Place on top of the tongue where it will dissolve, then swallow 30 tablet 2    BUTALBITAL-APAP-CAFFEINE -40 MG Oral Tab TAKE 1 TABLET BY MOUTH EVERY 4 HOURS AS NEEDED. DO NOT EXCEED 6 TABLETS IN 24 HOURS 30 tablet 3    Blood Glucose Monitoring Suppl (ONETOUCH VERIO) w/Device Does not apply Kit       Cyanocobalamin (VITAMIN B 12 OR) Take 500 mcg by mouth every morning.      Glucose Blood In Vitro Strip Use 1 strip by percutaneous route 4-6 times every day 200 each 0    Levothyroxine Sodium 88 MCG Oral Tab Take 1 tablet (88 mcg total) by mouth every morning before breakfast. 30 tablet 11    Magnesium Oxide 400 (240 Mg) MG Oral Tab Take 1 tablet (400 mg total) by mouth 2 (two) times daily. 30 tablet 0      Past Medical History:    Acute, but ill-defined, cerebrovascular disease    Adenomatous colon polyp    Anxiety    Anxiety state    Arthritis    Asthma (HCC)    Breast CA (HCC)    Cellulitis and abscess of leg    Closed displaced fracture of surgical neck of right humerus, unspecified fracture  morphology, initial encounter    CVA (cerebral infarction)    Depression    Disorder of thyroid    Esophageal reflux    Essential hypertension    Exposure to medical diagnostic radiation    Extrinsic asthma, unspecified    High blood pressure    High cholesterol    Hypothyroidism    Liver cirrhosis (HCC)    cirrhosis    Migraines    Neuropathy    Obesity    Obesity (BMI 30-39.9)    Osteoarthritis    Other and unspecified hyperlipidemia    PFO (patent foramen ovale) (HCC)    PONV (postoperative nausea and vomiting)    Shingles    March 2020    Stroke (HCC)    aphasia- 10 years ago    Type II or unspecified type diabetes mellitus without mention of complication, not stated as uncontrolled    Vertigo    Visual impairment    glasses      Social History:  Social History     Socioeconomic History    Marital status:    Tobacco Use    Smoking status: Never     Passive exposure: Never    Smokeless tobacco: Never   Vaping Use    Vaping status: Never Used   Substance and Sexual Activity    Alcohol use: Yes     Comment: socially    Drug use: No   Other Topics Concern    Caffeine Concern Yes     Comment: soda    Reaction to local anesthetic No     Social Drivers of Health     Financial Resource Strain: Low Risk  (8/14/2023)    Financial Resource Strain     Difficulty of Paying Living Expenses: Not very hard     Med Affordability: No   Transportation Needs: No Transportation Needs (8/14/2023)    Transportation Needs     Lack of Transportation: No    [facility]    Housing Stability        REVIEW OF SYSTEMS:   GENERAL HEALTH: feels well otherwise  RESPIRATORY: no SOB  CARDIOVASCULAR: no chest pain/pressure  GI: no nausea, vomiting, diarrhea    Wt Readings from Last 5 Encounters:   11/27/24 218 lb 12.8 oz (99.2 kg)   10/08/24 219 lb (99.3 kg)   08/06/24 216 lb (98 kg)   05/02/24 214 lb 9.6 oz (97.3 kg)   02/06/24 217 lb (98.4 kg)     Body mass index is 38.76 kg/m².      EXAM:   /74    Pulse 73   Temp 98.2 °F (36.8 °C)   Ht 5' 3\" (1.6 m)   Wt 218 lb 12.8 oz (99.2 kg)   SpO2 100%   BMI 38.76 kg/m²   GENERAL: well developed, well nourished, barking wet cough    NECK: supple, no adenopathy  LUNGS: clear to auscultation, no crackles or wheezing  CARDIO: RRR, normal S1S2, without murmur or gallop      ASSESSMENT AND PLAN:     Viral URI w/cough  -prednisone 40mg/day x 5d  -benzonatate 100mg TID prn  -mucinex DM, flonase, push fluids  -pt to call if not feeling better by next week - would consider CXR    Migraine  -gets rarely, but recent HA related to cough  -requested RF on fioricet - RF sent    The patient indicates understanding of these issues and agrees to the plan.    Erica Young MD, 11/27/24, 1:59 PM

## 2024-11-27 NOTE — TELEPHONE ENCOUNTER
Patient is returning nurse call.  Patient states she is COVID negative via home test.    Please call and advise

## 2024-12-03 NOTE — TELEPHONE ENCOUNTER
Patient called to follow up per Dr. Young's recommendation if she did not feel any better since 11/27/24 appointment.     Patient's symptoms today:  Cough with thick green phlegm  Head is congested with pain and pressure  Appetite is moderate  Fatigue    No Fever, No Body aches, No chills,     Patient completed course of Prednisone     Patient took home Covid test yesterday and it was negative.     Patient would like to know if she can get a chest Xray, get RSV test Dr. Young discussed and maybe start an antibiotic.     Patient #[phone]

## 2024-12-03 NOTE — TELEPHONE ENCOUNTER
Spoke to patient, relayed MD message. Verbalized understanding. Medication sent to patient pharmacy per MD written order

## 2024-12-18 ENCOUNTER — HOSPITAL ENCOUNTER (OUTPATIENT)
Age: 69
Discharge: ANOTHER HOSPITAL | End: 2024-12-18
Payer: MEDICARE

## 2024-12-18 ENCOUNTER — APPOINTMENT (OUTPATIENT)
Dept: ULTRASOUND IMAGING | Facility: HOSPITAL | Age: 69
End: 2024-12-18
Payer: MEDICARE

## 2024-12-18 ENCOUNTER — HOSPITAL ENCOUNTER (OUTPATIENT)
Dept: GENERAL RADIOLOGY | Age: 69
Discharge: HOME | End: 2024-12-18
Attending: INTERNAL MEDICINE
Payer: MEDICARE

## 2024-12-18 ENCOUNTER — HOSPITAL ENCOUNTER (EMERGENCY)
Facility: HOSPITAL | Age: 69
Discharge: HOME | End: 2024-12-19
Attending: EMERGENCY MEDICINE
Payer: MEDICARE

## 2024-12-18 ENCOUNTER — TELEPHONE (OUTPATIENT)
Dept: INTERNAL MEDICINE CLINIC | Facility: CLINIC | Age: 69
End: 2024-12-18

## 2024-12-18 VITALS
OXYGEN SATURATION: 96 % | RESPIRATION RATE: 18 BRPM | HEART RATE: 103 BPM | DIASTOLIC BLOOD PRESSURE: 50 MMHG | TEMPERATURE: 98 F | SYSTOLIC BLOOD PRESSURE: 123 MMHG

## 2024-12-18 DIAGNOSIS — R22.42 LOCALIZED SWELLING OF LEFT LOWER EXTREMITY: Primary | ICD-10-CM

## 2024-12-18 DIAGNOSIS — R05.2 SUBACUTE COUGH: ICD-10-CM

## 2024-12-18 DIAGNOSIS — E11.65 UNCONTROLLED TYPE 2 DIABETES MELLITUS WITH HYPERGLYCEMIA (HCC): ICD-10-CM

## 2024-12-18 DIAGNOSIS — L03.116 CELLULITIS OF LEFT LOWER EXTREMITY: Primary | ICD-10-CM

## 2024-12-18 DIAGNOSIS — J22 ACUTE RESPIRATORY INFECTION: ICD-10-CM

## 2024-12-18 DIAGNOSIS — R05.2 SUBACUTE COUGH: Primary | ICD-10-CM

## 2024-12-18 LAB
BASOPHILS # BLD AUTO: 0.02 X10(3) UL (ref 0–0.2)
BASOPHILS NFR BLD AUTO: 0.4 %
DEPRECATED RDW RBC AUTO: 46.1 FL (ref 35.1–46.3)
EOSINOPHIL # BLD AUTO: 0.21 X10(3) UL (ref 0–0.7)
EOSINOPHIL NFR BLD AUTO: 3.7 %
ERYTHROCYTE [DISTWIDTH] IN BLOOD BY AUTOMATED COUNT: 14 % (ref 11–15)
HCT VFR BLD AUTO: 42.3 %
HGB BLD-MCNC: 14.4 G/DL
IMM GRANULOCYTES # BLD AUTO: 0.02 X10(3) UL (ref 0–1)
IMM GRANULOCYTES NFR BLD: 0.4 %
LYMPHOCYTES # BLD AUTO: 1.17 X10(3) UL (ref 1–4)
LYMPHOCYTES NFR BLD AUTO: 20.7 %
MCH RBC QN AUTO: 30.8 PG (ref 26–34)
MCHC RBC AUTO-ENTMCNC: 34 G/DL (ref 31–37)
MCV RBC AUTO: 90.6 FL
MONOCYTES # BLD AUTO: 0.59 X10(3) UL (ref 0.1–1)
MONOCYTES NFR BLD AUTO: 10.4 %
NEUTROPHILS # BLD AUTO: 3.65 X10 (3) UL (ref 1.5–7.7)
NEUTROPHILS # BLD AUTO: 3.65 X10(3) UL (ref 1.5–7.7)
NEUTROPHILS NFR BLD AUTO: 64.4 %
PLATELET # BLD AUTO: 127 10(3)UL (ref 150–450)
RBC # BLD AUTO: 4.67 X10(6)UL
WBC # BLD AUTO: 5.7 X10(3) UL (ref 4–11)

## 2024-12-18 PROCEDURE — 71046 X-RAY EXAM CHEST 2 VIEWS: CPT | Performed by: INTERNAL MEDICINE

## 2024-12-18 PROCEDURE — 85025 COMPLETE CBC W/AUTO DIFF WBC: CPT | Performed by: EMERGENCY MEDICINE

## 2024-12-18 PROCEDURE — 36415 COLL VENOUS BLD VENIPUNCTURE: CPT

## 2024-12-18 PROCEDURE — 99215 OFFICE O/P EST HI 40 MIN: CPT | Performed by: NURSE PRACTITIONER

## 2024-12-18 PROCEDURE — 93971 EXTREMITY STUDY: CPT | Performed by: EMERGENCY MEDICINE

## 2024-12-18 PROCEDURE — 99285 EMERGENCY DEPT VISIT HI MDM: CPT

## 2024-12-18 PROCEDURE — 80053 COMPREHEN METABOLIC PANEL: CPT | Performed by: EMERGENCY MEDICINE

## 2024-12-18 PROCEDURE — 99284 EMERGENCY DEPT VISIT MOD MDM: CPT

## 2024-12-18 NOTE — TELEPHONE ENCOUNTER
To Dr Wells,    Triaged as below.    Advised evaluation at the  as patient is concerned for RSV and wants a chest x-ray.    Patient declined and stated wants to know what you advise    Has completed prior course of antibiotic and prednisone as advised    URI Triage:    Fever: Yes[]        No[x]        Unknown[]   []Temperature:   []Chills  []Night sweats  []Body aches    Cough: Yes[x]        No[]   [x]Productive cough ( clear mucus)  []Cough with exertion  [x]Dry cough    Respiratory Symptoms: Yes[]        No[x]   []Wheezing  []Pain with deep breathing  []SOB with exertion  []SOB at rest  []Heavy breathing  []Chest discomfort with deep breathing or coughing    GI Symptoms: Yes []     No[x]   []Diarrhea  []Nausea  []Vomiting  []Abdominal pain  []Lack of appetite    Other symptoms:  []Sore throat  []Sinus pressure  [x]Nasal drainage  []Nasal congestion  [x]Chest congestion  []Head congestion  [x]PND  []Facial pain   []Earache   []Conjunctivitis  [x]Headache  []Fatigue  []Weakness  []Loss of sense of smell   []Loss of sense of taste    [x]OTC Medications: Robitussin DM    []Any recent travel  [] Any sick contacts    []Positive Covid exposure (<6 feet, >15 min, no mask worn)  If yes, date of exposure (last contact):     [x]Covid Test  Date/Result:  12-18 tested negative    Vaccinated (covid): Yes [x]    No[]   Booster:  Yes[x]   No[]     Symptom onset: 1 month        Patient was notified that this message will be routed to the physician to determine what their recommendation (s) would be. In the meantime, if they develop new or worsening symptoms, they were advised to call back or seek emergent evaluation at the ER. ER precautions reviewed.   Reviewed business hours and the after-hour service for the on-call physician, I.e, concerns/questions.

## 2024-12-18 NOTE — TELEPHONE ENCOUNTER
Patient called complaining of constant cough starting all over again, up all night,  headache, no fever    COVID test is negative     Please advise

## 2024-12-19 VITALS
BODY MASS INDEX: 39 KG/M2 | WEIGHT: 219 LBS | RESPIRATION RATE: 16 BRPM | HEART RATE: 88 BPM | OXYGEN SATURATION: 99 % | DIASTOLIC BLOOD PRESSURE: 80 MMHG | SYSTOLIC BLOOD PRESSURE: 131 MMHG | TEMPERATURE: 97 F

## 2024-12-19 LAB
ALBUMIN SERPL-MCNC: 4 G/DL (ref 3.2–4.8)
ALBUMIN/GLOB SERPL: 1.3 {RATIO} (ref 1–2)
ALP LIVER SERPL-CCNC: 117 U/L
ALT SERPL-CCNC: 46 U/L
ANION GAP SERPL CALC-SCNC: 7 MMOL/L (ref 0–18)
AST SERPL-CCNC: 34 U/L (ref ?–34)
BILIRUB SERPL-MCNC: 0.7 MG/DL (ref 0.2–1.1)
BUN BLD-MCNC: 13 MG/DL (ref 9–23)
BUN/CREAT SERPL: 14.1 (ref 10–20)
CALCIUM BLD-MCNC: 9.8 MG/DL (ref 8.7–10.4)
CHLORIDE SERPL-SCNC: 105 MMOL/L (ref 98–112)
CO2 SERPL-SCNC: 26 MMOL/L (ref 21–32)
CREAT BLD-MCNC: 0.92 MG/DL
EGFRCR SERPLBLD CKD-EPI 2021: 68 ML/MIN/1.73M2 (ref 60–?)
FLUAV + FLUBV RNA SPEC NAA+PROBE: NEGATIVE
FLUAV + FLUBV RNA SPEC NAA+PROBE: NEGATIVE
GLOBULIN PLAS-MCNC: 3 G/DL (ref 2–3.5)
GLUCOSE BLD-MCNC: 268 MG/DL (ref 70–99)
OSMOLALITY SERPL CALC.SUM OF ELEC: 296 MOSM/KG (ref 275–295)
POTASSIUM SERPL-SCNC: 4.1 MMOL/L (ref 3.5–5.1)
PROT SERPL-MCNC: 7 G/DL (ref 5.7–8.2)
RSV RNA SPEC NAA+PROBE: NEGATIVE
SARS-COV-2 RNA RESP QL NAA+PROBE: NOT DETECTED
SODIUM SERPL-SCNC: 138 MMOL/L (ref 136–145)

## 2024-12-19 PROCEDURE — 0241U SARS-COV-2/FLU A AND B/RSV BY PCR (GENEXPERT): CPT | Performed by: EMERGENCY MEDICINE

## 2024-12-19 RX ORDER — DOXYCYCLINE 100 MG/1
100 CAPSULE ORAL 2 TIMES DAILY
Qty: 14 CAPSULE | Refills: 0 | Status: SHIPPED | OUTPATIENT
Start: 2024-12-19 | End: 2024-12-26

## 2024-12-19 RX ORDER — BENZONATATE 100 MG/1
100 CAPSULE ORAL 3 TIMES DAILY PRN
Qty: 30 CAPSULE | Refills: 0 | Status: SHIPPED | OUTPATIENT
Start: 2024-12-19 | End: 2025-01-18

## 2024-12-19 NOTE — ED INITIAL ASSESSMENT (HPI)
Pt c/o swelling + redness to LLE started this morning denies fever, sts that she has cough for few days and had an xray this evening as ordered by PMD

## 2024-12-19 NOTE — ED INITIAL ASSESSMENT (HPI)
Pt arrives ambulatory to ED for c/o L ankle swelling and redness that started this morning. +Cough. Pain with walking. Recent long plane ride to [place].  Aox4, speaking in full sentences.

## 2024-12-19 NOTE — ED PROVIDER NOTES
He    Patient Seen in: [facility]      History     Chief Complaint   Patient presents with    Cough    Cellulitis     Stated Complaint: left ankle swollen and red  Subjective:   69-year-old female with an extensive medical history presents for atraumatic left ankle and foot swelling and erythema that she woke up with this morning.  She denies any fevers.  She denies any injury or trauma.  She is an insulin-dependent diabetic and states her sugars have been running in the high 200s.  She denies any chest pain or difficulty breathing.  She is having pain with ambulation on the left ankle and foot.  She is also here for a cough that started 2 days ago.  She completed a course of Augmentin and prednisone 2 weeks ago for an upper  respiratory infection/possible sinusitis.  She states her symptoms did resolve, but returned a couple days ago.  The cough is dry bronchospastic.  Minimal nasal congestion.  No sore throat or ear pain.  She had an outpatient chest x-ray done today and is waiting for her results.  No chest pain.  She is here with her .      Objective:   Past Medical History:    Acute, but ill-defined, cerebrovascular disease    Adenomatous colon polyp    Anxiety    Anxiety state    Arthritis    Asthma (HCC)    Breast CA (HCC)    Cellulitis and abscess of leg    Closed displaced fracture of surgical neck of right humerus, unspecified fracture morphology, initial encounter    CVA (cerebral infarction)    Depression    Disorder of thyroid    Esophageal reflux    Essential hypertension    Exposure to medical diagnostic radiation    Extrinsic asthma, unspecified    High blood pressure    High cholesterol    Hypothyroidism    Liver cirrhosis (HCC)    cirrhosis    Migraines    Neuropathy    Obesity    Obesity (BMI 30-39.9)    Osteoarthritis    Other and unspecified hyperlipidemia    PFO (patent foramen ovale) (HCC)    PONV (postoperative nausea and vomiting)    Shingles    March 2020    Stroke (HCC)     aphasia- 10 years ago    Type II or unspecified type diabetes mellitus without mention of complication, not stated as uncontrolled    Vertigo    Visual impairment    glasses            Past Surgical History:   Procedure Laterality Date    Appendectomy      Cholecystectomy      Colonoscopy  2022    Colonoscopy N/A 2019    Procedure: COLONOSCOPY;  Surgeon: Mello Rowland MD;  Location: [facility] ENDOSCOPY    Colonoscopy N/A 2022    Procedure: COLONOSCOPY;  Surgeon: Mello Rowland MD;  Location: [facility] ENDOSCOPY    Colonoscopy N/A 2022    Procedure: COLONOSCOPY;  Surgeon: Mello Rowland MD;  Location: [facility] ENDOSCOPY    Colonoscopy  2023    Colonoscopy N/A 2023    Procedure: COLONOSCOPY;  Surgeon: Mello Rowland MD;  Location: [facility] ENDOSCOPY    Colonoscopy & polypectomy  2013    adenoma    Cortisone injection Left     Left knee, Dr. Murphy    D & c      4 of them    Egd  2013    Egd  2019    Egd  2022    Egd  2023    Electrocardiogram, complete  2013    scanned to media    Extraction erupted tooth/exr  2018    Hernia surgery      Hysterectomy      Talia biopsy stereo nodule 1 site right (cpt=19081) Right 2024    X clip- calcs    Needle biopsy left            Radiation right      Tooth implantation  2018    Total shoulder replacement  2023    Umbilical hernia repair                Social History     Socioeconomic History    Marital status:    Tobacco Use    Smoking status: Never     Passive exposure: Never    Smokeless tobacco: Never   Vaping Use    Vaping status: Never Used   Substance and Sexual Activity    Alcohol use: Yes     Comment: socially    Drug use: No   Other Topics Concern    Caffeine Concern Yes     Comment: soda    Reaction to local anesthetic No     Social Drivers of Health     Financial Resource Strain: Low Risk  (2023)    Financial Resource Strain     Difficulty of Paying Living Expenses: Not very hard      Med Affordability: No   Transportation Needs: No Transportation Needs (8/14/2023)    Transportation Needs     Lack of Transportation: No    Received [facility]    Housing Stability            Review of Systems    Positive for stated complaint: Cough and Cellulitis     Other systems are as noted in HPI.  Constitutional and vital signs reviewed.      All other systems reviewed and negative except as noted above.    Physical Exam     ED Triage Vitals [12/18/24 1856]   /50   Pulse 103   Resp 18   Temp 97.6 °F (36.4 °C)   Temp src Oral   SpO2 96 %   O2 Device None (Room air)     Current:/50   Pulse 103   Temp 97.6 °F (36.4 °C) (Oral)   Resp 18   SpO2 96%     Physical Exam  Vitals and nursing note reviewed.   Constitutional:       General: She is not in acute distress.     Appearance: Normal appearance. She is not toxic-appearing.   HENT:      Right Ear: Tympanic membrane normal.      Left Ear: Tympanic membrane normal.      Nose: Nose normal.      Mouth/Throat:      Mouth: Mucous membranes are moist.      Pharynx: Oropharynx is clear. Posterior oropharyngeal erythema present. No oropharyngeal exudate.   Eyes:      Extraocular Movements: Extraocular movements intact.      Conjunctiva/sclera: Conjunctivae normal.      Pupils: Pupils are equal, round, and reactive to light.   Cardiovascular:      Rate and Rhythm: Regular rhythm. Tachycardia present.   Pulmonary:      Effort: Pulmonary effort is normal.      Breath sounds: Normal breath sounds. No wheezing, rhonchi or rales.      Comments: Bronchospastic cough during exam.  Musculoskeletal:         General: Swelling and tenderness present. No signs of injury.      Cervical back: Normal range of motion and neck supple.      Comments: Erythema and swelling to the left medial lateral ankle that extends down into the foot.  Strong left pedal pulse.  The area is warm to touch.  No wounds, the skin is intact.   Skin:     General: Skin is  warm and dry.      Capillary Refill: Capillary refill takes less than 2 seconds.      Findings: Erythema present.   Neurological:      General: No focal deficit present.      Mental Status: She is alert and oriented to person, place, and time.   Psychiatric:         Mood and Affect: Mood normal.         Behavior: Behavior normal.         ED Course   No results found.  Labs Reviewed - No data to display    MDM     Medical Decision Making  Based on the patient's atraumatic left lower extremity redness and swelling with a history of diabetes, I feel she needs further workup in the emergency department.  She agrees with plan of care and her  will drive her to the emergency department at [place].    Risk  Risk Details: Cellulitis versus DVT        Disposition and Plan     Clinical Impression:  1. Localized swelling of left lower extremity         Disposition:  Ic to ed  12/18/2024  7:31 pm    Follow-up:  No follow-up provider specified.        Medications Prescribed:  Discharge Medication List as of 12/18/2024  7:32 PM

## 2024-12-19 NOTE — ED PROVIDER NOTES
Patient Seen in: [facility] Emergency Department      History     Chief Complaint   Patient presents with    Deep Vein Thrombosis     Stated Complaint: redness + swelling to LLE    Subjective:   HPI      69-year-old female presents from immediate care for evaluation for redness and swelling to her left lower extremity.  Patient states that she woke up with it this morning.  There is some mild pain associated with it.  She does report a history of cellulitis.  She also reports that she has had a cough for the past couple of days.  She recently completed a course of Augmentin for a cough after she had returned home from [place].  She was also on steroids at that time.  She denies any fevers, chills, nausea, vomiting, chest pain, shortness of breath.    Objective:     Past Medical History:    Acute, but ill-defined, cerebrovascular disease    Adenomatous colon polyp    Anxiety    Anxiety state    Arthritis    Asthma (HCC)    Breast CA (HCC)    Cellulitis and abscess of leg    Closed displaced fracture of surgical neck of right humerus, unspecified fracture morphology, initial encounter    CVA (cerebral infarction)    Depression    Disorder of thyroid    Esophageal reflux    Essential hypertension    Exposure to medical diagnostic radiation    Extrinsic asthma, unspecified    High blood pressure    High cholesterol    Hypothyroidism    Liver cirrhosis (HCC)    cirrhosis    Migraines    Neuropathy    Obesity    Obesity (BMI 30-39.9)    Osteoarthritis    Other and unspecified hyperlipidemia    PFO (patent foramen ovale) (HCC)    PONV (postoperative nausea and vomiting)    Shingles    March 2020    Stroke (HCC)    aphasia- 10 years ago    Type II or unspecified type diabetes mellitus without mention of complication, not stated as uncontrolled    Vertigo    Visual impairment    glasses              Past Surgical History:   Procedure Laterality Date    Appendectomy      Cholecystectomy      Colonoscopy  03/2022     Colonoscopy N/A 2019    Procedure: COLONOSCOPY;  Surgeon: Mello Rowland MD;  Location: [facility] ENDOSCOPY    Colonoscopy N/A 2022    Procedure: COLONOSCOPY;  Surgeon: Mello Rowland MD;  Location: [facility] ENDOSCOPY    Colonoscopy N/A 2022    Procedure: COLONOSCOPY;  Surgeon: Mello Rowland MD;  Location: [facility] ENDOSCOPY    Colonoscopy  2023    Colonoscopy N/A 2023    Procedure: COLONOSCOPY;  Surgeon: Mello Rowland MD;  Location: [facility] ENDOSCOPY    Colonoscopy & polypectomy  2013    adenoma    Cortisone injection Left     Left knee, Dr. Murphy    D & c      4 of them    Egd  2013    Egd  2019    Egd  2022    Egd  2023    Electrocardiogram, complete  2013    scanned to media    Extraction erupted tooth/exr  2018    Hernia surgery      Hysterectomy      Talia biopsy stereo nodule 1 site right (cpt=19081) Right 2024    X clip- calcs    Needle biopsy left            Radiation right      Tooth implantation  2018    Total shoulder replacement  2023    Umbilical hernia repair                  Social History     Socioeconomic History    Marital status:    Tobacco Use    Smoking status: Never     Passive exposure: Never    Smokeless tobacco: Never   Vaping Use    Vaping status: Never Used   Substance and Sexual Activity    Alcohol use: Yes     Comment: socially    Drug use: No   Other Topics Concern    Caffeine Concern Yes     Comment: soda    Reaction to local anesthetic No     Social Drivers of Health     Financial Resource Strain: Low Risk  (2023)    Financial Resource Strain     Difficulty of Paying Living Expenses: Not very hard     Med Affordability: No   Transportation Needs: No Transportation Needs (2023)    Transportation Needs     Lack of Transportation: No    Received from [facility]    Housing Stability                  Physical Exam     ED Triage Vitals [24]   /83   Pulse 101    Resp 18   Temp 97.4 °F (36.3 °C)   Temp src Temporal   SpO2 98 %   O2 Device None (Room air)       Current Vitals:   Vital Signs  BP: 131/80  Pulse: 88  Resp: 16  Temp: 97.4 °F (36.3 °C)  Temp src: Temporal  MAP (mmHg): 95    Oxygen Therapy  SpO2: 99 %  O2 Device: None (Room air)        Physical Exam  Vitals and nursing note reviewed.   Constitutional:       Appearance: Normal appearance.   HENT:      Head: Normocephalic and atraumatic.   Cardiovascular:      Rate and Rhythm: Normal rate and regular rhythm.      Pulses: Normal pulses.           Radial pulses are 2+ on the right side and 2+ on the left side.        Dorsalis pedis pulses are 2+ on the right side and 2+ on the left side.        Posterior tibial pulses are 2+ on the right side and 2+ on the left side.      Heart sounds: Normal heart sounds.   Pulmonary:      Effort: Pulmonary effort is normal.      Breath sounds: Normal breath sounds and air entry.   Musculoskeletal:         General: Normal range of motion.      Cervical back: Normal range of motion.      Right lower leg: No edema.      Left lower leg: No edema.   Skin:     General: Skin is warm and dry.      Findings: Erythema (LLE is erythematous, warm.  No induration, fluctuance, crepitus or drainage.) present.   Neurological:      General: No focal deficit present.      Mental Status: She is alert.             ED Course     Labs Reviewed   CBC WITH DIFFERENTIAL WITH PLATELET - Abnormal; Notable for the following components:       Result Value    .0 (*)     All other components within normal limits   COMP METABOLIC PANEL (14) - Abnormal; Notable for the following components:    Glucose 268 (*)     Calculated Osmolality 296 (*)     AST 34 (*)     All other components within normal limits   SARS-COV-2/FLU A AND B/RSV BY PCR (GENEXPERT) - Normal    Narrative:     This test is intended for the qualitative detection and differentiation of SARS-CoV-2, influenza A, influenza B, and respiratory  syncytial virus (RSV) viral RNA in nasopharyngeal or nares swabs from individuals suspected of respiratory viral infection consistent with COVID-19 by their healthcare provider. Signs and symptoms of respiratory viral infection due to SARS-CoV-2, influenza, and RSV can be similar.    Test performed using the Xpert Xpress SARS-CoV-2/FLU/RSV (real time RT-PCR)  assay on the GeneXpert instrument, [employer], [place] [address].   This test is being used under the Food and Drug Administration's Emergency Use Authorization.    The authorized Fact Sheet for Healthcare Providers for this assay is available upon request from the laboratory.   RAINBOW DRAW LAVENDER   RAINBOW DRAW BLUE   RAINBOW DRAW GOLD       ED Course as of 12/19/24 0154  ------------------------------------------------------------  Time: 12/18 2346  Value: US VENOUS DOPPLER LEG LEFT - DIAG IMG (CPT=93971)  Comment: Per my independent interpretation, patient's US DVT demonstrates no DVT.                MDM              Medical Decision Making  Differential diagnosis includes but is not limited to cellulitis, DVT, PNA, viral syndrome, etc    Patient CBC CMP were unremarkable.  Ultrasound was negative for any DVT.  Viral panel was also negative.  Patient with cellulitis on exam.  She will be started on doxycycline.  She was also given some Tessalon for her cough.  Chest x-ray from earlier today has not been read but I did not notice any significant pneumonia, she understands to follow-up on this x-ray words final read and to follow-up with her PCP for reexamination.    Medical Record Review: I personally reviewed available prior medical records for any recent pertinent discharge summaries, testing, and procedures, and reviewed those reports.    Complicating factors: The patient  has a past medical history of Acute, but ill-defined, cerebrovascular disease, Adenomatous colon polyp (09/16/2013), Anxiety, Anxiety state, Arthritis, Asthma (HCC), Breast CA (HCC),  Cellulitis and abscess of leg, Closed displaced fracture of surgical neck of right humerus, unspecified fracture morphology, initial encounter (2023), CVA (cerebral infarction), Depression, Disorder of thyroid, Esophageal reflux, Essential hypertension, Exposure to medical diagnostic radiation, Extrinsic asthma, unspecified, High blood pressure, High cholesterol, Hypothyroidism, Liver cirrhosis (HCC), Migraines, Neuropathy, Obesity, Obesity (BMI 30-39.9), Osteoarthritis, Other and unspecified hyperlipidemia, PFO (patent foramen ovale) (HCC), PONV (postoperative nausea and vomiting), Shingles, Stroke (HCC), Type II or unspecified type diabetes mellitus without mention of complication, not stated as uncontrolled, Vertigo, and Visual impairment. and  has a past surgical history that includes colonoscopy & polypectomy (2013); appendectomy; Umbilical hernia repair; electrocardiogram, complete (2013); cortisone injection (Left); hysterectomy; cholecystectomy; hernia surgery; tooth implantation (2018); extraction erupted tooth/exr (2018); egd (2013); egd (2019); colonoscopy (2022); colonoscopy (N/A, 2019); egd (2022); colonoscopy (N/A, 2022); colonoscopy (N/A, 2022); d & c; ; radiation right; egd (2023); colonoscopy (2023); colonoscopy (N/A, 2023); Total shoulder replacement (2023); needle biopsy left; and mercedez biopsy stereo nodule 1 site right (cpt=19081) (Right, 2024). that contribute to the medical complexity of this ED evaluation.     Clinical impression as well as any lab results and radiology findings were discussed with the patient and/or caregiver. I personally reviewed all laboratory results and radiology images myself. Patient is advised to follow up with PCP for reevaluation. I provided discharge instructions and return precautions. Patient and/or caregiver voices understanding and agreement with the treatment plan. All  questions were addressed and answered.           Problems Addressed:  Acute respiratory infection: acute illness or injury with systemic symptoms  Cellulitis of left lower extremity: acute illness or injury  Uncontrolled type 2 diabetes mellitus with hyperglycemia (HCC): chronic illness or injury with exacerbation, progression, or side effects of treatment    Amount and/or Complexity of Data Reviewed  External Data Reviewed: notes.     Details: Immediate care notes reviewed, patient presented with swelling to the lower extremity and was sent to the ED for rule out DVT.  Chest x-ray was performed.  Labs: ordered. Decision-making details documented in ED Course.  Radiology: ordered and independent interpretation performed. Decision-making details documented in ED Course.    Risk  Prescription drug management.        Disposition and Plan     Clinical Impression:  1. Cellulitis of left lower extremity    2. Acute respiratory infection    3. Uncontrolled type 2 diabetes mellitus with hyperglycemia (HCC)         Disposition:  Discharge  12/19/2024  1:23 am    Follow-up:  Erica Young MD  [address] [place] [id number]  [phone]    Schedule an appointment as soon as possible for a visit in 2 day(s)  For follow up and re-evaluation    We recommend that you schedule follow up care with a primary care provider within the next three months to obtain basic health screening including reassessment of your blood pressure.      Medications Prescribed:  Discharge Medication List as of 12/19/2024  1:28 AM        START taking these medications    Details   !! doxycycline 100 MG Oral Cap Take 1 capsule (100 mg total) by mouth 2 (two) times daily for 7 days., Normal, Disp-14 capsule, R-0      !! benzonatate 100 MG Oral Cap Take 1 capsule (100 mg total) by mouth 3 (three) times daily as needed for cough., Normal, Disp-30 capsule, R-0       !! - Potential duplicate medications found. Please discuss with provider.               Supplementary Documentation:

## 2024-12-24 ENCOUNTER — OFFICE VISIT (OUTPATIENT)
Dept: INTERNAL MEDICINE CLINIC | Facility: CLINIC | Age: 69
End: 2024-12-24
Payer: MEDICARE

## 2024-12-24 VITALS
SYSTOLIC BLOOD PRESSURE: 114 MMHG | TEMPERATURE: 98 F | WEIGHT: 215.38 LBS | OXYGEN SATURATION: 99 % | BODY MASS INDEX: 38.16 KG/M2 | HEIGHT: 63 IN | DIASTOLIC BLOOD PRESSURE: 66 MMHG | HEART RATE: 96 BPM

## 2024-12-24 DIAGNOSIS — L03.116 CELLULITIS OF LEFT LOWER EXTREMITY: Primary | ICD-10-CM

## 2024-12-24 DIAGNOSIS — J06.9 VIRAL URI WITH COUGH: ICD-10-CM

## 2024-12-24 PROCEDURE — 99214 OFFICE O/P EST MOD 30 MIN: CPT | Performed by: INTERNAL MEDICINE

## 2024-12-24 RX ORDER — ALBUTEROL SULFATE 90 UG/1
2 INHALANT RESPIRATORY (INHALATION) EVERY 6 HOURS PRN
Qty: 1 EACH | Refills: 5 | Status: SHIPPED | OUTPATIENT
Start: 2024-12-24

## 2024-12-24 NOTE — PROGRESS NOTES
Teresa Wilson is a 69 year old female.  Chief Complaint   Patient presents with    ER F/U     Cellulitis LLE and acute respiratory infection, continues with cough     HPI:     Was in ED 12/18/24 for LLE cellulitis (and ongoing cough).  RSV/covid/flu negative.  CXR negative.  LE doppler neg for DVT.  Was treated for URI with prednisone and benzonatate on 11/27/24 (after traveling from [place] (where family members were sick) to [place]), then was given Augmentin on 12/3/24 for continuing sx.  Felt fine x 10 days, then cough returned.  Some yellow sputum.  Has been using albuterol occasionally (left over from previous URI in 2023)    Leg improved      Current Outpatient Medications   Medication Sig Dispense Refill    doxycycline 100 MG Oral Cap Take 1 capsule (100 mg total) by mouth 2 (two) times daily for 7 days. 14 capsule 0    benzonatate 100 MG Oral Cap Take 1 capsule (100 mg total) by mouth 3 (three) times daily as needed for cough. 30 capsule 0    butalbital-acetaminophen-caffeine -40 MG Oral Tab Take 1 tablet by mouth every 6 (six) hours as needed for Headaches. 30 tablet 3    benzonatate (TESSALON PERLES) 100 MG Oral Cap Take 1 capsule (100 mg total) by mouth 3 (three) times daily as needed. 30 capsule 1    PRAVASTATIN 40 MG Oral Tab TAKE 1 TABLET(40 MG) BY MOUTH EVERY NIGHT 90 tablet 3    KLOR-CON 10 10 MEQ Oral Tab CR TAKE 4 TABLET BY MOUTH TWICE DAILY 720 tablet 3    letrozole 2.5 MG Oral Tab Take 1 tablet (2.5 mg total) by mouth nightly. 90 tablet 3    CLOPIDOGREL 75 MG Oral Tab TAKE 1 TABLET(75 MG) BY MOUTH DAILY 90 tablet 3    furosemide 20 MG Oral Tab Take 1 tablet (20 mg total) by mouth 2 (two) times daily. 180 tablet 3    carvedilol 3.125 MG Oral Tab Take 1 tablet (3.125 mg total) by mouth 2 (two) times daily.      doxycycline 100 MG Oral Cap Take 1 capsule (100 mg total) by mouth Q12H.      LOSARTAN 25 MG Oral Tab TAKE 1/2 TABLET BY MOUTH EVERY DAY 45 tablet 3    ERGOCALCIFEROL 1.25 MG  (75179 UT) Oral Cap TAKE 1 CAPSULE BY MOUTH 1 TIME A WEEK 13 capsule 3    IMIPRAMINE 50 MG Oral Tab TAKE 3 TABLETS BY MOUTH EVERY NIGHT 270 tablet 3    FAMOTIDINE 40 MG Oral Tab TAKE 1 TABLET(40 MG) BY MOUTH TWICE DAILY AS NEEDED FOR HEARTBURN 180 tablet 3    Naloxone HCl 4 MG/0.1ML Nasal Liquid 4 mg as needed.      traMADol 50 MG Oral Tab Take 1 tablet (50 mg total) by mouth every 8 (eight) hours as needed.      polyethylene glycol, PEG 3350, 17 g Oral Powd Pack Take 17 g by mouth daily as needed. 30 each 0    Continuous Blood Gluc Sensor (FREESTYLE MURALI 2 SENSOR) Does not apply Misc 1 Device every 14 (fourteen) days.      Insulin Disposable Pump (OMNIPOD DASH PODS, GEN 4,) Does not apply Misc use 1 pod      HUMULIN R U-500 KWIKPEN 500 UNIT/ML Subcutaneous Solution Pen-injector Pt with Insulin pump. Pt was instructed to verify with Endocrinogist if she needs to adjust dose a day before surgery. Pt was instructed no insulin os DOS and removed CGM and Insulin pump per Anesthesia protocol.      ALBUTEROL 108 (90 Base) MCG/ACT Inhalation Aero Soln INHALE 2 PUFFS INTO THE LUNGS EVERY 6 HOURS AS NEEDED FOR WHEEZING 3 each 3    Insulin Disposable Pump (OMNIPOD DASH PODS, GEN 4,) Does not apply Misc USE 1 POD UNDER THE SKIN EVERY 2.5 DAYS      Continuous Blood Gluc Sensor (FREESTYLE MURALI 2 SENSOR SYSTM) Does not apply Misc       BD PEN NEEDLE SKY 2ND GEN 32G X 4 MM Does not apply Misc USE AS DIRECTED 200 each 3    ondansetron (ZOFRAN ODT) 8 MG Oral Tablet Dispersible Take 1 tablet (8 mg total) by mouth every 6 (six) hours as needed for Nausea. Place on top of the tongue where it will dissolve, then swallow 30 tablet 2    Blood Glucose Monitoring Suppl (ONETOUCH VERIO) w/Device Does not apply Kit       Cyanocobalamin (VITAMIN B 12 OR) Take 500 mcg by mouth every morning.      Glucose Blood In Vitro Strip Use 1 strip by percutaneous route 4-6 times every day 200 each 0    Levothyroxine Sodium 88 MCG Oral Tab Take 1 tablet  (88 mcg total) by mouth every morning before breakfast. 30 tablet 11    Magnesium Oxide 400 (240 Mg) MG Oral Tab Take 1 tablet (400 mg total) by mouth 2 (two) times daily. 30 tablet 0      Past Medical History:    Acute, but ill-defined, cerebrovascular disease    Adenomatous colon polyp    Anxiety    Anxiety state    Arthritis    Asthma (HCC)    Breast CA (HCC)    Cellulitis and abscess of leg    Closed displaced fracture of surgical neck of right humerus, unspecified fracture morphology, initial encounter    CVA (cerebral infarction)    Depression    Disorder of thyroid    Esophageal reflux    Essential hypertension    Exposure to medical diagnostic radiation    Extrinsic asthma, unspecified    High blood pressure    High cholesterol    Hypothyroidism    Liver cirrhosis (HCC)    cirrhosis    Migraines    Neuropathy    Obesity    Obesity (BMI 30-39.9)    Osteoarthritis    Other and unspecified hyperlipidemia    PFO (patent foramen ovale) (HCC)    PONV (postoperative nausea and vomiting)    Shingles    March 2020    Stroke ([facility])    aphasia- 10 years ago    Type II or unspecified type diabetes mellitus without mention of complication, not stated as uncontrolled    Vertigo    Visual impairment    glasses      Social History:  Social History     Socioeconomic History    Marital status:    Tobacco Use    Smoking status: Never     Passive exposure: Never    Smokeless tobacco: Never   Vaping Use    Vaping status: Never Used   Substance and Sexual Activity    Alcohol use: Yes     Comment: socially    Drug use: No   Other Topics Concern    Caffeine Concern Yes     Comment: soda    Reaction to local anesthetic No     Social Drivers of Health     Financial Resource Strain: Low Risk  (8/14/2023)    Financial Resource Strain     Difficulty of Paying Living Expenses: Not very hard     Med Affordability: No   Transportation Needs: No Transportation Needs (8/14/2023)    Transportation Needs     Lack of Transportation: No     Received from [facility]    Housing Stability        REVIEW OF SYSTEMS:   GENERAL HEALTH: feels well otherwise  RESPIRATORY: no SOB  CARDIOVASCULAR: no chest pain/pressure  GI: no nausea, vomiting, diarrhea    Wt Readings from Last 5 Encounters:   12/24/24 215 lb 6.4 oz (97.7 kg)   12/18/24 219 lb (99.3 kg)   11/27/24 218 lb 12.8 oz (99.2 kg)   10/08/24 219 lb (99.3 kg)   08/06/24 216 lb (98 kg)     Body mass index is 38.16 kg/m².      EXAM:   /66   Pulse 96   Temp 98 °F (36.7 °C)   Ht 5' 3\" (1.6 m)   Wt 215 lb 6.4 oz (97.7 kg)   SpO2 99%   BMI 38.16 kg/m²   GENERAL: well developed, well nourished, in no apparent distress    LUNGS: clear to auscultation, sl exp wheezing  EXTREMITIES: no LE edema, minimal faint erythema left distal leg, no warmth or tenderness    ASSESSMENT AND PLAN:     LLE cellulitis  -almost resolved w/doxy  -LE doppler neg DVT    URI  -initially started in 11/2024 while in [place]  -treated with prednisone and benzonatate on 11/27, then Augmentin 12/3  -sx resolved x 10 days, then recurred  -CXR 12/18 neg  -RSV/covid/flu neg  -likely viral  -cont benzonatate, mucinex DM; RF albuterol    The patient indicates understanding of these issues and agrees to the plan.    Erica Young MD, 12/24/24, 10:39 AM

## 2024-12-26 RX ORDER — FAMOTIDINE 40 MG/1
TABLET, FILM COATED ORAL
Qty: 180 TABLET | Refills: 3 | Status: SHIPPED | OUTPATIENT
Start: 2024-12-26

## 2024-12-26 NOTE — TELEPHONE ENCOUNTER
Refill request is for a maintenance medication and has met the criteria specified in the Ambulatory Medication Refill Standing Order for eligibility, visits, laboratory, alerts and was sent to the requested pharmacy.    Requested Prescriptions     Signed Prescriptions Disp Refills    FAMOTIDINE 40 MG Oral Tab 180 tablet 3     Sig: TAKE 1 TABLET(40 MG) BY MOUTH TWICE DAILY AS NEEDED FOR HEARTBURN     Authorizing Provider: ANDRES TINOCO     Ordering User: JASON PAREDES

## 2025-01-09 ENCOUNTER — HOSPITAL ENCOUNTER (OUTPATIENT)
Dept: MRI IMAGING | Facility: HOSPITAL | Age: 70
Discharge: HOME | End: 2025-01-09
Attending: INTERNAL MEDICINE
Payer: MEDICARE

## 2025-01-09 DIAGNOSIS — K75.81 LIVER CIRRHOSIS SECONDARY TO NASH (HCC): ICD-10-CM

## 2025-01-09 DIAGNOSIS — K74.60 LIVER CIRRHOSIS SECONDARY TO NASH (HCC): ICD-10-CM

## 2025-01-09 PROCEDURE — 74183 MRI ABD W/O CNTR FLWD CNTR: CPT | Performed by: INTERNAL MEDICINE

## 2025-01-09 PROCEDURE — A9575 INJ GADOTERATE MEGLUMI 0.1ML: HCPCS | Performed by: INTERNAL MEDICINE

## 2025-01-09 RX ORDER — GADOTERATE MEGLUMINE 376.9 MG/ML
20 INJECTION INTRAVENOUS
Status: COMPLETED | OUTPATIENT
Start: 2025-01-09 | End: 2025-01-09

## 2025-01-09 RX ADMIN — GADOTERATE MEGLUMINE 20 ML: 376.9 INJECTION INTRAVENOUS at 12:06:00

## 2025-01-09 NOTE — IMAGING NOTE
1203 THIS RN WAS CALLED WITH MARK ABRAHAM TO SEE PT DUE TO PT COMPLAINED OF HER \"FACE IS SWELLING\" POST CONTRAST WITH MRI SCAN.  PT SAT IN US WAITING ROOM, WAS TALKING STATED SHE FELT LIKE HER FACE WAS SWELLING NO DIFFICULTLY IN SWALLOWING. VS TAKE /91 HR 97 . PT APPEARED UPSET ABOUT WAITING FOR MRI FOR A LONG TIME AND SHE IS DIABETIC AND WAS FEARFUL HER BLOOD SUGAR WAS DROPPING, JUICE GIVEN BY MRI EARLIER. THIS RN GAVE .WATER TO PT     1205 VS REPEATED /91  HR 93 PT STATED SHE FELT A LOT BETTER AND WAS ABLE TO LEAVE. PT AWARE OF SIGNS AND SYMPTOMS OF WORSENING SYMPTOMS TO CALL 911. PT AWARE.

## 2025-01-24 ENCOUNTER — HOSPITAL ENCOUNTER (OUTPATIENT)
Dept: MAMMOGRAPHY | Facility: HOSPITAL | Age: 70
Discharge: HOME | End: 2025-01-24
Attending: INTERNAL MEDICINE
Payer: MEDICARE

## 2025-01-24 ENCOUNTER — HOSPITAL ENCOUNTER (OUTPATIENT)
Dept: BONE DENSITY | Facility: HOSPITAL | Age: 70
Discharge: HOME | End: 2025-01-24
Attending: INTERNAL MEDICINE
Payer: MEDICARE

## 2025-01-24 DIAGNOSIS — C50.111 MALIGNANT NEOPLASM OF CENTRAL PORTION OF RIGHT BREAST IN FEMALE, ESTROGEN RECEPTOR POSITIVE (HCC): ICD-10-CM

## 2025-01-24 DIAGNOSIS — Z17.0 MALIGNANT NEOPLASM OF CENTRAL PORTION OF RIGHT BREAST IN FEMALE, ESTROGEN RECEPTOR POSITIVE (HCC): ICD-10-CM

## 2025-01-24 DIAGNOSIS — Z78.0 POST-MENOPAUSAL: ICD-10-CM

## 2025-01-24 PROCEDURE — 77061 BREAST TOMOSYNTHESIS UNI: CPT | Performed by: INTERNAL MEDICINE

## 2025-01-24 PROCEDURE — 77080 DXA BONE DENSITY AXIAL: CPT | Performed by: INTERNAL MEDICINE

## 2025-01-24 PROCEDURE — 77065 DX MAMMO INCL CAD UNI: CPT | Performed by: INTERNAL MEDICINE

## 2025-01-27 ENCOUNTER — TELEPHONE (OUTPATIENT)
Dept: INTERNAL MEDICINE CLINIC | Facility: CLINIC | Age: 70
End: 2025-01-27

## 2025-01-27 ENCOUNTER — HOSPITAL ENCOUNTER (OUTPATIENT)
Age: 70
Discharge: HOME | End: 2025-01-27
Attending: STUDENT IN AN ORGANIZED HEALTH CARE EDUCATION/TRAINING PROGRAM
Payer: MEDICARE

## 2025-01-27 VITALS
HEART RATE: 106 BPM | SYSTOLIC BLOOD PRESSURE: 134 MMHG | DIASTOLIC BLOOD PRESSURE: 73 MMHG | RESPIRATION RATE: 16 BRPM | TEMPERATURE: 99 F | OXYGEN SATURATION: 95 %

## 2025-01-27 DIAGNOSIS — L03.116 CELLULITIS OF LEFT LOWER EXTREMITY: Primary | ICD-10-CM

## 2025-01-27 PROCEDURE — 99214 OFFICE O/P EST MOD 30 MIN: CPT

## 2025-01-27 PROCEDURE — 99213 OFFICE O/P EST LOW 20 MIN: CPT

## 2025-01-27 RX ORDER — DOXYCYCLINE 100 MG/1
100 CAPSULE ORAL 2 TIMES DAILY
Qty: 14 CAPSULE | Refills: 0 | Status: SHIPPED | OUTPATIENT
Start: 2025-01-27 | End: 2025-02-03

## 2025-01-27 RX ORDER — IMIPRAMINE HYDROCHLORIDE 50 MG/1
TABLET, FILM COATED ORAL
Qty: 270 TABLET | Refills: 3 | Status: SHIPPED | OUTPATIENT
Start: 2025-01-27

## 2025-01-27 RX ORDER — IMIPRAMINE HYDROCHLORIDE 50 MG/1
150 TABLET, FILM COATED ORAL NIGHTLY
Qty: 270 TABLET | Refills: 3 | OUTPATIENT
Start: 2025-01-27

## 2025-01-27 NOTE — TELEPHONE ENCOUNTER
Current refill request refused due to refill is either a duplicate request or has active refills at the pharmacy.  Check previous templates.    Requested Prescriptions     Refused Prescriptions Disp Refills    imipramine 50 MG Oral Tab 270 tablet 3     Sig: Take 3 tablets (150 mg total) by mouth nightly.     Refused By: JASON PAREDES     Reason for Refusal: Duplicate refill request

## 2025-01-27 NOTE — ED INITIAL ASSESSMENT (HPI)
Left lower leg pain with redness and swelling and warm to touch started yesterday, no fever, cms intact

## 2025-01-27 NOTE — TELEPHONE ENCOUNTER
Patient calling for medication for cellulitis.   Red, painful, slightly warm at touch left leg. Above the ankle third of the way up to knee.  Looks just like it did when she saw Dr. Young in December.    Nichole Quevedo    Best call back number [phone]

## 2025-01-27 NOTE — TELEPHONE ENCOUNTER
Refill request is for a maintenance medication and has met the criteria specified in the Ambulatory Medication Refill Standing Order for eligibility, visits, laboratory, alerts and was sent to the requested pharmacy.    Requested Prescriptions     Signed Prescriptions Disp Refills    IMIPRAMINE 50 MG Oral Tab 270 tablet 3     Sig: TAKE 3 TABLETS BY MOUTH EVERY NIGHT     Authorizing Provider: ANDRES TINOCO     Ordering User: JASON PAREDES

## 2025-01-27 NOTE — TELEPHONE ENCOUNTER
Called patient who states left leg is warm , red and  painful   RN advised to be evaluated at [facility] -she agreed F/U 1/28

## 2025-01-28 NOTE — DISCHARGE INSTRUCTIONS
Your exam is consistent with a skin infection called cellulitis for which antibiotics have been prescribed. Symptoms and redness should begin to improve within 72 hours on antibiotics, if there is no improvement or if you notice any new, changing, or progressing signs or symptoms, or any sign of spreading redness, you should present immediately to your primary care physician for assessment. Currently there is no sign of associated abscess, but if you develop any purulence or fluctuance or any sign of abscess formation, you should present immediately to your primary care physician for assessment.     Even in the setting of antibiotics, infections can continue to progress and spread, and if you notice any streaking of the redness, any fever development, rapidly progressing/spreading redness, or any other concerning signs or symptoms you should present immediately to the emergency department for assessment     As we discussed, I had recommended emergency department assessment given your history of diabetes and the extensive nature of the cellulitis, you declined and preferred outpatient management.  It is very important that with any sign of progression of redness, streaking of redness, fevers, pain, or elevated blood sugar as infections can cause elevated blood sugar, you present immediately to the emergency department.  Even in the setting of antibiotics, infections can spread, progress, and develop complications.  Elevate the leg when at rest.  Follow-up with your primary care physician in 3 days for reassessment and for further recommendations as symptoms should begin to improve within 72 hours on antibiotics.

## 2025-01-28 NOTE — ED PROVIDER NOTES
Patient Seen in: [facility]      History     Chief Complaint   Patient presents with    Cellulitis     Stated Complaint: possible cellulitis    Subjective:   HPI      69-year-old female with past medical history of cirrhosis, asthma, CVA, thyroid disorder, HTN, DM with blood sugars running in the upper 200s per chart review and confirmed by the patient, and previous partial left second toe amputation and states that this toe has always been discolored and purpleish since that time and was told by the surgeon it will be that color, who presents with her  with concern for erythema of the left foot/ankle/distal left lower leg.  She notes it is painful and hot to the touch.  She denies any fevers.  She states she had something similar and was treated with oral doxycycline and symptoms improved.  Per chart review, patient was seen on 12/18/2024 in the immediate care for extensive erythema and swelling of the left medial and lateral ankle extending down into the foot with intact pulses, and was recommended to go to the emergency department, she had agreed at that time, and in the emergency department she had a CMP that showed blood sugar of 268, AST of 34, ALT of 46, GFR of 68, creatinine of 0.92, CBC with WBC of 5.7, hemoglobin 14.4, and platelets 127, and DVT study which was negative.  Her heart rate was mildly elevated at the time with pulse of 101, she was afebrile at 36.3C and was discharged on oral doxycycline.  She states doxycycline significantly improved her symptoms.    Objective:     Past Medical History:    Acute, but ill-defined, cerebrovascular disease    Adenomatous colon polyp    Anxiety    Anxiety state    Arthritis    Asthma (HCC)    Breast CA (HCC)    Cellulitis and abscess of leg    Closed displaced fracture of surgical neck of right humerus, unspecified fracture morphology, initial encounter    CVA (cerebral infarction)    Depression    Disorder of thyroid    Esophageal reflux     Essential hypertension    Exposure to medical diagnostic radiation    Extrinsic asthma, unspecified    High blood pressure    High cholesterol    Hypothyroidism    Liver cirrhosis (HCC)    cirrhosis    Migraines    Neuropathy    Obesity    Obesity (BMI 30-39.9)    Osteoarthritis    Other and unspecified hyperlipidemia    PFO (patent foramen ovale) (HCC)    PONV (postoperative nausea and vomiting)    Shingles    2020    Stroke (HCC)    aphasia- 10 years ago    Type II or unspecified type diabetes mellitus without mention of complication, not stated as uncontrolled    Vertigo    Visual impairment    glasses              Past Surgical History:   Procedure Laterality Date    Appendectomy      Cholecystectomy      Colonoscopy  2022    Colonoscopy N/A 2019    Procedure: COLONOSCOPY;  Surgeon: Mello Rowland MD;  Location: [facility] ENDOSCOPY    Colonoscopy N/A 2022    Procedure: COLONOSCOPY;  Surgeon: Mello Rowland MD;  Location: [facility] ENDOSCOPY    Colonoscopy N/A 2022    Procedure: COLONOSCOPY;  Surgeon: Mello Rowland MD;  Location: [facility] ENDOSCOPY    Colonoscopy  2023    Colonoscopy N/A 2023    Procedure: COLONOSCOPY;  Surgeon: Mello Rowland MD;  Location: [facility] ENDOSCOPY    Colonoscopy & polypectomy  2013    adenoma    Cortisone injection Left     Left knee, Dr. Murphy    D & c      4 of them    Egd  2013    Egd  2019    Egd  2022    Egd  2023    Electrocardiogram, complete  2013    scanned to media    Extraction erupted tooth/exr  2018    Hernia surgery      Hysterectomy      Lumpectomy right Right         Talia biopsy stereo nodule 1 site right (cpt=19081) Right 2024    X clip- calcs    Needle biopsy left            Radiation right      Tooth implantation  2018    Total shoulder replacement  2023    Umbilical hernia repair                  Social History     Socioeconomic History    Marital status:    Tobacco Use     Smoking status: Never     Passive exposure: Never    Smokeless tobacco: Never   Vaping Use    Vaping status: Never Used   Substance and Sexual Activity    Alcohol use: Yes     Comment: socially    Drug use: No   Other Topics Concern    Caffeine Concern Yes     Comment: soda    Reaction to local anesthetic No     Social Drivers of Health     Financial Resource Strain: Low Risk  (8/14/2023)    Financial Resource Strain     Difficulty of Paying Living Expenses: Not very hard     Med Affordability: No   Transportation Needs: No Transportation Needs (8/14/2023)    Transportation Needs     Lack of Transportation: No    Received from [facility]    Housing Stability              Review of Systems    Positive for stated complaint: possible cellulitis  Other systems are as noted in HPI.  Constitutional and vital signs reviewed.      All other systems reviewed and negative except as noted above.    Physical Exam     ED Triage Vitals [01/27/25 1708]   /73   Pulse 106   Resp 16   Temp 98.8 °F (37.1 °C)   Temp src Oral   SpO2 95 %   O2 Device None (Room air)       Current Vitals:   Vital Signs  BP: 134/73  Pulse: 106  Resp: 16  Temp: 98.8 °F (37.1 °C)  Temp src: Oral    Oxygen Therapy  SpO2: 95 %  O2 Device: None (Room air)        Physical Exam    General: Awake, alert, comfortable on room air, in no distress, tolerating oral secretions, interactive  Pulmonary: No conversational dyspnea  Cardiac: +2 B/L regular PT and DP pulses  Neuro: Symmetrical facial expressions on gross observation  Musculoskeletal: 5/5 B/L plantarflexion and dorsiflexion, TTP throughout the left ankle and foot and distal posterior calf, previous left second toe is purplish discoloration with some ulceration, reported to be baseline  HEENT: No periorbital edema or erythema  Skin: Patient has hot erythema of the mid dorsal left foot through the ankle and distal posterior calf and anterior superior ankle, TTP throughout, no abrasions,  no weeping, no blisters  Psych: Normal mood, normal affect    ED Course   No labs or imaging performed  MDM   I discussed with patient and with her  that given she is a poorly controlled diabetic which increases risk for spread of infection and decreases healing capacity, , and extensive cellulitis over her foot spreading through her ankle joint and into her distal calf, and the foot that is involved has previously required toe amputation, I would recommend immediate assessment in the emergency department, but both the patient and her  declined, they are oriented and understand concerns as well as risks of outpatient management    Therefore, given patient is declining emergency department assessment, the site was outlined with a surgical marker, oral doxycycline was prescribed as patient has extensive allergy profile including to Bactrim but has previously tolerated doxycycline which she felt significantly improved similar symptoms to this foot/ankle in the past  -Patient again advised on very strict ED precautions, spreading erythema, streaking erythema, fevers, progression of pain, hyperglycemia, as we discussed that even at this time I am concerned she would benefit from ED assessment as well as infections can cause progressive hyperglycemia and life-threatening diabetes complications  -Patient is to elevate when at rest  -Via [employer], I did message the on-call physician, Dr. Reynolds, for the patient's primary care physician, to explain the above with concern that patient would benefit from very close follow-up.    -Patient instructed to have the site reassessed within 72 hours by her primary care physician, to seek ED assessment immediately with any signs of progression of infection or worsening of her diabetes/hyperglycemia    Medical Decision Making  Amount and/or Complexity of Data Reviewed  Independent Historian: spouse     Details: Patient's  also assist with history  External  Data Reviewed: labs and notes.     Details: Note from immediate care assessment on 12/18/2024 and ED assessment on 12/19/2024 assessed and labs from ED assessment from 12/19/2024 assessed    Risk  Prescription drug management.        Disposition and Plan     Clinical Impression:  1. Cellulitis of left lower extremity         Disposition:  Discharge  1/27/2025  6:07 pm    Follow-up:  Erica Young MD  [address] [place] [id number]  [phone]    In 3 days  re-check          Medications Prescribed:  Discharge Medication List as of 1/27/2025  6:28 PM            doxycycline 100 MG Oral Cap 14 capsule 0 1/27/2025 2/3/2025   Sig:   Take 1 capsule (100 mg total) by mouth 2 (two) times daily for 7 days.     Route:   Oral

## 2025-01-30 ENCOUNTER — OFFICE VISIT (OUTPATIENT)
Dept: INTERNAL MEDICINE CLINIC | Facility: CLINIC | Age: 70
End: 2025-01-30
Payer: MEDICARE

## 2025-01-30 ENCOUNTER — TELEPHONE (OUTPATIENT)
Dept: INTERNAL MEDICINE CLINIC | Facility: CLINIC | Age: 70
End: 2025-01-30

## 2025-01-30 ENCOUNTER — LAB ENCOUNTER (OUTPATIENT)
Dept: LAB | Age: 70
End: 2025-01-30
Attending: INTERNAL MEDICINE
Payer: MEDICARE

## 2025-01-30 VITALS
HEART RATE: 100 BPM | HEIGHT: 63 IN | BODY MASS INDEX: 38.52 KG/M2 | OXYGEN SATURATION: 98 % | SYSTOLIC BLOOD PRESSURE: 128 MMHG | TEMPERATURE: 98 F | WEIGHT: 217.38 LBS | DIASTOLIC BLOOD PRESSURE: 76 MMHG

## 2025-01-30 DIAGNOSIS — L08.9 DIABETIC FOOT INFECTION (HCC): ICD-10-CM

## 2025-01-30 DIAGNOSIS — E11.628 DIABETIC FOOT INFECTION (HCC): Primary | ICD-10-CM

## 2025-01-30 DIAGNOSIS — L08.9 DIABETIC FOOT INFECTION (HCC): Primary | ICD-10-CM

## 2025-01-30 DIAGNOSIS — L03.116 CELLULITIS OF LEFT LOWER EXTREMITY: ICD-10-CM

## 2025-01-30 DIAGNOSIS — E11.628 DIABETIC FOOT INFECTION (HCC): ICD-10-CM

## 2025-01-30 LAB
BASOPHILS # BLD AUTO: 0.04 X10(3) UL (ref 0–0.2)
BASOPHILS NFR BLD AUTO: 0.6 %
CRP SERPL-MCNC: 6.9 MG/DL (ref ?–1)
DEPRECATED RDW RBC AUTO: 46.5 FL (ref 35.1–46.3)
EOSINOPHIL # BLD AUTO: 0.11 X10(3) UL (ref 0–0.7)
EOSINOPHIL NFR BLD AUTO: 1.7 %
ERYTHROCYTE [DISTWIDTH] IN BLOOD BY AUTOMATED COUNT: 13.4 % (ref 11–15)
ERYTHROCYTE [SEDIMENTATION RATE] IN BLOOD: 32 MM/HR
HCT VFR BLD AUTO: 44.4 %
HGB BLD-MCNC: 15.1 G/DL
IMM GRANULOCYTES # BLD AUTO: 0.06 X10(3) UL (ref 0–1)
IMM GRANULOCYTES NFR BLD: 0.9 %
LYMPHOCYTES # BLD AUTO: 1.27 X10(3) UL (ref 1–4)
LYMPHOCYTES NFR BLD AUTO: 19.8 %
MCH RBC QN AUTO: 32.1 PG (ref 26–34)
MCHC RBC AUTO-ENTMCNC: 34 G/DL (ref 31–37)
MCV RBC AUTO: 94.5 FL
MONOCYTES # BLD AUTO: 0.76 X10(3) UL (ref 0.1–1)
MONOCYTES NFR BLD AUTO: 11.8 %
NEUTROPHILS # BLD AUTO: 4.18 X10 (3) UL (ref 1.5–7.7)
NEUTROPHILS # BLD AUTO: 4.18 X10(3) UL (ref 1.5–7.7)
NEUTROPHILS NFR BLD AUTO: 65.2 %
PLATELET # BLD AUTO: 161 10(3)UL (ref 150–450)
RBC # BLD AUTO: 4.7 X10(6)UL
WBC # BLD AUTO: 6.4 X10(3) UL (ref 4–11)

## 2025-01-30 PROCEDURE — G2211 COMPLEX E/M VISIT ADD ON: HCPCS | Performed by: INTERNAL MEDICINE

## 2025-01-30 PROCEDURE — 99215 OFFICE O/P EST HI 40 MIN: CPT | Performed by: INTERNAL MEDICINE

## 2025-01-30 PROCEDURE — 36415 COLL VENOUS BLD VENIPUNCTURE: CPT

## 2025-01-30 PROCEDURE — 85652 RBC SED RATE AUTOMATED: CPT

## 2025-01-30 PROCEDURE — 85025 COMPLETE CBC W/AUTO DIFF WBC: CPT

## 2025-01-30 PROCEDURE — 86140 C-REACTIVE PROTEIN: CPT

## 2025-01-30 RX ORDER — DOXYCYCLINE 100 MG/1
100 CAPSULE ORAL EVERY 12 HOURS
Qty: 14 CAPSULE | Refills: 0 | Status: SHIPPED | OUTPATIENT
Start: 2025-01-30

## 2025-01-30 NOTE — PROGRESS NOTES
Teresa Wilson is a 69 year old female.  Chief Complaint   Patient presents with    Checkup       HPI:   Teresa Wilson is a 69 year old female who presents for: UC follow up    Noticed redness of left ankle and leg. Was seen in UC--was advised hospitalization but declined. Was given doxycycline.     Pt reports that swelling, pain and redness have improved in the last 2.5 days.   Denies fever.     Wt Readings from Last 6 Encounters:   01/30/25 217 lb 6.4 oz (98.6 kg)   12/24/24 215 lb 6.4 oz (97.7 kg)   12/18/24 219 lb (99.3 kg)   11/27/24 218 lb 12.8 oz (99.2 kg)   10/08/24 219 lb (99.3 kg)   08/06/24 216 lb (98 kg)     Body mass index is 38.51 kg/m².       Current Outpatient Medications   Medication Sig Dispense Refill    IMIPRAMINE 50 MG Oral Tab TAKE 3 TABLETS BY MOUTH EVERY NIGHT 270 tablet 3    doxycycline 100 MG Oral Cap Take 1 capsule (100 mg total) by mouth 2 (two) times daily for 7 days. 14 capsule 0    FAMOTIDINE 40 MG Oral Tab TAKE 1 TABLET(40 MG) BY MOUTH TWICE DAILY AS NEEDED FOR HEARTBURN 180 tablet 3    albuterol 108 (90 Base) MCG/ACT Inhalation Aero Soln Inhale 2 puffs into the lungs every 6 (six) hours as needed for Wheezing. 1 each 5    butalbital-acetaminophen-caffeine -40 MG Oral Tab Take 1 tablet by mouth every 6 (six) hours as needed for Headaches. 30 tablet 3    benzonatate (TESSALON PERLES) 100 MG Oral Cap Take 1 capsule (100 mg total) by mouth 3 (three) times daily as needed. 30 capsule 1    PRAVASTATIN 40 MG Oral Tab TAKE 1 TABLET(40 MG) BY MOUTH EVERY NIGHT 90 tablet 3    KLOR-CON 10 10 MEQ Oral Tab CR TAKE 4 TABLET BY MOUTH TWICE DAILY 720 tablet 3    letrozole 2.5 MG Oral Tab Take 1 tablet (2.5 mg total) by mouth nightly. 90 tablet 3    CLOPIDOGREL 75 MG Oral Tab TAKE 1 TABLET(75 MG) BY MOUTH DAILY 90 tablet 3    furosemide 20 MG Oral Tab Take 1 tablet (20 mg total) by mouth 2 (two) times daily. 180 tablet 3    carvedilol 3.125 MG Oral Tab Take 1 tablet (3.125 mg  total) by mouth 2 (two) times daily.      doxycycline 100 MG Oral Cap Take 1 capsule (100 mg total) by mouth Q12H.      LOSARTAN 25 MG Oral Tab TAKE 1/2 TABLET BY MOUTH EVERY DAY 45 tablet 3    ERGOCALCIFEROL 1.25 MG (68770 UT) Oral Cap TAKE 1 CAPSULE BY MOUTH 1 TIME A WEEK 13 capsule 3    polyethylene glycol, PEG 3350, 17 g Oral Powd Pack Take 17 g by mouth daily as needed. 30 each 0    Continuous Blood Gluc Sensor (FREESTYLE MURALI 2 SENSOR) Does not apply Misc 1 Device every 14 (fourteen) days.      Insulin Disposable Pump (OMNIPOD DASH PODS, GEN 4,) Does not apply Misc use 1 pod      HUMULIN R U-500 KWIKPEN 500 UNIT/ML Subcutaneous Solution Pen-injector Pt with Insulin pump. Pt was instructed to verify with Endocrinogist if she needs to adjust dose a day before surgery. Pt was instructed no insulin os DOS and removed CGM and Insulin pump per Anesthesia protocol.      Insulin Disposable Pump (OMNIPOD DASH PODS, GEN 4,) Does not apply Misc USE 1 POD UNDER THE SKIN EVERY 2.5 DAYS      Continuous Blood Gluc Sensor (FREESTYLE MURALI 2 SENSOR SYSTM) Does not apply Misc       BD PEN NEEDLE SKY 2ND GEN 32G X 4 MM Does not apply Misc USE AS DIRECTED 200 each 3    ondansetron (ZOFRAN ODT) 8 MG Oral Tablet Dispersible Take 1 tablet (8 mg total) by mouth every 6 (six) hours as needed for Nausea. Place on top of the tongue where it will dissolve, then swallow 30 tablet 2    Blood Glucose Monitoring Suppl (ONETOUCH VERIO) w/Device Does not apply Kit       Cyanocobalamin (VITAMIN B 12 OR) Take 500 mcg by mouth every morning.      Glucose Blood In Vitro Strip Use 1 strip by percutaneous route 4-6 times every day 200 each 0    Levothyroxine Sodium 88 MCG Oral Tab Take 1 tablet (88 mcg total) by mouth every morning before breakfast. 30 tablet 11    Magnesium Oxide 400 (240 Mg) MG Oral Tab Take 1 tablet (400 mg total) by mouth 2 (two) times daily. 30 tablet 0    Naloxone HCl 4 MG/0.1ML Nasal Liquid 4 mg as needed. (Patient not  taking: Reported on 1/30/2025)      traMADol 50 MG Oral Tab Take 1 tablet (50 mg total) by mouth every 8 (eight) hours as needed. (Patient not taking: Reported on 1/30/2025)        Past Medical History:    Acute, but ill-defined, cerebrovascular disease    Adenomatous colon polyp    Anxiety    Anxiety state    Arthritis    Asthma (HCC)    Breast CA (HCC)    Cellulitis and abscess of leg    Closed displaced fracture of surgical neck of right humerus, unspecified fracture morphology, initial encounter    CVA (cerebral infarction)    Depression    Disorder of thyroid    Esophageal reflux    Essential hypertension    Exposure to medical diagnostic radiation    Extrinsic asthma, unspecified    High blood pressure    High cholesterol    Hypothyroidism    Liver cirrhosis (HCC)    cirrhosis    Migraines    Neuropathy    Obesity    Obesity (BMI 30-39.9)    Osteoarthritis    Other and unspecified hyperlipidemia    PFO (patent foramen ovale) (HCC)    PONV (postoperative nausea and vomiting)    Shingles    March 2020    Stroke ([facility])    aphasia- 10 years ago    Type II or unspecified type diabetes mellitus without mention of complication, not stated as uncontrolled    Vertigo    Visual impairment    glasses      Past Surgical History:   Procedure Laterality Date    Appendectomy      Cholecystectomy      Colonoscopy  03/2022    Colonoscopy N/A 01/21/2019    Procedure: COLONOSCOPY;  Surgeon: Mello Rowland MD;  Location: [facility] ENDOSCOPY    Colonoscopy N/A 04/04/2022    Procedure: COLONOSCOPY;  Surgeon: Mello Rowland MD;  Location: [facility] ENDOSCOPY    Colonoscopy N/A 05/13/2022    Procedure: COLONOSCOPY;  Surgeon: Mello Rowland MD;  Location: [facility] ENDOSCOPY    Colonoscopy  06/2023    Colonoscopy N/A 06/30/2023    Procedure: COLONOSCOPY;  Surgeon: Mello Rowland MD;  Location: [facility] ENDOSCOPY    Colonoscopy & polypectomy  09/16/2013    adenoma    Cortisone injection Left     Left knee, Dr. Murphy    D & c      4 of them    Egd   2013    Egd  2019    Egd  2022    Egd  2023    Electrocardiogram, complete  2013    scanned to media    Extraction erupted tooth/exr  2018    Hernia surgery      Hysterectomy      Lumpectomy right Right         Talia biopsy stereo nodule 1 site right (cpt=19081) Right 2024    X clip- calcs    Needle biopsy left            Radiation right      Tooth implantation  2018    Total shoulder replacement  2023    Umbilical hernia repair        Family History   Problem Relation Age of Onset    Breast Cancer Self 67    Hypertension Mother     Skin cancer Mother     Cancer Mother     Dementia Mother     Diabetes Father     Skin cancer Father     Dementia Father     Pancreatic Cancer Sister     Diabetes Sister     Asthma Sister     Depression Sister     Obesity Daughter     Hypertension Maternal Grandmother     Cancer Maternal Grandfather     Stroke Paternal Grandmother       Social History:   Social History     Socioeconomic History    Marital status:    Tobacco Use    Smoking status: Never     Passive exposure: Never    Smokeless tobacco: Never   Vaping Use    Vaping status: Never Used   Substance and Sexual Activity    Alcohol use: Yes     Comment: socially    Drug use: No   Other Topics Concern    Caffeine Concern Yes     Comment: soda    Reaction to local anesthetic No     Social Drivers of Health     Financial Resource Strain: Low Risk  (2023)    Financial Resource Strain     Difficulty of Paying Living Expenses: Not very hard     Med Affordability: No   Transportation Needs: No Transportation Needs (2023)    Transportation Needs     Lack of Transportation: No    Received [facility]    Housing Stability          REVIEW OF SYSTEMS:   GENERAL: feels well otherwise  SKIN:redness      EXAM:   /76   Pulse 100   Temp 98.2 °F (36.8 °C)   Ht 5' 3\" (1.6 m)   Wt 217 lb 6.4 oz (98.6 kg)   SpO2 98%   BMI 38.51 kg/m²     GENERAL:  well developed, well nourished, in no apparent distress  L foot-3rd digit with distal eschar. Very mild erythema of ankle, nontender to palpation. +1 edema      ASSESSMENT AND PLAN:     Cellulitis  Open wound    Discussed with patient and  that I am concerned about toe infection and would recommend hospitalization for iv antbiotics and further evaluation of the foot. She and  decline stating that this is already better. I have arranged follow up with her podiatrist this afternoon and advised that she ask their opinion.   I also advised cbc, crp, esr.       Maria Luz Reynolds DO  1/30/2025  9:57 AM    ADDENDUM:  Cbc normal; esr mildly elevated CRP markedly elevated  Again recommended hospital evaluation as this would be the fastest way to evaluation the toe for infection. She declines.   I have ordered a stat MRI for evaluation and also recommended repeat esr/crp next week.

## 2025-01-31 ENCOUNTER — HOSPITAL ENCOUNTER (OUTPATIENT)
Dept: MRI IMAGING | Facility: HOSPITAL | Age: 70
Discharge: HOME | End: 2025-01-31
Attending: INTERNAL MEDICINE
Payer: MEDICARE

## 2025-01-31 ENCOUNTER — TELEPHONE (OUTPATIENT)
Dept: INTERNAL MEDICINE CLINIC | Facility: CLINIC | Age: 70
End: 2025-01-31

## 2025-01-31 DIAGNOSIS — E11.628 DIABETIC FOOT INFECTION (HCC): ICD-10-CM

## 2025-01-31 DIAGNOSIS — L08.9 DIABETIC FOOT INFECTION (HCC): ICD-10-CM

## 2025-01-31 PROCEDURE — A9575 INJ GADOTERATE MEGLUMI 0.1ML: HCPCS | Performed by: INTERNAL MEDICINE

## 2025-01-31 PROCEDURE — 73720 MRI LWR EXTREMITY W/O&W/DYE: CPT | Performed by: INTERNAL MEDICINE

## 2025-01-31 RX ORDER — GADOTERATE MEGLUMINE 376.9 MG/ML
20 INJECTION INTRAVENOUS
Status: COMPLETED | OUTPATIENT
Start: 2025-01-31 | End: 2025-01-31

## 2025-01-31 RX ADMIN — GADOTERATE MEGLUMINE 20 ML: 376.9 INJECTION INTRAVENOUS at 19:02:00

## 2025-01-31 NOTE — TELEPHONE ENCOUNTER
Patient is calling and states that she spoke to Dr Reynolds last evening in regards to getting an MRI    Patient states doctor told her to call if she has not heard from a nurse about getting the orders and scheduling of the MRI     Please call and advise

## 2025-01-31 NOTE — TELEPHONE ENCOUNTER
Called patient and explained that order for Magnetic Resonance Imaging (MRI) is in the system - scheduling number given -explained she should let them know that it is ordered as immediately-verbalized understanding

## 2025-02-03 ENCOUNTER — APPOINTMENT (OUTPATIENT)
Age: 70
End: 2025-02-03
Attending: INTERNAL MEDICINE
Payer: MEDICARE

## 2025-02-03 ENCOUNTER — TELEPHONE (OUTPATIENT)
Dept: INTERNAL MEDICINE CLINIC | Facility: CLINIC | Age: 70
End: 2025-02-03

## 2025-02-03 ENCOUNTER — TELEPHONE (OUTPATIENT)
Dept: HEMATOLOGY/ONCOLOGY | Facility: HOSPITAL | Age: 70
End: 2025-02-03

## 2025-02-03 ENCOUNTER — TELEPHONE (OUTPATIENT)
Age: 70
End: 2025-02-03

## 2025-02-03 NOTE — TELEPHONE ENCOUNTER
Patient canceled her apt for today due to cellulitis to foot, on abx.  No cancer concerns.  Requesting a call back later today for rescheduling with Dr Mcgill.

## 2025-02-03 NOTE — TELEPHONE ENCOUNTER
Patient is because she have Cellulitis and it is very painful. Pain is 5/10. She is cancelling her appointment with Dr. Mcgill Today 2/3/25 at 300 pm.

## 2025-02-03 NOTE — TELEPHONE ENCOUNTER
Please notify pt that her MRI was ok--no evidence of osteomyelitis (this means infection of the bone). So let's have her do another set of blood tests this week to make sure the markers are coming down

## 2025-02-06 ENCOUNTER — LAB ENCOUNTER (OUTPATIENT)
Dept: LAB | Age: 70
End: 2025-02-06
Attending: INTERNAL MEDICINE
Payer: MEDICARE

## 2025-02-06 ENCOUNTER — OFFICE VISIT (OUTPATIENT)
Dept: INTERNAL MEDICINE CLINIC | Facility: CLINIC | Age: 70
End: 2025-02-06
Payer: MEDICARE

## 2025-02-06 VITALS
DIASTOLIC BLOOD PRESSURE: 76 MMHG | WEIGHT: 217 LBS | HEART RATE: 108 BPM | BODY MASS INDEX: 38.45 KG/M2 | TEMPERATURE: 99 F | OXYGEN SATURATION: 97 % | SYSTOLIC BLOOD PRESSURE: 134 MMHG | HEIGHT: 63 IN

## 2025-02-06 DIAGNOSIS — L08.9 DIABETIC FOOT INFECTION (HCC): ICD-10-CM

## 2025-02-06 DIAGNOSIS — L03.116 CELLULITIS OF LEFT LOWER EXTREMITY: Primary | ICD-10-CM

## 2025-02-06 DIAGNOSIS — L97.521 SKIN ULCER OF TOE OF LEFT FOOT, LIMITED TO BREAKDOWN OF SKIN (HCC): ICD-10-CM

## 2025-02-06 DIAGNOSIS — E11.628 DIABETIC FOOT INFECTION (HCC): ICD-10-CM

## 2025-02-06 LAB
CRP SERPL-MCNC: 0.7 MG/DL (ref ?–1)
ERYTHROCYTE [SEDIMENTATION RATE] IN BLOOD: 27 MM/HR

## 2025-02-06 PROCEDURE — 36415 COLL VENOUS BLD VENIPUNCTURE: CPT

## 2025-02-06 PROCEDURE — 99214 OFFICE O/P EST MOD 30 MIN: CPT | Performed by: INTERNAL MEDICINE

## 2025-02-06 PROCEDURE — 86140 C-REACTIVE PROTEIN: CPT

## 2025-02-06 PROCEDURE — 85652 RBC SED RATE AUTOMATED: CPT

## 2025-02-06 NOTE — PROGRESS NOTES
Teresa Wilson is a 69 year old female.  Chief Complaint   Patient presents with    Infection     Patient is here today to follow on cellulitis infection of the left lower leg (currently on doxycycline- which is helping). Patient also notes left heel issue- she is currently putting neosporin on it. No fevers.       HPI:     Follow up of cellulitis    Seen in  on 1/27 for cellulitis of left foot/ankle. (Previously treated for LLE cellulitis in  in 12/2024 w/doxy).  Was advised for ED eval but pt refused.  Was given Rx for doxycycline.   Saw Dr. Reynolds on 1/30 -- pt noted improvement at that time.  Labs done -- ESR mildly elevated; CRP markedly elevated.  MRI foot ordered -- done 1/31/25 -- no evidence of osteomyelitis; no evidence of drainable fluid collection.  +soft tissue swelling      Current Outpatient Medications   Medication Sig Dispense Refill    doxycycline 100 MG Oral Cap Take 1 capsule (100 mg total) by mouth Q12H. 14 capsule 0    IMIPRAMINE 50 MG Oral Tab TAKE 3 TABLETS BY MOUTH EVERY NIGHT 270 tablet 3    FAMOTIDINE 40 MG Oral Tab TAKE 1 TABLET(40 MG) BY MOUTH TWICE DAILY AS NEEDED FOR HEARTBURN 180 tablet 3    albuterol 108 (90 Base) MCG/ACT Inhalation Aero Soln Inhale 2 puffs into the lungs every 6 (six) hours as needed for Wheezing. 1 each 5    butalbital-acetaminophen-caffeine -40 MG Oral Tab Take 1 tablet by mouth every 6 (six) hours as needed for Headaches. 30 tablet 3    benzonatate (TESSALON PERLES) 100 MG Oral Cap Take 1 capsule (100 mg total) by mouth 3 (three) times daily as needed. 30 capsule 1    PRAVASTATIN 40 MG Oral Tab TAKE 1 TABLET(40 MG) BY MOUTH EVERY NIGHT 90 tablet 3    KLOR-CON 10 10 MEQ Oral Tab CR TAKE 4 TABLET BY MOUTH TWICE DAILY 720 tablet 3    letrozole 2.5 MG Oral Tab Take 1 tablet (2.5 mg total) by mouth nightly. 90 tablet 3    CLOPIDOGREL 75 MG Oral Tab TAKE 1 TABLET(75 MG) BY MOUTH DAILY 90 tablet 3    furosemide 20 MG Oral Tab Take 1 tablet (20 mg total)  by mouth 2 (two) times daily. 180 tablet 3    carvedilol 3.125 MG Oral Tab Take 1 tablet (3.125 mg total) by mouth 2 (two) times daily.      LOSARTAN 25 MG Oral Tab TAKE 1/2 TABLET BY MOUTH EVERY DAY 45 tablet 3    ERGOCALCIFEROL 1.25 MG (29677 UT) Oral Cap TAKE 1 CAPSULE BY MOUTH 1 TIME A WEEK 13 capsule 3    Naloxone HCl 4 MG/0.1ML Nasal Liquid 4 mg as needed.      traMADol 50 MG Oral Tab Take 1 tablet (50 mg total) by mouth every 8 (eight) hours as needed.      polyethylene glycol, PEG 3350, 17 g Oral Powd Pack Take 17 g by mouth daily as needed. 30 each 0    Continuous Blood Gluc Sensor (FREESTYLE MURALI 2 SENSOR) Does not apply Misc 1 Device every 14 (fourteen) days.      Insulin Disposable Pump (OMNIPOD DASH PODS, GEN 4,) Does not apply Misc use 1 pod      HUMULIN R U-500 KWIKPEN 500 UNIT/ML Subcutaneous Solution Pen-injector Pt with Insulin pump. Pt was instructed to verify with Endocrinogist if she needs to adjust dose a day before surgery. Pt was instructed no insulin os DOS and removed CGM and Insulin pump per Anesthesia protocol.      Insulin Disposable Pump (OMNIPOD DASH PODS, GEN 4,) Does not apply Misc USE 1 POD UNDER THE SKIN EVERY 2.5 DAYS      Continuous Blood Gluc Sensor (FREESTYLE MURALI 2 SENSOR SYSTM) Does not apply Misc       BD PEN NEEDLE SKY 2ND GEN 32G X 4 MM Does not apply Misc USE AS DIRECTED 200 each 3    ondansetron (ZOFRAN ODT) 8 MG Oral Tablet Dispersible Take 1 tablet (8 mg total) by mouth every 6 (six) hours as needed for Nausea. Place on top of the tongue where it will dissolve, then swallow 30 tablet 2    Blood Glucose Monitoring Suppl (ONETOUCH VERIO) w/Device Does not apply Kit       Cyanocobalamin (VITAMIN B 12 OR) Take 500 mcg by mouth every morning.      Glucose Blood In Vitro Strip Use 1 strip by percutaneous route 4-6 times every day 200 each 0    Levothyroxine Sodium 88 MCG Oral Tab Take 1 tablet (88 mcg total) by mouth every morning before breakfast. 30 tablet 11     Magnesium Oxide 400 (240 Mg) MG Oral Tab Take 1 tablet (400 mg total) by mouth 2 (two) times daily. 30 tablet 0      Past Medical History:    Acute, but ill-defined, cerebrovascular disease    Adenomatous colon polyp    Anxiety    Anxiety state    Arthritis    Asthma (HCC)    Breast CA (HCC)    Cancer (HCC)    Cellulitis and abscess of leg    Closed displaced fracture of surgical neck of right humerus, unspecified fracture morphology, initial encounter    CVA (cerebral infarction)    Depression    Disorder of thyroid    Esophageal reflux    Essential hypertension    Exposure to medical diagnostic radiation    Extrinsic asthma, unspecified    High blood pressure    High cholesterol    Hypothyroidism    Liver cirrhosis (HCC)    cirrhosis    Migraines    Neuropathy    Obesity    Obesity (BMI 30-39.9)    Osteoarthritis    Other and unspecified hyperlipidemia    PFO (patent foramen ovale) (HCC)    PONV (postoperative nausea and vomiting)    Shingles    March 2020    Sleep apnea    cpap not used    Stroke (HCC)    aphasia- 10 years ago    Type II or unspecified type diabetes mellitus without mention of complication, not stated as uncontrolled    Vertigo    Visual impairment    glasses      Social History:  Social History     Socioeconomic History    Marital status:    Tobacco Use    Smoking status: Never     Passive exposure: Never    Smokeless tobacco: Never   Vaping Use    Vaping status: Never Used   Substance and Sexual Activity    Alcohol use: Not Currently     Alcohol/week: 2.0 standard drinks of alcohol     Types: 2 Glasses of wine per week     Comment: social 1 or 2    Drug use: No   Other Topics Concern    Caffeine Concern Yes     Comment: soda    Reaction to local anesthetic No     Social Drivers of Health     Transportation Needs: No Transportation Needs (8/14/2023)    Transportation Needs     Lack of Transportation: No    Received from [facility]    Housing Stability        REVIEW OF  SYSTEMS:   GENERAL HEALTH: feels well otherwise  RESPIRATORY: no SOB  CARDIOVASCULAR: no chest pain/pressure  GI: no nausea, vomiting, diarrhea    Wt Readings from Last 5 Encounters:   02/06/25 217 lb (98.4 kg)   01/30/25 217 lb 6.4 oz (98.6 kg)   12/24/24 215 lb 6.4 oz (97.7 kg)   12/18/24 219 lb (99.3 kg)   11/27/24 218 lb 12.8 oz (99.2 kg)     Body mass index is 38.44 kg/m².      EXAM:   /76 (BP Location: Right arm, Patient Position: Sitting, Cuff Size: adult)   Pulse 108   Temp 99.2 °F (37.3 °C) (Oral)   Ht 5' 3\" (1.6 m)   Wt 217 lb (98.4 kg)   SpO2 97%   BMI 38.44 kg/m²   GENERAL: well developed, well nourished, in no apparent distress    CARDIO: RRR, normal S1S2, without murmur or gallop  EXTREMITIES: no LE edema, +small scab over distal left 3rd toe.  No warmth or erythema of leg, no tenderness.  Small crack in left heel - no redness or tenderness    ASSESSMENT AND PLAN:     LLE cellulitis  Left 3rd toe ulcer  -Hx Left 2nd toe osteomyelitis 2/2 infected ulcer  -s/p distal L 2nd toe amputation 5/20/24 (at [facility])   -seen in IC for LLE cellulitis on 1/27/25 - started on doxy  -saw Dr. Reynolds 1/30 for follow up; improving but inflamm markers high  -saw her podiatrist's partner also on 1/30 - eschar removed from distal 3rd left toe  -MRI foot 1/31 neg for osteo or abscess  -repeat ESR, CRP normalized  -cellulitis clinically resolved  -has small crack in skin on left heel -- no sx of infection.  Rec nightly moisturizing of left foot/leg w/CeraVe or Cetaphil moisturizing cream  -has appt with her podiatrist (Dr. Tc Juarez) at [facility] in 2 weeks    The patient indicates understanding of these issues and agrees to the plan.    Erica Young MD, 02/06/25, 5:40 PM

## 2025-02-07 RX ORDER — ERGOCALCIFEROL 1.25 MG/1
50000 CAPSULE, LIQUID FILLED ORAL WEEKLY
Qty: 13 CAPSULE | Refills: 3 | Status: SHIPPED | OUTPATIENT
Start: 2025-02-07

## 2025-02-07 RX ORDER — POTASSIUM CHLORIDE 750 MG/1
40 TABLET, FILM COATED, EXTENDED RELEASE ORAL 2 TIMES DAILY
Qty: 720 TABLET | Refills: 3 | OUTPATIENT
Start: 2025-02-07

## 2025-02-07 NOTE — TELEPHONE ENCOUNTER
Refill request is for a maintenance medication and has met the criteria specified in the Ambulatory Medication Refill Standing Order for eligibility, visits, laboratory, alerts and was sent to the requested pharmacy.    Requested Prescriptions     Signed Prescriptions Disp Refills    ERGOCALCIFEROL 1.25 MG (11022 UT) Oral Cap 13 capsule 3     Sig: TAKE 1 CAPSULE BY MOUTH 1 TIME A WEEK     Authorizing Provider: ANDRES TINOCO     Ordering User: ROYAL CHERRY

## 2025-02-07 NOTE — TELEPHONE ENCOUNTER
1 year was sent in Oct    Current refill request refused due to refill is either a duplicate request or has active refills at the pharmacy.  Check previous templates.    Requested Prescriptions     Pending Prescriptions Disp Refills    KLOR-CON 10 10 MEQ Oral Tab CR [Pharmacy Med Name: KLOR-CON 10MEQ ER TABLETS] 720 tablet 3     Sig: TAKE 4 TABLETS BY MOUTH TWICE DAILY

## 2025-02-08 ENCOUNTER — APPOINTMENT (OUTPATIENT)
Dept: LAB | Age: 70
End: 2025-02-08
Attending: INTERNAL MEDICINE
Payer: MEDICARE

## 2025-02-08 ENCOUNTER — LAB ENCOUNTER (OUTPATIENT)
Dept: LAB | Age: 70
End: 2025-02-08
Attending: INTERNAL MEDICINE
Payer: MEDICARE

## 2025-02-08 DIAGNOSIS — K75.81 NONALCOHOLIC STEATOHEPATITIS: Primary | ICD-10-CM

## 2025-02-08 DIAGNOSIS — K74.60 CIRRHOSIS (HCC): ICD-10-CM

## 2025-02-08 LAB
AFP-TM SERPL-MCNC: 7.2 NG/ML
ALBUMIN SERPL-MCNC: 3.7 G/DL (ref 3.2–4.8)
ALBUMIN/GLOB SERPL: 1.3 {RATIO} (ref 1–2)
ALP LIVER SERPL-CCNC: 107 U/L
ALT SERPL-CCNC: 50 U/L
ANION GAP SERPL CALC-SCNC: 8 MMOL/L (ref 0–18)
AST SERPL-CCNC: 40 U/L (ref ?–34)
BASOPHILS # BLD AUTO: 0.06 X10(3) UL (ref 0–0.2)
BASOPHILS NFR BLD AUTO: 1 %
BILIRUB SERPL-MCNC: 0.5 MG/DL (ref 0.2–1.1)
BUN BLD-MCNC: 15 MG/DL (ref 9–23)
BUN/CREAT SERPL: 16.5 (ref 10–20)
CALCIUM BLD-MCNC: 9.3 MG/DL (ref 8.7–10.4)
CHLORIDE SERPL-SCNC: 104 MMOL/L (ref 98–112)
CO2 SERPL-SCNC: 28 MMOL/L (ref 21–32)
CREAT BLD-MCNC: 0.91 MG/DL
DEPRECATED RDW RBC AUTO: 45.1 FL (ref 35.1–46.3)
EGFRCR SERPLBLD CKD-EPI 2021: 69 ML/MIN/1.73M2 (ref 60–?)
EOSINOPHIL # BLD AUTO: 0.11 X10(3) UL (ref 0–0.7)
EOSINOPHIL NFR BLD AUTO: 1.9 %
ERYTHROCYTE [DISTWIDTH] IN BLOOD BY AUTOMATED COUNT: 12.9 % (ref 11–15)
FASTING STATUS PATIENT QL REPORTED: YES
GLOBULIN PLAS-MCNC: 2.8 G/DL (ref 2–3.5)
GLUCOSE BLD-MCNC: 250 MG/DL (ref 70–99)
HCT VFR BLD AUTO: 42.8 %
HGB BLD-MCNC: 14.2 G/DL
IMM GRANULOCYTES # BLD AUTO: 0.04 X10(3) UL (ref 0–1)
IMM GRANULOCYTES NFR BLD: 0.7 %
LYMPHOCYTES # BLD AUTO: 1.84 X10(3) UL (ref 1–4)
LYMPHOCYTES NFR BLD AUTO: 31.7 %
MCH RBC QN AUTO: 31.4 PG (ref 26–34)
MCHC RBC AUTO-ENTMCNC: 33.2 G/DL (ref 31–37)
MCV RBC AUTO: 94.7 FL
MONOCYTES # BLD AUTO: 0.53 X10(3) UL (ref 0.1–1)
MONOCYTES NFR BLD AUTO: 9.1 %
NEUTROPHILS # BLD AUTO: 3.22 X10 (3) UL (ref 1.5–7.7)
NEUTROPHILS # BLD AUTO: 3.22 X10(3) UL (ref 1.5–7.7)
NEUTROPHILS NFR BLD AUTO: 55.6 %
OSMOLALITY SERPL CALC.SUM OF ELEC: 299 MOSM/KG (ref 275–295)
PLATELET # BLD AUTO: 154 10(3)UL (ref 150–450)
POTASSIUM SERPL-SCNC: 4.3 MMOL/L (ref 3.5–5.1)
PROT SERPL-MCNC: 6.5 G/DL (ref 5.7–8.2)
RBC # BLD AUTO: 4.52 X10(6)UL
SODIUM SERPL-SCNC: 140 MMOL/L (ref 136–145)
WBC # BLD AUTO: 5.8 X10(3) UL (ref 4–11)

## 2025-02-08 PROCEDURE — 85025 COMPLETE CBC W/AUTO DIFF WBC: CPT

## 2025-02-08 PROCEDURE — 80053 COMPREHEN METABOLIC PANEL: CPT

## 2025-02-08 PROCEDURE — 36415 COLL VENOUS BLD VENIPUNCTURE: CPT

## 2025-02-08 PROCEDURE — 82105 ALPHA-FETOPROTEIN SERUM: CPT

## 2025-02-18 ENCOUNTER — LAB ENCOUNTER (OUTPATIENT)
Dept: LAB | Age: 70
End: 2025-02-18
Attending: INTERNAL MEDICINE
Payer: MEDICARE

## 2025-02-18 DIAGNOSIS — E11.628 DIABETIC FOOT INFECTION (HCC): ICD-10-CM

## 2025-02-18 DIAGNOSIS — E11.9 DIABETES MELLITUS (HCC): Primary | ICD-10-CM

## 2025-02-18 DIAGNOSIS — E78.2 MIXED HYPERLIPIDEMIA: ICD-10-CM

## 2025-02-18 DIAGNOSIS — L08.9 DIABETIC FOOT INFECTION (HCC): ICD-10-CM

## 2025-02-18 LAB
ALBUMIN SERPL-MCNC: 3.7 G/DL (ref 3.2–4.8)
ALBUMIN/GLOB SERPL: 1.3 {RATIO} (ref 1–2)
ALP LIVER SERPL-CCNC: 100 U/L
ALT SERPL-CCNC: 43 U/L
ANION GAP SERPL CALC-SCNC: 6 MMOL/L (ref 0–18)
AST SERPL-CCNC: 38 U/L (ref ?–34)
BILIRUB SERPL-MCNC: 0.7 MG/DL (ref 0.2–1.1)
BUN BLD-MCNC: 14 MG/DL (ref 9–23)
BUN/CREAT SERPL: 15.7 (ref 10–20)
CALCIUM BLD-MCNC: 9.1 MG/DL (ref 8.7–10.4)
CHLORIDE SERPL-SCNC: 101 MMOL/L (ref 98–112)
CHOLEST SERPL-MCNC: 171 MG/DL (ref ?–200)
CO2 SERPL-SCNC: 31 MMOL/L (ref 21–32)
CREAT BLD-MCNC: 0.89 MG/DL
CREAT UR-SCNC: 279.8 MG/DL
CRP SERPL-MCNC: 0.6 MG/DL (ref ?–1)
EGFRCR SERPLBLD CKD-EPI 2021: 71 ML/MIN/1.73M2 (ref 60–?)
ERYTHROCYTE [SEDIMENTATION RATE] IN BLOOD: 24 MM/HR
EST. AVERAGE GLUCOSE BLD GHB EST-MCNC: 258 MG/DL (ref 68–126)
FASTING PATIENT LIPID ANSWER: YES
FASTING STATUS PATIENT QL REPORTED: YES
GLOBULIN PLAS-MCNC: 2.8 G/DL (ref 2–3.5)
GLUCOSE BLD-MCNC: 160 MG/DL (ref 70–99)
HBA1C MFR BLD: 10.6 % (ref ?–5.7)
HDLC SERPL-MCNC: 74 MG/DL (ref 40–59)
LDLC SERPL CALC-MCNC: 83 MG/DL (ref ?–100)
MICROALBUMIN UR-MCNC: 5.8 MG/DL
MICROALBUMIN/CREAT 24H UR-RTO: 20.7 UG/MG (ref ?–30)
NONHDLC SERPL-MCNC: 97 MG/DL (ref ?–130)
OSMOLALITY SERPL CALC.SUM OF ELEC: 290 MOSM/KG (ref 275–295)
POTASSIUM SERPL-SCNC: 4 MMOL/L (ref 3.5–5.1)
PROT SERPL-MCNC: 6.5 G/DL (ref 5.7–8.2)
SODIUM SERPL-SCNC: 138 MMOL/L (ref 136–145)
TRIGL SERPL-MCNC: 76 MG/DL (ref 30–149)
VLDLC SERPL CALC-MCNC: 12 MG/DL (ref 0–30)

## 2025-02-18 PROCEDURE — 82570 ASSAY OF URINE CREATININE: CPT

## 2025-02-18 PROCEDURE — 36415 COLL VENOUS BLD VENIPUNCTURE: CPT

## 2025-02-18 PROCEDURE — 86140 C-REACTIVE PROTEIN: CPT

## 2025-02-18 PROCEDURE — 80061 LIPID PANEL: CPT

## 2025-02-18 PROCEDURE — 83036 HEMOGLOBIN GLYCOSYLATED A1C: CPT

## 2025-02-18 PROCEDURE — 80053 COMPREHEN METABOLIC PANEL: CPT

## 2025-02-18 PROCEDURE — 82043 UR ALBUMIN QUANTITATIVE: CPT

## 2025-02-18 PROCEDURE — 85652 RBC SED RATE AUTOMATED: CPT

## 2025-02-26 ENCOUNTER — OFFICE VISIT (OUTPATIENT)
Age: 70
End: 2025-02-26
Attending: INTERNAL MEDICINE
Payer: MEDICARE

## 2025-02-26 VITALS
BODY MASS INDEX: 38.74 KG/M2 | TEMPERATURE: 98 F | HEIGHT: 63 IN | RESPIRATION RATE: 20 BRPM | OXYGEN SATURATION: 96 % | SYSTOLIC BLOOD PRESSURE: 117 MMHG | HEART RATE: 91 BPM | WEIGHT: 218.63 LBS | DIASTOLIC BLOOD PRESSURE: 72 MMHG

## 2025-02-26 DIAGNOSIS — Z17.0 MALIGNANT NEOPLASM OF CENTRAL PORTION OF RIGHT BREAST IN FEMALE, ESTROGEN RECEPTOR POSITIVE (HCC): Primary | ICD-10-CM

## 2025-02-26 DIAGNOSIS — Z79.811 ENCOUNTER FOR MONITORING AROMATASE INHIBITOR THERAPY: ICD-10-CM

## 2025-02-26 DIAGNOSIS — Z12.31 SCREENING MAMMOGRAM, ENCOUNTER FOR: ICD-10-CM

## 2025-02-26 DIAGNOSIS — I89.0 LYMPHEDEMA OF RIGHT ARM: ICD-10-CM

## 2025-02-26 DIAGNOSIS — Z08 ENCOUNTER FOR FOLLOW-UP SURVEILLANCE OF BREAST CANCER: ICD-10-CM

## 2025-02-26 DIAGNOSIS — Z51.81 ENCOUNTER FOR MONITORING AROMATASE INHIBITOR THERAPY: ICD-10-CM

## 2025-02-26 DIAGNOSIS — Z85.3 ENCOUNTER FOR FOLLOW-UP SURVEILLANCE OF BREAST CANCER: ICD-10-CM

## 2025-02-26 DIAGNOSIS — C50.111 MALIGNANT NEOPLASM OF CENTRAL PORTION OF RIGHT BREAST IN FEMALE, ESTROGEN RECEPTOR POSITIVE (HCC): Primary | ICD-10-CM

## 2025-02-26 NOTE — PROGRESS NOTES
HPI   Teresa Wilson is a 69 year old female here for follow up of   Encounter Diagnoses   Name Primary?    Malignant neoplasm of central portion of right breast in female, estrogen receptor positive (HCC) Yes    Encounter for monitoring aromatase inhibitor therapy     Encounter for follow-up surveillance of breast cancer     Lymphedema of right arm     Screening mammogram, encounter for        Compliance with SBE: Yes. Changes on SBE: No.      Compliance with letrozole:  compliance most of the time.      Side effects from letrozole: Yes hot flashes (cold flashes) decreased.  No arthralgias or myalgias from the medication..        ECOG PS 2      Review of Systems:   Review of Systems   Constitutional:  Positive for fatigue. Negative for appetite change and unexpected weight change.   HENT:           Has URI   Respiratory:  Positive for cough (from URI). Negative for shortness of breath.    Cardiovascular:  Negative for chest pain.   Gastrointestinal:  Negative for abdominal pain and constipation.        Liver disease   Endocrine: Positive for hot flashes.   Musculoskeletal:  Positive for arthralgias (knees and hips) and back pain. Negative for neck pain.        From DJD   Skin:         Hair loss.     Had cellulitis   Neurological:  Negative for dizziness and headaches (once in a while, chronic).   Hematological:  Negative for adenopathy.   Psychiatric/Behavioral:  Positive for sleep disturbance.            Current Outpatient Medications   Medication Sig Dispense Refill    ERGOCALCIFEROL 1.25 MG (06952 UT) Oral Cap TAKE 1 CAPSULE BY MOUTH 1 TIME A WEEK 13 capsule 3    doxycycline 100 MG Oral Cap Take 1 capsule (100 mg total) by mouth Q12H. 14 capsule 0    IMIPRAMINE 50 MG Oral Tab TAKE 3 TABLETS BY MOUTH EVERY NIGHT 270 tablet 3    FAMOTIDINE 40 MG Oral Tab TAKE 1 TABLET(40 MG) BY MOUTH TWICE DAILY AS NEEDED FOR HEARTBURN 180 tablet 3    albuterol 108 (90 Base) MCG/ACT Inhalation Aero Soln Inhale 2 puffs  into the lungs every 6 (six) hours as needed for Wheezing. 1 each 5    butalbital-acetaminophen-caffeine -40 MG Oral Tab Take 1 tablet by mouth every 6 (six) hours as needed for Headaches. 30 tablet 3    benzonatate (TESSALON PERLES) 100 MG Oral Cap Take 1 capsule (100 mg total) by mouth 3 (three) times daily as needed. 30 capsule 1    PRAVASTATIN 40 MG Oral Tab TAKE 1 TABLET(40 MG) BY MOUTH EVERY NIGHT 90 tablet 3    KLOR-CON 10 10 MEQ Oral Tab CR TAKE 4 TABLET BY MOUTH TWICE DAILY 720 tablet 3    letrozole 2.5 MG Oral Tab Take 1 tablet (2.5 mg total) by mouth nightly. 90 tablet 3    CLOPIDOGREL 75 MG Oral Tab TAKE 1 TABLET(75 MG) BY MOUTH DAILY 90 tablet 3    furosemide 20 MG Oral Tab Take 1 tablet (20 mg total) by mouth 2 (two) times daily. 180 tablet 3    carvedilol 3.125 MG Oral Tab Take 1 tablet (3.125 mg total) by mouth 2 (two) times daily.      LOSARTAN 25 MG Oral Tab TAKE 1/2 TABLET BY MOUTH EVERY DAY 45 tablet 3    Naloxone HCl 4 MG/0.1ML Nasal Liquid 4 mg as needed.      traMADol 50 MG Oral Tab Take 1 tablet (50 mg total) by mouth every 8 (eight) hours as needed.      polyethylene glycol, PEG 3350, 17 g Oral Powd Pack Take 17 g by mouth daily as needed. 30 each 0    Continuous Blood Gluc Sensor (FREESTYLE MURALI 2 SENSOR) Does not apply Misc 1 Device every 14 (fourteen) days.      Insulin Disposable Pump (OMNIPOD DASH PODS, GEN 4,) Does not apply Misc use 1 pod      HUMULIN R U-500 KWIKPEN 500 UNIT/ML Subcutaneous Solution Pen-injector Pt with Insulin pump. Pt was instructed to verify with Endocrinogist if she needs to adjust dose a day before surgery. Pt was instructed no insulin os DOS and removed CGM and Insulin pump per Anesthesia protocol.      Insulin Disposable Pump (OMNIPOD DASH PODS, GEN 4,) Does not apply Misc USE 1 POD UNDER THE SKIN EVERY 2.5 DAYS      Continuous Blood Gluc Sensor (FREESTYLE MURALI 2 SENSOR SYSTM) Does not apply Misc       BD PEN NEEDLE SKY 2ND GEN 32G X 4 MM Does not  apply Misc USE AS DIRECTED 200 each 3    ondansetron (ZOFRAN ODT) 8 MG Oral Tablet Dispersible Take 1 tablet (8 mg total) by mouth every 6 (six) hours as needed for Nausea. Place on top of the tongue where it will dissolve, then swallow 30 tablet 2    Blood Glucose Monitoring Suppl (ONETOUCH VERIO) w/Device Does not apply Kit       Cyanocobalamin (VITAMIN B 12 OR) Take 500 mcg by mouth every morning.      Glucose Blood In Vitro Strip Use 1 strip by percutaneous route 4-6 times every day 200 each 0    Levothyroxine Sodium 88 MCG Oral Tab Take 1 tablet (88 mcg total) by mouth every morning before breakfast. 30 tablet 11    Magnesium Oxide 400 (240 Mg) MG Oral Tab Take 1 tablet (400 mg total) by mouth 2 (two) times daily. 30 tablet 0     Allergies:   Allergies   Allergen Reactions    Adhesive Tape HIVES    Diltiazem SWELLING    Pioglitazone SWELLING    Acetaminophen NAUSEA AND VOMITING     cirrhosis    Erythromycin NAUSEA AND VOMITING    Ibuprofen OTHER (SEE COMMENTS)     Mouth ulcers      Lisinopril NAUSEA AND VOMITING and DIZZINESS    Metformin Hcl PAIN     Other reaction(s): abdominal cramps    Potassium Chloride NAUSEA AND VOMITING      In Liquid form    Sitagliptin PAIN     stomach aches    Sulfamethoxazole W/Trimethoprim FEVER     Per patient    Amiloride OTHER (SEE COMMENTS)     Other reaction(s): Sores in Throat    Eucerin ITCHING and RASH    Farxiga [Dapagliflozin] ITCHING     Vaginal itchiness    Latex RASH and ITCHING    Metoprolol Tartrate FATIGUE     feeling fuzzy    Pantoprazole Sodium NAUSEA ONLY    Propoxyphene UNKNOWN     Pt can't remember the reaction    Spironolactone NAUSEA ONLY       Past Medical History:    Acute, but ill-defined, cerebrovascular disease    Adenomatous colon polyp    Anxiety    Anxiety state    Arthritis    Asthma (HCC)    Breast CA (HCC)    Cancer (HCC)    Cellulitis and abscess of leg    Closed displaced fracture of surgical neck of right humerus, unspecified fracture  morphology, initial encounter    CVA (cerebral infarction)    Depression    Disorder of thyroid    Esophageal reflux    Essential hypertension    Exposure to medical diagnostic radiation    Extrinsic asthma, unspecified    High blood pressure    High cholesterol    Hypothyroidism    Liver cirrhosis (HCC)    cirrhosis    Migraines    Neuropathy    Obesity    Obesity (BMI 30-39.9)    Osteoarthritis    Other and unspecified hyperlipidemia    PFO (patent foramen ovale) (HCC)    PONV (postoperative nausea and vomiting)    Shingles    2020    Sleep apnea    cpap not used    Stroke (HCC)    aphasia- 10 years ago    Type II or unspecified type diabetes mellitus without mention of complication, not stated as uncontrolled    Vertigo    Visual impairment    glasses     Past Surgical History:   Procedure Laterality Date    Appendectomy      Cataract      Cholecystectomy      Colonoscopy  2022    Colonoscopy N/A 2019    Procedure: COLONOSCOPY;  Surgeon: Mello Rowland MD;  Location: [facility] ENDOSCOPY    Colonoscopy N/A 2022    Procedure: COLONOSCOPY;  Surgeon: Mello Rowland MD;  Location: [facility] ENDOSCOPY    Colonoscopy N/A 2022    Procedure: COLONOSCOPY;  Surgeon: Mello Rowland MD;  Location: [facility] ENDOSCOPY    Colonoscopy  2023    Colonoscopy N/A 2023    Procedure: COLONOSCOPY;  Surgeon: Mello Rowland MD;  Location: [facility] ENDOSCOPY    Colonoscopy & polypectomy  2013    adenoma    Cortisone injection Left     Left knee, Dr. Murphy    D & c      4 of them    Egd  2013    Egd  2019    Egd  2022    Egd  2023    Electrocardiogram, complete  2013    scanned to media    Extraction erupted tooth/exr  2018    Hernia surgery      Hysterectomy      Lumpectomy right Right         Talia biopsy stereo nodule 1 site right (cpt=19081) Right 2024    X clip- calcs    Needle biopsy left            Radiation right      Tooth implantation  2018    Total  shoulder replacement  09/07/2023    Umbilical hernia repair       Social History     Socioeconomic History    Marital status:    Tobacco Use    Smoking status: Never     Passive exposure: Never    Smokeless tobacco: Never   Vaping Use    Vaping status: Never Used   Substance and Sexual Activity    Alcohol use: Not Currently     Alcohol/week: 2.0 standard drinks of alcohol     Types: 2 Glasses of wine per week     Comment: social 1 or 2    Drug use: No   Other Topics Concern    Caffeine Concern Yes     Comment: soda    Reaction to local anesthetic No     Social Drivers of Health     Transportation Needs: No Transportation Needs (8/14/2023)    Transportation Needs     Lack of Transportation: No    Received from [facility]    Housing Stability       Family History   Problem Relation Age of Onset    Breast Cancer Self 67    Hypertension Mother     Skin cancer Mother     Cancer Mother     Dementia Mother     Diabetes Father     Skin cancer Father     Dementia Father     Pancreatic Cancer Sister     Diabetes Sister     Asthma Sister     Depression Sister     Obesity Daughter     Hypertension Maternal Grandmother     Cancer Maternal Grandfather     Stroke Paternal Grandmother          PHYSICAL EXAM:    /72 (BP Location: Left arm, Patient Position: Sitting, Cuff Size: large)   Pulse 91   Temp 98.1 °F (36.7 °C) (Oral)   Resp 20   Ht 1.6 m (5' 3\")   Wt 99.2 kg (218 lb 9.6 oz)   SpO2 96%   BMI 38.72 kg/m²   Wt Readings from Last 6 Encounters:   02/26/25 99.2 kg (218 lb 9.6 oz)   02/06/25 98.4 kg (217 lb)   01/30/25 98.6 kg (217 lb 6.4 oz)   12/24/24 97.7 kg (215 lb 6.4 oz)   12/18/24 99.3 kg (219 lb)   11/27/24 99.2 kg (218 lb 12.8 oz)     Physical Exam  General: Patient is alert, not in acute distress.  HEENT: EOMs intact. PERRL.   Neck: No JVD. No palpable lymphadenopathy. Neck is supple.  Chest: Clear to auscultation.  Heart: Regular rate and rhythm.   Abdomen: Soft, non tender with  good bowel sounds.  Extremities: No edema.  Neurological: Grossly intact.   Lymphatics: There is no palpable lymphadenopathy throughout in the cervical, supraclavicular, axillary, or inguinal regions.  Psych/Depression: nl  Breasts:  R breast no masses, surgical scars on the axilla and central lumpectomy.  Lymphedema at axilla on the R and on the R arm.  Shoulder on the R with new scar from surgery.  L breast no masses.        ASSESSMENT/PLAN:     1. Malignant neoplasm of central portion of right breast in female, estrogen receptor positive (HCC)    2. Encounter for monitoring aromatase inhibitor therapy    3. Encounter for follow-up surveillance of breast cancer    4. Lymphedema of right arm    5. Screening mammogram, encounter for       Cancer Staging   Mucinous carcinoma of breast, right (HCC)  Staging form: Breast, AJCC 8th Edition  - Clinical stage from 10/18/2022: Stage IA (cT1c, cN0(sn), cM0, G1, ER+, MI+, HER2-) - Signed by Kyle Mcgill MD on 10/18/2022    Discussed with the patient the natural history of her type of breast cancer.  Discussed that this is a very favorable prognosis as pure mucinous.  No indication for Oncotype Dx.    Patient has completed her surgical management. Completed RT to complete local regional management.    On an AI for 5 yrs, depending on new emerging data.  On letrozole, 5 years will be an December 2027.    Baseline DEXA on October 2022 was normal.  DEXA on 1/24/2025 so within normal range.  Next will be due in January 2027.    Mammogram on 1/24/2025 of the right breast with no evidence of malignancy.  Recommended for follow-up of bilateral breast in 6 months.      Follow up in 6 months.    MDM moderate.  Continuity of complex medical care.    No orders of the defined types were placed in this encounter.      Results From Past 48 Hours:  No results found for this or any previous visit (from the past 48 hours).    Imaging & Referrals:  None   No orders of the defined types were  placed in this encounter.  PROCEDURE: [facility] GREGORIO 2D+3D DIAGNOSTIC AMANDA RIGHT (CPT=[id number])      COMPARISON: [facility], [facility] GREGORIO 2D+3D DIAGNOSTIC AMANDA BILAT (YWS=[id number]), 9/24/2021, 7:05 AM.  [facility], [facility] GREGORIO 2D+3D DIAGNOSTIC AMANDA BILAT (TMD=[id number]), 5/09/2023, 10:39 AM.  [facility], [facility] GREGORIO 2D+3D DIAGNOSTIC AMANDA BILAT (ZNR=[id number]), 5/29/2024, 7:27 AM.  [facility], [facility] POST PROCEDURAL IMAGE LEFT (CPT=77065), 6/05/2024, 7:59 AM.      INDICATIONS: Z17.0 Malignant neoplasm of central portion of right breast in female, estrogen receptor positive (HCC) C50.111 Malignant neoplasm of central portion of right *      TECHNIQUE: Digital diagnostic mammography was performed and images were reviewed with the [employer] MARISELA 1.5.1.5 CAD device.  3D tomosynthesis was performed and reviewed.         BREAST COMPOSITION:   Category b-Scattered areas fibroglandular density.         FINDINGS: Right breast biopsy clip noted in the posterior central aspect.  Post lumpectomy changes in the right breast appear similar to prior examinations since May 2023 in this patient with history of right lumpectomy September 2022. The parenchymal   pattern otherwise in the right breast is not significantly changed.  No suspicious mass, calcification or distortion noted.                   Impression  CONCLUSION:        No mammographic evidence of malignancy right breast.      BI-RADS CATEGORY:     DIAGNOSTIC CATEGORY 2 - BENIGN FINDING:       RECOMMENDATIONS:   ROUTINE MAMMOGRAM AND CLINICAL EVALUATION DUE MAY 2025.              PLEASE NOTE: NORMAL MAMMOGRAM DOES NOT EXCLUDE THE POSSIBILITY OF BREAST CANCER.  A CLINICALLY SUSPICIOUS PALPABLE LUMP SHOULD BE BIOPSIED.       For patients over the age of 40, the target due date for the patient's next mammogram has been entered into a reminder system.       Patient received a discharge summary from  the technologist after completion of exam.      Breast marker legend used on images    Triangle = Palpable lump   Pilot Point = Skin tag or mole   BB = Nipple   Linear cortes = Scar   Square = Pain            Dictated by (CST): Javy Foley MD on 1/24/2025 at 8:19 AM       Finalized by (CST): Javy Foley MD on 1/24/2025 at 8:28 AM            [address] [place] [address]   [phone]

## 2025-02-27 ENCOUNTER — TELEPHONE (OUTPATIENT)
Dept: INTERNAL MEDICINE CLINIC | Facility: CLINIC | Age: 70
End: 2025-02-27

## 2025-02-27 RX ORDER — BENZONATATE 100 MG/1
100 CAPSULE ORAL 3 TIMES DAILY PRN
Qty: 30 CAPSULE | Refills: 0 | Status: SHIPPED | OUTPATIENT
Start: 2025-02-27

## 2025-02-27 RX ORDER — LOSARTAN POTASSIUM 25 MG/1
TABLET ORAL
Qty: 45 TABLET | Refills: 3 | Status: SHIPPED | OUTPATIENT
Start: 2025-02-27

## 2025-02-27 RX ORDER — OFLOXACIN 3 MG/ML
2 SOLUTION/ DROPS OPHTHALMIC 4 TIMES DAILY
Qty: 10 ML | Refills: 0 | Status: SHIPPED | OUTPATIENT
Start: 2025-02-27 | End: 2025-03-06

## 2025-02-27 NOTE — TELEPHONE ENCOUNTER
Patient calling for medication.  Has had cold for about a week. Coughing up dark green thick and sticky phlegm. Blowing green.   No headache, no sinus or chest congestion.  2 days ago eyes draining green. Itching and blood shot. Waking up with crusted over eyes. Patient is more concern about the eyes with green drainage.  Putting warm compress on eyes off and on when itching.    Nichole Quevedo    Best call back number [phone]

## 2025-02-27 NOTE — TELEPHONE ENCOUNTER
Patient contacted, symptoms started last Thursday. Complaining of cough (productive, thick green phlegm), runny nose, eye redness and discharge (green). Patient denies fever, chills, shortness of breath, sore throat, or any other symptoms.   Patient has not taken a Covid or flu test.  Asking if something can be prescribed.     To  to please advise

## 2025-02-28 NOTE — TELEPHONE ENCOUNTER
Rx sent in for eye drops and benzonatate.  Would not do an oral antibiotic at this point.  See me next week if not better

## 2025-02-28 NOTE — TELEPHONE ENCOUNTER
Spoke with patient and relayed Dr. Yuong' message. Patient verbalized an understanding. Patient advised to call back/schedule an appointment with new or worsening symptoms. Patient verbalized an agreement to the plan.

## 2025-03-03 ENCOUNTER — TELEPHONE (OUTPATIENT)
Dept: INTERNAL MEDICINE CLINIC | Facility: CLINIC | Age: 70
End: 2025-03-03

## 2025-03-05 NOTE — TELEPHONE ENCOUNTER
Fax signed, okay to hold Plavix 5 days prior and faxed to [phone] with confirmation received. Sent to scanning.

## 2025-03-13 ENCOUNTER — HOSPITAL ENCOUNTER (OUTPATIENT)
Facility: HOSPITAL | Age: 70
Setting detail: HOSPITAL OUTPATIENT SURGERY
Discharge: HOME | End: 2025-03-13
Attending: INTERNAL MEDICINE | Admitting: INTERNAL MEDICINE
Payer: MEDICARE

## 2025-03-13 ENCOUNTER — ANESTHESIA (OUTPATIENT)
Dept: ENDOSCOPY | Facility: HOSPITAL | Age: 70
End: 2025-03-13
Payer: MEDICARE

## 2025-03-13 ENCOUNTER — ANESTHESIA EVENT (OUTPATIENT)
Dept: ENDOSCOPY | Facility: HOSPITAL | Age: 70
End: 2025-03-13
Payer: MEDICARE

## 2025-03-13 VITALS
OXYGEN SATURATION: 95 % | HEART RATE: 63 BPM | SYSTOLIC BLOOD PRESSURE: 144 MMHG | HEIGHT: 63 IN | TEMPERATURE: 97 F | DIASTOLIC BLOOD PRESSURE: 79 MMHG | RESPIRATION RATE: 14 BRPM | BODY MASS INDEX: 38.62 KG/M2 | WEIGHT: 218 LBS

## 2025-03-13 LAB
GLUCOSE BLDC GLUCOMTR-MCNC: 216 MG/DL (ref 70–99)
GLUCOSE BLDC GLUCOMTR-MCNC: 252 MG/DL (ref 70–99)

## 2025-03-13 PROCEDURE — BD47ZZZ ULTRASONOGRAPHY OF GASTROINTESTINAL TRACT: ICD-10-PCS | Performed by: INTERNAL MEDICINE

## 2025-03-13 PROCEDURE — 82962 GLUCOSE BLOOD TEST: CPT

## 2025-03-13 RX ORDER — ONDANSETRON 2 MG/ML
4 INJECTION INTRAMUSCULAR; INTRAVENOUS ONCE
Status: COMPLETED | OUTPATIENT
Start: 2025-03-13 | End: 2025-03-13

## 2025-03-13 RX ORDER — LIDOCAINE HYDROCHLORIDE 10 MG/ML
INJECTION, SOLUTION EPIDURAL; INFILTRATION; INTRACAUDAL; PERINEURAL AS NEEDED
Status: DISCONTINUED | OUTPATIENT
Start: 2025-03-13 | End: 2025-03-13 | Stop reason: SURG

## 2025-03-13 RX ORDER — NICOTINE POLACRILEX 4 MG
30 LOZENGE BUCCAL
Status: DISCONTINUED | OUTPATIENT
Start: 2025-03-13 | End: 2025-03-13 | Stop reason: HOSPADM

## 2025-03-13 RX ORDER — MIDAZOLAM HYDROCHLORIDE 1 MG/ML
INJECTION INTRAMUSCULAR; INTRAVENOUS AS NEEDED
Status: DISCONTINUED | OUTPATIENT
Start: 2025-03-13 | End: 2025-03-13 | Stop reason: SURG

## 2025-03-13 RX ORDER — ONDANSETRON 2 MG/ML
INJECTION INTRAMUSCULAR; INTRAVENOUS AS NEEDED
Status: DISCONTINUED | OUTPATIENT
Start: 2025-03-13 | End: 2025-03-13 | Stop reason: SURG

## 2025-03-13 RX ORDER — SODIUM CHLORIDE, SODIUM LACTATE, POTASSIUM CHLORIDE, CALCIUM CHLORIDE 600; 310; 30; 20 MG/100ML; MG/100ML; MG/100ML; MG/100ML
INJECTION, SOLUTION INTRAVENOUS CONTINUOUS
Status: DISCONTINUED | OUTPATIENT
Start: 2025-03-13 | End: 2025-03-13 | Stop reason: HOSPADM

## 2025-03-13 RX ORDER — ROCURONIUM BROMIDE 10 MG/ML
INJECTION, SOLUTION INTRAVENOUS AS NEEDED
Status: DISCONTINUED | OUTPATIENT
Start: 2025-03-13 | End: 2025-03-13 | Stop reason: SURG

## 2025-03-13 RX ORDER — DEXTROSE MONOHYDRATE 25 G/50ML
50 INJECTION, SOLUTION INTRAVENOUS
Status: DISCONTINUED | OUTPATIENT
Start: 2025-03-13 | End: 2025-03-13 | Stop reason: HOSPADM

## 2025-03-13 RX ORDER — INDOMETHACIN 100 MG
100 SUPPOSITORY, RECTAL RECTAL ONCE
Status: DISCONTINUED | OUTPATIENT
Start: 2025-03-13 | End: 2025-03-13 | Stop reason: HOSPADM

## 2025-03-13 RX ORDER — NICOTINE POLACRILEX 4 MG
15 LOZENGE BUCCAL
Status: DISCONTINUED | OUTPATIENT
Start: 2025-03-13 | End: 2025-03-13 | Stop reason: HOSPADM

## 2025-03-13 RX ORDER — DEXAMETHASONE SODIUM PHOSPHATE 4 MG/ML
VIAL (ML) INJECTION AS NEEDED
Status: DISCONTINUED | OUTPATIENT
Start: 2025-03-13 | End: 2025-03-13 | Stop reason: SURG

## 2025-03-13 RX ORDER — SODIUM CHLORIDE, SODIUM LACTATE, POTASSIUM CHLORIDE, CALCIUM CHLORIDE 600; 310; 30; 20 MG/100ML; MG/100ML; MG/100ML; MG/100ML
INJECTION, SOLUTION INTRAVENOUS CONTINUOUS
Status: DISCONTINUED | OUTPATIENT
Start: 2025-03-13 | End: 2025-03-13

## 2025-03-13 RX ORDER — NALOXONE HYDROCHLORIDE 0.4 MG/ML
0.08 INJECTION, SOLUTION INTRAMUSCULAR; INTRAVENOUS; SUBCUTANEOUS AS NEEDED
Status: DISCONTINUED | OUTPATIENT
Start: 2025-03-13 | End: 2025-03-13 | Stop reason: HOSPADM

## 2025-03-13 RX ADMIN — ROCURONIUM BROMIDE 40 MG: 10 INJECTION, SOLUTION INTRAVENOUS at 14:55:00

## 2025-03-13 RX ADMIN — SODIUM CHLORIDE, SODIUM LACTATE, POTASSIUM CHLORIDE, CALCIUM CHLORIDE: 600; 310; 30; 20 INJECTION, SOLUTION INTRAVENOUS at 15:45:00

## 2025-03-13 RX ADMIN — MIDAZOLAM HYDROCHLORIDE 2 MG: 1 INJECTION INTRAMUSCULAR; INTRAVENOUS at 14:48:00

## 2025-03-13 RX ADMIN — ONDANSETRON 4 MG: 2 INJECTION INTRAMUSCULAR; INTRAVENOUS at 15:13:00

## 2025-03-13 RX ADMIN — SODIUM CHLORIDE, SODIUM LACTATE, POTASSIUM CHLORIDE, CALCIUM CHLORIDE: 600; 310; 30; 20 INJECTION, SOLUTION INTRAVENOUS at 14:49:00

## 2025-03-13 RX ADMIN — DEXAMETHASONE SODIUM PHOSPHATE 4 MG: 4 MG/ML VIAL (ML) INJECTION at 15:13:00

## 2025-03-13 RX ADMIN — LIDOCAINE HYDROCHLORIDE 50 MG: 10 INJECTION, SOLUTION EPIDURAL; INFILTRATION; INTRACAUDAL; PERINEURAL at 14:55:00

## 2025-03-13 NOTE — ANESTHESIA POSTPROCEDURE EVALUATION
Patient: Teresa Wilson    Procedure Summary       Date: 03/13/25 Room / Location: [facility] ENDOSCOPY 01 / [facility] ENDOSCOPY    Anesthesia Start: 1448 Anesthesia Stop: 1546    Procedure: ENDOSCOPIC ULTRASOUND (EUS) Diagnosis:       Choledocholithiasis      (normal exam)    Surgeons: Herbert Rebollar MD Anesthesiologist: Jessie Fields CRNA    Anesthesia Type: general ASA Status: 3            Anesthesia Type: general    Vitals Value Taken Time   /87 03/13/25 1543   Temp 36 03/13/25 1546   Pulse 80 03/13/25 1545   Resp 15 03/13/25 1545   SpO2 99 % 03/13/25 1545   Vitals shown include unfiled device data.    [facility] AN Post Evaluation:   Patient Evaluated in PACU  Patient Participation: complete - patient participated  Level of Consciousness: responsive to verbal stimuli  Pain Score: 0  Pain Management: adequate  Airway Patency:patent  Dental exam unchanged from preop  Yes    Nausea/Vomiting: none  Cardiovascular Status: stable  Respiratory Status: face mask and spontaneous ventilation  Postoperative Hydration stable      Jessie Fields CRNA  3/13/2025 3:46 PM

## 2025-03-13 NOTE — ANESTHESIA PROCEDURE NOTES
Airway  Date/Time: 3/13/2025 2:57 PM  Urgency: elective      General Information and Staff    Patient location during procedure: endo  Resident/CRNA: Jessie Fields CRNA  Performed: CRNA   Performed by: Jessie Fields CRNA  Authorized by: eJssie Fields CRNA      Indications and Patient Condition  Indications for airway management: anesthesia  Spontaneous ventilation: present  Sedation level: minimal  Preoxygenated: yes  Patient position: sniffing  Mask difficulty assessment: 1 - vent by mask    Final Airway Details  Final airway type: endotracheal airway      Successful airway: ETT     Successful intubation technique: direct laryngoscopy  Blade: Courtney  Blade size: #3  ETT size (mm): 7.5    Cormack-Lehane Classification: grade IIB - view of arytenoids or posterior of glottis only  Placement verified by: capnometry   Measured from: lips  ETT to lips (cm): 22  Number of attempts at approach: 1

## 2025-03-13 NOTE — ANESTHESIA PREPROCEDURE EVALUATION
Anesthesia PreOp Note    HPI:     Teresa Wilson is a 69 year old female who presents for preoperative consultation requested by: Herbert Rebollar MD    Date of Surgery: 3/13/2025    Procedure(s):  ENDOSCOPIC RETROGRADE CHOLANGIOPANCREATOGRAPHY / ENDOSCOPIC ULTRASOUND  ENDOSCOPIC ULTRASOUND (EUS)  Indication: Choledocholithiasis    Relevant Problems   No relevant active problems       NPO:                         History Review:  Patient Active Problem List    Diagnosis Date Noted    Vitamin D deficiency 05/02/2024    Obesity (BMI 30-39.9) 07/18/2023    Thrombocytopenia, unspecified 04/12/2023    Type 2 diabetes mellitus with diabetic neuropathy (HCC) 03/01/2023    Morbid obesity with BMI of 40.0-44.9, adult (HCC) 03/01/2023    Mucinous carcinoma of breast, 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/05/2022    Primary osteoarthritis of both knees 08/25/2022    Hypomagnesemia 12/09/2017    Liver cirrhosis secondary to BLANCAS (HCC) 03/15/2016    Vitamin B12 deficiency 09/03/2014    Type 2 diabetes mellitus without complication, with long-term current use of insulin (HCC) 12/19/2013    Hypercholesterolemia 12/19/2013    History of adenomatous polyp of colon 12/19/2013    Routine health maintenance 09/10/2013    Other specified hypothyroidism 04/23/2013    Hx of transient ischemic attack (TIA) 11/15/2012    Gastroesophageal reflux disease without esophagitis 07/19/2012    Obstructive sleep apnea 08/24/2011       Past Medical History:    Acute, but ill-defined, cerebrovascular disease    Adenomatous colon polyp    Anxiety    Anxiety state    Arthritis    Asthma (HCC)    Breast CA (HCC)    Cancer (HCC)    Cellulitis    Cellulitis and abscess of leg    Closed displaced fracture of surgical neck of right humerus, unspecified fracture morphology, initial encounter    CVA (cerebral infarction)    Depression    Disorder of thyroid    Esophageal reflux    Essential hypertension    Exposure to medical diagnostic radiation    last dose of radiation  Patient ID: Augustin Owusu is a 69 y.o. male     Patient Care Team:  Head, GARRET Khoury as PCP - General (Nurse Practitioner)    Subjective     Chief Complaint   Patient presents with   • review test results       History of Present Illness    Augustin Owusu presents to [facility] today to discuss recent diagnostic imaging results which were completed when he was seen in the emergency room.  He presented to the emergency room on January 19, 2023 with complaints of body aches, chills, and headache along with a temperature of 101.1.  He had had a prostate biopsy done approximate 9 days prior to onset of symptoms.  He had a CT scan of abdomen and pelvis with IV contrast completed which enlarged prostate along with distal colitis with infectious as well as inflammatory etiologies.  He was treated with antibiotics and continues on treatment currently.  His symptoms have improved.  No longer running any fevers.  No complaints of abdominal pain.  On the CT there was incidental findings of subcentimeter hypodense lesions in the liver there was also lesion to the right kidney.  He had follow-up with urology yesterday with the nurse practitioner.  States he has a repeat ultrasound scheduled on February 1, 2023.      Colonoscopy is due in May 2023.  He was seen by Dr. Rizzo in the past.  With prior history of sigmoid polyp.    He denies any complaints of fever, chills, cough, chest pain, shortness of air, abdominal pain, nausea, or any other concerns.     The following portions of the patient's history were reviewed and updated as appropriate: allergies, current medications, past family history, past medical history, past social history, past surgical history and problem list.       ROS    Vitals:    01/26/23 0804   BP: 124/78   Pulse: 70   Temp: 97.5 °F (36.4 °C)   SpO2: 98%       Documented weights    01/26/23 0804   Weight: 78 kg (172 lb)     Body mass index is 23.99  2 years ago    Extrinsic asthma, unspecified    High blood pressure    High cholesterol    Hypothyroidism    Liver cirrhosis (HCC)    cirrhosis    Migraines    Neuropathy    Obesity    Obesity (BMI 30-39.9)    Osteoarthritis    Other and unspecified hyperlipidemia    PFO (patent foramen ovale) (HCC)    PONV (postoperative nausea and vomiting)    Shingles    2020    Sleep apnea    cpap not used    Stroke (HCC)    aphasia- 10 years ago    Type II or unspecified type diabetes mellitus without mention of complication, not stated as uncontrolled    Vertigo    Visual impairment    glasses       Past Surgical History:   Procedure Laterality Date    Appendectomy      Cataract      Cholecystectomy      Colonoscopy  2022    Colonoscopy N/A 2019    Procedure: COLONOSCOPY;  Surgeon: Mello Rowland MD;  Location: [facility] ENDOSCOPY    Colonoscopy N/A 2022    Procedure: COLONOSCOPY;  Surgeon: Mello Rowland MD;  Location: [facility] ENDOSCOPY    Colonoscopy N/A 2022    Procedure: COLONOSCOPY;  Surgeon: Mello Rowland MD;  Location: [facility] ENDOSCOPY    Colonoscopy  2023    Colonoscopy N/A 2023    Procedure: COLONOSCOPY;  Surgeon: Mello Rowland MD;  Location: [facility] ENDOSCOPY    Colonoscopy & polypectomy  2013    adenoma    Cortisone injection Left     Left knee, Dr. Murphy    Create eardrum opening,gen anesth      D & c      4 of them    Egd  2013    Egd  2019    Egd  2022    Egd  2023    Electrocardiogram, complete  2013    scanned to media    Extraction erupted tooth/exr  2018    Hernia surgery      Hysterectomy      Lumpectomy right Right         Talia biopsy stereo nodule 1 site right (cpt=19081) Right 2024    X clip- calcs    Needle biopsy left            Radiation right      Tooth implantation  2018    Total shoulder replacement  2023    Umbilical hernia repair         Prescriptions Prior to Admission[1]  Current Medications and  kg/m².    Results for orders placed or performed in visit on 08/03/22   TSH    Specimen: Blood   Result Value Ref Range    TSH 3.040 0.450 - 4.500 uIU/mL   Lipid Panel    Specimen: Blood   Result Value Ref Range    Total Cholesterol 225 (H) 100 - 199 mg/dL    Triglycerides 80 0 - 149 mg/dL    HDL Cholesterol 47 >39 mg/dL    VLDL Cholesterol Kb 14 5 - 40 mg/dL    LDL Chol Calc (NIH) 164 (H) 0 - 99 mg/dL   Comprehensive Metabolic Panel    Specimen: Blood   Result Value Ref Range    Glucose 92 65 - 99 mg/dL    BUN 19 8 - 27 mg/dL    Creatinine 1.05 0.76 - 1.27 mg/dL    EGFR Result 78 >59 mL/min/1.73    BUN/Creatinine Ratio 18 10 - 24    Sodium 143 134 - 144 mmol/L    Potassium 4.7 3.5 - 5.2 mmol/L    Chloride 107 (H) 96 - 106 mmol/L    Total CO2 18 (L) 20 - 29 mmol/L    Calcium 9.1 8.6 - 10.2 mg/dL    Total Protein 6.6 6.0 - 8.5 g/dL    Albumin 4.2 3.8 - 4.8 g/dL    Globulin 2.4 1.5 - 4.5 g/dL    A/G Ratio 1.8 1.2 - 2.2    Total Bilirubin 0.3 0.0 - 1.2 mg/dL    Alkaline Phosphatase 61 44 - 121 IU/L    AST (SGOT) 28 0 - 40 IU/L    ALT (SGPT) 17 0 - 44 IU/L   CBC (No Diff)    Specimen: Blood   Result Value Ref Range    WBC 3.6 3.4 - 10.8 x10E3/uL    RBC 4.70 4.14 - 5.80 x10E6/uL    Hemoglobin 14.4 13.0 - 17.7 g/dL    Hematocrit 42.5 37.5 - 51.0 %    MCV 90 79 - 97 fL    MCH 30.6 26.6 - 33.0 pg    MCHC 33.9 31.5 - 35.7 g/dL    RDW 12.5 11.6 - 15.4 %    Platelets CANCELED x10E3/uL    Hematology Comments: Note:            Objective     Physical Exam  Vitals reviewed.   Constitutional:       General: He is not in acute distress.  Cardiovascular:      Rate and Rhythm: Normal rate and regular rhythm.      Heart sounds: No murmur heard.  Pulmonary:      Effort: Pulmonary effort is normal.      Breath sounds: Normal breath sounds. No wheezing.   Abdominal:      General: Bowel sounds are normal. There is no distension.      Palpations: Abdomen is soft. There is no hepatomegaly or splenomegaly.      Tenderness: There is no  Prescriptions Ordered in Epic[2]    Allergies[3]    Family History   Problem Relation Age of Onset    Breast Cancer Self 65    Hypertension Mother     Skin cancer Mother     Cancer Mother     Dementia Mother     Diabetes Father     Skin cancer Father     Dementia Father     Pancreatic Cancer Sister     Diabetes Sister     Asthma Sister     Depression Sister     Obesity Daughter     Hypertension Maternal Grandmother     Cancer Maternal Grandfather     Stroke Paternal Grandmother      Social History     Socioeconomic History    Marital status:    Tobacco Use    Smoking status: Never     Passive exposure: Never    Smokeless tobacco: Never   Vaping Use    Vaping status: Never Used   Substance and Sexual Activity    Alcohol use: Not Currently     Alcohol/week: 2.0 standard drinks of alcohol     Types: 2 Glasses of wine per week     Comment: social 1 or 2    Drug use: No   Other Topics Concern    Caffeine Concern Yes     Comment: soda    Reaction to local anesthetic No       Available pre-op labs reviewed.  Lab Results   Component Value Date    WBC 5.8 02/08/2025    RBC 4.52 02/08/2025    HGB 14.2 02/08/2025    HCT 42.8 02/08/2025    MCV 94.7 02/08/2025    MCH 31.4 02/08/2025    MCHC 33.2 02/08/2025    RDW 12.9 02/08/2025    .0 02/08/2025     Lab Results   Component Value Date     02/18/2025    K 4.0 02/18/2025     02/18/2025    CO2 31.0 02/18/2025    BUN 14 02/18/2025    CREATSERUM 0.89 02/18/2025     (H) 02/18/2025    CA 9.1 02/18/2025          Vital Signs:  Body mass index is 38.62 kg/m².   height is 1.6 m (5' 3\") and weight is 98.9 kg (218 lb).   Vitals:    03/07/25 1336   Weight: 98.9 kg (218 lb)   Height: 1.6 m (5' 3\")        Anesthesia Evaluation      History of anesthetic complications   Airway   Mallampati: II  TM distance: >3 FB  Neck ROM: full  Dental    (+) lower dentures and upper dentures    Pulmonary - normal exam   (+) asthma, sleep apnea  Cardiovascular - normal exam  (+)  abdominal tenderness. There is no right CVA tenderness or left CVA tenderness.   Skin:     General: Skin is warm and dry.   Neurological:      Mental Status: He is alert.            Assessment & Plan     Assessment/Plan     Diagnoses and all orders for this visit:    1. Proctocolitis (Primary)    2. Liver lesion    3. Kidney lesion, native, right    4. Encounter for screening colonoscopy  -     Ambulatory Referral For Screening Colonoscopy    5. History of colon polyps  -     Ambulatory Referral For Screening Colonoscopy      Summary:  Augustin Owusu condition has improved since being seen in the emergency room.  He continues on p.o. antibiotics at this time.  No longer running any fevers.  No complaints of body aches or chills.  No complaints of abdominal pain or back pain.  Will obtain recent [facility] from [facility] to see what type of ultrasound was ordered to see if liver lesion will be assessed.  This will determine further recommendations.    In the meantime, instructed to contact us sooner for any problems or concerns.    Follow Up:  Return if symptoms worsen or fail to improve, for Next scheduled follow up.    Patient was given instructions and counseling regarding condition or for health maintenance advice.  Please see specific information pulled into the AVS if appropriate.      Patient was wearing facemask when I entered the room and throughout our encounter. Protective equipment was worn throughout this patient encounter including a face mask.  Hand hygiene was performed before donning protective equipment and after removal when leaving the room.     GARRET Newberry  Family Medicine  [facility]  01/26/23  09:45 EST     hypertension    ECG reviewed    Neuro/Psych    (+)  CVA, anxiety/panic attacks,  depression      Comments: No limb weakness observed and reported by patient.    GI/Hepatic/Renal    (+) GERD, liver disease    Endo/Other    (+) diabetes mellitus    Comments: Hx of breast cancer  Abdominal   (+) obese                 Anesthesia Plan:   ASA:  3  Plan:   General  Airway:  ETT  Informed Consent Plan and Risks Discussed With:  Patient  Use of Blood Products Discussed With:  Patient  Blood Product Use Consented    Discussed plan with:  Surgeon      I have informed Teresa Wilson and/or legal guardian or family member of the nature of the anesthetic plan, benefits, risks including possible dental damage if relevant, major complications, and any alternative forms of anesthetic management.   All of the patient's questions were answered to the best of my ability. The patient desires the anesthetic management as planned.  Jessie NOBLE Fields  3/13/2025 2:12 PM  Present on Admission:  **None**           [1]   Medications Prior to Admission   Medication Sig Dispense Refill Last Dose/Taking    LOSARTAN 25 MG Oral Tab TAKE 1/2 TABLET BY MOUTH EVERY DAY 45 tablet 3 Taking    ERGOCALCIFEROL 1.25 MG (98662 UT) Oral Cap TAKE 1 CAPSULE BY MOUTH 1 TIME A WEEK 13 capsule 3 Taking    IMIPRAMINE 50 MG Oral Tab TAKE 3 TABLETS BY MOUTH EVERY NIGHT 270 tablet 3 Taking    FAMOTIDINE 40 MG Oral Tab TAKE 1 TABLET(40 MG) BY MOUTH TWICE DAILY AS NEEDED FOR HEARTBURN 180 tablet 3 Taking    albuterol 108 (90 Base) MCG/ACT Inhalation Aero Soln Inhale 2 puffs into the lungs every 6 (six) hours as needed for Wheezing. 1 each 5 Taking As Needed    PRAVASTATIN 40 MG Oral Tab TAKE 1 TABLET(40 MG) BY MOUTH EVERY NIGHT 90 tablet 3 Taking    KLOR-CON 10 10 MEQ Oral Tab CR TAKE 4 TABLET BY MOUTH TWICE DAILY 720 tablet 3 Taking    letrozole 2.5 MG Oral Tab Take 1 tablet (2.5 mg total) by mouth nightly. 90 tablet 3 Taking    CLOPIDOGREL 75 MG Oral Tab  TAKE 1 TABLET(75 MG) BY MOUTH DAILY 90 tablet 3 Taking    furosemide 20 MG Oral Tab Take 1 tablet (20 mg total) by mouth 2 (two) times daily. 180 tablet 3 Taking    carvedilol 3.125 MG Oral Tab Take 1 tablet (3.125 mg total) by mouth 2 (two) times daily.   Taking    Naloxone HCl 4 MG/0.1ML Nasal Liquid 4 mg as needed.   Taking As Needed    traMADol 50 MG Oral Tab Take 1 tablet (50 mg total) by mouth every 8 (eight) hours as needed.   Taking As Needed    HUMULIN R U-500 KWIKPEN 500 UNIT/ML Subcutaneous Solution Pen-injector Pt with Insulin pump. Pt was instructed to verify with Endocrinogist if she needs to adjust dose a day before surgery. Pt was instructed no insulin os DOS and removed CGM and Insulin pump per Anesthesia protocol.   using Humulin U500 insulin              Basal 12mn 0.3-->0.4, 6am 0.8-->0.9 Total 16.2--> 18.6 units              Bolus CHO:I 20              Correction 100              Target               Active ins Time 6 hours   Taking    ondansetron (ZOFRAN ODT) 8 MG Oral Tablet Dispersible Take 1 tablet (8 mg total) by mouth every 6 (six) hours as needed for Nausea. Place on top of the tongue where it will dissolve, then swallow 30 tablet 2 Taking As Needed    Cyanocobalamin (VITAMIN B 12 OR) Take 500 mcg by mouth every morning.   Taking    Levothyroxine Sodium 88 MCG Oral Tab Take 1 tablet (88 mcg total) by mouth every morning before breakfast. 30 tablet 11 Taking    Magnesium Oxide 400 (240 Mg) MG Oral Tab Take 1 tablet (400 mg total) by mouth 2 (two) times daily. 30 tablet 0 Taking    [] ofloxacin 0.3 % Ophthalmic Solution Place 2 drops into both eyes 4 (four) times daily for 7 days. 10 mL 0     benzonatate 100 MG Oral Cap Take 1 capsule (100 mg total) by mouth 3 (three) times daily as needed for cough. (Patient not taking: Reported on 3/7/2025) 30 capsule 0 Not Taking    doxycycline 100 MG Oral Cap Take 1 capsule (100 mg total) by mouth Q12H. (Patient not taking: Reported on  3/7/2025) 14 capsule 0 Not Taking    [] doxycycline 100 MG Oral Cap Take 1 capsule (100 mg total) by mouth 2 (two) times daily for 7 days. 14 capsule 0     [] doxycycline 100 MG Oral Cap Take 1 capsule (100 mg total) by mouth 2 (two) times daily for 7 days. 14 capsule 0     [] benzonatate 100 MG Oral Cap Take 1 capsule (100 mg total) by mouth 3 (three) times daily as needed for cough. (Patient not taking: Reported on 3/7/2025) 30 capsule 0 Not Taking    butalbital-acetaminophen-caffeine -40 MG Oral Tab Take 1 tablet by mouth every 6 (six) hours as needed for Headaches. (Patient not taking: Reported on 3/7/2025) 30 tablet 3 Not Taking    benzonatate (TESSALON PERLES) 100 MG Oral Cap Take 1 capsule (100 mg total) by mouth 3 (three) times daily as needed. (Patient not taking: Reported on 3/7/2025) 30 capsule 1 Not Taking    polyethylene glycol, PEG 3350, 17 g Oral Powd Pack Take 17 g by mouth daily as needed. 30 each 0     Continuous Blood Gluc Sensor (FREESTYLE MURALI 2 SENSOR) Does not apply Misc 1 Device every 14 (fourteen) days.       Insulin Disposable Pump (OMNIPOD DASH PODS, GEN 4,) Does not apply Misc use 1 pod       Insulin Disposable Pump (OMNIPOD DASH PODS, GEN 4,) Does not apply Misc USE 1 POD UNDER THE SKIN EVERY 2.5 DAYS       Continuous Blood Gluc Sensor (FREESTYLE MURALI 2 SENSOR SYSTM) Does not apply Misc        BD PEN NEEDLE SKY 2ND GEN 32G X 4 MM Does not apply Misc USE AS DIRECTED 200 each 3     Blood Glucose Monitoring Suppl (ONETOUCH VERIO) w/Device Does not apply Kit        Glucose Blood In Vitro Strip Use 1 strip by percutaneous route 4-6 times every day 200 each 0    [2]   Current Facility-Administered Medications Ordered in Epic   Medication Dose Route Frequency Provider Last Rate Last Admin    [START ON 4/10/2025] ampicillin-sulbactam (Unasyn) 3 g in sodium chloride 0.9% 100mL IVPB-ADD  3 g Intravenous Once Herbert Rebollar MD        lactated ringers infusion    Intravenous Continuous Herbert Rebollar MD         No current Epic-ordered outpatient medications on file.   [3]   Allergies  Allergen Reactions    Adhesive Tape HIVES    Diltiazem SWELLING    Pioglitazone SWELLING    Acetaminophen NAUSEA AND VOMITING     cirrhosis    Erythromycin NAUSEA AND VOMITING    Ibuprofen OTHER (SEE COMMENTS)     Mouth ulcers      Lisinopril NAUSEA AND VOMITING and DIZZINESS    Metformin Hcl PAIN     Other reaction(s): abdominal cramps    Potassium Chloride NAUSEA AND VOMITING      In Liquid form only - Able to tolerate IV potassium     Sitagliptin PAIN     stomach aches    Sulfamethoxazole W/Trimethoprim FEVER     Per patient    Amiloride OTHER (SEE COMMENTS)     Other reaction(s): Sores in Throat    Eucerin ITCHING and RASH    Farxiga [Dapagliflozin] ITCHING     Vaginal itchiness    Latex RASH and ITCHING    Metoprolol Tartrate FATIGUE     feeling fuzzy    Pantoprazole Sodium NAUSEA ONLY    Propoxyphene UNKNOWN     Pt can't remember the reaction    Spironolactone NAUSEA ONLY

## 2025-03-14 NOTE — H&P
History & Physical Examination    Patient Name: Teresa Wilson  MRN: [id number]  CSN: [id number]  YOB: 1955    Diagnosis: Choledocholithiasis      Present Illness:  Teresa Wilson is a 69 year old female is here Choledocholithiasis.    Body mass index is 38.62 kg/m².    Past Medical History:    Acute, but ill-defined, cerebrovascular disease    Adenomatous colon polyp    Anxiety    Anxiety state    Arthritis    Asthma (HCC)    Breast CA (HCC)    Cancer (HCC)    Cellulitis    Cellulitis and abscess of leg    Closed displaced fracture of surgical neck of right humerus, unspecified fracture morphology, initial encounter    CVA (cerebral infarction)    Depression    Disorder of thyroid    Esophageal reflux    Essential hypertension    Exposure to medical diagnostic radiation    last dose of radiation 2 years ago    Extrinsic asthma, unspecified    High blood pressure    High cholesterol    Hypothyroidism    Liver cirrhosis (HCC)    cirrhosis    Migraines    Neuropathy    Obesity    Obesity (BMI 30-39.9)    Osteoarthritis    Other and unspecified hyperlipidemia    PFO (patent foramen ovale) (HCC)    PONV (postoperative nausea and vomiting)    Shingles    March 2020    Sleep apnea    cpap not used    Stroke (HCC)    aphasia- 10 years ago    Type II or unspecified type diabetes mellitus without mention of complication, not stated as uncontrolled    Vertigo    Visual impairment    glasses       Procedure: EUS ERCP    Physician Pre-Sedation Assessment    Pre-Sedation Assessment:    Sedation History: Airway Assessed    ASA Classification: 2. Patient with mild systemic disease    Cardiac:    Respiratory:    Abdomen:      Plan: MAC        No current facility-administered medications for this encounter.       Allergies: Allergies[1]    Past Surgical History:   Procedure Laterality Date    Appendectomy      Cataract      Cholecystectomy      Colonoscopy  03/2022    Colonoscopy N/A 01/21/2019     Procedure: COLONOSCOPY;  Surgeon: Mello oRwland MD;  Location: [facility] ENDOSCOPY    Colonoscopy N/A 2022    Procedure: COLONOSCOPY;  Surgeon: Mello Rowland MD;  Location: [facility] ENDOSCOPY    Colonoscopy N/A 2022    Procedure: COLONOSCOPY;  Surgeon: Mello Rowland MD;  Location: [facility] ENDOSCOPY    Colonoscopy  2023    Colonoscopy N/A 2023    Procedure: COLONOSCOPY;  Surgeon: Mello Rowland MD;  Location: [facility] ENDOSCOPY    Colonoscopy & polypectomy  2013    adenoma    Cortisone injection Left     Left knee, Dr. Murphy    Create eardrum opening,gen anesth      D & c      4 of them    Egd  2013    Egd  2019    Egd  2022    Egd  2023    Electrocardiogram, complete  2013    scanned to media    Extraction erupted tooth/exr  2018    Hernia surgery      Hysterectomy      Lumpectomy right Right         Talia biopsy stereo nodule 1 site right (cpt=19081) Right 2024    X clip- calcs    Needle biopsy left            Radiation right      Tooth implantation  2018    Total shoulder replacement  2023    Umbilical hernia repair       Family History   Problem Relation Age of Onset    Breast Cancer Self 67    Hypertension Mother     Skin cancer Mother     Cancer Mother     Dementia Mother     Diabetes Father     Skin cancer Father     Dementia Father     Pancreatic Cancer Sister     Diabetes Sister     Asthma Sister     Depression Sister     Obesity Daughter     Hypertension Maternal Grandmother     Cancer Maternal Grandfather     Stroke Paternal Grandmother      Social History     Tobacco Use    Smoking status: Never     Passive exposure: Never    Smokeless tobacco: Never   Substance Use Topics    Alcohol use: Not Currently     Alcohol/week: 2.0 standard drinks of alcohol     Types: 2 Glasses of wine per week     Comment: social 1 or 2       SYSTEM Check if Review is Normal Check if Physical Exam is Normal If not normal, please explain:   HEENT [x ] [x ]     NECK & BACK [x ] [x ]    HEART [x ] [x ]    LUNGS [x ] [x ]    ABDOMEN [x ] [x ]    UROGENITAL [ ] [ ]    EXTREMITIES [ ] [ ]    OTHER        [ x ] I have discussed the risks and benefits and alternatives with the patient/family.  They understand and agree to proceed with plan of care.  [ x ] I have reviewed the History and Physical done within the last 30 days.  Any changes noted above.    Herbert Rebollar MD  3/13/2025  10:30 PM             [1]   Allergies  Allergen Reactions    Adhesive Tape HIVES    Diltiazem SWELLING    Pioglitazone SWELLING    Acetaminophen NAUSEA AND VOMITING     cirrhosis    Erythromycin NAUSEA AND VOMITING    Ibuprofen OTHER (SEE COMMENTS)     Mouth ulcers      Lisinopril NAUSEA AND VOMITING and DIZZINESS    Metformin Hcl PAIN     Other reaction(s): abdominal cramps    Potassium Chloride NAUSEA AND VOMITING      In Liquid form only - Able to tolerate IV potassium     Sitagliptin PAIN     stomach aches    Sulfamethoxazole W/Trimethoprim FEVER     Per patient    Amiloride OTHER (SEE COMMENTS)     Other reaction(s): Sores in Throat    Eucerin ITCHING and RASH    Farxiga [Dapagliflozin] ITCHING     Vaginal itchiness    Latex RASH and ITCHING    Metoprolol Tartrate FATIGUE     feeling fuzzy    Pantoprazole Sodium NAUSEA ONLY    Propoxyphene UNKNOWN     Pt can't remember the reaction    Spironolactone NAUSEA ONLY

## 2025-03-14 NOTE — OPERATIVE REPORT
[facility] OPERATIVE REPORT   PATIENT NAME: Teresa Wilson  MRN: [id number]  DATE OF OPERATION: 3/13/2025  PREOPERATIVE DIAGNOSIS: abnormal MRI suggestive of choledocholithiasis; nausea which has improved recently (per ); BLANCAS cirrhosis (followed by Dr. Hebert Velazquez)  POSTOPERATIVE DIAGNOSIS:    1.  EGD- Small esophageal varices  - Dilated CBD of 11mm without evidence of choledocholithiasis, stricture  2.  EUS  - Mildly dilated pancreatic duct diffusely with PD in head measuring 4m  - Normal ampulla without dysplasia, no pancreatic mass  - Cirrhotic appearing with multiple micronodules  - 5mm pancreatic tail cystic lesion without worrsome features  PROCEDURE PERFORMED: upper endoscopy/ ENDOSCOPIC ultrasound  SEDATION MEDICATIONS: MAC  PREOPERATIVE MEDICATIONS:   PREPROCEDURE ASSESSMENT: The indication for this procedure is to assess for CBD stone. The patient was identified by myself and nursing staff in the exam room. Informed consent was obtained. The patient was seen in clinic and a full H&P was obtained. On brief physical examination, airway is patent. Chest is clear. Heart has regular rate and rhythm. Abdomen is soft, nontender with good bowel sounds. A medication list was taken by nursing today and reviewed by myself. The patient is an ASA grade 2.   PROCEDURE NOTE: The procedure was completed without difficulty. The patient tolerated the procedure well.   Upper endoscopy (EGD):  The endoscope was inserted through the mouth and advanced to the level of the duodenum, 3rd portion.  Visualized portion of the esophagus, stomach including antrum, body, fundus and cardiac, and duodenum were normal. Small esophageal varices were noted.  Endoscopic ultrasound (EUS):  Endoscopic ultrasound was performed using the linear echoendoscope.  Images were obtained.    LIVER: Left lobe of the liver was visualized and no mass or lesions seen. However multiple micronodules were noted.  No intrahepatic duct  dilation was noted.  Portal vein was noted to come out of the liver and the portal confluence was seen and pancreas was noted.   PANCREAS:  Pancreatic neck, body, and tail were interrogated from the gastric body while the neck, head and uncinate were examined from the 1st and 2nd duodenum.  PD  - body: 2  mm  - tail: 1 mm  - head: 4 mm  Pancreas divisum: no  Chronic pancreatitis changes: no  Neoplasm: no   Cysts:  yes; 5mm in tail of pancreas; No worrisome feature such as mural nodules, wall thickness, associated mass, or upstream pancreatic duct dilation were noted.  Biliary Tree:  - common hepatic duct: 11 mm  - distal: 6.8 mm  - stones: no  GALLBLADDER: absent   CELIAC AXIS:  visualized without lymphadenopathy  L ADRENAL GLAND:  visualized   L KIDNEY:  visualized   MEDIASTINUM:  visualized without lymphadenopathy  Scope was withdrawn from the patient and patient tolerated the procedure well.  FINDINGS   No evidence of CBD stones or stricture  Ampulla was not dysplastic however dark bile stain was noted without discrete stone; possibly passed stone  Cirrhotic appearing liver  RECOMMENDATIONS:  follow up with Dr. Velazquez  DISCHARGE:  On discharge, the patient was given an after-visit summary detailing the procedure, findings, followup plans, and an updated medication list.     Thank you very much for the consultation.  I really appreciate it.    Herbert Rebollar MD

## 2025-03-17 ENCOUNTER — CLINICAL ABSTRACT (OUTPATIENT)
Dept: OTHER | Age: 70
End: 2025-03-17

## 2025-04-10 ENCOUNTER — LAB ENCOUNTER (OUTPATIENT)
Dept: LAB | Age: 70
End: 2025-04-10
Attending: INTERNAL MEDICINE
Payer: MEDICARE

## 2025-04-10 DIAGNOSIS — E78.00 HYPERCHOLESTEROLEMIA: ICD-10-CM

## 2025-04-10 DIAGNOSIS — E11.9 TYPE 2 DIABETES MELLITUS WITHOUT COMPLICATION, WITH LONG-TERM CURRENT USE OF INSULIN (HCC): ICD-10-CM

## 2025-04-10 DIAGNOSIS — Z79.4 TYPE 2 DIABETES MELLITUS WITHOUT COMPLICATION, WITH LONG-TERM CURRENT USE OF INSULIN (HCC): ICD-10-CM

## 2025-04-10 DIAGNOSIS — D69.6 THROMBOCYTOPENIA, UNSPECIFIED: ICD-10-CM

## 2025-04-10 LAB
ALBUMIN SERPL-MCNC: 3.7 G/DL (ref 3.2–4.8)
ALBUMIN/GLOB SERPL: 1.3 {RATIO} (ref 1–2)
ALP LIVER SERPL-CCNC: 121 U/L (ref 55–142)
ALT SERPL-CCNC: 37 U/L (ref 10–49)
ANION GAP SERPL CALC-SCNC: 5 MMOL/L (ref 0–18)
AST SERPL-CCNC: 37 U/L (ref ?–34)
BASOPHILS # BLD AUTO: 0.04 X10(3) UL (ref 0–0.2)
BASOPHILS NFR BLD AUTO: 0.9 %
BILIRUB SERPL-MCNC: 0.6 MG/DL (ref 0.2–1.1)
BUN BLD-MCNC: 15 MG/DL (ref 9–23)
BUN/CREAT SERPL: 17.9 (ref 10–20)
CALCIUM BLD-MCNC: 9.4 MG/DL (ref 8.7–10.4)
CHLORIDE SERPL-SCNC: 102 MMOL/L (ref 98–112)
CHOLEST SERPL-MCNC: 193 MG/DL (ref ?–200)
CO2 SERPL-SCNC: 34 MMOL/L (ref 21–32)
CREAT BLD-MCNC: 0.84 MG/DL (ref 0.55–1.02)
CREAT UR-SCNC: 226.8 MG/DL
DEPRECATED RDW RBC AUTO: 44.8 FL (ref 35.1–46.3)
EGFRCR SERPLBLD CKD-EPI 2021: 76 ML/MIN/1.73M2 (ref 60–?)
EOSINOPHIL # BLD AUTO: 0.15 X10(3) UL (ref 0–0.7)
EOSINOPHIL NFR BLD AUTO: 3.4 %
ERYTHROCYTE [DISTWIDTH] IN BLOOD BY AUTOMATED COUNT: 13.2 % (ref 11–15)
EST. AVERAGE GLUCOSE BLD GHB EST-MCNC: 272 MG/DL (ref 68–126)
FASTING PATIENT LIPID ANSWER: YES
FASTING STATUS PATIENT QL REPORTED: YES
GLOBULIN PLAS-MCNC: 2.9 G/DL (ref 2–3.5)
GLUCOSE BLD-MCNC: 158 MG/DL (ref 70–99)
HBA1C MFR BLD: 11.1 % (ref ?–5.7)
HCT VFR BLD AUTO: 42.4 % (ref 35–48)
HDLC SERPL-MCNC: 81 MG/DL (ref 40–59)
HGB BLD-MCNC: 13.7 G/DL (ref 12–16)
IMM GRANULOCYTES # BLD AUTO: 0.02 X10(3) UL (ref 0–1)
IMM GRANULOCYTES NFR BLD: 0.5 %
LDLC SERPL CALC-MCNC: 95 MG/DL (ref ?–100)
LYMPHOCYTES # BLD AUTO: 1.6 X10(3) UL (ref 1–4)
LYMPHOCYTES NFR BLD AUTO: 36.4 %
MCH RBC QN AUTO: 30 PG (ref 26–34)
MCHC RBC AUTO-ENTMCNC: 32.3 G/DL (ref 31–37)
MCV RBC AUTO: 92.8 FL (ref 80–100)
MICROALBUMIN UR-MCNC: 13.9 MG/DL
MICROALBUMIN/CREAT 24H UR-RTO: 61.3 UG/MG (ref ?–30)
MONOCYTES # BLD AUTO: 0.57 X10(3) UL (ref 0.1–1)
MONOCYTES NFR BLD AUTO: 13 %
NEUTROPHILS # BLD AUTO: 2.01 X10 (3) UL (ref 1.5–7.7)
NEUTROPHILS # BLD AUTO: 2.01 X10(3) UL (ref 1.5–7.7)
NEUTROPHILS NFR BLD AUTO: 45.8 %
NONHDLC SERPL-MCNC: 112 MG/DL (ref ?–130)
OSMOLALITY SERPL CALC.SUM OF ELEC: 296 MOSM/KG (ref 275–295)
PLATELET # BLD AUTO: 153 10(3)UL (ref 150–450)
POTASSIUM SERPL-SCNC: 3.7 MMOL/L (ref 3.5–5.1)
PROT SERPL-MCNC: 6.6 G/DL (ref 5.7–8.2)
RBC # BLD AUTO: 4.57 X10(6)UL (ref 3.8–5.3)
SODIUM SERPL-SCNC: 141 MMOL/L (ref 136–145)
TRIGL SERPL-MCNC: 97 MG/DL (ref 30–149)
VLDLC SERPL CALC-MCNC: 16 MG/DL (ref 0–30)
WBC # BLD AUTO: 4.4 X10(3) UL (ref 4–11)

## 2025-04-10 PROCEDURE — 80061 LIPID PANEL: CPT

## 2025-04-10 PROCEDURE — 82570 ASSAY OF URINE CREATININE: CPT

## 2025-04-10 PROCEDURE — 83036 HEMOGLOBIN GLYCOSYLATED A1C: CPT

## 2025-04-10 PROCEDURE — 85025 COMPLETE CBC W/AUTO DIFF WBC: CPT

## 2025-04-10 PROCEDURE — 36415 COLL VENOUS BLD VENIPUNCTURE: CPT

## 2025-04-10 PROCEDURE — 80053 COMPREHEN METABOLIC PANEL: CPT

## 2025-04-10 PROCEDURE — 82043 UR ALBUMIN QUANTITATIVE: CPT

## 2025-04-17 ENCOUNTER — OFFICE VISIT (OUTPATIENT)
Dept: INTERNAL MEDICINE CLINIC | Facility: CLINIC | Age: 70
End: 2025-04-17

## 2025-04-17 VITALS
HEART RATE: 104 BPM | DIASTOLIC BLOOD PRESSURE: 86 MMHG | BODY MASS INDEX: 39.87 KG/M2 | SYSTOLIC BLOOD PRESSURE: 122 MMHG | TEMPERATURE: 99 F | HEIGHT: 63 IN | OXYGEN SATURATION: 97 % | WEIGHT: 225 LBS

## 2025-04-17 DIAGNOSIS — E83.42 HYPOMAGNESEMIA: ICD-10-CM

## 2025-04-17 DIAGNOSIS — Z86.73 HX OF TRANSIENT ISCHEMIC ATTACK (TIA): ICD-10-CM

## 2025-04-17 DIAGNOSIS — K21.9 GASTROESOPHAGEAL REFLUX DISEASE WITHOUT ESOPHAGITIS: ICD-10-CM

## 2025-04-17 DIAGNOSIS — E11.40 TYPE 2 DIABETES MELLITUS WITH DIABETIC NEUROPATHY, WITH LONG-TERM CURRENT USE OF INSULIN (HCC): ICD-10-CM

## 2025-04-17 DIAGNOSIS — C50.911 MUCINOUS CARCINOMA OF BREAST, RIGHT (HCC): ICD-10-CM

## 2025-04-17 DIAGNOSIS — E03.8 OTHER SPECIFIED HYPOTHYROIDISM: ICD-10-CM

## 2025-04-17 DIAGNOSIS — E53.8 VITAMIN B12 DEFICIENCY: ICD-10-CM

## 2025-04-17 DIAGNOSIS — E66.01 MORBID OBESITY WITH BMI OF 40.0-44.9, ADULT (HCC): ICD-10-CM

## 2025-04-17 DIAGNOSIS — E78.00 HYPERCHOLESTEROLEMIA: ICD-10-CM

## 2025-04-17 DIAGNOSIS — E11.9 TYPE 2 DIABETES MELLITUS WITHOUT COMPLICATION, WITH LONG-TERM CURRENT USE OF INSULIN (HCC): Primary | ICD-10-CM

## 2025-04-17 DIAGNOSIS — K74.60 LIVER CIRRHOSIS SECONDARY TO NASH (HCC): ICD-10-CM

## 2025-04-17 DIAGNOSIS — Z79.4 TYPE 2 DIABETES MELLITUS WITHOUT COMPLICATION, WITH LONG-TERM CURRENT USE OF INSULIN (HCC): Primary | ICD-10-CM

## 2025-04-17 DIAGNOSIS — Z79.4 TYPE 2 DIABETES MELLITUS WITH DIABETIC NEUROPATHY, WITH LONG-TERM CURRENT USE OF INSULIN (HCC): ICD-10-CM

## 2025-04-17 DIAGNOSIS — G47.33 OBSTRUCTIVE SLEEP APNEA: ICD-10-CM

## 2025-04-17 DIAGNOSIS — Z86.0101 HISTORY OF ADENOMATOUS POLYP OF COLON: ICD-10-CM

## 2025-04-17 DIAGNOSIS — K75.81 LIVER CIRRHOSIS SECONDARY TO NASH (HCC): ICD-10-CM

## 2025-04-17 DIAGNOSIS — D69.6 THROMBOCYTOPENIA, UNSPECIFIED: ICD-10-CM

## 2025-04-17 DIAGNOSIS — Z00.00 ROUTINE HEALTH MAINTENANCE: ICD-10-CM

## 2025-04-17 RX ORDER — CLOTRIMAZOLE 1 %
1 CREAM (GRAM) TOPICAL 2 TIMES DAILY
Qty: 60 G | Refills: 1 | Status: SHIPPED | OUTPATIENT
Start: 2025-04-17

## 2025-04-17 NOTE — PROGRESS NOTES
Teresa Wilson is a 69 year old female.  Chief Complaint   Patient presents with    Physical     Patient is here today for a MA. She has multiple issue to discuss with Dr. Young including a rash on her legs, right cataract (extraction in 2 weeks), and purple \"spots\" on her arms.        HPI:   Teresa Wilson is a 69 year old female who presents for MAW PE and 6 mo check up.      Still with the white hard crusts on her legs; has seen derm for this; itches; has been scratching    Seeing podiatrist next week -- to have tendon cut under local anesthesia next Monday (due to hammer toe)    Notes \"rot\" and pain under her pannus; has not had that for years    Had abnormal abd MRI suggestive of choledocholithiasis and nausea  On 3/13/25, Dr. Herbert Rebollar did EGD (small esoph varices) and EUS (dilated CBD w/o evidence of stone or stricture, mildly dilated pancreatic duct, normal ampulla, no pancreatic mass; cirrhotic appearing liver; 5mm pancreatic tail cystic lesion w/o worrisome features)      Wt Readings from Last 6 Encounters:   04/17/25 225 lb (102.1 kg)   03/07/25 218 lb (98.9 kg)   02/26/25 218 lb 9.6 oz (99.2 kg)   02/06/25 217 lb (98.4 kg)   01/30/25 217 lb 6.4 oz (98.6 kg)   12/24/24 215 lb 6.4 oz (97.7 kg)     Body mass index is 39.86 kg/m².       Current Outpatient Medications   Medication Sig Dispense Refill    LOSARTAN 25 MG Oral Tab TAKE 1/2 TABLET BY MOUTH EVERY DAY 45 tablet 3    benzonatate 100 MG Oral Cap Take 1 capsule (100 mg total) by mouth 3 (three) times daily as needed for cough. (Patient not taking: Reported on 3/7/2025) 30 capsule 0    ERGOCALCIFEROL 1.25 MG (46537 UT) Oral Cap TAKE 1 CAPSULE BY MOUTH 1 TIME A WEEK 13 capsule 3    doxycycline 100 MG Oral Cap Take 1 capsule (100 mg total) by mouth Q12H. (Patient not taking: Reported on 3/7/2025) 14 capsule 0    IMIPRAMINE 50 MG Oral Tab TAKE 3 TABLETS BY MOUTH EVERY NIGHT 270 tablet 3    FAMOTIDINE 40 MG Oral Tab TAKE 1 TABLET(40  MG) BY MOUTH TWICE DAILY AS NEEDED FOR HEARTBURN 180 tablet 3    albuterol 108 (90 Base) MCG/ACT Inhalation Aero Soln Inhale 2 puffs into the lungs every 6 (six) hours as needed for Wheezing. 1 each 5    butalbital-acetaminophen-caffeine -40 MG Oral Tab Take 1 tablet by mouth every 6 (six) hours as needed for Headaches. (Patient not taking: Reported on 3/7/2025) 30 tablet 3    benzonatate (TESSALON PERLES) 100 MG Oral Cap Take 1 capsule (100 mg total) by mouth 3 (three) times daily as needed. (Patient not taking: Reported on 3/7/2025) 30 capsule 1    PRAVASTATIN 40 MG Oral Tab TAKE 1 TABLET(40 MG) BY MOUTH EVERY NIGHT 90 tablet 3    KLOR-CON 10 10 MEQ Oral Tab CR TAKE 4 TABLET BY MOUTH TWICE DAILY 720 tablet 3    letrozole 2.5 MG Oral Tab Take 1 tablet (2.5 mg total) by mouth nightly. 90 tablet 3    CLOPIDOGREL 75 MG Oral Tab TAKE 1 TABLET(75 MG) BY MOUTH DAILY 90 tablet 3    furosemide 20 MG Oral Tab Take 1 tablet (20 mg total) by mouth 2 (two) times daily. 180 tablet 3    carvedilol 3.125 MG Oral Tab Take 1 tablet (3.125 mg total) by mouth 2 (two) times daily.      Naloxone HCl 4 MG/0.1ML Nasal Liquid 4 mg as needed.      traMADol 50 MG Oral Tab Take 1 tablet (50 mg total) by mouth every 8 (eight) hours as needed.      polyethylene glycol, PEG 3350, 17 g Oral Powd Pack Take 17 g by mouth daily as needed. 30 each 0    Continuous Blood Gluc Sensor (FREESTYLE MURALI 2 SENSOR) Does not apply Misc 1 Device every 14 (fourteen) days.      Insulin Disposable Pump (OMNIPOD DASH PODS, GEN 4,) Does not apply Misc use 1 pod      HUMULIN R U-500 KWIKPEN 500 UNIT/ML Subcutaneous Solution Pen-injector Pt with Insulin pump. Pt was instructed to verify with Endocrinogist if she needs to adjust dose a day before surgery. Pt was instructed no insulin os DOS and removed CGM and Insulin pump per Anesthesia protocol.   using Humulin U500 insulin              Basal 12mn 0.3-->0.4, 6am 0.8-->0.9 Total 16.2--> 18.6 units               Bolus CHO:I 20              Correction 100              Target               Active ins Time 6 hours      Insulin Disposable Pump (OMNIPOD DASH PODS, GEN 4,) Does not apply Misc USE 1 POD UNDER THE SKIN EVERY 2.5 DAYS      Continuous Blood Gluc Sensor (FREESTYLE MURALI 2 SENSOR SYSTM) Does not apply Misc       BD PEN NEEDLE SKY 2ND GEN 32G X 4 MM Does not apply Misc USE AS DIRECTED 200 each 3    ondansetron (ZOFRAN ODT) 8 MG Oral Tablet Dispersible Take 1 tablet (8 mg total) by mouth every 6 (six) hours as needed for Nausea. Place on top of the tongue where it will dissolve, then swallow 30 tablet 2    Blood Glucose Monitoring Suppl (ONETOUCH VERIO) w/Device Does not apply Kit       Cyanocobalamin (VITAMIN B 12 OR) Take 500 mcg by mouth every morning.      Glucose Blood In Vitro Strip Use 1 strip by percutaneous route 4-6 times every day 200 each 0    Levothyroxine Sodium 88 MCG Oral Tab Take 1 tablet (88 mcg total) by mouth every morning before breakfast. 30 tablet 11    Magnesium Oxide 400 (240 Mg) MG Oral Tab Take 1 tablet (400 mg total) by mouth 2 (two) times daily. 30 tablet 0      Past Medical History:    Acute, but ill-defined, cerebrovascular disease    Adenomatous colon polyp    Anxiety    Anxiety state    Arthritis    Asthma (HCC)    Breast CA (HCC)    Cancer (HCC)    Cellulitis    Cellulitis and abscess of leg    Closed displaced fracture of surgical neck of right humerus, unspecified fracture morphology, initial encounter    Contact allergic reaction    CVA (cerebral infarction)    Depression    Disorder of thyroid    Esophageal reflux    Essential hypertension    Exposure to medical diagnostic radiation    last dose of radiation 2 years ago    Extrinsic asthma, unspecified    High blood pressure    High cholesterol    Hypothyroidism    Irritable bowel syndrome    Yyyy ur5    Liver cirrhosis (HCC)    cirrhosis    Migraines    Neuropathy    Obesity    Obesity (BMI 30-39.9)    Osteoarthritis    Other  and unspecified hyperlipidemia    PFO (patent foramen ovale) (HCC)    PONV (postoperative nausea and vomiting)    Scoliosis    Shingles    2020    Sleep apnea    cpap not used    Stroke ([facility])    aphasia- 10 years ago    Type II or unspecified type diabetes mellitus without mention of complication, not stated as uncontrolled    Vertigo    Visual impairment    glasses      Past Surgical History:   Procedure Laterality Date    Appendectomy      Cataract      Cholecystectomy      Colonoscopy  2022    Colonoscopy N/A 2019    Procedure: COLONOSCOPY;  Surgeon: Mello Rowland MD;  Location: [facility] ENDOSCOPY    Colonoscopy N/A 2022    Procedure: COLONOSCOPY;  Surgeon: Mello Rowland MD;  Location: [facility] ENDOSCOPY    Colonoscopy N/A 2022    Procedure: COLONOSCOPY;  Surgeon: Mello Rowland MD;  Location: [facility] ENDOSCOPY    Colonoscopy  2023    Colonoscopy N/A 2023    Procedure: COLONOSCOPY;  Surgeon: Mello Rowland MD;  Location: [facility] ENDOSCOPY    Colonoscopy & polypectomy  2013    adenoma    Cortisone injection Left     Left knee, Dr. Murphy    Create eardrum opening,gen anesth      D & c      4 of them    Egd  2013    Egd  2019    Egd  2022    Egd  2023    Electrocardiogram, complete  2013    scanned to media    Extraction erupted tooth/exr  2018    Hernia surgery      Hysterectomy      Lumpectomy right Right         Talia biopsy stereo nodule 1 site right (cpt=19081) Right 2024    X clip- calcs    Needle biopsy left      Needle biopsy right            Radiation left      Radiation right      Tooth implantation  2018    Total shoulder replacement  2023    Umbilical hernia repair        Family History   Problem Relation Age of Onset    Breast Cancer Self 67    Hypertension Mother     Skin cancer Mother     Cancer Mother     Dementia Mother     Diabetes Father     Skin cancer Father     Dementia Father     Pancreatic Cancer Sister      Diabetes Sister     Asthma Sister     Depression Sister     Obesity Daughter     Hypertension Maternal Grandmother     Cancer Maternal Grandfather     Stroke Paternal Grandmother     Diabetes Sister     Obesity Sister     Obesity Daughter       Social History:   Social History     Socioeconomic History    Marital status:    Tobacco Use    Smoking status: Never     Passive exposure: Never    Smokeless tobacco: Never   Vaping Use    Vaping status: Never Used   Substance and Sexual Activity    Alcohol use: Not Currently     Alcohol/week: 1.0 standard drink of alcohol     Types: 1 Glasses of wine per week     Comment: social 1    Drug use: No   Other Topics Concern    Caffeine Concern Yes     Comment: soda    Reaction to local anesthetic No     Social Drivers of Health     Transportation Needs: No Transportation Needs (8/14/2023)    Transportation Needs     Lack of Transportation: No    [facility]    Housing Stability                General Health                                                   Functional Ability                                                        Functional Status                                     Fall/Risk Assessment                                                              Depression Screening (PHQ-2/PHQ-9): Over the LAST 2 WEEKS                      Advance Directives                Hearing Assessment (Required for AWV/SWV)      Hearing Screening    No data filed         Visual Acuity                   Cognitive Assessment                                       Teresa Wilson's SCREENING SCHEDULE   Tests on this list are recommended by your physician but may not be covered, or covered at this frequency, by your insurer. Please check with your insurance carrier before scheduling to verify coverage.   PREVENTATIVE SERVICES  INDICATIONS AND SCHEDULE Internal Lab or Procedure External Lab or Procedure   Diabetes Screening      HbgA1C   Annually  HgbA1C (%)   Date Value   04/10/2025 11.1 (H)         No data to display                Fasting Blood Sugar (FSB)Annually Glucose (mg/dL)   Date Value   04/10/2025 158 (H)         No data to display               Cardiovascular Disease Screening     LDL Annually LDL Cholesterol (mg/dL)   Date Value   04/10/2025 95        EKG One Time y    Colorectal Cancer Screening      Colonoscopy Screen every 10 years Health Maintenance   Topic Date Due    Colorectal Cancer Screening  06/30/2026    Update Health Maintenance if applicable    Flex Sigmoidoscopy Screen every 5 years No results found for this or any previous visit.      No data to display                 Fecal Occult Blood Annually No results found for: \"FOB\", \"OCCULTSTOOL\"      No data to display                Glaucoma Screening      Ophthalmology Visit Annually y    Bone Density Screening      Dexascan Every two years Last Dexa Scan:    XR DEXA BONE DENSITOMETRY (CPT=77080) 01/24/2025        No data to display               Pap and Pelvic      Pap: Every 3 yrs age 21-65 or Pap+HPV every 5 yrs age 30-65, age 65 and older at high risk   No recommendations at this time Update Health Maintenance if applicable    Chlamydia  Annually if high risk No results found for: \"CHLAMYDIA\"      No data to display                Screening Mammogram      Mammogram  Annually Health Maintenance   Topic Date Due    Mammogram  Discontinued    Update Health Maintenance if applicable   Immunizations      Influenza No orders found for this or any previous visit. Update Immunization Activity if applicable    Pneumococcal No orders found for this or any previous visit. Update Immunization Activity if applicable    Hepatitis B Orders placed or performed in visit on 07/22/20    HEPATITIS B VACCINE, OVER 20    Update Immunization Activity if applicable    Tetanus Orders placed or performed in visit on 09/07/16    TETANUS, DIPHTHERIA TOXOIDS AND ACELLULAR PERTUSIS VACCINE (TDAP), >7 YEARS, IM  USE    Update Immunization Activity if applicable    Zoster (Not covered by Medicare Part B) No orders found for this or any previous visit. Update Immunization Activity if applicable     SPECIFIC DISEASE MONITORING Internal Lab or Procedure External Lab or Procedure   Annual Monitoring of Persistent     Medications (ACE/ARB, digoxin, diuretics)    Potassium  Annually Potassium (mmol/L)   Date Value   04/10/2025 3.7         No data to display                Creatinine  Annually Creatinine (mg/dL)   Date Value   04/10/2025 0.84         No data to display                Digoxin Serum Conc  Annually No results found for: \"DIGOXIN\"      No data to display                Diabetes      HgbA1C  Annually HgbA1C (%)   Date Value   04/10/2025 11.1 (H)         No data to display                Creat/alb ratio  Annually      LDL  Annually LDL Cholesterol (mg/dL)   Date Value   04/10/2025 95         No data to display                 Dilated Eye exam  Annually     9/11/2018    11:40 AM   Data entered on:   Last Dilated Eye Exam 9/10/2018          No data to display                COPD      Spirometry Testing Annually No results found for this or any previous visit.      No data to display                        REVIEW OF SYSTEMS:   GENERAL: feels well otherwise  SKIN: denies any unusual skin lesions  EYES:denies blurred vision or double vision  HEENT: denies nasal congestion, sinus pain or ST  LUNGS: denies shortness of breath with exertion or cough  CARDIOVASCULAR: denies chest pain, pressure, or palpitations  GI: denies abdominal pain, nausea, vomiting, diarrhea, constipation, hematochezia, or melena  NEURO: denies headaches or dizziness    EXAM:   /86 (BP Location: Left arm, Patient Position: Sitting, Cuff Size: large)   Pulse 104   Temp 98.5 °F (36.9 °C) (Oral)   Ht 5' 3\" (1.6 m)   Wt 225 lb (102.1 kg)   SpO2 97%   BMI 39.86 kg/m²     GENERAL: well developed, well nourished, in no apparent distress  HEENT: normal  oropharynx, normal TM's  EYES: PERRLA, EOMI, conjunctivae are pink  NECK: supple, no cervical or supraclavicular LAD, no carotid bruits  LUNGS: clear to auscultation  CARDIO: RRR, normal S1S2, no gallops or murmurs  GI: soft, NT, ND, NABS, no HSM; mild rash under left pannus  EXTREMITIES: minimal trace BLE edema, +2 DP pulses, mild erythema left lower leg w/excoriations (does not appear cellulitic)  BREASTS: no masses; mild rash under right breast      ASSESSMENT AND PLAN:     Routine health maintenance  Pt is s/p HERMELINDA/BSO    DEXA normal 10/2022   Tdap given 9/7/16  Rec Shingrix shingles vaccine  CT calcium score 0 in 8/2020  Prevnar 13 done 1/2023; Prevnar 20  10/8/24    Hx of R breast cancer (mucinous carcinoma)  -s/p excisional bx on 8/30/22 -- mucinous carcinoma, extending to the surgical margins  -s/p R central breast resection, R axillary sentinel LN bx 9/28/22 -- 3/3 LN's negative for carcinoma.  R breast resection negative for residual carcinoma  -s/p RT with Dr. Cortez Francis (completed 1/2023)  -followed by med onc Dr. 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 Ulrich  -HgbA1c 10.6>11.1  -Has CGM and insulin pump.  -Sees ophtho (Dr. Crispin Scott in [place]) regularly (last in 3/2024) --  found to have retinopathy on exam 2019.   -no longer on Ozempic (caused nausea/vomiting); was thought to have maybe caused bile duct stones     Hypercholesterolemia  -Continue Pravachol   -lipids at goal     Hypothyroidism  Cont synthroid. TSH 1.99 (10/2024)     Obstructive sleep apnea  Unable to tolerate CPAP mask or nasal pillows     History of adenomatous colonic polyps  Found on colonoscopy 9/11/13   Colonoscopy 1/2019 -- 3 adenomatous polyps.   Colonoscopy 4/4/22 (Dr. Rowland) -- inadequate bowel prep; to have 2 day prep for repeat colonoscopy  Repeat  colonoscopy 5/2022 -- large tubulovillous adenoma; still with only fair bowel prep, so advised to have repeat colonoscopy in 1 year (5/2023).  Repeat colonoscopy (6/30/23) -- 1 adenomatous polyp -- repeat in 3 years (6/2026)    Hx of transient ischemic attack (TIA) (expressive aphasia)  Continue Plavix.  Pt with occasional difficulty with word finding       Vitamin B12 deficiency  Vit B12 level normal 10/2024     Venous insufficiency  -on KDur 40meq BID, Lasix 20mg/day     NAFLD, prob BLANCAS cirrhosis  Thrombocytopenia  -Fatty liver seen on CT in 9/2013. Iron sats, Hep A/B/C panel, ceruloplasmin, DONTAE, AMA normal.    -EGD 1/2019 and 4/2022 (Dr. Rowland) -- small esophageal varices; o/w unremarkable.   -Hep B series completed 7/22/20  -followed by hepatology Dr. Velazquez  -on carvedilol 3.125mg BID (for portal hypertension)  -mild stable thrombocytopenia (plts normal this time 153)  -Had abnormal abd MRI 1/9/25 suggestive of choledocholithiasis and nausea  On 3/13/25, Dr. Herbert Rebollar did EGD (small esoph varices) and EUS (dilated CBD w/o evidence of stone or stricture, mildly dilated pancreatic duct, normal ampulla, no pancreatic mass; cirrhotic appearing liver; 5mm pancreatic tail cystic lesion w/o worrisome features)    GERD  -stable on pepcid     Left and right knee OA  -s/p arthroscopic surgery with Dr. Lucho Evans in the past.    -has since seen ortho Dr. Carrillo Murphy.  Needs TKR but ideally only after DM better controlled    Morbid obesity  -has seen med bariatrics Dr. Doherty though not since 3/2023  -no longer on topiramate  -encouraged her to schedule another appt    Hypomagnesemia  Takes Mg oxide 400mg BID.  Level normal 10/2024 (2.0)    Hypertension  On losartan 12.5mg/day  Carvedilol 3.125mg BID added in 2/2024 by hepatologist Dr. Velazquez (for portal HTN)    Depression  Stable on imipramine 150mg/day    Vitamin D deficiency  On weekly vitamin D  Level normal 80.4    Hx R humerus fracture (s/p fall 8/7/23)  -s/p  right reverse total shoulder replacement 9/7/23 (Dr. Luan Boland)    Left shoulder OA  -seeing Dr. Boland    Hx Left 2nd toe osteomyelitis 2/2 infected ulcer   -s/p distal L 2nd toe amputation 5/20/24 (at [facility])    Tinea cruris  -clotrimazole cream     RTC 6 mos     Erica Young MD, 04/17/25, 3:51 PM

## 2025-04-22 RX ORDER — CLOPIDOGREL BISULFATE 75 MG/1
75 TABLET ORAL DAILY
Qty: 90 TABLET | Refills: 3 | OUTPATIENT
Start: 2025-04-22

## 2025-04-22 NOTE — TELEPHONE ENCOUNTER
Current refill request refused due to refill is either a duplicate request or has active refills at the pharmacy.  Check previous templates.    Requested Prescriptions     Refused Prescriptions Disp Refills    CLOPIDOGREL 75 MG Oral Tab [Pharmacy Med Name: CLOPIDOGREL 75MG TABLETS] 90 tablet 3     Sig: TAKE 1 TABLET(75 MG) BY MOUTH DAILY     Refused By: JASON PAREDES     Reason for Refusal: Patient has requested refill too soon      sent 7/8/2024 with enough refills for one year, too early

## 2025-05-07 ENCOUNTER — TELEPHONE (OUTPATIENT)
Dept: INTERNAL MEDICINE CLINIC | Facility: CLINIC | Age: 70
End: 2025-05-07

## 2025-05-13 RX ORDER — ONDANSETRON 8 MG/1
8 TABLET, ORALLY DISINTEGRATING ORAL EVERY 6 HOURS PRN
Qty: 30 TABLET | Refills: 2 | Status: SHIPPED | OUTPATIENT
Start: 2025-05-13

## 2025-05-30 RX ORDER — FUROSEMIDE 20 MG/1
20 TABLET ORAL 2 TIMES DAILY
Qty: 180 TABLET | Refills: 3 | Status: SHIPPED | OUTPATIENT
Start: 2025-05-30

## 2025-05-30 NOTE — TELEPHONE ENCOUNTER
Refill request is for a maintenance medication and has met the criteria specified in the Ambulatory Medication Refill Standing Order for eligibility, visits, laboratory, alerts and was sent to the requested pharmacy.    Requested Prescriptions     Signed Prescriptions Disp Refills    FUROSEMIDE 20 MG Oral Tab 180 tablet 3     Sig: TAKE 1 TABLET(20 MG) BY MOUTH TWICE DAILY     Authorizing Provider: ANDRES TINOCO     Ordering User: ROAYL CHERRY

## 2025-06-19 ENCOUNTER — OFFICE VISIT (OUTPATIENT)
Dept: INTERNAL MEDICINE CLINIC | Facility: CLINIC | Age: 70
End: 2025-06-19
Payer: MEDICARE

## 2025-06-19 VITALS
HEART RATE: 88 BPM | WEIGHT: 225 LBS | DIASTOLIC BLOOD PRESSURE: 78 MMHG | TEMPERATURE: 98 F | BODY MASS INDEX: 39.87 KG/M2 | HEIGHT: 63 IN | SYSTOLIC BLOOD PRESSURE: 144 MMHG

## 2025-06-19 DIAGNOSIS — S31.819A WOUND OF GLUTEAL CLEFT, RIGHT, INITIAL ENCOUNTER: Primary | ICD-10-CM

## 2025-06-19 PROCEDURE — 99213 OFFICE O/P EST LOW 20 MIN: CPT | Performed by: INTERNAL MEDICINE

## 2025-06-19 NOTE — PROGRESS NOTES
Teresa Wilson is a 69 year old female.  Chief Complaint   Patient presents with    Checkup     HPI:     Pt notes a sore on her left buttock for the past week or so.  Hurts to sit.      Current Medications[1]   Past Medical History[2]   Social History:  Short Social Hx on File[3]     REVIEW OF SYSTEMS:   GENERAL HEALTH: feels well otherwise  RESPIRATORY: no SOB  CARDIOVASCULAR: no chest pain/pressure  GI: no nausea, vomiting, diarrhea    Wt Readings from Last 5 Encounters:   06/19/25 225 lb (102.1 kg)   04/17/25 225 lb (102.1 kg)   03/07/25 218 lb (98.9 kg)   02/26/25 218 lb 9.6 oz (99.2 kg)   02/06/25 217 lb (98.4 kg)     Body mass index is 39.86 kg/m².      EXAM:   /78   Pulse 88   Temp 98.2 °F (36.8 °C)   Ht 5' 3\" (1.6 m)   Wt 225 lb (102.1 kg)   BMI 39.86 kg/m²   GENERAL: well developed, well nourished, in no apparent distress  SKIN: +approx 8 x 10 mm scab in right gluteal cleft -- tender to palpation, minimal surrounding erythema; no exudate; no fluctuance    ASSESSMENT AND PLAN:     Gluteal cleft wound  -likely due to chafing  -no sx of infection/abscess  -rec Duoderm-type gel pad to prevent friction  -rec sitting on donut pillow until healed    The patient indicates understanding of these issues and agrees to the plan.    Erica Young MD, 06/19/25, 3:43 PM       [1]   Current Outpatient Medications   Medication Sig Dispense Refill    FUROSEMIDE 20 MG Oral Tab TAKE 1 TABLET(20 MG) BY MOUTH TWICE DAILY 180 tablet 3    ondansetron (ZOFRAN ODT) 8 MG Oral Tablet Dispersible Take 1 tablet (8 mg total) by mouth every 6 (six) hours as needed for Nausea. Place on top of the tongue where it will dissolve, then swallow 30 tablet 2    clotrimazole 1 % External Cream Apply 1 Application topically 2 (two) times daily. Apply on abdomen and under breasts 60 g 1    LOSARTAN 25 MG Oral Tab TAKE 1/2 TABLET BY MOUTH EVERY DAY 45 tablet 3    ERGOCALCIFEROL 1.25 MG (05130 UT) Oral Cap TAKE 1 CAPSULE BY MOUTH  1 TIME A WEEK 13 capsule 3    IMIPRAMINE 50 MG Oral Tab TAKE 3 TABLETS BY MOUTH EVERY NIGHT 270 tablet 3    FAMOTIDINE 40 MG Oral Tab TAKE 1 TABLET(40 MG) BY MOUTH TWICE DAILY AS NEEDED FOR HEARTBURN 180 tablet 3    albuterol 108 (90 Base) MCG/ACT Inhalation Aero Soln Inhale 2 puffs into the lungs every 6 (six) hours as needed for Wheezing. 1 each 5    PRAVASTATIN 40 MG Oral Tab TAKE 1 TABLET(40 MG) BY MOUTH EVERY NIGHT 90 tablet 3    KLOR-CON 10 10 MEQ Oral Tab CR TAKE 4 TABLET BY MOUTH TWICE DAILY 720 tablet 3    letrozole 2.5 MG Oral Tab Take 1 tablet (2.5 mg total) by mouth nightly. 90 tablet 3    CLOPIDOGREL 75 MG Oral Tab TAKE 1 TABLET(75 MG) BY MOUTH DAILY 90 tablet 3    carvedilol 3.125 MG Oral Tab Take 1 tablet (3.125 mg total) by mouth 2 (two) times daily.      Naloxone HCl 4 MG/0.1ML Nasal Liquid 4 mg as needed.      polyethylene glycol, PEG 3350, 17 g Oral Powd Pack Take 17 g by mouth daily as needed. 30 each 0    Continuous Blood Gluc Sensor (FREESTYLE MURALI 2 SENSOR) Does not apply Misc 1 Device every 14 (fourteen) days.      HUMULIN R U-500 KWIKPEN 500 UNIT/ML Subcutaneous Solution Pen-injector Pt with Insulin pump. Pt was instructed to verify with Endocrinogist if she needs to adjust dose a day before surgery. Pt was instructed no insulin os DOS and removed CGM and Insulin pump per Anesthesia protocol.   using Humulin U500 insulin              Basal 12mn 0.3-->0.4, 6am 0.8-->0.9 Total 16.2--> 18.6 units              Bolus CHO:I 20              Correction 100              Target               Active ins Time 6 hours      Insulin Disposable Pump (OMNIPOD DASH PODS, GEN 4,) Does not apply Misc USE 1 POD UNDER THE SKIN EVERY 2.5 DAYS      BD PEN NEEDLE SKY 2ND GEN 32G X 4 MM Does not apply Misc USE AS DIRECTED 200 each 3    Blood Glucose Monitoring Suppl (ONETOUCH VERIO) w/Device Does not apply Kit       Cyanocobalamin (VITAMIN B 12 OR) Take 500 mcg by mouth every morning.      Glucose Blood In  Vitro Strip Use 1 strip by percutaneous route 4-6 times every day 200 each 0    Levothyroxine Sodium 88 MCG Oral Tab Take 1 tablet (88 mcg total) by mouth every morning before breakfast. 30 tablet 11    Magnesium Oxide 400 (240 Mg) MG Oral Tab Take 1 tablet (400 mg total) by mouth 2 (two) times daily. 30 tablet 0    Insulin Disposable Pump (OMNIPOD DASH PODS, GEN 4,) Does not apply Misc use 1 pod (Patient not taking: Reported on 6/19/2025)      Continuous Blood Gluc Sensor (FREESTYLE MURALI 2 SENSOR SYSTM) Does not apply Misc  (Patient not taking: Reported on 6/19/2025)     [2]   Past Medical History:   Acute, but ill-defined, cerebrovascular disease    Adenomatous colon polyp    Anxiety    Anxiety state    Arthritis    Asthma (HCC)    Breast CA (HCC)    Cancer (HCC)    Cataract    Right    Cellulitis    Cellulitis and abscess of leg    Closed displaced fracture of surgical neck of right humerus, unspecified fracture morphology, initial encounter    Contact allergic reaction    CVA (cerebral infarction)    Depression    Disorder of thyroid    Esophageal reflux    Essential hypertension    Exposure to medical diagnostic radiation    last dose of radiation 2 years ago    Extrinsic asthma, unspecified    High blood pressure    High cholesterol    Hypothyroidism    Irritable bowel syndrome    Yyyy ur5    Liver cirrhosis (HCC)    cirrhosis    Migraines    Neuropathy    Obesity    Obesity (BMI 30-39.9)    Osteoarthritis    Other and unspecified hyperlipidemia    PFO (patent foramen ovale) (HCC)    PONV (postoperative nausea and vomiting)    Scoliosis    Shingles    March 2020    Sleep apnea    cpap not used    Stroke (HCC)    aphasia- 10 years ago    Type II or unspecified type diabetes mellitus without mention of complication, not stated as uncontrolled    Vertigo    Visual impairment    glasses   [3]   Social History  Socioeconomic History    Marital status:    Tobacco Use    Smoking status: Never     Passive  exposure: Never    Smokeless tobacco: Never   Vaping Use    Vaping status: Never Used   Substance and Sexual Activity    Alcohol use: Not Currently     Alcohol/week: 1.0 standard drink of alcohol     Types: 1 Glasses of wine per week     Comment: social 1    Drug use: No   Other Topics Concern    Caffeine Concern Yes     Comment: soda    Reaction to local anesthetic No     Social Drivers of Health     Transportation Needs: No Transportation Needs (8/14/2023)    Transportation Needs     Lack of Transportation: No    Received from [facility]    Housing Stability

## 2025-07-02 ENCOUNTER — LAB ENCOUNTER (OUTPATIENT)
Dept: LAB | Age: 70
End: 2025-07-02
Attending: INTERNAL MEDICINE
Payer: MEDICARE

## 2025-07-02 DIAGNOSIS — E03.9 HYPOTHYROIDISM: Primary | ICD-10-CM

## 2025-07-02 DIAGNOSIS — E11.9 TYPE 2 DIABETES MELLITUS (HCC): ICD-10-CM

## 2025-07-02 DIAGNOSIS — E78.5 HYPERLIPIDEMIA: ICD-10-CM

## 2025-07-02 LAB
ALBUMIN SERPL-MCNC: 4 G/DL (ref 3.2–4.8)
ALBUMIN/GLOB SERPL: 1.3 {RATIO} (ref 1–2)
ALP LIVER SERPL-CCNC: 122 U/L (ref 55–142)
ALT SERPL-CCNC: 33 U/L (ref 10–49)
ANION GAP SERPL CALC-SCNC: 4 MMOL/L (ref 0–18)
AST SERPL-CCNC: 38 U/L (ref ?–34)
BILIRUB SERPL-MCNC: 0.8 MG/DL (ref 0.2–1.1)
BUN BLD-MCNC: 8 MG/DL (ref 9–23)
BUN/CREAT SERPL: 8.8 (ref 10–20)
CALCIUM BLD-MCNC: 9.5 MG/DL (ref 8.7–10.4)
CHLORIDE SERPL-SCNC: 103 MMOL/L (ref 98–112)
CHOLEST SERPL-MCNC: 178 MG/DL (ref ?–200)
CO2 SERPL-SCNC: 31 MMOL/L (ref 21–32)
CREAT BLD-MCNC: 0.91 MG/DL (ref 0.55–1.02)
EGFRCR SERPLBLD CKD-EPI 2021: 69 ML/MIN/1.73M2 (ref 60–?)
EST. AVERAGE GLUCOSE BLD GHB EST-MCNC: 286 MG/DL (ref 68–126)
FASTING PATIENT LIPID ANSWER: YES
FASTING STATUS PATIENT QL REPORTED: YES
GLOBULIN PLAS-MCNC: 3 G/DL (ref 2–3.5)
GLUCOSE BLD-MCNC: 176 MG/DL (ref 70–99)
HBA1C MFR BLD: 11.6 % (ref ?–5.7)
HDLC SERPL-MCNC: 74 MG/DL (ref 40–59)
LDLC SERPL CALC-MCNC: 86 MG/DL (ref ?–100)
NONHDLC SERPL-MCNC: 104 MG/DL (ref ?–130)
OSMOLALITY SERPL CALC.SUM OF ELEC: 289 MOSM/KG (ref 275–295)
POTASSIUM SERPL-SCNC: 3.9 MMOL/L (ref 3.5–5.1)
PROT SERPL-MCNC: 7 G/DL (ref 5.7–8.2)
SODIUM SERPL-SCNC: 138 MMOL/L (ref 136–145)
T4 FREE SERPL-MCNC: 1.2 NG/DL (ref 0.8–1.7)
TRIGL SERPL-MCNC: 101 MG/DL (ref 30–149)
TSI SER-ACNC: 1.52 UIU/ML (ref 0.55–4.78)
VLDLC SERPL CALC-MCNC: 16 MG/DL (ref 0–30)

## 2025-07-02 PROCEDURE — 80053 COMPREHEN METABOLIC PANEL: CPT

## 2025-07-02 PROCEDURE — 80061 LIPID PANEL: CPT

## 2025-07-02 PROCEDURE — 84443 ASSAY THYROID STIM HORMONE: CPT

## 2025-07-02 PROCEDURE — 84439 ASSAY OF FREE THYROXINE: CPT

## 2025-07-02 PROCEDURE — 36415 COLL VENOUS BLD VENIPUNCTURE: CPT

## 2025-07-02 PROCEDURE — 83036 HEMOGLOBIN GLYCOSYLATED A1C: CPT

## 2025-07-05 SDOH — ECONOMIC STABILITY: FOOD INSECURITY: WITHIN THE PAST 12 MONTHS, THE FOOD YOU BOUGHT JUST DIDN'T LAST AND YOU DIDN'T HAVE MONEY TO GET MORE.: NEVER TRUE

## 2025-07-05 SDOH — ECONOMIC STABILITY: TRANSPORTATION INSECURITY
IN THE PAST 12 MONTHS, HAS LACK OF RELIABLE TRANSPORTATION KEPT YOU FROM MEDICAL APPOINTMENTS, MEETINGS, WORK OR FROM GETTING THINGS NEEDED FOR DAILY LIVING?: NO

## 2025-07-05 SDOH — ECONOMIC STABILITY: HOUSING INSECURITY: DO YOU HAVE PROBLEMS WITH ANY OF THE FOLLOWING?: NONE OF THE ABOVE

## 2025-07-05 ASSESSMENT — PATIENT HEALTH QUESTIONNAIRE - PHQ9
2. FEELING DOWN, DEPRESSED OR HOPELESS: NOT AT ALL
SUM OF ALL RESPONSES TO PHQ9 QUESTIONS 1 AND 2: 0
1. LITTLE INTEREST OR PLEASURE IN DOING THINGS: NOT AT ALL
CLINICAL INTERPRETATION OF PHQ2 SCORE: NO FURTHER SCREENING NEEDED
CLINICAL INTERPRETATION OF PHQ2 SCORE: 0

## 2025-07-05 ASSESSMENT — SOCIAL DETERMINANTS OF HEALTH (SDOH): IN THE PAST 12 MONTHS, HAS THE ELECTRIC, GAS, OIL, OR WATER COMPANY THREATENED TO SHUT OFF SERVICE IN YOUR HOME?: NO

## 2025-07-07 ENCOUNTER — TELEPHONE (OUTPATIENT)
Age: 70
End: 2025-07-07

## 2025-07-08 ENCOUNTER — E-ADVICE (OUTPATIENT)
Age: 70
End: 2025-07-08

## 2025-07-08 PROBLEM — E55.9 VITAMIN D DEFICIENCY: Status: ACTIVE | Noted: 2024-05-02

## 2025-07-08 PROBLEM — E11.65 TYPE II OR UNSPECIFIED TYPE DIABETES MELLITUS WITH RENAL MANIFESTATIONS, UNCONTROLLED(250.42)  (CMD): Status: ACTIVE | Noted: 2025-07-08

## 2025-07-08 PROBLEM — E11.40 TYPE 2 DIABETES MELLITUS WITH DIABETIC NEUROPATHY  (CMD): Status: ACTIVE | Noted: 2023-03-01

## 2025-07-08 PROBLEM — E11.29 TYPE II OR UNSPECIFIED TYPE DIABETES MELLITUS WITH RENAL MANIFESTATIONS, UNCONTROLLED(250.42)  (CMD): Status: ACTIVE | Noted: 2025-07-08

## 2025-07-08 PROBLEM — C50.911: Status: ACTIVE | Noted: 2022-09-05

## 2025-07-08 PROBLEM — K76.0 NAFLD (NONALCOHOLIC FATTY LIVER DISEASE): Status: ACTIVE | Noted: 2017-03-08

## 2025-07-08 PROBLEM — I10 HYPERTENSION: Status: ACTIVE | Noted: 2025-07-08

## 2025-07-08 RX ORDER — CARVEDILOL 3.12 MG/1
3.12 TABLET ORAL 2 TIMES DAILY
COMMUNITY
Start: 2025-06-30

## 2025-07-08 RX ORDER — DIETHYLPROPION HYDROCHLORIDE 25 MG/1
25 TABLET ORAL DAILY
COMMUNITY
End: 2025-07-08 | Stop reason: CLARIF

## 2025-07-11 ENCOUNTER — OFFICE VISIT (OUTPATIENT)
Age: 70
End: 2025-07-11

## 2025-07-11 VITALS
DIASTOLIC BLOOD PRESSURE: 60 MMHG | WEIGHT: 222 LBS | OXYGEN SATURATION: 96 % | RESPIRATION RATE: 16 BRPM | SYSTOLIC BLOOD PRESSURE: 107 MMHG | BODY MASS INDEX: 39.34 KG/M2 | HEIGHT: 63 IN | HEART RATE: 90 BPM | TEMPERATURE: 98.2 F

## 2025-07-11 DIAGNOSIS — E03.8 OTHER SPECIFIED HYPOTHYROIDISM: ICD-10-CM

## 2025-07-11 DIAGNOSIS — E55.9 VITAMIN D DEFICIENCY: ICD-10-CM

## 2025-07-11 DIAGNOSIS — E66.01 MORBID OBESITY  (CMD): ICD-10-CM

## 2025-07-11 DIAGNOSIS — E83.42 HYPOMAGNESEMIA: ICD-10-CM

## 2025-07-11 DIAGNOSIS — C50.911 MUCINOUS CARCINOMA OF BREAST, RIGHT  (CMD): ICD-10-CM

## 2025-07-11 DIAGNOSIS — E53.8 VITAMIN B12 DEFICIENCY: ICD-10-CM

## 2025-07-11 DIAGNOSIS — I10 PRIMARY HYPERTENSION: ICD-10-CM

## 2025-07-11 DIAGNOSIS — Z79.4 TYPE 2 DIABETES MELLITUS WITH DIABETIC NEUROPATHY, WITH LONG-TERM CURRENT USE OF INSULIN (CMD): ICD-10-CM

## 2025-07-11 DIAGNOSIS — E78.00 HYPERCHOLESTEROLEMIA: Primary | ICD-10-CM

## 2025-07-11 DIAGNOSIS — E11.65 TYPE II OR UNSPECIFIED TYPE DIABETES MELLITUS WITH RENAL MANIFESTATIONS, UNCONTROLLED(250.42)  (CMD): ICD-10-CM

## 2025-07-11 DIAGNOSIS — E11.40 TYPE 2 DIABETES MELLITUS WITH DIABETIC NEUROPATHY, WITH LONG-TERM CURRENT USE OF INSULIN (CMD): ICD-10-CM

## 2025-07-11 DIAGNOSIS — K75.81 LIVER CIRRHOSIS SECONDARY TO NASH  (CMD): ICD-10-CM

## 2025-07-11 DIAGNOSIS — K74.60 LIVER CIRRHOSIS SECONDARY TO NASH  (CMD): ICD-10-CM

## 2025-07-11 DIAGNOSIS — E11.29 TYPE II OR UNSPECIFIED TYPE DIABETES MELLITUS WITH RENAL MANIFESTATIONS, UNCONTROLLED(250.42)  (CMD): ICD-10-CM

## 2025-07-11 DIAGNOSIS — Z00.00 ROUTINE HEALTH MAINTENANCE: ICD-10-CM

## 2025-07-11 RX ORDER — LIDOCAINE 50 MG/G
OINTMENT TOPICAL
COMMUNITY
End: 2025-07-11 | Stop reason: ALTCHOICE

## 2025-07-11 RX ORDER — MAGNESIUM OXIDE 400 MG/1
400 TABLET ORAL
COMMUNITY

## 2025-07-11 RX ORDER — HYDROMORPHONE HYDROCHLORIDE 2 MG/1
TABLET ORAL
COMMUNITY
End: 2025-07-11 | Stop reason: ALTCHOICE

## 2025-07-11 RX ORDER — OFLOXACIN 3 MG/ML
SOLUTION/ DROPS OPHTHALMIC
COMMUNITY
End: 2025-07-11 | Stop reason: ALTCHOICE

## 2025-07-11 RX ORDER — LIDOCAINE HYDROCHLORIDE 20 MG/ML
SOLUTION OROPHARYNGEAL
COMMUNITY
End: 2025-07-11 | Stop reason: ALTCHOICE

## 2025-07-11 RX ORDER — DOXYCYCLINE 100 MG/1
100 CAPSULE ORAL EVERY 12 HOURS
COMMUNITY
End: 2025-07-11 | Stop reason: ALTCHOICE

## 2025-07-11 RX ORDER — POLYETHYLENE GLYCOL 3350, SODIUM CHLORIDE, SODIUM BICARBONATE, POTASSIUM CHLORIDE 420; 11.2; 5.72; 1.48 G/4L; G/4L; G/4L; G/4L
POWDER, FOR SOLUTION ORAL
COMMUNITY
End: 2025-07-11 | Stop reason: ALTCHOICE

## 2025-07-11 RX ORDER — POLYETHYLENE GLYCOL 3350 17 G/17G
17 POWDER, FOR SOLUTION ORAL
COMMUNITY

## 2025-07-11 RX ORDER — SULFAMETHOXAZOLE AND TRIMETHOPRIM 800; 160 MG/1; MG/1
TABLET ORAL
COMMUNITY
End: 2025-07-11 | Stop reason: ALTCHOICE

## 2025-07-11 RX ORDER — BISACODYL 5 MG/1
TABLET, DELAYED RELEASE ORAL
COMMUNITY
Start: 2023-09-06

## 2025-07-11 RX ORDER — HYDROCODONE BITARTRATE AND ACETAMINOPHEN 5; 325 MG/1; MG/1
TABLET ORAL
COMMUNITY
End: 2025-07-11 | Stop reason: ALTCHOICE

## 2025-07-11 RX ORDER — FLUOCINOLONE ACETONIDE 0.1 MG/ML
SOLUTION TOPICAL
COMMUNITY
End: 2025-07-11 | Stop reason: CLARIF

## 2025-07-11 RX ORDER — TRAMADOL HYDROCHLORIDE 50 MG/1
50 TABLET ORAL EVERY 8 HOURS PRN
COMMUNITY
End: 2025-07-11

## 2025-07-11 RX ORDER — TRIAMCINOLONE ACETONIDE 1 MG/G
OINTMENT TOPICAL
COMMUNITY
End: 2025-07-11

## 2025-07-11 RX ORDER — PSEUDOEPHEDRINE HCL 30 MG
1 TABLET ORAL 2 TIMES DAILY
COMMUNITY
End: 2025-07-11

## 2025-07-11 RX ORDER — PREDNISONE 20 MG/1
TABLET ORAL
COMMUNITY
End: 2025-07-11 | Stop reason: ALTCHOICE

## 2025-07-11 RX ORDER — IBUPROFEN 800 MG/1
800 TABLET, FILM COATED ORAL 3 TIMES DAILY PRN
COMMUNITY
End: 2025-07-11 | Stop reason: ALTCHOICE

## 2025-07-11 RX ORDER — CEFADROXIL 500 MG/1
CAPSULE ORAL
COMMUNITY
End: 2025-07-11 | Stop reason: ALTCHOICE

## 2025-07-11 RX ORDER — GABAPENTIN 100 MG/1
CAPSULE ORAL
COMMUNITY
End: 2025-07-11 | Stop reason: ALTCHOICE

## 2025-07-11 RX ORDER — METOLAZONE 2.5 MG/1
TABLET ORAL
COMMUNITY

## 2025-07-11 RX ORDER — ONDANSETRON 4 MG/1
4 TABLET, FILM COATED ORAL 2 TIMES DAILY
COMMUNITY

## 2025-07-11 RX ORDER — TOPIRAMATE 25 MG/1
TABLET, FILM COATED ORAL
COMMUNITY

## 2025-07-11 RX ORDER — CLOTRIMAZOLE 1 %
CREAM (GRAM) TOPICAL
COMMUNITY
Start: 2025-04-17

## 2025-07-11 RX ORDER — TRIAMCINOLONE ACETONIDE 1 MG/G
1 CREAM TOPICAL 2 TIMES DAILY
COMMUNITY

## 2025-07-11 ASSESSMENT — PATIENT HEALTH QUESTIONNAIRE - PHQ9
1. LITTLE INTEREST OR PLEASURE IN DOING THINGS: NOT AT ALL
SUM OF ALL RESPONSES TO PHQ9 QUESTIONS 1 AND 2: 0
CLINICAL INTERPRETATION OF PHQ2 SCORE: NO FURTHER SCREENING NEEDED
SUM OF ALL RESPONSES TO PHQ9 QUESTIONS 1 AND 2: 0
2. FEELING DOWN, DEPRESSED OR HOPELESS: NOT AT ALL

## 2025-07-29 ENCOUNTER — LAB ENCOUNTER (OUTPATIENT)
Dept: LAB | Facility: REFERENCE LAB | Age: 70
End: 2025-07-29
Attending: INTERNAL MEDICINE

## 2025-07-29 DIAGNOSIS — K75.81 NONALCOHOLIC STEATOHEPATITIS: Primary | ICD-10-CM

## 2025-07-29 DIAGNOSIS — K74.60 CIRRHOSIS (HCC): ICD-10-CM

## 2025-07-29 LAB
AFP-TM SERPL-MCNC: 5.6 NG/ML (ref ?–8)
ALBUMIN SERPL-MCNC: 3.9 G/DL (ref 3.2–4.8)
ALBUMIN/GLOB SERPL: 1.3 (ref 1–2)
ALP LIVER SERPL-CCNC: 121 U/L (ref 55–142)
ALT SERPL-CCNC: 35 U/L (ref 10–49)
ANION GAP SERPL CALC-SCNC: 7 MMOL/L (ref 0–18)
AST SERPL-CCNC: 41 U/L (ref ?–34)
BILIRUB SERPL-MCNC: 0.7 MG/DL (ref 0.2–1.1)
BUN BLD-MCNC: 12 MG/DL (ref 9–23)
BUN/CREAT SERPL: 13.3 (ref 10–20)
CALCIUM BLD-MCNC: 9.5 MG/DL (ref 8.7–10.4)
CHLORIDE SERPL-SCNC: 102 MMOL/L (ref 98–112)
CO2 SERPL-SCNC: 32 MMOL/L (ref 21–32)
CREAT BLD-MCNC: 0.9 MG/DL (ref 0.55–1.02)
EGFRCR SERPLBLD CKD-EPI 2021: 70 ML/MIN/1.73M2 (ref 60–?)
FASTING STATUS PATIENT QL REPORTED: YES
GLOBULIN PLAS-MCNC: 2.9 G/DL (ref 2–3.5)
GLUCOSE BLD-MCNC: 143 MG/DL (ref 70–99)
OSMOLALITY SERPL CALC.SUM OF ELEC: 294 MOSM/KG (ref 275–295)
POTASSIUM SERPL-SCNC: 4.1 MMOL/L (ref 3.5–5.1)
PROT SERPL-MCNC: 6.8 G/DL (ref 5.7–8.2)
SODIUM SERPL-SCNC: 141 MMOL/L (ref 136–145)

## 2025-07-29 PROCEDURE — 36415 COLL VENOUS BLD VENIPUNCTURE: CPT

## 2025-07-29 PROCEDURE — 82105 ALPHA-FETOPROTEIN SERUM: CPT

## 2025-07-29 PROCEDURE — 80053 COMPREHEN METABOLIC PANEL: CPT

## 2025-08-06 ENCOUNTER — HOSPITAL ENCOUNTER (OUTPATIENT)
Dept: MRI IMAGING | Age: 70
Discharge: HOME | End: 2025-08-06
Attending: INTERNAL MEDICINE

## 2025-08-06 DIAGNOSIS — K74.60 LIVER CIRRHOSIS SECONDARY TO NASH (HCC): ICD-10-CM

## 2025-08-06 DIAGNOSIS — K75.81 LIVER CIRRHOSIS SECONDARY TO NASH (HCC): ICD-10-CM

## 2025-08-06 PROCEDURE — 74183 MRI ABD W/O CNTR FLWD CNTR: CPT | Performed by: INTERNAL MEDICINE

## 2025-08-06 PROCEDURE — A9575 INJ GADOTERATE MEGLUMI 0.1ML: HCPCS | Performed by: RADIOLOGY

## 2025-08-06 RX ORDER — GADOTERATE MEGLUMINE 376.9 MG/ML
20 INJECTION INTRAVENOUS
Status: COMPLETED | OUTPATIENT
Start: 2025-08-06 | End: 2025-08-06

## 2025-08-06 RX ADMIN — GADOTERATE MEGLUMINE 20 ML: 376.9 INJECTION INTRAVENOUS at 08:56:00

## 2025-08-13 ENCOUNTER — HOSPITAL ENCOUNTER (OUTPATIENT)
Dept: MAMMOGRAPHY | Facility: HOSPITAL | Age: 70
Discharge: HOME | End: 2025-08-13
Attending: INTERNAL MEDICINE

## 2025-08-13 DIAGNOSIS — Z17.0 MALIGNANT NEOPLASM OF CENTRAL PORTION OF RIGHT BREAST IN FEMALE, ESTROGEN RECEPTOR POSITIVE (HCC): ICD-10-CM

## 2025-08-13 DIAGNOSIS — C50.111 MALIGNANT NEOPLASM OF CENTRAL PORTION OF RIGHT BREAST IN FEMALE, ESTROGEN RECEPTOR POSITIVE (HCC): ICD-10-CM

## 2025-08-13 PROCEDURE — 77066 DX MAMMO INCL CAD BI: CPT | Performed by: INTERNAL MEDICINE

## 2025-08-13 PROCEDURE — 77062 BREAST TOMOSYNTHESIS BI: CPT | Performed by: INTERNAL MEDICINE

## 2025-08-18 RX ORDER — POTASSIUM CHLORIDE 750 MG/1
40 TABLET, FILM COATED, EXTENDED RELEASE ORAL 2 TIMES DAILY
Qty: 720 TABLET | Refills: 3 | Status: SHIPPED | OUTPATIENT
Start: 2025-08-18

## 2025-08-27 ENCOUNTER — OFFICE VISIT (OUTPATIENT)
Facility: LOCATION | Age: 70
End: 2025-08-27
Attending: INTERNAL MEDICINE

## 2025-08-27 VITALS
TEMPERATURE: 98 F | SYSTOLIC BLOOD PRESSURE: 115 MMHG | DIASTOLIC BLOOD PRESSURE: 75 MMHG | RESPIRATION RATE: 18 BRPM | HEIGHT: 63 IN | BODY MASS INDEX: 39.69 KG/M2 | HEART RATE: 94 BPM | OXYGEN SATURATION: 96 % | WEIGHT: 224 LBS

## 2025-08-27 DIAGNOSIS — Z08 ENCOUNTER FOR FOLLOW-UP SURVEILLANCE OF BREAST CANCER: ICD-10-CM

## 2025-08-27 DIAGNOSIS — Z17.0 MALIGNANT NEOPLASM OF CENTRAL PORTION OF RIGHT BREAST IN FEMALE, ESTROGEN RECEPTOR POSITIVE (HCC): Primary | ICD-10-CM

## 2025-08-27 DIAGNOSIS — Z78.0 POST-MENOPAUSAL: ICD-10-CM

## 2025-08-27 DIAGNOSIS — Z12.31 SCREENING MAMMOGRAM, ENCOUNTER FOR: ICD-10-CM

## 2025-08-27 DIAGNOSIS — Z51.81 ENCOUNTER FOR MONITORING AROMATASE INHIBITOR THERAPY: ICD-10-CM

## 2025-08-27 DIAGNOSIS — C50.111 MALIGNANT NEOPLASM OF CENTRAL PORTION OF RIGHT BREAST IN FEMALE, ESTROGEN RECEPTOR POSITIVE (HCC): Primary | ICD-10-CM

## 2025-08-27 DIAGNOSIS — Z79.811 ENCOUNTER FOR MONITORING AROMATASE INHIBITOR THERAPY: ICD-10-CM

## 2025-08-27 DIAGNOSIS — Z85.3 ENCOUNTER FOR FOLLOW-UP SURVEILLANCE OF BREAST CANCER: ICD-10-CM

## 2025-08-27 RX ORDER — LETROZOLE 2.5 MG/1
2.5 TABLET, FILM COATED ORAL NIGHTLY
Qty: 90 TABLET | Refills: 3 | Status: SHIPPED | OUTPATIENT
Start: 2025-08-27

## 2025-12-22 ENCOUNTER — APPOINTMENT (OUTPATIENT)
Dept: FAMILY MEDICINE | Age: 70
End: 2025-12-22

## 2026-01-05 ENCOUNTER — APPOINTMENT (OUTPATIENT)
Age: 71
End: 2026-01-05

## (undated) DIAGNOSIS — M54.16 LUMBAR RADICULOPATHY: Primary | ICD-10-CM

## (undated) DEVICE — SOLUTION IRRIG 1000ML 0.9% NACL USP BTL

## (undated) DEVICE — CLOSURE EXOFIN 1.0ML

## (undated) DEVICE — ENDOSCOPY PACK UPPER: Brand: MEDLINE INDUSTRIES, INC.

## (undated) DEVICE — MEDI-VAC NON-CONDUCTIVE SUCTION TUBING 6MM X 1.8M (6FT.) L: Brand: CARDINAL HEALTH

## (undated) DEVICE — FRAZIER SUCTION INSTRUMENT 12 FR W/CONTROL VENT & OBTURATOR: Brand: FRAZIER

## (undated) DEVICE — 35 ML SYRINGE REGULAR TIP: Brand: MONOJECT

## (undated) DEVICE — CONMED SCOPE SAVER BITE BLOCK, 20X27 MM: Brand: SCOPE SAVER

## (undated) DEVICE — SNARE CAPTIFLEX MICRO-OVL OLY

## (undated) DEVICE — DRAPE SRG 70X60IN SPLT U IMPRV

## (undated) DEVICE — KIT ENDO ORCAPOD 160/180/190

## (undated) DEVICE — DRESSING SURG W9XL25CM SIL SP CVR WTRPRF VIR

## (undated) DEVICE — ENCORE® LATEX ACCLAIM SIZE 8, STERILE LATEX POWDER-FREE SURGICAL GLOVE: Brand: ENCORE

## (undated) DEVICE — UNDYED BRAIDED (POLYGLACTIN 910), SYNTHETIC ABSORBABLE SUTURE: Brand: COATED VICRYL

## (undated) DEVICE — DRAPE SHEET TRANSVERSE LAP

## (undated) DEVICE — Device: Brand: DUAL NARE NASAL CANNULAE FEMALE LUER CON 7FT O2 TUBE

## (undated) DEVICE — STERILE LATEX POWDER-FREE SURGICAL GLOVESWITH NITRILE COATING: Brand: PROTEXIS

## (undated) DEVICE — MEDI-VAC NON-CONDUCTIVE SUCTION TUBING: Brand: CARDINAL HEALTH

## (undated) DEVICE — E-Z CLEAN, NON-STICK, PTFE COATED, ELECTROSURGICAL NEEDLE ELECTRODE, MODIFIED EXTENDED INSULATION, 2.75 INCH (7 CM): Brand: MEGADYNE

## (undated) DEVICE — 3M™ STERI-STRIP™ REINFORCED ADHESIVE SKIN CLOSURES, R1547, 1/2 IN X 4 IN (12 MM X 100 MM), 6 STRIPS/ENVELOPE: Brand: 3M™ STERI-STRIP™

## (undated) DEVICE — SHOULDER: Brand: MEDLINE INDUSTRIES, INC.

## (undated) DEVICE — 6 ML SYRINGE LUER-LOCK TIP: Brand: MONOJECT

## (undated) DEVICE — WRAP COOLING SHLDR W/ICE PILLO

## (undated) DEVICE — BRA SURG ELIZ PINK XL

## (undated) DEVICE — SNARE ENDOSCOPIC 10MM ROUND

## (undated) DEVICE — SPONGE PREMIERPRO 7X18X18

## (undated) DEVICE — HANDPIECE SET WITH HIGH FLOW TIP AND SUCTION TUBE: Brand: INTERPULSE

## (undated) DEVICE — BOWL BNE CEM MIX DISP QUIK-VAC

## (undated) DEVICE — BEACH CHAIR MASK SGL USE

## (undated) DEVICE — SUT CHROMIC GUT 2-0 MH G127H

## (undated) DEVICE — GAUZE SPONGES,12 PLY: Brand: CURITY

## (undated) DEVICE — SOLUTION IRRIG 3000ML 0.9% NACL FLX CONT

## (undated) DEVICE — GAMMEX® NON-LATEX PI ORTHO SIZE 9, STERILE POLYISOPRENE POWDER-FREE SURGICAL GLOVE: Brand: GAMMEX

## (undated) DEVICE — INTENT TO BE USED WITH SUTURE MATERIAL FOR TISSUE CLOSURE: Brand: RICHARD-ALLAN® NEEDLE 3/8 CIRCLE TROCAR

## (undated) DEVICE — COVER MAYO STD REG W23XL54IN STD TELSCP FLD

## (undated) DEVICE — MEGADYNE E-Z CLEAN BLADE 2.75"

## (undated) DEVICE — APPLICATOR SKIN PREP 26ML HI LT ORNG 2% CHG

## (undated) DEVICE — DRAPE SHEET LG

## (undated) DEVICE — SUTURE FIBERWIRE SZ 2 L38IN NONABSORB BLU

## (undated) DEVICE — KIT CLEAN ENDOKIT 1.1OZ GOWNX2

## (undated) DEVICE — DRAPE SRG 90X60IN BCK TBL CVR

## (undated) DEVICE — BANDAGE ROLL,100% COTTON, 6 PLY, LARGE: Brand: KERLIX

## (undated) DEVICE — SNARE OPTMZ PLPCTM TRP

## (undated) DEVICE — CLIP SM INTNL HMCLP TNTLM ESCP

## (undated) DEVICE — ADHESIVE MASTISOL 2/3ML

## (undated) DEVICE — IMPERVIOUS STOCKINETTE: Brand: DEROYAL

## (undated) DEVICE — SUT CHROMIC GUT 3-0 SH G122H

## (undated) DEVICE — 3 ML SYRINGE LUER-LOCK TIP: Brand: MONOJECT

## (undated) DEVICE — SUTURE ETHBND EXCEL SZ 1 L30IN NONABSORB

## (undated) DEVICE — SUTURE VCRL SZ 2-0 L36IN ABSRB UD L36MM CT-1

## (undated) DEVICE — SUTURE VCRL SZ 1 L27IN ABSRB VLT L36MM CT-1

## (undated) DEVICE — COVER PRB NEOGUARD 30X2.6CM US

## (undated) DEVICE — 3M™ IOBAN™ 2 ANTIMICROBIAL INCISE DRAPE 6650EZ: Brand: IOBAN™ 2

## (undated) DEVICE — MAT TRANSFER HALFMATS 39 X 49

## (undated) DEVICE — SHOULDER STABILIZATION KIT,                                    DISPOSABLE 12 PER BOX

## (undated) DEVICE — 60 ML SYRINGE REGULAR TIP: Brand: MONOJECT

## (undated) DEVICE — YANKAUER SUCTION INSTRUMENT NO CONTROL VENT, BULB TIP, CLEAR: Brand: YANKAUER

## (undated) DEVICE — SUT CHROMIC GUT 2-0 SH G123H

## (undated) DEVICE — PREMIERPRO LAP SPONGE 18"X18" STERILE, 5/PK: Brand: PREMIERPRO

## (undated) DEVICE — BALLOON HEMOSTATIC EUS LINEAR

## (undated) DEVICE — LOCKING DEVICE AND BIOPSY CAP FOR USE WITH RX BILIARY SYSTEM: Brand: RX LOCKING DEVICE AND BIOPSY CAP

## (undated) DEVICE — ENCORE® LATEX MICRO SIZE 8, STERILE LATEX POWDER-FREE SURGICAL GLOVE: Brand: ENCORE

## (undated) DEVICE — HARMONIC FOCUS SHEARS 9CM LENGTH + ADAPTIVE TISSUE TECHNOLOGY FOR USE WITH BLUE HAND PIECE ONLY: Brand: HARMONIC FOCUS

## (undated) DEVICE — ENDOSCOPY PACK - LOWER: Brand: MEDLINE INDUSTRIES, INC.

## (undated) DEVICE — SOLUTION  .9 1000ML BTL

## (undated) DEVICE — HEWSON SUTURE RETRIEVER: Brand: HEWSON SUTURE RETRIEVER

## (undated) DEVICE — BLADE SAW W1.14XL2.17IN THK0.025IN CUT

## (undated) DEVICE — YANKAUER,BULB TIP,W/O VENT,RIGID,STERILE: Brand: MEDLINE

## (undated) DEVICE — MINOR GENERAL: Brand: MEDLINE INDUSTRIES, INC.

## (undated) DEVICE — GOWN SURG AERO BLUE PERF XLG

## (undated) DEVICE — LINE MNTR ADLT SET O2 INTMD

## (undated) DEVICE — FORCEP RADIAL JAW 4

## (undated) DEVICE — SUTURE PROL SZ 3-0 L18IN NONABSORB BLU

## (undated) DEVICE — GAMMEX® PI HYBRID SIZE 7, STERILE POWDER-FREE SURGICAL GLOVE, POLYISOPRENE AND NEOPRENE BLEND: Brand: GAMMEX

## (undated) DEVICE — V2 SPECIMEN COLLECTION MANIFOLD KIT: Brand: NEPTUNE

## (undated) DEVICE — CLIP MED INTNL HMCLP TNTLM

## (undated) DEVICE — Device

## (undated) DEVICE — OR TOWEL, 17" X 26" STERILE, BLUE: Brand: PREMIERPRO

## (undated) DEVICE — Device: Brand: DEFENDO AIR/WATER/SUCTION AND BIOPSY VALVE

## (undated) DEVICE — SUTURE MCRYL SZ 3-0 L27IN ABSRB UD L24MM PS-1

## (undated) DEVICE — INTENT TO BE USED WITH SUTURE MATERIAL FOR TISSUE CLOSURE: Brand: RICHARD-ALLAN® NEEDLE 1/2 CIRCLE TAPER

## (undated) DEVICE — GAMMEX® PI HYBRID SIZE 9, STERILE POWDER-FREE SURGICAL GLOVE, POLYISOPRENE AND NEOPRENE BLEND: Brand: GAMMEX

## (undated) NOTE — LETTER
[facility]  Administration of Anesthesia  I, Teresa Wilson agree to be cared for by a physician anesthesiologist alone and/or with a nurse anesthetist, who is specially trained to monitor me and give me medicine to put me to sleep or keep me comfortable during my procedure    I understand that my anesthesiologist and/or anesthetist is not an employee or agent of [facility] or [facility]. He or she works for [facility]    As the patient asking for anesthesia services, I agree to:  Allow the anesthesiologist (anesthesia doctor) to give me medicine and do additional procedures as necessary. Some examples are: Starting or using an “IV” to give me medicine, fluids or blood during my procedure, and having a breathing tube placed to help me breathe when I’m asleep (intubation). In the event that my heart stops working properly, I understand that my anesthesiologist will make every effort to sustain my life, unless otherwise directed by [facility] Do Not Resuscitate documents.  Tell my anesthesia doctor before my procedure:  If I am pregnant.  The last time that I ate or drank.  iii. All of the medicines I take (including prescriptions, herbal supplements, and pills I can buy without a prescription (including street drugs/illegal medications). Failure to inform my anesthesiologist about these medicines may increase my risk of anesthetic complications.  iv.If I am allergic to anything or have had a reaction to anesthesia before.  I understand how the anesthesia medicine will help me (benefits).  I understand that with any type of anesthesia medicine there are risks:  The most common risks are: nausea, vomiting, sore throat, muscle soreness, damage to my eyes, mouth, or teeth (from breathing tube placement).  Rare risks include: remembering what happened during my procedure, allergic reactions to medications, injury to my airway, heart, lungs, vision, nerves,  or muscles and in extremely rare instances death.  My doctor has explained to me other choices available to me for my care (alternatives).  Pregnant Patients (“epidural”):  I understand that the risks of having an epidural (medicine given into my back to help control pain during labor), include itching, low blood pressure, difficulty urinating, headache or slowing of the baby’s heart. Very rare risks include infection, bleeding, seizure, irregular heart rhythms and nerve injury.  Regional Anesthesia (“spinal”, “epidural”, & “nerve blocks”):  I understand that rare but potential complications include headache, bleeding, infection, seizure, irregular heart rhythms, and nerve injury.    _____________________________________________________________________________  Patient (or Representative) Signature/Relationship to Patient  Date   Time    _____________________________________________________________________________   Name (if used)    Language/Organization   Time    _____________________________________________________________________________  Nurse Anesthetist Signature     Date   Time  _____________________________________________________________________________  Anesthesiologist Signature     Date   Time  I have discussed the procedure and information above with the patient (or patient’s representative) and answered their questions. The patient or their representative has agreed to have anesthesia services.    _____________________________________________________________________________  Witness        Date   Time  I have verified that the signature is that of the patient or patient’s representative, and that it was signed before the procedure  Patient Name: Teresa Wilson     : 1955                 Printed: 3/11/2025 at 8:16 AM    Medical Record #: [id number]                                            Page 1 of 1  ----------ANESTHESIA CONSENT----------

## (undated) NOTE — MR AVS SNAPSHOT
[facility]  [facility] [address] [address]  [phone]               Thank you for choosing us for your health care visit with Rajendra Elena MD.  We are glad to serve you and happy to provide you with this summary of your visit.   Mya acquired. Please contact the Patient Business Office at [phone] if you have   any questions related to insurance coverage. Thank you. Wednesday March 22, 2017     Imaging:  AMANDA SCREENING LEFT (LVO=[id number])    Instructions:   To schedule a test Pioglitazone     Other reaction(s): edema    Potassium Chloride Nausea and vomiting    Sitagliptin     Other reaction(s): stomach aches    Spironolactone Nausea only                Today's Vital Signs     BP Pulse Temp Height Weight BMI    140/78 mmHg 100 Levothyroxine Sodium 88 MCG Tabs   Take 1 tablet by mouth every morning before breakfast.   Commonly known as:  SYNTHROID, LEVOTHROID           Losartan Potassium 25 MG Tabs   TAKE 1/2 TABLET(12.5 MG) BY MOUTH DAILY   Commonly known as:  COZAAR view more details from this visit by going to [email or web address]. If you've recently had a stay at the Hospital you can access your discharge instructions in [employer] by going to Visits < Admission Summaries.  If you've been to the Emergency Depar

## (undated) NOTE — Clinical Note
Spoke with pt today for TCM--she prefers to f/u with specialists, only, at this time. Sent appt TE to office staff as FYI/TCM protocol--thank you.   Future Appointments 8/28/2023  7:00 AM    [facility] MRI RM1 (1.5T)         [facility] MRI             EM [facility] 10/18/2023 10:30 AM   Guillermina Molina MD       [facility] 11/28/2023 9:45 AM    Jelani Tolbert MD           [address] [facility] 2/1/2024   10:30 AM   Rosette Sever, MD         [address] [facility] ONC         EMO

## (undated) NOTE — LETTER
[address] [place], [place]  Authorization for Surgical Operation and Procedure                                                                                           I hereby authorize Alexa Mendoza MD, my physician and his/her assistants (if applicable), which may include medical students, residents, and/or fellows, to perform the following surgical operation/ procedure and administer such anesthesia as may be determined necessary by my physician: Operation/Procedure name (s) COLONOSCOPY/ ESOPHAGOGASTRODUODENOSCOPY on [address] [facility]   2. I recognize that during the surgical operation/procedure, unforeseen conditions may necessitate additional or different procedures than those listed above. I, therefore, further authorize and request that the above-named surgeon, assistants, or designees perform such procedures as are, in their judgment, necessary and desirable. 3.   My surgeon/physician has discussed prior to my surgery the potential benefits, risks and side effects of this procedure; the likelihood of achieving goals; and potential problems that might occur during recuperation. They also discussed reasonable alternatives to the procedure, including risks, benefits, and side effects related to the alternatives and risks related to not receiving this procedure. I have had all my questions answered and I acknowledge that no guarantee has been made as to the result that may be obtained. 4.   Should the need arise during my operation/procedure, which includes change of level of care prior to discharge, I also consent to the administration of blood and/or blood products. Further, I understand that despite careful testing and screening of blood or blood products by collecting agencies, I may still be subject to ill effects as a result of receiving a blood transfusion and/or blood products.   The following are some, but not all, of the potential risks that can occur: fever and allergic reactions, hemolytic reactions, transmission of diseases such as Hepatitis, AIDS and Cytomegalovirus (CMV) and fluid overload. In the event that I wish to have an autologous transfusion of my own blood, or a directed donor transfusion, I will discuss this with my physician. Check only if Refusing Blood or Blood Products  I understand refusal of blood or blood products as deemed necessary by my physician may have serious consequences to my condition to include possible death. I hereby assume responsibility for my refusal and release the hospital, its personnel, and my physicians from any responsibility for the consequences of my refusal.    o  Refuse   5. I authorize the use of any specimen, organs, tissues, body parts or foreign objects that may be removed from my body during the operation/procedure for diagnosis, research or teaching purposes and their subsequent disposal by hospital authorities. I also authorize the release of specimen test results and/or written reports to my treating physician on the hospital medical staff or other referring or consulting physicians involved in my care, at the discretion of the Pathologist or my treating physician. 6.   I consent to the photographing or videotaping of the operations or procedures to be performed, including appropriate portions of my body for medical, scientific, or educational purposes, provided my identity is not revealed by the pictures or by descriptive texts accompanying them. If the procedure has been photographed/videotaped, the surgeon will obtain the original picture, image, videotape or CD. The hospital will not be responsible for storage, release or maintenance of the picture, image, tape or CD.    7.   I consent to the presence of a  or observers in the operating room as deemed necessary by my physician or their designees.     8.   I recognize that in the event my procedure results in extended X-Ray/fluoroscopy time, I may develop a skin reaction. 9. If I have a Do Not Attempt Resuscitation (DNAR) order in place, that status will be suspended while in the operating room, procedural suite, and during the recovery period unless otherwise explicitly stated by me (or a person authorized to consent on my behalf). The surgeon or my attending physician will determine when the applicable recovery period ends for purposes of reinstating the DNAR order. 10. Patients having a sterilization procedure: I understand that if the procedure is successful the results will be permanent and it will therefore be impossible for me to inseminate, conceive, or bear children. I also understand that the procedure is intended to result in sterility, although the result has not been guaranteed. 11. I acknowledge that my physician has explained sedation/analgesia administration to me including the risk and benefits I consent to the administration of sedation/analgesia as may be necessary or desirable in the judgment of my physician. I CERTIFY THAT I HAVE READ AND FULLY UNDERSTAND THE ABOVE CONSENT TO OPERATION and/or OTHER PROCEDURE.     _________________________________________ _________________________________     ___________________________________  Signature of Patient     Signature of Responsible Person                   Printed Name of Responsible Person                              _________________________________________ ______________________________        ___________________________________  Signature of Witness         Date  Time         Relationship to Patient    STATEMENT OF PHYSICIAN My signature below affirms that prior to the time of the procedure; I have explained to the patient and/or his/her legal representative, the risks and benefits involved in the proposed treatment and any reasonable alternative to the proposed treatment.  I have also explained the risks and benefits involved in refusal of the proposed treatment and alternatives to the proposed treatment and have answered the patient's questions.  If I have a significant financial interest in a co-management agreement or a significant financial interest in any product or implant, or other significant relationship used in this procedure/surgery, I have disclosed this and had a discussion with my patient.     _______________________________________________________________ _____________________________  Claudia Simms Physician)                                                                                         (Date)                                   (Time)  Patient Name: Jonah Pink    : 1955   Printed: 2023      Medical Record #: [id number]                                              Page 1 of 1

## (undated) NOTE — LETTER
[address], [place]  Authorization for Invasive Procedures  1. I hereby authorize Dr. Radha Santoyo , my physician and whomever may be designated as the doctor's assistant, to perform the following operation and/or procedure:  Colonoscopy on [facility] at [facility].    2. My physician has explained to me the nature and purpose of the operation or other procedure, possible alternative methods of treatment, the risks involved and the possibility of complications to me. I understand the probable consequences of declining the recommended procedure and the alternative methods of treatment. I acknowledge that no guarantee has been made as to the result that may be obtained. 3. I recognize that during the course of this operation or other procedure, unforeseen conditions may necessitate additional or different procedures than those listed above. I, therefore, further authorize and request that the above-named physician, his/her physician assistants, or designees perform such procedures as are, in his/her professional opinion, necessary and desirable. If I have a Do Not Attempt Resuscitation (DNAR) order in place, that status will be suspended while in the operating room, procedural suite, and during the recovery period unless otherwise explicitly stated by me (or a person authorized to consent on my behalf). The surgeon or my attending physician will determine when the applicable recovery period ends for purposes of reinstating the DNAR order. 4. Should the need arise during my operation or immediate post-operative period; I also consent to the administration of blood and/or blood products.  Further, I understand that despite careful testing and screening of blood and blood products, I may still be subject to ill effects as a result of recieving a blood transfusion an/or blood producst. The following are some, but not all, of the potential risks that can occur: fever and allergic reactions, hemolytic reactions, transmission of disease such as hepatitis, AIDS, cytomegalovirus (CMV), and flluid overload. In the event that I wish to have autologous transfusions of my own blood, or a directed donor transfusion, I will discuss this with my physician. 5. I consent to the photographing of the operations or procedures to be performed for the purposes of advancing medicine, science, and/or education, provided my identity is not revealed. If the procedure has been videotaped, the physician/surgeon will obtain the original videotape. The hospital will not be responsible for storage or maintenance of this tape. 6. I consent to the presence of a  or observer as deemed necessary by my physician or his designee. 7. Any tissues or organs removed in the operation or other procedure may be disposed of by and at the discretion of [facility].    8. I understand that the physician and his/her physician assistants may not be employees or agents of [facility], [facility], [facility], but are independent medical practitioners who have been permitted to use its facilities for the care and treatment of their patients. 9. Patients having a sterilization procedure: I understand that if the procedure is successful the results will be permanent and it will therefore be impossible for me to inseminate, conceive or bear children. I also understand that the procedure is intended to result in sterility, although the result has not been guaranteed. 10. I CERTIFY THAT I HAVE READ AND FULLY UNDERSTAND THE ABOVE CONSENT TO OPERATION and/or OTHER PROCEDURE. 11. I acknowledge that my physician has explained sedation/analgesia administration to me including the risks and benefits. I consent to the administration of sedation/analgesia as may be necessary or desirable in the judgment of my physician.      Signature of Patient:  ________________________________________________ Date: _________Time: _________    Responsible person in case of minor or unconscious: _____________________________Relationship: ____________     Witness Signature: ____________________________________________ Date: __________ Time: ___________    Statement of Physician  My signature below affirms that prior to the time of the procedure, I have explained to the patient and/or her legal representative, the risks and benefits involved in the proposed treatment and any reasonable alternative to the proposed treatment. I have also explained the risks and benefits involved in the refusal of the proposed treatment and have answered the patient's questions. If I have a significant financial interest in this procedure/surgery, I have disclosed this and had a discussion with my patient.     Signature of Physician:   ________________________________________Date: _________Time:_______ Patient Name: Em Muller  : 1955   Printed: May 10, 2022    Medical Record #: [id number]

## (undated) NOTE — LETTER
[facility], [place]  Authorization for Invasive Procedures  1. I hereby authorize Dr. Neelima Franklin , my physician and whomever may be designated as the doctor's assistant, to perform the following operation and/or procedure:  Esophagogastroduodenoscopy (EGD) and Colonoscopy on David Diggs at [facility].    2. My physician has explained to me the nature and purpose of the operation or other procedure, possible alternative methods of treatment, the risks involved and the possibility of complications to me. I understand the probable consequences of declining the recommended procedure and the alternative methods of treatment. I acknowledge that no guarantee has been made as to the result that may be obtained. 3. I recognize that during the course of this operation or other procedure, unforeseen conditions may necessitate additional or different procedures than those listed above. I, therefore, further authorize and request that the above-named physician, his/her physician assistants, or designees perform such procedures as are, in his/her professional opinion, necessary and desirable. If I have a Do Not Attempt Resuscitation (DNAR) order in place, that status will be suspended while in the operating room, procedural suite, and during the recovery period unless otherwise explicitly stated by me (or a person authorized to consent on my behalf). The surgeon or my attending physician will determine when the applicable recovery period ends for purposes of reinstating the DNAR order. 4. Should the need arise during my operation or immediate post-operative period; I also consent to the administration of blood and/or blood products.  Further, I understand that despite careful testing and screening of blood and blood products, I may still be subject to ill effects as a result of recieving a blood transfusion an/or blood producst. The following are some, but not all, of the potential risks that can occur: fever and allergic reactions, hemolytic reactions, transmission of disease such as hepatitis, AIDS, cytomegalovirus (CMV), and flluid overload. In the event that I wish to have autologous transfusions of my own blood, or a directed donor transfusion, I will discuss this with my physician. 5. I consent to the photographing of the operations or procedures to be performed for the purposes of advancing medicine, science, and/or education, provided my identity is not revealed. If the procedure has been videotaped, the physician/surgeon will obtain the original videotape. [facility] will not be responsible for storage or maintenance of this tape. 6. I consent to the presence of a  or observer as deemed necessary by my physician or his designee. 7. Any tissues or organs removed in the operation or other procedure may be disposed of by and at the discretion of [facility].    8. I understand that the physician and his/her physician assistants may not be employees or agents of [facility], [facility], nor [facility], but are independent medical practitioners who have been permitted to use its facilities for the care and treatment of their patients. 9. Patients having a sterilization procedure: I understand that if the procedure is successful the results will be permanent and it will therefore be impossible for me to inseminate, conceive or bear children. I also understand that the procedure is intended to result in sterility, although the result has not been guaranteed. 10. I CERTIFY THAT I HAVE READ AND FULLY UNDERSTAND THE ABOVE CONSENT TO OPERATION and/or OTHER PROCEDURE. 11. I acknowledge that my physician has explained sedation/analgesia administration to me including the risks and benefits.  I consent to the administration of sedation/analgesia as may be necessary or desirable in the judgment of my physician. Signature of Patient:  ________________________________________________ Date: _________Time: _________    Responsible person in case of minor or unconscious: _____________________________Relationship: ____________     Witness Signature: ____________________________________________ Date: __________ Time: ___________    Statement of Physician  My signature below affirms that prior to the time of the procedure, I have explained to the patient and/or her legal representative, the risks and benefits involved in the proposed treatment and any reasonable alternative to the proposed treatment. I have also explained the risks and benefits involved in the refusal of the proposed treatment and have answered the patient's questions. If I have a significant financial interest in this procedure/surgery, I have disclosed this and had a discussion with my patient.     Signature of Physician:   ________________________________________Date: _________Time:_______ Patient Name: Aida Orona  : 1955   Printed: 2022    Medical Record #: [id number]

## (undated) NOTE — LETTER
[facility]  Administration of Anesthesia  1. Hung Singletary, or _________________________________ acting on her behalf, (Patient) (Dependent/Representative) request to receive anesthesia for my pending procedure/operation/treatment. A physician (anesthesiologist) alone or an anesthesiologist working with a nurse anesthetist may administer my anesthesia. 2. I understand that my anesthesiologist is not an employee or agent of the hospital, but is an independent medical practitioner who has been permitted to use its facilities for the care and treatment of his/her patients. 3. I acknowledge that a physician from [facility] or their designate(s), recommended anesthesia for me using her/his medical judgment. The type(s) of anesthesia I may receive include:                a) General Anesthesia, b) Spinal/Epidural Anesthesia, c) Regional Anesthesia or d) Monitored Anesthesia Care. 4. If my spinal, regional or monitored anesthesia care (local) is not satisfactory for my comfort, or if my medical condition requires, I consent to the administration of general anesthesia. 5. I am aware that the practice of anesthesiology is not an exact science and that some foreseeable risks or consequences may occur. Some common risks/consequences include sore throat and hoarseness, nausea and vomiting, muscle soreness, backache, damage to the mouth/teeth/vocal cords and eye injury. I understand that more rare but serious potential risks of anesthesia include blood pressure changes, drug reactions, cardiac arrest, brain damage, paralysis or death. These risks apply to whether I have general, spinal/epidural, regional or monitored anesthesia care. 6. OBSTETRIC PATIENTS: Specific risks/consequences of spinal/epidural anesthesia may include itching, low blood pressure, difficulty urinating, slowing of the baby's heart rate and headache.  Rare risks include infections, high spinal block, spinal bleeding, seizure, cardiac arrest and death. 7. AWARENESS: I understand that it is possible (but unlikely) to have explicit memory of events from the operating room while under general anesthesia. 8. ELECTROCONVULSIVE THERAPY PATIENTS: This consent serves for all treatments in a single course of therapy. 9. I understand that I must inform my anesthesiologist when I last ate and/or drank to minimize the risk of anesthesia. 10. If I am pregnant, or may pregnant, I understand that elective surgery should be postponed until after the baby is born. Anesthetics cross the placenta and may temporarily anesthetize the baby. Although fetal complications of anesthesia during pregnancy are rare, they may include birth defects, premature labor, brain damage and death. 11. I certify that I informed the anesthesiologist, to the best of my ability, about medication I take including blood thinners, anticoagulants, herbal remedies, narcotics and recreational drugs (e.g. cocaine, marijuana, PCP). Failure to inform my anesthesiologist about these medicines may increase my risk of anesthetic complications. The nature and purpose of my anesthetic management was explained to me. I had the opportunity to ask questions and the answers and information provided meet my satisfaction.   I retain the right to withdraw this consent at any time prior to the administration of said anesthetic.    ___________________________________________________           _____________________________________________________  Patient Signature                                                                                      Witness Signature                ___________________________________________________           _____________________________________________________  Date/Time                                                                                               Responsible person in case of minor/ unconscious pt /Relationship    My signature below affirms that prior to the time of the procedure, I have explained to the patient and/or his/her guardian, the risks and benefits of undergoing anesthesia, as well as any reasonable alternatives.     ___________________________________________________            _____________________________________________________  Physician Signature                            Date/Time  Patient Name: Bing Yarbrough     : 1955     Printed: 5/10/2022      Medical Record #: [id number]                              Page 1 of 1    ----------ANESTHESIA CONSENT----------

## (undated) NOTE — MR AVS SNAPSHOT
[facility]  [address] [place] [address]  [phone]               Thank you for choosing us for your health care visit with Rajendra Elena MD.  We are glad to serve you and happy to provide you with this summary of your visit.   Mya 120/82 mmHg 109 98.6 °F (37 °C) (Oral) 5' 3.5\" (1.613 m) 217 lb (98.431 kg) 37.83 kg/m2         Current Medications          This list is accurate as of: 6/13/17  5:40 PM.  Always use your most recent med list.                * BD PEN NEEDLE SKY U/F 32G metolazone 2.5 MG Tabs   Take 1 tablet by mouth as needed. Commonly known as:  ZAROXOLYN           ondansetron 8 MG Tbdp   Take 1 tablet (8 mg total) by mouth every 6 (six) hours as needed for Nausea.  Place on top of the tongue to dissolve, then swallow

## (undated) NOTE — LETTER
[facility]  [address], [place], [place]    Authorization for Surgical Operation and Procedure                               I hereby authorize Herbert Rebollar MD, my physician and his/her assistants (if applicable), which may include medical students, residents, and/or fellows, to perform the following surgical operation/ procedure and administer such anesthesia as may be determined necessary by my physician: Operation/Procedure name (s) ENDOSCOPIC RETROGRADE CHOLANGIOPANCREATOGRAPHY / ENDOSCOPIC ULTRASOUND on Teresa Wilson   2.   I recognize that during the surgical operation/procedure, unforeseen conditions may necessitate additional or different procedures than those listed above.  I, therefore, further authorize and request that the above-named surgeon, assistants, or designees perform such procedures as are, in their judgment, necessary and desirable.    3.   My surgeon/physician has discussed prior to my surgery the potential benefits, risks and side effects of this procedure; the likelihood of achieving goals; and potential problems that might occur during recuperation.  They also discussed reasonable alternatives to the procedure, including risks, benefits, and side effects related to the alternatives and risks related to not receiving this procedure.  I have had all my questions answered and I acknowledge that no guarantee has been made as to the result that may be obtained.    4.   Should the need arise during my operation/procedure, which includes change of level of care prior to discharge, I also consent to the administration of blood and/or blood products.  Further, I understand that despite careful testing and screening of blood or blood products by collecting agencies, I may still be subject to ill effects as a result of receiving a blood transfusion and/or blood products.  The following are some, but not all, of the potential risks that can occur: fever and allergic  reactions, hemolytic reactions, transmission of diseases such as Hepatitis, AIDS and Cytomegalovirus (CMV) and fluid overload.  In the event that I wish to have an autologous transfusion of my own blood, or a directed donor transfusion, I will discuss this with my physician.  Check only if Refusing Blood or Blood Products  I understand refusal of blood or blood products as deemed necessary by my physician may have serious consequences to my condition to include possible death. I hereby assume responsibility for my refusal and release the hospital, its personnel, and my physicians from any responsibility for the consequences of my refusal.    o  Refuse   5.   I authorize the use of any specimen, organs, tissues, body parts or foreign objects that may be removed from my body during the operation/procedure for diagnosis, research or teaching purposes and their subsequent disposal by hospital authorities.  I also authorize the release of specimen test results and/or written reports to my treating physician on the hospital medical staff or other referring or consulting physicians involved in my care, at the discretion of the Pathologist or my treating physician.    6.   I consent to the photographing or videotaping of the operations or procedures to be performed, including appropriate portions of my body for medical, scientific, or educational purposes, provided my identity is not revealed by the pictures or by descriptive texts accompanying them.  If the procedure has been photographed/videotaped, the surgeon will obtain the original picture, image, videotape or CD.  The hospital will not be responsible for storage, release or maintenance of the picture, image, tape or CD.    7.   I consent to the presence of a  or observers in the operating room as deemed necessary by my physician or their designees.    8.   I recognize that in the event my procedure results in extended X-Ray/fluoroscopy time, I may  develop a skin reaction.    9. If I have a Do Not Attempt Resuscitation (DNAR) order in place, that status will be suspended while in the operating room, procedural suite, and during the recovery period unless otherwise explicitly stated by me (or a person authorized to consent on my behalf). The surgeon or my attending physician will determine when the applicable recovery period ends for purposes of reinstating the DNAR order.  10. Patients having a sterilization procedure: I understand that if the procedure is successful the results will be permanent and it will therefore be impossible for me to inseminate, conceive, or bear children.  I also understand that the procedure is intended to result in sterility, although the result has not been guaranteed.   11. I acknowledge that my physician has explained sedation/analgesia administration to me including the risk and benefits I consent to the administration of sedation/analgesia as may be necessary or desirable in the judgment of my physician.    I CERTIFY THAT I HAVE READ AND FULLY UNDERSTAND THE ABOVE CONSENT TO OPERATION and/or OTHER PROCEDURE.     ____________________________________  _________________________________        ______________________________  Signature of Patient    Signature of Responsible Person                Printed Name of Responsible Person                                      ____________________________________  _____________________________                ________________________________  Signature of Witness        Date  Time         Relationship to Patient    STATEMENT OF PHYSICIAN My signature below affirms that prior to the time of the procedure; I have explained to the patient and/or his/her legal representative, the risks and benefits involved in the proposed treatment and any reasonable alternative to the proposed treatment. I have also explained the risks and benefits involved in refusal of the proposed treatment and alternatives to  the proposed treatment and have answered the patient's questions. If I have a significant financial interest in a co-management agreement or a significant financial interest in any product or implant, or other significant relationship used in this procedure/surgery, I have disclosed this and had a discussion with my patient.     _____________________________________________________              _____________________________  (Signature of Physician)                                                                                         (Date)                                   (Time)  Patient Name: Teresa Wilson      : 1955      Printed: 3/11/2025     Medical Record #: [id number]                                      Page 1 of 1

## (undated) NOTE — MR AVS SNAPSHOT
[facility]  [address], [address] [id number] [phone]                    After Visit Summary   5/8/2017    Anabel Hummel    MRN: [id number]           Visit Information        Provider Department Dept Phone    5/8/ [facility]-CON 10 10 MEQ Oral Tab CR TAKE 3 TABLETS(30 MEQ) BY MOUTH TWICE DAILY    ERGOCALCIFEROL 28048 units Oral Cap TAKE 1 CAPSULE BY MOUTH EVERY WEEK    Insulin Pen Needle (CARETOUCH PEN NEEDLES) 32G X 4 MM Does not apply Misc Use to inject QID    FUROSEMI Abnormal liver function tests   [[id number]]  -  Primary       Future Appointments        Provider Department    6/12/2017 1:30 PM [place], [facility]    9/5/2017 11:00 AM Maya Young [address], [address] [place] [address]

## (undated) NOTE — MR AVS SNAPSHOT
[facility]  [address] [place] [address]  [phone]               Thank you for choosing us for your health care visit with Laurel De MD.  We are glad to serve you and happy to provide you with this summary of your visit.   Mya BP Pulse Temp Height Weight BMI    112/74 mmHg 118 98.5 °F (36.9 °C) (Oral) 5' 3.5\" (1.613 m) 217 lb 9.6 oz (98.703 kg) 37.94 kg/m2         Current Medications          This list is accurate as of: 3/8/17  6:00 PM.  Always use your most recent med list. Take 1 tablet by mouth as needed. Commonly known as:  ZAROXOLYN           ondansetron 8 MG Tbdp   Take 1 tablet (8 mg total) by mouth every 6 (six) hours as needed for Nausea.  Place on top of the tongue to dissolve, then swallow   Commonly known as:  ZOF Make half your plate fruits and vegetables Highly refined, white starches including white bread, rice and pasta   Eat plenty of protein, keep the fat content low Sugars:  sodas and sports drinks, candies and desserts   Eat plenty of low-fat dairy products

## (undated) NOTE — LETTER
1/21/2019          [address] [address]    Dear Juan Carvalho,     Here are the results from your recent testing :    Here are the biopsy/pathology findings from your recent EGD (upper endoscopy) and colonoscopy:     The bio

## (undated) NOTE — LETTER
[facility] ULTRASOUND [facility] FOR [facility]  [address] [address]  Authorization for Imaging Procedure      I hereby authorize Dr. CRAIG , my physician and his/her assistants (if applicable), which may include medical students, residents, and/or fellows, to perform the following procedure and administer such anesthesia as may be determined necessary by my physician: ULTRASOUND GUIDED LEFT BREAST BIOPSY AND STEREOTACTIC GUIDED WITH TOMOSYNTHESIS BIOPSY RIGHT BREAST WITH CLIP PLACEMENT TO BOTH SITES on Teresa Wilson.   2.  I recognize that during the procedure, unforeseen conditions may necessitate additional or different procedures than those listed above. I, therefore, further authorize and request that the above-named physician, assistants, or designees perform such procedures as are, in their judgment, necessary and desirable.    3.  My physician has discussed prior to my procedure the potential benefits, risks and side effects of this procedure; the likelihood of achieving goals; and potential problems that might occur during recuperation. They also discussed reasonable alternatives to the procedure, including risks, benefits, and side effects related to the alternatives and risks related to not receiving this procedure. I have had all my questions answered and I acknowledge that no guarantee has been made as to the result that may be obtained.    4.  Should the need arise during my procedure, which includes change of level of care prior to discharge, I also consent to the administration of blood and/or blood products. Further, I understand that despite careful testing and screening of blood or blood products by collecting agencies, I may still be subject to ill effects as a result of receiving a blood transfusion and/or blood products. The following are some, but not all, of the potential risks that can occur: fever and allergic reactions, hemolytic reactions,  transmission of diseases such as Hepatitis, AIDS and Cytomegalovirus (CMV) and fluid overload. In the event that I wish to have an autologous transfusion of my own blood, or a directed donor transfusion, I will discuss this with my physician.  Check only if Refusing Blood or Blood Products  I understand refusal of blood or blood products as deemed necessary by my physician may have serious consequences to my condition to include possible death. I hereby assume responsibility for my refusal and release the hospital, its personnel, and my physicians from any responsibility for the consequences of my refusal.   [  ] Patient Refuses Blood      5.  I authorize the use of any specimen, organs, tissues, body parts or foreign objects that may be removed from my body during the procedure for diagnosis, research or teaching purposes and their subsequent disposal by hospital authorities. I also authorize the release of specimen test results and/or written reports to my treating physician on the hospital medical staff or other referring or consulting physicians involved in my care, at the discretion of the Pathologist or my treating physician.    6.  I consent to the photographing or videotaping of the procedures to be performed, including appropriate portions of my body for medical, scientific, or educational purposes, provided my identity is not revealed by the pictures or by descriptive texts accompanying them. If the procedure has been photographed/videotaped, the physician will obtain the original picture, image, videotape or CD. The hospital will not be responsible for storage, release or maintenance of the picture, image, tape or CD.   7.  I consent to the presence of a  or observers in the operating room as deemed necessary by my physician or their designees.    8.  I recognize that in the event my procedure results in extended X-Ray/fluoroscopy time, I may develop a skin reaction.    9.  If I have a  Do Not Attempt Resuscitation (DNAR) order in place, that status will be suspended while in the operating room, procedural suite, and during the recovery period unless otherwise explicitly stated by me (or a person authorized to consent on my behalf). The performing physician or my attending physician will determine when the applicable recovery period ends for purposes of reinstating the DNAR order.  10.  I acknowledge that my physician has explained sedation/analgesia administration to me including the risk and benefits I consent to the administration of sedation/analgesia as may be necessary or desirable in the judgment of my physician.      I CERTIFY THAT I HAVE READ AND FULLY UNDERSTAND THE ABOVE CONSENT FOR THE PROCEDURE.   Signature of Patient: _____________________________________________________________  Responsible person in case of minor, unconscious: ____________________________________  Relationship to patient:  __________________________________________________________  Signature of Witness: _______________________________Date: _________Time: __________    Statement of Physician: My signature below affirms that prior to the time of the procedure, I have explained to the patient and/or her guardian, the risks and benefits involved in the proposed treatment and any reasonable alternative to the proposed treatment. I have also explained the risks and benefits involved in the refusal of the proposed treatment and have answered the patient's questions. If I have a significant financial interest in a co-management agreement or a significant financial interest in any product or implant, or other significant relationship used in the procedure/surgery, I have disclosed this and had a discussion with my patient.  Signature of Physician:   _________________________________Date:_____________Time:________    Patient Name: Teresa Wilson : 1955  Printed: May 31, 2024   Medical Record #: [id number]

## (undated) NOTE — LETTER
[address] [place] [address]  Authorization for Surgical Operation or Procedure  Date: ______________       Time: _______________  1.  I hereby authorize Dr. Melecio Bo my physician and the assistant, to perform the following operation and/ consent to the photographing of the operations or procedures to be performed for the purposes of advancing medicine, science, and/or education, provided my identity is not revealed.  If the procedure has been videotaped, the physician/surgeon will obtain th proposed treatment and any reasonable alternative to the proposed treatment. I have also explained the risks and benefits involved in the refusal of the proposed treatment and have answered the patient's questions.  If I have a significant financial interes

## (undated) NOTE — LETTER
[facility]  Administration of Anesthesia  1. Rochelle Clark, or _________________________________ acting on her behalf, (Patient) (Dependent/Representative) request to receive anesthesia for my pending procedure/operation/treatment. A physician (anesthesiologist) alone or an anesthesiologist working with a nurse anesthetist may administer my anesthesia. 2. I understand that my anesthesiologist is not an employee or agent of the hospital, but is an independent medical practitioner who has been permitted to use its facilities for the care and treatment of his/her patients. 3. I acknowledge that a physician from [facility] or their designate(s), recommended anesthesia for me using her/his medical judgment. The type(s) of anesthesia I may receive include:                a) General Anesthesia, b) Spinal/Epidural Anesthesia, c) Regional Anesthesia or d) Monitored Anesthesia Care. 4. If my spinal, regional or monitored anesthesia care (local) is not satisfactory for my comfort, or if my medical condition requires, I consent to the administration of general anesthesia. 5. I am aware that the practice of anesthesiology is not an exact science and that some foreseeable risks or consequences may occur. Some common risks/consequences include sore throat and hoarseness, nausea and vomiting, muscle soreness, backache, damage to the mouth/teeth/vocal cords and eye injury. I understand that more rare but serious potential risks of anesthesia include blood pressure changes, drug reactions, cardiac arrest, brain damage, paralysis or death. These risks apply to whether I have general, spinal/epidural, regional or monitored anesthesia care. 6. OBSTETRIC PATIENTS: Specific risks/consequences of spinal/epidural anesthesia may include itching, low blood pressure, difficulty urinating, slowing of the baby's heart rate and headache.  Rare risks include infections, high spinal block, spinal bleeding, seizure, cardiac arrest and death. 7. AWARENESS: I understand that it is possible (but unlikely) to have explicit memory of events from the operating room while under general anesthesia. 8. ELECTROCONVULSIVE THERAPY PATIENTS: This consent serves for all treatments in a single course of therapy. 9. I understand that I must inform my anesthesiologist when I last ate and/or drank to minimize the risk of anesthesia. 10. If I am pregnant, or may pregnant, I understand that elective surgery should be postponed until after the baby is born. Anesthetics cross the placenta and may temporarily anesthetize the baby. Although fetal complications of anesthesia during pregnancy are rare, they may include birth defects, premature labor, brain damage and death. 11. I certify that I informed the anesthesiologist, to the best of my ability, about medication I take including blood thinners, anticoagulants, herbal remedies, narcotics and recreational drugs (e.g. cocaine, marijuana, PCP). Failure to inform my anesthesiologist about these medicines may increase my risk of anesthetic complications. The nature and purpose of my anesthetic management was explained to me. I had the opportunity to ask questions and the answers and information provided meet my satisfaction.   I retain the right to withdraw this consent at any time prior to the administration of said anesthetic.    ___________________________________________________           _____________________________________________________  Patient Signature                                                                                      Witness Signature                ___________________________________________________           _____________________________________________________  Date/Time                                                                                               Responsible person in case of minor/ unconscious pt /Relationship    My signature below affirms that prior to the time of the procedure, I have explained to the patient and/or his/her guardian, the risks and benefits of undergoing anesthesia, as well as any reasonable alternatives.     ___________________________________________________            _____________________________________________________  Physician Signature                            Date/Time  Patient Name: Cailin Peoples     : 1955     Printed: 2022      Medical Record #: [id number]                              Page 1 of 1    ----------ANESTHESIA CONSENT----------

## (undated) NOTE — Clinical Note
She has so much going on. [employer] fitness, but she was not sure. Sees you again 6/19, mammogram yesterday.